# Patient Record
Sex: MALE | Race: WHITE | NOT HISPANIC OR LATINO | Employment: OTHER | URBAN - NONMETROPOLITAN AREA
[De-identification: names, ages, dates, MRNs, and addresses within clinical notes are randomized per-mention and may not be internally consistent; named-entity substitution may affect disease eponyms.]

---

## 2017-08-22 ENCOUNTER — HISTORY (OUTPATIENT)
Dept: INTERNAL MEDICINE | Facility: OTHER | Age: 77
End: 2017-08-22

## 2017-08-22 ENCOUNTER — OFFICE VISIT - GICH (OUTPATIENT)
Dept: INTERNAL MEDICINE | Facility: OTHER | Age: 77
End: 2017-08-22

## 2017-08-22 DIAGNOSIS — L57.8 OTHER SKIN CHANGES DUE TO CHRONIC EXPOSURE TO NONIONIZING RADIATION: ICD-10-CM

## 2017-08-22 DIAGNOSIS — C44.90 MALIGNANT NEOPLASM OF SKIN: ICD-10-CM

## 2017-08-22 ASSESSMENT — PATIENT HEALTH QUESTIONNAIRE - PHQ9: SUM OF ALL RESPONSES TO PHQ QUESTIONS 1-9: 0

## 2017-08-25 ENCOUNTER — AMBULATORY - GICH (OUTPATIENT)
Dept: SURGERY | Facility: OTHER | Age: 77
End: 2017-08-25

## 2017-08-25 ENCOUNTER — HISTORY (OUTPATIENT)
Dept: SURGERY | Facility: OTHER | Age: 77
End: 2017-08-25

## 2017-08-25 DIAGNOSIS — C44.90 MALIGNANT NEOPLASM OF SKIN: ICD-10-CM

## 2017-08-30 ENCOUNTER — AMBULATORY - GICH (OUTPATIENT)
Dept: SURGERY | Facility: OTHER | Age: 77
End: 2017-08-30

## 2017-08-30 DIAGNOSIS — C44.90 MALIGNANT NEOPLASM OF SKIN: ICD-10-CM

## 2017-09-01 ENCOUNTER — HISTORY (OUTPATIENT)
Dept: SURGERY | Facility: OTHER | Age: 77
End: 2017-09-01

## 2017-09-01 ENCOUNTER — OFFICE VISIT - GICH (OUTPATIENT)
Dept: SURGERY | Facility: OTHER | Age: 77
End: 2017-09-01

## 2017-09-01 DIAGNOSIS — C44.92 SQUAMOUS CELL CARCINOMA OF SKIN: ICD-10-CM

## 2017-09-08 ENCOUNTER — OFFICE VISIT - GICH (OUTPATIENT)
Dept: SURGERY | Facility: OTHER | Age: 77
End: 2017-09-08

## 2017-09-08 ENCOUNTER — HISTORY (OUTPATIENT)
Dept: SURGERY | Facility: OTHER | Age: 77
End: 2017-09-08

## 2017-09-08 DIAGNOSIS — C44.92 SQUAMOUS CELL CARCINOMA OF SKIN: ICD-10-CM

## 2017-09-08 DIAGNOSIS — L57.0 ACTINIC KERATOSIS: ICD-10-CM

## 2017-09-08 DIAGNOSIS — C44.519 BASAL CELL CARCINOMA OF SKIN OF OTHER PART OF TRUNK: ICD-10-CM

## 2017-10-03 ENCOUNTER — OFFICE VISIT - GICH (OUTPATIENT)
Dept: SURGERY | Facility: OTHER | Age: 77
End: 2017-10-03

## 2017-10-03 ENCOUNTER — HISTORY (OUTPATIENT)
Dept: SURGERY | Facility: OTHER | Age: 77
End: 2017-10-03

## 2017-10-03 DIAGNOSIS — L57.0 ACTINIC KERATOSIS: ICD-10-CM

## 2017-12-01 ENCOUNTER — HISTORY (OUTPATIENT)
Dept: INTERNAL MEDICINE | Facility: OTHER | Age: 77
End: 2017-12-01

## 2017-12-01 ENCOUNTER — OFFICE VISIT - GICH (OUTPATIENT)
Dept: INTERNAL MEDICINE | Facility: OTHER | Age: 77
End: 2017-12-01

## 2017-12-01 ENCOUNTER — AMBULATORY - GICH (OUTPATIENT)
Dept: ORTHOPEDICS | Facility: OTHER | Age: 77
End: 2017-12-01

## 2017-12-01 DIAGNOSIS — G89.29 OTHER CHRONIC PAIN: ICD-10-CM

## 2017-12-01 DIAGNOSIS — I10 ESSENTIAL (PRIMARY) HYPERTENSION: ICD-10-CM

## 2017-12-01 DIAGNOSIS — Z23 ENCOUNTER FOR IMMUNIZATION: ICD-10-CM

## 2017-12-01 DIAGNOSIS — R47.1 DYSARTHRIA AND ANARTHRIA: ICD-10-CM

## 2017-12-01 DIAGNOSIS — M25.512 PAIN IN LEFT SHOULDER: ICD-10-CM

## 2017-12-01 DIAGNOSIS — I67.9 CEREBROVASCULAR DISEASE: ICD-10-CM

## 2017-12-01 DIAGNOSIS — N13.8 OTHER OBSTRUCTIVE AND REFLUX UROPATHY: ICD-10-CM

## 2017-12-01 DIAGNOSIS — E78.5 HYPERLIPIDEMIA: ICD-10-CM

## 2017-12-01 DIAGNOSIS — I25.10 ATHEROSCLEROTIC HEART DISEASE OF NATIVE CORONARY ARTERY WITHOUT ANGINA PECTORIS: ICD-10-CM

## 2017-12-01 DIAGNOSIS — R73.09 OTHER ABNORMAL GLUCOSE: ICD-10-CM

## 2017-12-01 DIAGNOSIS — N40.1 ENLARGED PROSTATE WITH LOWER URINARY TRACT SYMPTOMS (LUTS): ICD-10-CM

## 2017-12-01 DIAGNOSIS — I25.84 CORONARY ATHEROSCLEROSIS DUE TO CALCIFIED CORONARY LESION (CODE): ICD-10-CM

## 2017-12-01 LAB
A/G RATIO - HISTORICAL: 1.8 (ref 1–2)
ALBUMIN SERPL-MCNC: 4.7 G/DL (ref 3.5–5.7)
ALP SERPL-CCNC: 65 IU/L (ref 34–104)
ALT (SGPT) - HISTORICAL: 17 IU/L (ref 7–52)
ANION GAP - HISTORICAL: 10 (ref 5–18)
AST SERPL-CCNC: 20 IU/L (ref 13–39)
BILIRUB SERPL-MCNC: 0.8 MG/DL (ref 0.3–1)
BUN SERPL-MCNC: 20 MG/DL (ref 7–25)
BUN/CREAT RATIO - HISTORICAL: 20
CALCIUM SERPL-MCNC: 9.2 MG/DL (ref 8.6–10.3)
CHLORIDE SERPLBLD-SCNC: 100 MMOL/L (ref 98–107)
CHOL/HDL RATIO - HISTORICAL: 3.03
CHOLESTEROL TOTAL: 209 MG/DL
CO2 SERPL-SCNC: 25 MMOL/L (ref 21–31)
CREAT SERPL-MCNC: 0.98 MG/DL (ref 0.7–1.3)
ESTIMATED AVERAGE GLUCOSE: 105 MG/DL
GFR IF NOT AFRICAN AMERICAN - HISTORICAL: >60 ML/MIN/1.73M2
GLOBULIN - HISTORICAL: 2.6 G/DL (ref 2–3.7)
GLUCOSE SERPL-MCNC: 106 MG/DL (ref 70–105)
HDLC SERPL-MCNC: 69 MG/DL (ref 23–92)
HEMOGLOBIN A1C MONITORING (POCT) - HISTORICAL: 5.3 % (ref 4–6.2)
LDLC SERPL CALC-MCNC: 119 MG/DL
NON-HDL CHOLESTEROL - HISTORICAL: 140 MG/DL
POTASSIUM SERPL-SCNC: 4.2 MMOL/L (ref 3.5–5.1)
PROT SERPL-MCNC: 7.3 G/DL (ref 6.4–8.9)
PROVIDER ORDERDED STATUS - HISTORICAL: ABNORMAL
PSA TOTAL (DIAGNOSTIC) - HISTORICAL: 2.96 NG/ML
SODIUM SERPL-SCNC: 135 MMOL/L (ref 133–143)
TRIGL SERPL-MCNC: 103 MG/DL

## 2017-12-04 ENCOUNTER — HISTORY (OUTPATIENT)
Dept: ORTHOPEDICS | Facility: OTHER | Age: 77
End: 2017-12-04

## 2017-12-04 ENCOUNTER — AMBULATORY - GICH (OUTPATIENT)
Dept: ORTHOPEDICS | Facility: OTHER | Age: 77
End: 2017-12-04

## 2017-12-04 ENCOUNTER — OFFICE VISIT - GICH (OUTPATIENT)
Dept: ORTHOPEDICS | Facility: OTHER | Age: 77
End: 2017-12-04

## 2017-12-04 ENCOUNTER — HOSPITAL ENCOUNTER (OUTPATIENT)
Dept: RADIOLOGY | Facility: OTHER | Age: 77
End: 2017-12-04
Attending: ORTHOPAEDIC SURGERY

## 2017-12-04 DIAGNOSIS — G89.29 OTHER CHRONIC PAIN: ICD-10-CM

## 2017-12-04 DIAGNOSIS — M75.42 IMPINGEMENT SYNDROME OF LEFT SHOULDER: ICD-10-CM

## 2017-12-04 DIAGNOSIS — M75.82 OTHER SHOULDER LESIONS, LEFT SHOULDER: ICD-10-CM

## 2017-12-04 DIAGNOSIS — M25.512 PAIN IN LEFT SHOULDER: ICD-10-CM

## 2017-12-04 DIAGNOSIS — M19.012 PRIMARY OSTEOARTHRITIS OF LEFT SHOULDER: ICD-10-CM

## 2017-12-12 ENCOUNTER — COMMUNICATION - GICH (OUTPATIENT)
Dept: ORTHOPEDICS | Facility: OTHER | Age: 77
End: 2017-12-12

## 2017-12-27 NOTE — PROGRESS NOTES
Patient Information     Patient Name MRN Sex Wilfrid Stewart [id number] Male 1940      Progress Notes by Nanette Marcelo DO at 2017  1:00 PM     Author:  Nanette Marcelo DO Service:  (none) Author Type:  PHYS- Osteopathic     Filed:  9/3/2017  8:29 PM Encounter Date:  2017 Status:  Signed     :  Nanette Marcelo DO (PHYS- Osteopathic)            Patient is doing well post-op from there recent skin lesion removal..   He has been having no nausea or vomiting; no chest pain, shortness of breath or coughing up anything. Patient has been urinating without any difficulties and moving bowel w/ no straining or bleeding. Patient has no calf pain swelling, tenderness, or redness. Patient has been sleeping at night with no problems. Patient has been ambulating without difficulty. Patient has been able to tolerate a regular diet. Patient has had good relief with previously prescribed pain meds.  The deep margin was +.  If reoccurs- than re-excise.    Patient has an additional lesion by the right lower corner of the lip- he is nonsmoker/nonchewer.  We treated this w/ Liquid N today.  Follow up in 1 weeks time.    /70  Pulse 68  Wt 72.5 kg (159 lb 12.8 oz)  BMI 27.43 kg/m2    HEENT: all negative  No jaundice.  No eye redness or pain.  No throat pain.  L: CTA b/l  Abdom: + BS. no distensions.  LOWER EXTREMITY: no swelling or calf pain  The incision(s) is clean dry and intact without redness, drainage or swelling or bleeding.   ?  Pathology: see Epic  ?  Pt doing well; with no complaints. Reviewed path report. Incision(s): Clean/dry/intact and healing nicely. Removed sutures and placed steri's.    Pt should keep the area clean and dry: wash with plain soap and water. Keep covered. Does not need to put anything on the lesions. May use abx ointment if desires. Avoid lifting Over 5 #'s x1 week. No strenuous activity or hot tubs/sauna's/pool/lake x1 week. Ice and NSAID's for pain. Monitor for  redness/drainage/swelling/increase in pain/temp > 101. May have serous drainage/should not be purulent. Call Clinic if these occur.  As far as heeling: may be red and firm for about 1-3 weeks. This is the normal heeling process and will smooth out to a silvery/white line. Avoid sun exposure X1 year. May take months for redness to dissipate. If is sun burnt, will stay red. Can use vit E oil.  Today we discussed wound care, activities, return to work, warnings signs. Pt is doing well.    If also have a few AK/SK that we have treated w/ liquid N.  These are healing nicely    The patient and I reviewed safe sun practices today: the importance of staying out of the sun; especially between 10-PM, wearing sun screen SPF > 50, and protective clothing. We also discussed the ABC's of skin cancers including: changes in size, uniformity, borders, coloration, bleeding, or any irregularity or changes. If these occur, seek medical attention. The patient should have a complete skin exam by a medical professional every 6 months, and any questionable/suspicious, or changing lesions should be removed.     .  -if lip lesion doesn't improve- refer to ENT      Patient Active Problem List     Diagnosis  Code     HYPERTENSION I10     BPH (benign prostatic hypertrophy) with urinary obstruction N40.1, N13.8     HYPERLIPIDEMIA E78.5     COLONIC POLYPS D12.6     ALCOHOL ABUSE F10.10     HYPERGLYCEMIA R73.09     Basal cell carcinoma of back C44.519     Coronary artery calcification I25.10, I25.84     Lumbar spinal stenosis M48.06     Cerebral microvascular disease I67.9     Dysarthria R47.1         Call the office if have any questions or concern's.  F/u with PCP for routine medical care.  Cobos not require further pain med's.  Will F/U :ashly Marcelo, DO

## 2017-12-27 NOTE — PROGRESS NOTES
Patient Information     Patient Name MRN Sex Wilfrid Stewart [id number] Male 1940      Progress Notes by Jimenez Colon MD at 2017  9:40 AM     Author:  Jimenez Colon MD Service:  (none) Author Type:  Physician     Filed:  2017  9:51 AM Encounter Date:  2017 Status:  Signed     :  Jimenez Colon MD (Physician)            SUBJECTIVE:    Wilfrid Meneses is a 77 y.o. male who presents for spot on his ear.    HPI Comments: He is in today for a few skin lesions. He has a couple on his right ear that he would like checked. He also has some scaly areas on his right forehead. Other than that he feels fine and has no complaints. It was noted today that his blood pressure looks excellent. He does have a previous history of basal cell carcinomas. He has no other complaints or concerns.      Allergies     Allergen  Reactions     Ace Inhibitors Cough     Septra [Sulfamethoxazole-Trimethoprim] Rash   ,   Current Outpatient Prescriptions     Medication  Sig     atenolol (TENORMIN) 50 mg tablet Take 1 tablet by mouth once daily.     multivitamin (MVI) tablet Take 1 tablet by mouth once daily.     simvastatin (ZOCOR) 40 mg tablet Take 1 tablet by mouth at bedtime.     valsartan-hydrochlorothiazide (DIOVAN HCT) 320-12.5 mg tablet Take 1 tablet by mouth once daily.     No current facility-administered medications for this visit.      Medications have been reviewed by me and are current to the best of my knowledge and ability. ,   Past Medical History:     Diagnosis  Date     Alcohol abuse, unspecified      BPH (benign prostatic hyperplasia)     Post prostatectomy      Coronary artery calcification     score 297 2010      Hx of adenomatous colonic polyps      Hyperglycemia      Hyperlipidemia      Hypertension      Lumbar spinal stenosis      Macrocytosis      presumed secondary to alcohol      Pulmonary HTN     on ECHO 2010      Stress ECHO 2010     negative for ischemia     and  "  Patient Active Problem List       Diagnosis  Date Noted     Cerebral microvascular disease  05/18/2016     Dysarthria  05/18/2016     Coronary artery calcification  12/01/2015     Lumbar spinal stenosis       Basal cell carcinoma of back  12/01/2014     HYPERGLYCEMIA  12/14/2011     ALCOHOL ABUSE  12/05/2011     HYPERTENSION       BPH (benign prostatic hypertrophy) with urinary obstruction       HYPERLIPIDEMIA       COLONIC POLYPS       History of colonic polyps, colonoscopy 1/01, follow up done 2006, normal            REVIEW OF SYSTEMS:  Review of Systems   All other systems reviewed and are negative.      OBJECTIVE:  /76  Pulse 68  Ht 1.626 m (5' 4\")  Wt 72.1 kg (159 lb)  BMI 27.29 kg/m2    EXAM:   Physical Exam   Constitutional: He is well-developed, well-nourished, and in no distress. No distress.   HENT:   Head:       Ears:    Skin: He is not diaphoretic.   Nursing note and vitals reviewed.      ASSESSMENT/PLAN:    ICD-10-CM    1. Skin cancer C44.90 AMB CONSULT TO GENERAL SURGEON   2. Actinic skin damage L57.8 PA DESTROY PREMALIGNANT 1ST LESION        Plan:  I treated basal cell carcinoma and needs to be removed. He is sent to surgery to have this done.the actinic lesions pect of the right           "

## 2017-12-28 ENCOUNTER — AMBULATORY - GICH (OUTPATIENT)
Dept: INTERNAL MEDICINE | Facility: OTHER | Age: 77
End: 2017-12-28

## 2017-12-28 NOTE — PROGRESS NOTES
Patient Information     Patient Name MRN Sex Wilfrid Stewart [id number] Male 1940      Progress Notes by Nanette Marcelo DO at 2017 12:00 PM     Author:  Nanette Marcelo DO Service:  (none) Author Type:  PHYS- Osteopathic     Filed:  2017  8:30 PM Encounter Date:  2017 Status:  Signed     :  Nanette Marcelo DO (PHYS- Osteopathic)            Patient is doing well post-op from there recent lesion removal.  He had a stitch that was still in place and we removed this.  He had several actinic keratosis/sk we treated.  particularly one at the angle of the right lower lip.   This is not healed over yet.  We need to re-evaluate this to make sure this is not a squamous lesion of the lip.     The other lesions are treated are healing nicely w/ no infections     /70  Pulse 66  Wt 72.7 kg (160 lb 4 oz)  BMI 27.51 kg/m2    HEENT: all negative  No jaundice.  No eye redness or pain.  No throat pain.  Benign tremor     The incision(s) is clean dry and intact without redness, drainage or swelling or bleeding.   ?  Pathology: see Epic  ?  Pt doing well; with no complaints. Reviewed path report. Incision(s): Clean/dry/intact and healing nicely.    Pt should keep the area clean and dry: wash with plain soap and water. Keep covered. Does not need to put anything on the lesions. May use abx ointment if desires. Avoid lifting Over 5 #'s x1 week. No strenuous activity or hot tubs/sauna's/pool/lake x1 week. Ice and NSAID's for pain. Monitor for redness/drainage/swelling/increase in pain/temp > 101. May have serous drainage/should not be purulent. Call Clinic if these occur.  As far as heeling: may be red and firm for about 1-3 weeks. This is the normal heeling process and will smooth out to a silvery/white line. Avoid sun exposure X1 year. May take months for redness to dissipate. If is sun burnt, will stay red. Can use vit E oil.  Today we discussed wound care, activities, return to work,  warnings signs. Pt is doing well.    The patient and I reviewed safe sun practices today: the importance of staying out of the sun; especially between 10-PM, wearing sun screen SPF > 50, and protective clothing. We also discussed the ABC's of skin cancers including: changes in size, uniformity, borders, coloration, bleeding, or any irregularity or changes. If these occur, seek medical attention. The patient should have a complete skin exam by a medical professional every 6 months, and any questionable/suspicious, or changing lesions should be removed.           Patient Active Problem List     Diagnosis  Code     HYPERTENSION I10     BPH (benign prostatic hypertrophy) with urinary obstruction N40.1, N13.8     HYPERLIPIDEMIA E78.5     COLONIC POLYPS D12.6     ALCOHOL ABUSE F10.10     HYPERGLYCEMIA R73.09     Basal cell carcinoma of back C44.519     Coronary artery calcification I25.10, I25.84     Lumbar spinal stenosis M48.06     Cerebral microvascular disease I67.9     Dysarthria R47.1     Squamous cell skin cancer C44.92         Call the office if have any questions or concern's.  F/u with PCP for routine medical care.  Cobos not require further pain med's.  Will F/U :1st week in October           Nanette Marcelo, DO

## 2017-12-28 NOTE — PROGRESS NOTES
Patient Information     Patient Name MRN Sex Wilfrid Stewart [id number] Male 1940      Progress Notes by Nanette Marcelo DO at 2017  1:50 PM     Author:  Nanette Marcelo DO Service:  (none) Author Type:  PHYS- Osteopathic     Filed:  2017  2:50 PM Encounter Date:  2017 Status:  Signed     :  Nanette Marcelo DO (PHYS- Osteopathic)            Clinic Procedure Note      PMx, PSx, and social Hx are reviewed and updated in [facility]    Current Outpatient Prescriptions on File Prior to Visit       Medication  Sig Dispense Refill     atenolol (TENORMIN) 50 mg tablet Take 1 tablet by mouth once daily. 90 tablet 3     multivitamin (MVI) tablet Take 1 tablet by mouth once daily.       simvastatin (ZOCOR) 40 mg tablet Take 1 tablet by mouth at bedtime. 90 tablet 3     valsartan-hydrochlorothiazide (DIOVAN HCT) 320-12.5 mg tablet Take 1 tablet by mouth once daily. 90 tablet 3     No current facility-administered medications on file prior to visit.        Allergies      Allergen   Reactions     Ace Inhibitors  Cough     Hydrocodone-Acetaminophen  Insomnia     Keeps him awake       Septra [Sulfamethoxazole-Trimethoprim]  Rash     Sulfasalazine  Rash         No visits with results within 90 Day(s) from this visit.  Latest known visit with results is:    Office Visit on 12/15/2016      Component  Date Value     SODIUM 12/15/2016 134      POTASSIUM 12/15/2016 4.6      CHLORIDE 12/15/2016 98      CO2,TOTAL 12/15/2016 26      ANION GAP 12/15/2016 10      GLUCOSE 12/15/2016 116*     CALCIUM 12/15/2016 8.8      BUN 12/15/2016 17      CREATININE 12/15/2016 1.03      BUN/CREAT RATIO           12/15/2016 17      GFR if  12/15/2016 >60      GFR if not  [employer]* 12/15/2016 >60      ALBUMIN 12/15/2016 4.5      PROTEIN,TOTAL 12/15/2016 6.8      GLOBULIN                  12/15/2016 2.3      A/G RATIO 12/15/2016 2.0      BILIRUBIN,TOTAL 12/15/2016 0.8      ALK PHOSPHATASE 12/15/2016 54       ALT (SGPT) 12/15/2016 17      AST (SGOT) 12/15/2016 21      CHOLESTEROL,TOTAL 12/15/2016 195      TRIGLYCERIDES 12/15/2016 75      HDL CHOLESTEROL 12/15/2016 68      NON-HDL CHOLESTEROL 12/15/2016 127      CHOL/HDL RATIO            12/15/2016 2.87      LDL CHOLESTEROL 12/15/2016 112*     PATIENT STATUS            12/15/2016 FASTING      PSA TOTAL (DIAGNOSTIC) 12/15/2016 3.026      HEMOGLOBIN A1C MONITORIN* 12/15/2016 6.0      ESTIMATED AVERAGE GLUCOS* 12/15/2016 126      VITAMIN B12 12/15/2016 735          Any imagining associated with this vist was reviewed.      Indication  Wilfrid Meneses is seen at the request of the Jimenez Colon MD.  Wilfrid Meneses  presents for lesion removal. We have discussed this procedure, including option  of not performing surgery, technique of surgery and potential for  bleeding/infection/scarring/need for removal of more tissue and post-procedural care.  Patient is able to do post procedural dressing changes and understands what is  expected.    OBJECTIVE:    Wilfrid Meneses appears well. Vitals are normal. The patient has no allergies to lidocaine or  suture material. The patient not on any blood thinners.  Informed consent was obtained prior to beginning the procedure. The area was marked  and doubled checked/ID ed with the pt. PAUSE for the CAUSE completed. The risks of  lesion removal include but are not limited to: bleeding, infection, scarring, reoccurrence,  and the need for removal of more tissue, and complications of anesthesia.    Location  ASSESSMENT: The lesion is 0.5Cm in size. Color:reddish   Borders: irreg  Differential diagnosis includes:skin cancer vs sk    Location: right extremity ear     Procedural Sedation  1% lidocaine w/ Epinephrine  2 cc    Technique  Patient appears well. Vitals are normal. The patient has no allergies to lidocaine or  suture material. The patient not on any blood thinners.  Wilfrid Meneses has no history of keloids or problems with healing  in the past.    Skin: see HPI    Informed consent was obtained prior to beginning the procedure. The area was marked  and doubled checked/ID ed with the pt. PAUSE for the CAUSE completed. The risks of  lesion removal include but are not limited to: bleeding, infection, scarring, reoccurrence,  and the need for removal of more tissue, and complications of anesthesia.  After informed consent was obtained, using Chloroprep for cleansing and 1%   Lidocaine w/ epi for anesthetic; using sterile technique, PROCEDURE:excision  was performed.    The lesion is 0.5cm in size. The incision was 0.6Cm long.  Closure:4-0 prolen  of  simple single interrupted stitches.     An Antibiotic salve and steriledressing is applied, and wound care instructions provided. The procedure was well tolerated without complications.    Plan:  The patient will follow up in 7- 10  days for suture removal and review of pathology. If thereis any bleeding/redness/drainage/swelling/pain or tenderness- call the clinic. Patient  was given instructions in wound care, acivity, warning signs, appointment for follow up.  If the patient has any questions or concerns, should call our clinic or go to ER/urgent  care after hours. Patient expressed understanding in directions for care, and was given a  written copy of instructions.    Rx=see EPIC    Pt tolerated the procedure well without complications  Patient was given instruction in activity restrictions, wound care and dressing changes. Medication usage, diet, warning signs to look for (and what to do if they occur), and an appointment for follow-up.      Caring for Your Incision      You ll need to help care for your incision after surgery and certain medical procedures. To close an incision, your healthcare provider used stitches (sutures), special strips of surgical tape called Steri-Strips, surgical staples, or surgical skin glue. Follow the tips on this sheet to help stop bleeding, speed healing, and  prevent infection of your incision.      Types of incision closures    Surgical stitches (sutures) are placed by sewing the edges of an incision together with surgical thread. Sutures are either absorbable or non-absorbable. Absorbable sutures break down in the body over time. Non-absorbable sutures need to be removed.    Home care  Always wash your hands before touching your incision.  Keep the incision clean, dry, and out of water, keep the incision out of water.  Do not to pick at the scabs. Scabs help protect the wound.  You can take a shower in 24 hours and wash the incision with soap and water. Pat dry/don t scrub. It s OK to wash around the incision. But don t spray water directly on it.  Pat stitches dry if they get wet. Don't rub.  Check the incision site daily for pain, redness, drainage, swelling, or separation of the incision edges.  If there is a bandage (dressing) over the incision, leave the dressing in place until you are told to remove it or change it. Using clean hands change the dressing as directed by your healthcare provider. Always wash your hands before changing your dressing.  Make sure any clothing that touches the incision is loose-fitting. This will prevent rubbing.   Try to avoid from rough play, contact sports, or physical activities. This can put you at risk of opening the incision.  Make sure you avoid doing things that could cause dirt or sweat to get in or on the incision.  As your incision heals, the skin may appear pink or red. It may also feel slightly bumpy or raised. This is called a healing ridge. Over time, the color should fade and the raised skin will become less noticeable.    Care for specific closures  Follow these guidelines unless your healthcare provider tells you otherwise:  Sutures or staples. Once you no longer need to keep these dry, clean the incision or wound daily. First remove the bandage using clean hands. Then wash the area gently with soap and warm water.  Use a wet cotton swab to loosen and remove any blood or crust that forms. After cleaning, put a thin layer of antibiotic ointment on. Then put on a new bandage.      Pain Control    Use ice!  Ice keeps the swelling down and swelling is what causes pain.  Never apply ice directly to the skin.  Wrap it in a towel or cloth.  Apply ice 20 minutes on and 20 minutes off for pain control.  Use as needed.    Take tylenol 325 mg by mouth with food every 4 hours as needed for pain. Or ibuprofen 400 mg by mouth with food every 4 hours as needed for pain.  Do not take tylenol if you have a history of heavy drinking , hepatits C or liver problems.  Do not take ibuprofen if you have a history of stomach ulcers/problems, bleeding problem or kidney issues.           Follow-up care      Follow up with your healthcare provider to ask how long sutures or staples should be left in place. Be sure to return for suture or staple removal as directed. If dissolving stitches were used in your mouth, these will not need to be removed. They should fall out or dissolve on their own.  If tape closures were used, remove them yourself when your healthcare provider tells you to if they have not fallen off on their own. If skin glue was used to close your incision, the glue will wear off by itself.    When to seek medical care  Call your healthcare provider right away if you has any of these:  More pain, redness, swelling, bleeding, or foul-smelling discharge around the incision area  Fever of 101 F (38.3 C) or higher, or as directed by your  healthcare provider  Shaking chills  Vomiting or nausea that doesn t go away  Numbness, coldness, or tingling around the incision area, or changes in skin color  Opening of the sutures or wound  Stitches or staples come apart or fall out or surgical tape falls off before 7 days, or as directed by your healthcare provider

## 2017-12-28 NOTE — PATIENT INSTRUCTIONS
Patient Information     Patient Name MRN Sex Wilfrid Stewart [id number] Male 1940      Patient Instructions by Nanette Marcelo DO at 2017  1:00 PM     Author:  Nanette Marcelo DO  Service:  (none) Author Type:  PHYS- Osteopathic     Filed:  9/3/2017  8:29 PM  Encounter Date:  2017 Status:  Addendum     :  Nanette Marcelo DO (PHYS- Osteopathic)        Related Notes: Original Note by Nanette Marcelo DO (PHYS- Osteopathic) filed at 9/3/2017  8:24 PM            Pt should keep the area clean and dry: wash with plain soap and water. Keep covered. Does not need to put anything on the lesions. May use abx ointment if desires. Avoid lifting Over 5 #'s x1 week. No strenuous activity or hot tubs/sauna's/pool/lake x1 week. Ice and NSAID's for pain. Monitor for redness/drainage/swelling/increase in pain/temp > 101. May have serous drainage/should not be purulent. Call Clinic if these occur.  As far as heeling: may be red and firm for about 1-3 weeks. This is the normal heeling process and will smooth out to a silvery/white line. Avoid sun exposure X1 year. May take months for redness to dissipate. If is sun burnt, will stay red. Can use vit E oil.  Today we discussed wound care, activities, return to work, warnings signs. Pt is doing well.    reoccurs, return for re excision  -follow up in 1 weeks time to check lip lesion     -The patient and I reviewed safe sun practices today: the importance of staying out of the sun; especially between 10-PM, wearing sun screen SPF > 50, and protective clothing. We also discussed the ABC's of skin cancers including: changes in size, uniformity, borders, coloration, bleeding, or any irregularity or changes. If these occur, seek medical attention. The patient should have a complete skin exam by a medical professional every 6 months, and any questionable/suspicious, or changing lesions should be removed.

## 2017-12-28 NOTE — PROGRESS NOTES
Patient Information     Patient Name MRN Sex Wilfrid Stewart [id number] Male 1940      Progress Notes by Maria Luisa Dowell MD at 10/3/2017  1:20 PM     Author:  Maria Luisa Dowell MD Service:  (none) Author Type:  Physician     Filed:  10/3/2017  1:46 PM Encounter Date:  10/3/2017 Status:  Signed     :  Maria Luisa Dowell MD (Physician)            Patient presents for follow up of excision of skin lesion from his right ear and cryo of lesion on his lower lip. He notes a spot on the back of his head that is rough and scaly. The previously treated lesions have resolved and no nodules or sores have started again.  /78  Pulse 60  Wt 74.6 kg (164 lb 6.4 oz)  BMI 28.22 kg/m2  General: no acute distress  Skin: right ear with scar and no nodularity or ulceration. Right lower lip with no area of rough or scaly skin. Posterior scalp with area of hyperpigmentation and just superior to that is a crescent shaped area of rough, scaly pink skin consistent with AK.  A: actinic keratosis of scalp  Plan  We discussed cryo therapy of the scalp lesion. We specifically discussed the risks of infection, discoloration and the possible need for further treatments. The patient expressed understanding and the patient wishes to proceed. Informed consent paperwork was completed.     Procedure:  The area of the skin lesion on the posterior scalp was treated with liquid nitrogen for 2 freeze thaw cycles. The patient tolerated the procedure with no immediately apparent complications. We reviewed discharge instructions. The patient will call for any concerns. The patient will follow up in 6 weeks if the lesion is not completely resolved. The patient denies questions at this time.

## 2017-12-29 NOTE — PATIENT INSTRUCTIONS
Patient Information     Patient Name MRN Sex Wilfrid Stewart [id number] Male 1940      Patient Instructions by Nanette Marcelo DO at 2017  1:50 PM     Author:  Nanette Marcelo DO Service:  (none) Author Type:  PHYS- Osteopathic     Filed:  2017  3:06 PM Encounter Date:  2017 Status:  Signed     :  Nanette Marcelo DO (PHYS- Osteopathic)            Caring for Your Incision      You ll need to help care for your incision after surgery and certain medical procedures. To close an incision, your healthcare provider used stitches (sutures), special strips of surgical tape called Steri-Strips, surgical staples, or surgical skin glue. Follow the tips on this sheet to help stop bleeding, speed healing, and prevent infection of your incision.      Pain Control    Use ice!  Ice keeps the swelling down and swelling is what causes pain.  Never apply ice directly to the skin.  Wrap it in a towel or cloth.  Apply ice 20 minutes on and 20 minutes off for pain control.  Use as needed.    Take tylenol 325 mg by mouth with food every 4 hours as needed for pain. Or ibuprofen 400 mg by mouth with food every 4 hours as needed for pain.  Do not take tylenol if you have a history of heavy drinking , hepatits C or liver problems.  Do not take ibuprofen if you have a history of stomach ulcers/problems, bleeding problem or kidney issues.     Types of incision closures    Surgical stitches (sutures) are placed by sewing the edges of an incision together with surgical thread. Sutures are either absorbable or non-absorbable. Absorbable sutures break down in the body over time. Non-absorbable sutures need to be removed.    Home care  Always wash your hands before touching your incision.  Keep the incision clean, dry, and out of water, keep the incision out of water.  Do not to pick at the scabs. Scabs help protect the wound.  You can take a shower in 24 hours and wash the incision with soap and water. Pat  dry/don t scrub. It s OK to wash around the incision. But don t spray water directly on it.  Pat stitches dry if they get wet. Don't rub.  Check the incision site daily for pain, redness, drainage, swelling, or separation of the incision edges.  If there is a bandage (dressing) over the incision, change this every 24 hours as instructed by your provider. Using clean hands change the dressing as directed by your healthcare provider. Always wash your hands before changing your dressing.  Make sure any clothing that touches the incision is loose-fitting. This will prevent rubbing. If the incision is on the head, keep your child from wearing caps or other head coverings. These may rub against the incision.  Try to avoid from rough play, contact sports, or physical activities for two weeks. This can put you at risk of opening the incision.  Make sure you avoid doing things that could cause dirt or sweat to get in or on the incision.  As your incision heals, the skin may appear pink or red. It may also feel slightly bumpy or raised. This is called a healing ridge. Over time, the color should fade and the raised skin will become less noticeable.    Care for specific closures  :  Sutures or staples. Once you no longer need to keep these dry, clean the incision or wound daily, generally after the first 24 hours.  First remove the bandage using clean hands. Then wash the area gently with soap and warm water. Use a wet cotton swab to loosen and remove any blood or crust that forms. After cleaning, put a thin layer of antibiotic ointment on. Then put on a new bandage.      Follow-up care  Staples or sutures generally need to be removed in 7-10 days.  5 days for the face.   Be sure to return for suture or staple removal as directed. If dissolving stitches were used in your mouth, these will not need to be removed. They should fall out or dissolve on their own.  If tape closures were used, remove them yourself when your  healthcare provider tells you to if they have not fallen off on their own.     When to seek medical care  Call your healthcare provider right away if you have any of these:  More pain, redness, swelling, bleeding, or foul-smelling discharge around the incision area  Fever of 101 F (38.3 C) or higher, or as directed by your child's healthcare provider  Shaking chills  Vomiting or nausea that doesn t go away  Numbness, coldness, or tingling around the incision area, or changes in skin color  Opening of the sutures or wound  Stitches or staples come apart or fall out or surgical tape falls off before 7 days, or as directed by your healthcare provider         What to Expect Following Cryosurgery (Liquid Nitrogen)   What is Cryosurgery?   Cryosurgery is a technique for removing skin lesions that primarily involve the surface of the skin, such as warts, seborrheic keratosis, or actinic keratosis. It is a quick method of removing the lesion with minimal scarring.   The liquid nitrogen needs to be applied long enough to freeze the affected skin. By freezing the skin, a blister is created underneath the lesion. Ideally, as the new skin forms underneath the blister, the abnormal skin on the roof of the blister peels off. Occasionally, if the lesion is very thick (such as a large wart), only the surface is blistered off. The base or residual lesion may need to be frozen at another visit.   It takes about one to two weeks for the scab to fall off, which is when the new layer of skin has formed under the blister. Areas of thinner skin, such as the face, may heal a little faster.     What to Expect Over the Next Few Weeks     During Treatment - Area being treated will sting, burn, and then possibly itch.     Immediately After Treatment - Area will be red, sore, and swollen.     Next Day - Blister or blood blister has formed, tenderness starts to subside. Apply a Band-Aid if   necessary.     7 Days - Surface is dark red/brown and  scab-like. Apply Vaseline  or an antibacterial ointment, such as Polysporin , if necessary.     2 to 4 Weeks - The surface starts to peel off. This may be encouraged gently during bathing, when the scab is softened.     No makeup should be applied until area is fully healed.     How to Take Care of the Skin after Cryosurgery     A Band-Aid can be used for larger blisters or blisters in areas that are more likely to be traumatized -such as fingers and toes. If the area becomes dry or crusted, an ointment (Vaseline , Bacitracin , or Polysporin ) can also be applied.     Cleanse area with a mild cleanser  and cool water.     Pat the area dry with a lint-free cloth and apply an ointment (Vaseline , Bacitracin , or Polysporin ).     Avoid glycolic acids, Vitamin C, scrubs, Tretinoin (Retin-A), and Retinol creams for 7 to 10 days.     If approved by your Provider, you may bathe, swim, exercise, and otherwise follow all of your normal activities. How to Take Care of the Skin after Cryosurgery - Continued     The area may get wet while bathing, but swimming or hot tub use should be avoided for one week following a treatment or while the skin is open.     Within 24 hours, you can expect the area to be swollen and/or blistered. The blister may not be visible to the naked eye.     Within one week, the swelling goes down. The top becomes dark red and scab-like. The scab will loosen over the next weeks, and should fall off within one month.     Adverse Effects     The most common adverse effects are pain, swelling/blistering, potential for infection, and pale discoloration of the skin after it heals.         Blisters       Anytime a blister surfaces, whether from ill-fitting shoes, an oven burn, or liquid nitrogen cryosurgery, it will be a bit painful. For most patients, the pain is a temporary sting with some discomfort periodically over the next day as the blister forms.     The goal is to achieve a blister. This means, most  commonly, patients will have a blister form following treatment. Sometimes, the blister is so thin that it can't be seen and may have minimal swelling. Occasionally, a blood blister forms that can be quite dramatic but is harmless.     Rarely, the blister may become infected. When this happens, the blister becomes unusually tender, the fluid becomes cloudy, and the redness around it becomes more extensive (and may even form streaks). If this happens, contact our office.     Some lesions, especially those on the face, may leave a slight pale discoloration.     True scarring, involving deeper layers of the skin is unlikely.     If you have any questions or concerns, please contact our office at [phone]

## 2017-12-29 NOTE — PATIENT INSTRUCTIONS
Patient Information     Patient Name MRN Wilfrid Anderson [id number] Male 1940      Patient Instructions by Maria Luisa Dowell MD at 10/3/2017  1:20 PM     Author:  Maria Luisa Dowell MD Service:  (none) Author Type:  Physician     Filed:  10/3/2017  1:39 PM Encounter Date:  10/3/2017 Status:  Signed     :  Maria Luisa Dowell MD (Physician)            What to Expect Following Cryosurgery (Liquid Nitrogen)   What is Cryosurgery?   Cryosurgery is a technique for removing skin lesions that primarily involve the surface of the skin, such as warts, seborrheic keratosis, or actinic keratosis. It is a quick method of removing the lesion with minimal scarring.   The liquid nitrogen needs to be applied long enough to freeze the affected skin. By freezing the skin, a blister is created underneath the lesion. Ideally, as the new skin forms underneath the blister, the abnormal skin on the roof of the blister peels off. Occasionally, if the lesion is very thick (such as a large wart), only the surface is blistered off. The base or residual lesion may need to be frozen at another visit.   It takes about one to two weeks for the scab to fall off, which is when the new layer of skin has formed under the blister. Areas of thinner skin, such as the face, may heal a little faster.      What to Expect Over the Next Few Weeks     During Treatment - Area being treated will sting, burn, and then possibly itch.     Immediately After Treatment - Area will be red, sore, and swollen.     Next Day - Blister or blood blister has formed, tenderness starts to subside. Apply a Band-Aid if necessary.     7 Days - Surface is dark red/brown and scab-like. You can apply an antibacterial ointment, such as Polysporin , but you don't have to.     2 to 4 Weeks - The surface starts to peel off. This may be encouraged gently during bathing, when the scab is softened.     No makeup should be applied until area is fully healed.      How to Take Care  of the Skin after Cryosurgery   A Band-Aid can be used for larger blisters or blisters in areas that are more likely to be traumatized -such as fingers and toes. If the area becomes dry or crusted, an ointment (Vaseline , Bacitracin , or Polysporin ) can also be applied.   Cleanse area with a mild cleanser and cool water.   Pat the area dry with a lint-free cloth.   Avoid glycolic acids, Vitamin C, scrubs, Tretinoin (Retin-A), and Retinol creams for 7 to 10 days.  The area may get wet while bathing, but swimming or hot tub use should be avoided for one week following a treatment or while the skin is open.   Within 24 hours, you can expect the area to be swollen and/or blistered. The blister may not be visible to the naked eye.   Within one week, the swelling goes down. The top becomes dark red and scab-like. The scab will loosen over the next weeks, and should fall off within one month.      Adverse Effects   The most common adverse effects are pain, swelling/blistering, potential for infection, and discoloration of the skin after it heals.     Blisters  Anytime a blister surfaces, whether from ill-fitting shoes, an oven burn, or liquid nitrogen cryosurgery, it will be a bit painful. For most patients, the pain is a temporary sting with some discomfort periodically over the next day as the blister forms.   The goal is to achieve a blister. This means, most commonly, patients will have a blister form following treatment. Sometimes, the blister is so thin that it can't be seen and may have minimal swelling. Occasionally, a blood blister forms that can be quite dramatic but is harmless.   Rarely, the blister may become infected. When this happens, the blister becomes unusually tender, the fluid becomes cloudy, and the redness around it becomes more extensive (and may even form streaks). If this happens, contact our office.   Some lesions, especially those on the face, may leave a slight pale discoloration.   True  scarring, involving deeper layers of the skin is unlikely.

## 2017-12-30 NOTE — NURSING NOTE
Patient Information     Patient Name MRN Sex Wilfrid Stewart [id number] Male 1940      Nursing Note by Isis Brady at 2017  1:50 PM     Author:  Isis Brady Service:  (none) Author Type:  (none)     Filed:  2017  2:13 PM Encounter Date:  2017 Status:  Signed     :  Isis Brady            Here today with a lesion on his right ear.  Isis Brady LPN..........2017  2:11 PM

## 2017-12-30 NOTE — NURSING NOTE
Patient Information     Patient Name MRN Sex Wilfrid Stewart [id number] Male 1940      Nursing Note by Isis Brady at 2017  1:50 PM     Author:  Isis Brady Service:  (none) Author Type:  (none)     Filed:  2017  2:54 PM Encounter Date:  2017 Status:  Signed     :  Isis Brady            Universal Protocol    A. Pre-procedure verification complete yes  1-relevant information / documentation available, reviewed and properly matched to the patient; 2-consent accurate and complete, 3-equipment and supplies available    B. Site marking complete Yes  Site marked if not in continuous attendance with patient    C. TIME OUT completed yes  Time Out was conducted just prior to starting procedure to verify the eight required elements: 1-patient identity, 2-consent accurate and complete, 3-position, 4-correct side/site marked (if applicable), 5-procedure, 6-relevant images / results properly labeled and displayed (if applicable), 7-antibiotics / irrigation fluids (if applicable), 8-safety precautions.    Isis Brady LPN..........2017  2:54 PM

## 2017-12-30 NOTE — NURSING NOTE
Patient Information     Patient Name MRN Wilfrid Anderson [id number] Male 1940      Nursing Note by Kortney Chapman LPN at 2017  9:40 AM     Author:  Kortney Chapman LPN Service:  (none) Author Type:  NURS- Licensed Practical Nurse     Filed:  2017  9:37 AM Encounter Date:  2017 Status:  Signed     :  Kortney Chapman LPN (NURS- Licensed Practical Nurse)            The patient is here today to have a spot looked at on his right ear.  Kortney Chapman LPN......2017  9:31 AM

## 2017-12-30 NOTE — NURSING NOTE
Patient Information     Patient Name MRN Sex Wilfrid Stewart [id number] Male 1940      Nursing Note by Lexus Barajas at 10/3/2017  1:20 PM     Author:  Lexus Barajas Service:  (none) Author Type:  (none)     Filed:  10/3/2017  1:10 PM Encounter Date:  10/3/2017 Status:  Signed     :  Lexus Barajas            Patient is here today to re-check his skin lesions.  Lexus Barajas LPN.......................... 10/3/2017  1:08 PM

## 2017-12-30 NOTE — NURSING NOTE
Patient Information     Patient Name MRN Sex Wilfrid Stewart [id number] Male 1940      Nursing Note by Lexus Barajas at 2017  1:00 PM     Author:  Lexus Barajas Service:  (none) Author Type:  (none)     Filed:  2017  1:10 PM Encounter Date:  2017 Status:  Signed     :  Lexus Barajas            Patient is here today for a suture removal from his right ear.  Lexus Barajas LPN.......................... 2017  1:07 PM

## 2018-01-09 ENCOUNTER — AMBULATORY - GICH (OUTPATIENT)
Dept: SCHEDULING | Facility: OTHER | Age: 78
End: 2018-01-09

## 2018-01-16 PROBLEM — M48.061 LUMBAR SPINAL STENOSIS: Status: ACTIVE | Noted: 2018-01-16

## 2018-01-16 PROBLEM — M19.012 ARTHRITIS OF LEFT ACROMIOCLAVICULAR JOINT: Status: ACTIVE | Noted: 2017-12-04

## 2018-01-16 PROBLEM — M19.012 OSTEOARTHRITIS OF LEFT GLENOHUMERAL JOINT: Status: ACTIVE | Noted: 2017-12-04

## 2018-01-16 PROBLEM — N13.8 BENIGN PROSTATIC HYPERPLASIA WITH URINARY OBSTRUCTION: Status: ACTIVE | Noted: 2018-01-16

## 2018-01-16 PROBLEM — E78.5 HYPERLIPIDEMIA: Status: ACTIVE | Noted: 2018-01-16

## 2018-01-16 PROBLEM — L57.0 AK (ACTINIC KERATOSIS): Status: ACTIVE | Noted: 2017-09-09

## 2018-01-16 PROBLEM — N40.1 BENIGN PROSTATIC HYPERPLASIA WITH URINARY OBSTRUCTION: Status: ACTIVE | Noted: 2018-01-16

## 2018-01-16 PROBLEM — I10 HYPERTENSION: Status: ACTIVE | Noted: 2018-01-16

## 2018-01-16 PROBLEM — C44.92 SQUAMOUS CELL SKIN CANCER: Status: ACTIVE | Noted: 2017-09-03

## 2018-01-16 PROBLEM — D12.6 BENIGN NEOPLASM OF COLON: Status: ACTIVE | Noted: 2018-01-16

## 2018-01-16 PROBLEM — M75.82 TENDINITIS OF LEFT ROTATOR CUFF: Status: ACTIVE | Noted: 2017-12-04

## 2018-01-16 PROBLEM — M75.42 IMPINGEMENT SYNDROME OF LEFT SHOULDER: Status: ACTIVE | Noted: 2017-12-04

## 2018-01-16 RX ORDER — ATENOLOL 50 MG/1
50 TABLET ORAL
COMMUNITY
Start: 2017-12-01 | End: 2018-09-24

## 2018-01-16 RX ORDER — DIPHENOXYLATE HYDROCHLORIDE AND ATROPINE SULFATE 2.5; .025 MG/1; MG/1
1 TABLET ORAL
COMMUNITY
End: 2018-09-24

## 2018-01-16 RX ORDER — VALSARTAN AND HYDROCHLOROTHIAZIDE 320; 12.5 MG/1; MG/1
1 TABLET, FILM COATED ORAL
COMMUNITY
Start: 2017-12-01 | End: 2018-09-24

## 2018-01-16 RX ORDER — SIMVASTATIN 40 MG
40 TABLET ORAL
COMMUNITY
Start: 2017-12-01 | End: 2018-09-24

## 2018-01-24 ENCOUNTER — HISTORY (OUTPATIENT)
Dept: ONCOLOGY | Facility: OTHER | Age: 78
End: 2018-01-24

## 2018-01-24 ENCOUNTER — OFFICE VISIT - GICH (OUTPATIENT)
Dept: ONCOLOGY | Facility: OTHER | Age: 78
End: 2018-01-24

## 2018-01-24 ENCOUNTER — TRANSFERRED RECORDS (OUTPATIENT)
Dept: HEALTH INFORMATION MANAGEMENT | Facility: HOSPITAL | Age: 78
End: 2018-01-24

## 2018-01-24 DIAGNOSIS — D68.9 COAGULATION DEFECT (H): ICD-10-CM

## 2018-01-24 LAB
A/G RATIO - HISTORICAL: 2.1 (ref 1–2)
ABSOLUTE BASOPHILS - HISTORICAL: 0 THOU/CU MM
ABSOLUTE EOSINOPHILS - HISTORICAL: 0.1 THOU/CU MM
ABSOLUTE IMMATURE GRANULOCYTES(METAS,MYELOS,PROS) - HISTORICAL: 0 THOU/CU MM
ABSOLUTE LYMPHOCYTES - HISTORICAL: 1.4 THOU/CU MM (ref 0.9–2.9)
ABSOLUTE MONOCYTES - HISTORICAL: 0.4 THOU/CU MM
ABSOLUTE NEUTROPHILS - HISTORICAL: 2.9 THOU/CU MM (ref 1.7–7)
ALBUMIN SERPL-MCNC: 4.8 G/DL (ref 3.5–5.7)
ALP SERPL-CCNC: 70 IU/L (ref 34–104)
ALT (SGPT) - HISTORICAL: 15 IU/L (ref 7–52)
ANION GAP - HISTORICAL: 11 (ref 5–18)
APTT PPP: 31 SEC (ref 26–39)
AST SERPL-CCNC: 21 IU/L (ref 13–39)
BASOPHILS # BLD AUTO: 0.4 %
BILIRUB SERPL-MCNC: 0.7 MG/DL (ref 0.3–1)
BUN SERPL-MCNC: 17 MG/DL (ref 7–25)
BUN/CREAT RATIO - HISTORICAL: 18
CALCIUM SERPL-MCNC: 9.6 MG/DL (ref 8.6–10.3)
CHLORIDE SERPLBLD-SCNC: 96 MMOL/L (ref 98–107)
CHROMOGENIC FACTOR 10: 111 % (ref 60–120)
CO2 SERPL-SCNC: 26 MMOL/L (ref 21–31)
CREAT SERPL-MCNC: 0.93 MG/DL (ref 0.7–1.3)
D-DIMER, QUANTITATIVE NG/ML - HISTORICAL: <200 NG/ML
EOSINOPHIL NFR BLD AUTO: 1.4 %
ERYTHROCYTE [DISTWIDTH] IN BLOOD BY AUTOMATED COUNT: 11.9 % (ref 11.5–15.5)
GFR IF NOT AFRICAN AMERICAN - HISTORICAL: >60 ML/MIN/1.73M2
GLOBULIN - HISTORICAL: 2.3 G/DL (ref 2–3.7)
GLUCOSE SERPL-MCNC: 101 MG/DL (ref 70–105)
HCT VFR BLD AUTO: 41.4 % (ref 37–53)
HEMOGLOBIN: 14.5 G/DL (ref 13.5–17.5)
IMMATURE GRANULOCYTES(METAS,MYELOS,PROS) - HISTORICAL: 0.4 %
INR - HISTORICAL: 0.9
LDH SERPL-CCNC: 170 IU/L (ref 140–271)
LYMPHOCYTES NFR BLD AUTO: 28.9 % (ref 20–44)
Lab: 15 SEC
MCH RBC QN AUTO: 36.2 PG (ref 26–34)
MCHC RBC AUTO-ENTMCNC: 35 G/DL (ref 32–36)
MCV RBC AUTO: 103 FL (ref 80–100)
MISCELLANEOUS SEND OUT - HISTORICAL: NORMAL
MISCELLANEOUS SEND OUT - HISTORICAL: NORMAL
MONOCYTES NFR BLD AUTO: 8.5 %
NEUTROPHILS NFR BLD AUTO: 60.4 % (ref 42–72)
PERFORMING LAB - HISTORICAL: NORMAL
PERFORMING LAB - HISTORICAL: NORMAL
PLATELET # BLD AUTO: 231 THOU/CU MM (ref 140–440)
PMV BLD: 9.2 FL (ref 6.5–11)
POTASSIUM SERPL-SCNC: 4.3 MMOL/L (ref 3.5–5.1)
PROT SERPL-MCNC: 7.1 G/DL (ref 6.4–8.9)
PROTIME - HISTORICAL: 10.8 SEC (ref 11.9–15.2)
RED BLOOD COUNT - HISTORICAL: 4.01 MIL/CU MM (ref 4.3–5.9)
REFERRAL LAB TEST # - HISTORICAL: NORMAL
REFERRAL LAB TEST # - HISTORICAL: NORMAL
RHEUMATOID FACTOR - HISTORICAL: <14 IU/ML
SODIUM SERPL-SCNC: 133 MMOL/L (ref 133–143)
SOURCE - HISTORICAL: NORMAL
SOURCE - HISTORICAL: NORMAL
TEST NAME - HISTORICAL: NORMAL
TEST NAME - HISTORICAL: NORMAL
WHITE BLOOD COUNT - HISTORICAL: 4.9 THOU/CU MM (ref 4.5–11)

## 2018-01-25 LAB
ANA INTERPRETATION: POSITIVE
ANA PATTERN 1 - HISTORICAL: ABNORMAL
ANA TITER 1: ABNORMAL
Lab: 114 %
Lab: 117 %
Lab: 120 %
Lab: 93 %
SPECIMEN STATUS - HISTORICAL: ABNORMAL

## 2018-01-27 VITALS
WEIGHT: 164.4 LBS | DIASTOLIC BLOOD PRESSURE: 78 MMHG | HEART RATE: 60 BPM | BODY MASS INDEX: 28.22 KG/M2 | SYSTOLIC BLOOD PRESSURE: 140 MMHG

## 2018-01-27 VITALS
DIASTOLIC BLOOD PRESSURE: 70 MMHG | BODY MASS INDEX: 27.51 KG/M2 | WEIGHT: 160.25 LBS | SYSTOLIC BLOOD PRESSURE: 122 MMHG | WEIGHT: 159.8 LBS | DIASTOLIC BLOOD PRESSURE: 70 MMHG | BODY MASS INDEX: 27.43 KG/M2 | HEART RATE: 68 BPM | SYSTOLIC BLOOD PRESSURE: 128 MMHG | HEART RATE: 66 BPM

## 2018-01-27 VITALS
BODY MASS INDEX: 27.14 KG/M2 | HEIGHT: 64 IN | DIASTOLIC BLOOD PRESSURE: 76 MMHG | WEIGHT: 159 LBS | SYSTOLIC BLOOD PRESSURE: 128 MMHG | HEART RATE: 68 BPM

## 2018-01-27 VITALS
WEIGHT: 159.13 LBS | HEIGHT: 64 IN | SYSTOLIC BLOOD PRESSURE: 124 MMHG | BODY MASS INDEX: 27.17 KG/M2 | DIASTOLIC BLOOD PRESSURE: 84 MMHG

## 2018-02-04 ASSESSMENT — PATIENT HEALTH QUESTIONNAIRE - PHQ9: SUM OF ALL RESPONSES TO PHQ QUESTIONS 1-9: 0

## 2018-02-08 ENCOUNTER — HISTORY (OUTPATIENT)
Dept: ONCOLOGY | Facility: OTHER | Age: 78
End: 2018-02-08

## 2018-02-08 ENCOUNTER — OFFICE VISIT - GICH (OUTPATIENT)
Dept: ONCOLOGY | Facility: OTHER | Age: 78
End: 2018-02-08

## 2018-02-08 ENCOUNTER — TRANSFERRED RECORDS (OUTPATIENT)
Dept: HEALTH INFORMATION MANAGEMENT | Facility: HOSPITAL | Age: 78
End: 2018-02-08

## 2018-02-08 DIAGNOSIS — M19.012 PRIMARY OSTEOARTHRITIS OF LEFT SHOULDER: ICD-10-CM

## 2018-02-09 VITALS
SYSTOLIC BLOOD PRESSURE: 140 MMHG | DIASTOLIC BLOOD PRESSURE: 72 MMHG | HEART RATE: 78 BPM | WEIGHT: 160.6 LBS | BODY MASS INDEX: 27.42 KG/M2 | TEMPERATURE: 97.6 F | HEIGHT: 64 IN

## 2018-02-09 VITALS
BODY MASS INDEX: 26.67 KG/M2 | SYSTOLIC BLOOD PRESSURE: 120 MMHG | WEIGHT: 156.2 LBS | HEIGHT: 64 IN | DIASTOLIC BLOOD PRESSURE: 84 MMHG | RESPIRATION RATE: 16 BRPM | HEART RATE: 48 BPM

## 2018-02-09 VITALS
HEIGHT: 64 IN | BODY MASS INDEX: 27.31 KG/M2 | SYSTOLIC BLOOD PRESSURE: 128 MMHG | HEART RATE: 68 BPM | DIASTOLIC BLOOD PRESSURE: 60 MMHG | WEIGHT: 160 LBS | TEMPERATURE: 97.1 F

## 2018-02-09 VITALS
SYSTOLIC BLOOD PRESSURE: 138 MMHG | BODY MASS INDEX: 26.63 KG/M2 | HEART RATE: 60 BPM | HEIGHT: 64 IN | DIASTOLIC BLOOD PRESSURE: 82 MMHG | WEIGHT: 156 LBS

## 2018-02-11 ENCOUNTER — HEALTH MAINTENANCE LETTER (OUTPATIENT)
Age: 78
End: 2018-02-11

## 2018-02-12 NOTE — PROGRESS NOTES
Patient Information     Patient Name MRN Wilfrid Anderson [id number] Male 1940      Progress Notes by Jimenez Colon MD at 2017 11:00 AM     Author:  Jimenez Colon MD Service:  (none) Author Type:  Physician     Filed:  2017 11:32 AM Encounter Date:  2017 Status:  Signed     :  Jimenez Colon MD (Physician)            SUBJECTIVE:    Wilfrid Meneses is a 77 y.o. male who presents for comprehensive review of their multiple medical problems and review of medications, renewal of medications and update on necessary health maintenance issues.      HPI Comments: He is here today for complete evaluation and a review of his chronic medical problems. He has a history of hypertension. He is on 2 drug therapy for control of his hypertension. He tolerates his medications well and his blood pressure is controlled. He also has a history of coronary artery calcifications. He takes moderate intensity statin therapy. He tolerates this without difficulty. He is due for recheck on this. He also has a history of some hyperglycemia. A1c levels have always been normal.    The patient has a history of significant BPH with symptoms. He's had previous partial prostatectomy. Overall, his urination has been stable. He has history of colonic polyps but is up-to-date with colonoscopy. He continues to use excessive amount of alcohol. This was reviewed with him today and he knows he needs to limit that. The only real problem that he is having lately is that he has pain in his left shoulder with reduced mobility. There was no injury. He is right-handed. He would like to have this checked.    He has a history of dysarthria, likely secondary to alcohol abuse but also has some cerebral microvascular disease. He is due for a flu shot. We talked about a Zostavax. Other immunizations up-to-date. Medications are reconciled. Past medical history, past surgical history, family history and social histories are  reviewed and updated.      Allergies      Allergen   Reactions     Ace Inhibitors  Cough     Hydrocodone-Acetaminophen  Insomnia     Keeps him awake       Septra [Sulfamethoxazole-Trimethoprim]  Rash     Sulfasalazine  Rash   ,   Current Outpatient Prescriptions     Medication  Sig     atenolol (TENORMIN) 50 mg tablet Take 1 tablet by mouth once daily.     multivitamin (MVI) tablet Take 1 tablet by mouth once daily.     simvastatin (ZOCOR) 40 mg tablet Take 1 tablet by mouth at bedtime.     valsartan-hydrochlorothiazide (DIOVAN HCT) 320-12.5 mg tablet Take 1 tablet by mouth once daily.     No current facility-administered medications for this visit.      Medications have been reviewed by me and are current to the best of my knowledge and ability. ,   Past Medical History:     Diagnosis  Date     Alcohol abuse, unspecified      BPH (benign prostatic hyperplasia)     Post prostatectomy      Coronary artery calcification     score 297 12/2010      Hx of adenomatous colonic polyps      Hyperglycemia      Hyperlipidemia      Hypertension      Lumbar spinal stenosis      Macrocytosis      presumed secondary to alcohol      Pulmonary HTN     on ECHO 12/2010      Skin cancer      Stress ECHO 12/30/2010     negative for ischemia    ,   Patient Active Problem List       Diagnosis  Date Noted     AK (actinic keratosis)  09/09/2017     Squamous cell skin cancer  09/03/2017     Cerebral microvascular disease  05/18/2016     Dysarthria  05/18/2016     Coronary artery calcification  12/01/2015     Lumbar spinal stenosis       Basal cell carcinoma of back  12/01/2014     HYPERGLYCEMIA  12/14/2011     ALCOHOL ABUSE  12/05/2011     HYPERTENSION       Benign prostatic hyperplasia with urinary obstruction       HYPERLIPIDEMIA       COLONIC POLYPS       History of colonic polyps, colonoscopy 1/01, follow up done 2006, normal        ,   Past Surgical History:      Procedure  Laterality Date     COLONOSCOPY  11/21/2014    Adenoma        COLONOSCOPY SCREENING  , ,     Adenomatous 2001, subsequent WNL       ENDOSCOPY GI       within normal limits       INGUINAL HERNIA REPAIR BILATERAL       KNEE ARTHROSCOPY Left      KNEE REPLACEMENT Left      PROSTATECTOMY  2007    Open and median lobe removal for symptoms of prostatism.       and   Social History       Substance Use Topics         Smoking status:   Never Smoker     Smokeless tobacco:   Never Used     Alcohol use   16.8 oz/week      28 Cans of beer per week         Comment: 4 daily      Family Status     Relation  Status     Mother  at age 82     Father  at age 82     Son     Acute MI on GXT, severe ASCAD      Sister Alive     Social History     Social History        Marital status:       Spouse name: N/A     Number of children:  N/A     Years of education:  N/A     Social History Main Topics         Smoking status:   Never Smoker     Smokeless tobacco:   Never Used     Alcohol use   16.8 oz/week     28 Cans of beer per week        Comment: 4 daily      Drug use:   No     Sexual activity:   Not Asked     Other Topics  Concern     None      Social History Narrative     Pt is ; lives in [place]; retired as a pharmacist at the hospital.                                   REVIEW OF SYSTEMS:  Review of Systems   Constitutional: Negative for chills, diaphoresis, fever, malaise/fatigue and weight loss.   HENT: Negative for congestion, ear pain, nosebleeds, sore throat and tinnitus.    Eyes: Negative for blurred vision, double vision, photophobia, pain, discharge and redness.   Respiratory: Negative for cough, hemoptysis, sputum production, shortness of breath and wheezing.    Cardiovascular: Negative for chest pain, palpitations, orthopnea, claudication, leg swelling and PND.   Gastrointestinal: Negative for abdominal pain, blood in stool, constipation, diarrhea, heartburn, nausea and vomiting.   Genitourinary: Negative for dysuria,  "flank pain and hematuria.   Musculoskeletal: Positive for joint pain. Negative for back pain, myalgias and neck pain.   Skin: Negative for itching and rash.   Neurological: Negative for dizziness, tingling, tremors, speech change, loss of consciousness, weakness and headaches.   Psychiatric/Behavioral: Negative for depression, hallucinations, memory loss, substance abuse and suicidal ideas. The patient is not nervous/anxious.        OBJECTIVE:  /84  Pulse (!) 48  Resp 16  Ht 1.626 m (5' 4\")  Wt 70.9 kg (156 lb 3.2 oz)  BMI 26.81 kg/m2    EXAM:   Physical Exam   Constitutional: He is oriented to person, place, and time and well-developed, well-nourished, and in no distress. No distress.   HENT:   Head: Normocephalic and atraumatic.   Right Ear: Tympanic membrane and external ear normal.   Left Ear: Tympanic membrane and external ear normal.   Nose: Nose normal.   Mouth/Throat: Oropharynx is clear and moist and mucous membranes are normal. No oropharyngeal exudate.   Eyes: Conjunctivae are normal. Pupils are equal, round, and reactive to light. No scleral icterus.   Neck: Normal range of motion. Neck supple. Normal carotid pulses, no hepatojugular reflux and no JVD present. Carotid bruit is not present. No tracheal deviation and no edema present. No thyroid mass and no thyromegaly present.   Cardiovascular: Normal rate, regular rhythm, normal heart sounds and intact distal pulses.  Exam reveals no gallop and no friction rub.    No murmur heard.  Pulmonary/Chest: Effort normal and breath sounds normal. No respiratory distress. He has no decreased breath sounds. He has no wheezes. He has no rhonchi. He has no rales. He exhibits no tenderness.   Abdominal: Soft. Bowel sounds are normal. He exhibits no distension and no mass. There is no hepatosplenomegaly. There is no tenderness. There is no rebound and no guarding. Hernia confirmed negative in the right inguinal area and confirmed negative in the left " inguinal area.   Genitourinary: Rectum normal, testes/scrotum normal and penis normal. Prostate is enlarged.   Genitourinary Comments: Right lobe larger than the left. No nodularity.   Musculoskeletal: He exhibits no edema or tenderness.   Left shoulder appears normal. There was pain with attempts to abduct past 40 . No specific areas of tenderness.   Lymphadenopathy:     He has no cervical adenopathy.   Neurological: He is alert and oriented to person, place, and time. He has normal motor skills. He displays normal reflexes. No cranial nerve deficit. He exhibits normal muscle tone. Gait normal. Coordination normal.   He has dysarthria with abnormal tremulous movement with talking   Skin: Skin is warm and dry. No rash noted. He is not diaphoretic. No cyanosis or erythema. No pallor. Nails show no clubbing.   Psychiatric: Mood, memory, affect and judgment normal.   Nursing note and vitals reviewed.      ASSESSMENT/PLAN:    ICD-10-CM    1. HYPERTENSION I10 FLU VACCINE => 3 YRS PF QUADRIVALENT IIV4 IM      COMPLETE METABOLIC PANEL      valsartan-hydrochlorothiazide (DIOVAN HCT) 320-12.5 mg tablet      atenolol (TENORMIN) 50 mg tablet      COMPLETE METABOLIC PANEL   2. Benign prostatic hyperplasia with urinary obstruction N40.1 PSA, TOTAL     N13.8 PSA, TOTAL   3. Cerebral microvascular disease I67.9    4. Dysarthria R47.1    5. Coronary artery calcification I25.10      I25.84    6. HYPERGLYCEMIA R73.09 HEMOGLOBIN A1C MONITORING (POCT)      HEMOGLOBIN A1C MONITORING (POCT)   7. Hyperlipidemia, unspecified hyperlipidemia type E78.5 LIPID PANEL      simvastatin (ZOCOR) 40 mg tablet      LIPID PANEL   8. Encounter for immunization  Z23 FLU VACCINE => 3 YRS PF QUADRIVALENT IIV4 IM      ME ADMIN VACC INITIAL   9. Chronic left shoulder pain M25.512 AMB CONSULT TO ORTHOPEDICS - AFFILIATE ONLY     G89.29         Plan:  He appears to be doing well in most respects. Medications will continue without change. Again encouraged him  to limit his alcohol. Flu shot given today, he will consider getting a shingles vaccine at some point in time. Complete lab is drawn and pending and I will send him a letter with the results and any recommendations. We discussed his shoulder situation, he would like to see orthopedics which I think is a good idea. Consultation request is placed. Meds are refilled for him with 1 year of refills. If all goes well, follow up annually.

## 2018-02-12 NOTE — TELEPHONE ENCOUNTER
Patient Information     Patient Name MRN Sex Wilfrid Stewart [id number] Male 1940      Telephone Encounter by Andreina Thompson at 2017  9:15 AM     Author:  Andreina Thompson Service:  (none) Author Type:  (none)     Filed:  2017  9:16 AM Encounter Date:  2017 Status:  Signed     :  Andreina Thompson             called and let us know that she would call this patient about this.  Andreina Thompson LPN .......2017 9:15 AM

## 2018-02-12 NOTE — NURSING NOTE
Patient Information     Patient Name MRN Wilfrid Anderson [id number] Male 1940      Nursing Note by Hannah Naylor at 2017 11:00 AM     Author:  Hannah Naylor Service:  (none) Author Type:  (none)     Filed:  2017 10:57 AM Encounter Date:  2017 Status:  Signed     :  Hannah Naylor            Patient is here for an annual review-no concerns at this time  Hannah Naylor LPN...................2017   10:56 AM

## 2018-02-12 NOTE — NURSING NOTE
Patient Information     Patient Name MRN Sex Wilfrid Stewart [id number] Male 1940      Nursing Note by Andreina Thompson at 2017  7:45 AM     Author:  Andreina Thompson Service:  (none) Author Type:  (none)     Filed:  2017  7:41 AM Encounter Date:  2017 Status:  Signed     :  Andreina Thompson            Patient was referred by Dr. Colon for his chronic left shoulder pain.  Andreina Thompson LPN .......2017 7:41 AM

## 2018-02-12 NOTE — TELEPHONE ENCOUNTER
Patient Information     Patient Name MRN Sex Wilfrid Stewart [id number] Male 1940      Telephone Encounter by Gosselin, Norma J at 2017  9:02 AM     Author:  Gosselin, Norma J Service:  (none) Author Type:  (none)     Filed:  2017  9:03 AM Encounter Date:  2017 Status:  Signed     :  Gosselin, Norma J            Referral printed and handed to   to call patient.  Norma J Gosselin LPN....................  2017   9:03 AM

## 2018-02-12 NOTE — PROGRESS NOTES
Patient Information     Patient Name MRN Sex Wilfrid Stewart [id number] Male 1940      Progress Notes by Alverto Meeks DO at 2017  7:45 AM     Author:  Alverto Meeks DO Service:  (none) Author Type:  Physician     Filed:  2017  8:11 AM Encounter Date:  2017 Status:  Signed     :  Alverto Meeks DO (Physician)            Wilfrid Meneses was seen in consultation for Jimenez Colon MD for a chief complaint of pain about the left shoulder.    CHIEF COMPLAINT: Wilfrid Meneses is a 77 y.o.  male  Chief Complaint     Patient presents with       Consult      left shoulder pain - chronic       HISTORY OF PRESENTING INJURY   History of presenting injury, patient is a 77-year-old male who comes in today with ongoing complaints of pain and stiffness into his left shoulder. It always had some stiffness but about 2 months ago while taking a cooler off of the shelf he has some increased pain was like a shot of pain into his left shoulder since that time his been getting worse. He comes in today having tried his own therapy at the [facility] he's had no injections and no other treatment.  Description of pain:  moderate aching, discomfort and increased pain with activity   Radiation of pain: yes  Pain course: gradually worsening  Worse with: bending or flexing affected area, extending affected area, overhead reaching, pushing and pulling  Improved by: OTC meds, rest, ice and heat  History of injection: No  Any PT: No    PAST MEDICAL HISTORY:  Past Medical History:     Diagnosis  Date     Alcohol abuse, unspecified      Arthritis of left acromioclavicular joint 2017     BPH (benign prostatic hyperplasia)     Post prostatectomy      Coronary artery calcification     score 297 2010      Hx of adenomatous colonic polyps      Hyperglycemia      Hyperlipidemia      Hypertension      Impingement syndrome of left shoulder 2017     Lumbar spinal stenosis      Macrocytosis       presumed secondary to alcohol      Osteoarthritis of left glenohumeral joint 12/4/2017     Pulmonary HTN     on ECHO 12/2010      Skin cancer      Stress ECHO 12/30/2010     negative for ischemia      Tendinitis of left rotator cuff 12/4/2017       PAST SURGICAL HISTORY:  Past Surgical History:      Procedure  Laterality Date     COLONOSCOPY  11/21/2014    Adenoma       COLONOSCOPY SCREENING  2001, 2006, 2011    Adenomatous 2001, subsequent WNL       ENDOSCOPY GI  04/05     within normal limits       INGUINAL HERNIA REPAIR BILATERAL       KNEE ARTHROSCOPY Left 12/05     KNEE REPLACEMENT Left 01/09     PROSTATECTOMY  02/2007    Open and median lobe removal for symptoms of prostatism.          ORTHOPEDIC FRACTURES AND BROKEN BONES:  As above. He did have a total knee by Dr. Gomez.    ALLERGIES:  Allergies      Allergen   Reactions     Ace Inhibitors  Cough     Hydrocodone-Acetaminophen  Insomnia     Keeps him awake       Septra [Sulfamethoxazole-Trimethoprim]  Rash     Sulfasalazine  Rash       CURRENT MEDICATIONS:  Current Outpatient Prescriptions       Medication  Sig Dispense Refill     atenolol (TENORMIN) 50 mg tablet Take 1 tablet by mouth once daily. 90 tablet 3     multivitamin (MVI) tablet Take 1 tablet by mouth once daily.       simvastatin (ZOCOR) 40 mg tablet Take 1 tablet by mouth at bedtime. 90 tablet 3     valsartan-hydrochlorothiazide (DIOVAN HCT) 320-12.5 mg tablet Take 1 tablet by mouth once daily. 90 tablet 3     No current facility-administered medications for this visit.      Medications have been reviewed by me and are current to the best of my knowledge and ability.      SOCIAL HISTORY:  Marital Status:   Children: Yes  Occupation: Retired.  Alcohol use:  Yes  Tobacco use: Smoker: no  Are you or have you used illicit drugs:  no    FAMILY HISTORY:  Family History      Problem  Relation Age of Onset     Diabetes Mother      COPD Mother      Heart attack Father      Good Health Sister   "      REVIEW OF SYSTEMS:  The review of systems as documented in the HPI and on the intake questionnaire, completed by the patient on 12/4/2017 have been reviewed by myself and the pertinent positives and negatives addressed.  The remainder of the complete review of systems was non-contributory.    PHYSICAL EXAM:   /82  Pulse 60  Ht 1.626 m (5' 4\")  Wt 70.8 kg (156 lb)  BMI 26.78 kg/m2 Body mass index is 26.78 kg/(m^2).    General Appearance: Pleasant male in good appearance, mood and affect.  Alert and orientated times three ( time, date and location).    Skin: Normal    Shoulder:  Motion: Patient has active forward flexion to 130  passively I can stretch him to 160  with crepitations to his shoulder during that motion. His abduction is relatively the same range active and passive. External rotation only 20  internal rotation to his back pocket.  Hawkin's Sign: positive  Neer's Sign: positive  Cross body adduction:  positive  Drop arm test: negative  Weakness at waist level: positive  Bicipital testing: positive  Lift off test:  negative  Durant's test:  positive    Elbow:  Flexion: Normal  Extension: Normal    Hand:  Sensation: Normal  Radial and ulnar blood flow:  Normal    Eyes: Pupils are round.    Ears: Hearing: Impaired    Heart: Good capillary refill into his hands.    Lungs: Distant breath sounds.     Radiographic images from 12/4 where independently reviewed and discussed with the patient.      Xray:     X-rays demonstrate significant glenohumeral arthritis. There are osteophytes off of the glenoid and also the inferior humeral head. Type II acromial hook. Narrowing of the acromioclavicular joint.    IMPRESSION:  Left glenohumeral arthritis.  Probable partial if not complete supraspinatus tear.  Left impingement syndrome.  Left acromioclavicular arthritis.  Significant use of alcohol.    PLAN:  Risks, benefits, conservative, surgical and alternatives to treatment where discussed and the patient " would like to proceed with discussion of surgical intervention.  I refer him to Dr. Rodriguez due to his expertise in total shoulder arthroplasty for consideration of total versus reverse arthroplasty on the left.  Patient does drink 7 times a week or his description.  He was instructed on his wall walking he was older performed this today.  I utilized the handouts, poster board and model to help them understand his shoulder anatomy and pathology.  I did not order an MR arthrogram due to the fact that he has significant arthritis and if the patient chooses to do so a total would be best.  Questions and concerns answered.  Follow-up with me as needed otherwise I will refer him to Dr. Rodriguez.    Alverto Meeks D.O.  Orthopaedic Surgeon    [address] [address]  Phone [phone] (KNEE)  Fax [phone]    This document was created using computer generated templates and voice activated software.    8:06 AM 12/4/2017

## 2018-02-13 NOTE — NURSING NOTE
Patient Information     Patient Name MRN Wilfrid Anderson [id number] Male 1940      Nursing Note by Skye Glover at 2018  9:00 AM     Author:  Skye Glover Service:  (none) Author Type:  (none)     Filed:  2018  9:55 AM Encounter Date:  2018 Status:  Signed     :  Skye Glover            Patient presents for a follow up for results regarding a bleeding disorder.  No current complaints or side effects.   Skye Glover CMA (AAMA).....................2018  9:13 AM

## 2018-02-13 NOTE — ADDENDUM NOTE
Patient Information     Patient Name MRN Sex Wilfrid Stewart [id number] Male 1940      Addendum Note by Erick Robison at 2018 10:42 AM     Author:  Erick Robison Service:  (none) Author Type:  (none)     Filed:  2018 10:42 AM Encounter Date:  2018 Status:  Signed     :  Erick Robison       Addended by: ERICK ROBISON on: 2018 10:42 AM        Modules accepted: Orders

## 2018-02-13 NOTE — PROGRESS NOTES
Patient Information     Patient Name MRN Wilfrid Anderson [id number] Male 1940      Progress Notes signed by Jarret Collier MD at 2018  5:03 PM      Author:  Jarret Collier MD Service:  (none) Author Type:  Physician     Filed:  2018  5:03 PM Encounter Date:  2018 Status:  Signed     :  Jarret Collier MD (Physician)            DATE OF SERVICE:  2018    HEMATOLOGY ONCOLOGY CONSULTATION    REASON FOR CONSULTATION:    Rule out bleeding diathesis.    REFERRING PHYSICIAN:  Dr. Timi Rodriguez, [facility] orthopedics  Dr. Jimenez Colon     HISTORY OF PRESENT ILLNESS:  Mr. Meneses is a 77-year-old white male with a history of hypertension, hyperlipidemia we were asked to evaluate concerning possible bleeding disorder in anticipation of planned left shoulder surgery for stage IV osteoarthritis.  Apparently, the patient stated that he had two isolated episodes of excessive bleeding.  Four years ago, he had an episode of epistaxis, which required an ER visit and it subsequently was resolved with packing.  It was felt to be due to low dose aspirin. His low dose aspirin was stopped.  He also had an episode of rectal bleeding after a colonoscopy 3 years ago, but otherwise since then he has had no isolated bleeding episodes.  He denies any recurrent epistaxis, gingival bleeding after dental work, rectal bleeding, ecchymosis, easy bruisability. Most recently had been referred to Dr. 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  He wanted us to clear him for surgery.  The patient's major complaint at this point is left shoulder pain.  He also suffers from dysarthria, which was felt to be due to cerebral microvascular disease and possible  history of alcohol abuse.  He is a retired pharmacist.  There is no family history of bleeding diathesis.  He does not take any nonsteroidals at this point or any aspirin.    PAST MEDICAL HISTORY:  Significant for BPH with urinary obstruction, cerebral microvascular disease, dysarthria, coronary artery calcification, hyperglycemia, hyperlipidemia, chronic left shoulder osteoarthritis.    ALLERGIES:  He IS ALLERGIC TO ACE INHIBITORS, HYDROCODONE, SEPTRA, SULFASALAZINE.    MEDICATIONS:  Include Tenormin 50 mg daily, Zocor 40 mg daily, valsartan/hydrochlorothiazide, Diovan 1 tablet daily, multivitamin daily.    SOCIAL HISTORY:  Tobacco is negative.  Alcohol is negative.  He is a retired pharmacist.    FAMILY HISTORY:  Noncontributory.    REVIEW OF SYSTEMS:    CNS: Negative for headaches.  RESPIRATORY:  Negative for shortness of breath.  CARDIAC:  Negative for chest pain, palpitations.    GASTROINTESTINAL:  Negative for rectal bleeding or change in bowel habits.  MUSCULOSKELETAL:  Significant for left shoulder pain.  GENITOURINARY:  Significant for nocturia.  CONSTITUTIONAL:  Negative for fevers, night sweats, weight loss.  HEMATOLOGIC:  Negative for easy bruisability, epistaxis, bleeding after dental work.    PHYSICAL EXAMINATION:  GENERAL:  He is an elderly white male in no acute distress.  VITAL SIGNS:  Reveal blood pressure 120/60, pulse 68, temperature 97.1.  HEENT:  Atraumatic, normocephalic.  Oropharynx is nonerythematous.  NECK:  Supple.  LUNGS:  Clear to auscultation and percussion.  HEART:  Regular rate and rhythm.  S1, S2 normal.  ABDOMEN:  Soft.  Normoactive bowel sounds.  No masses or tenderness.   LYMPHATICS:  No cervical or supraclavicular nodes.  EXTREMITIES:  Reveal tenderness over the left deltoid region.  NEUROLOGIC:  Significant for dysarthria.    LABORATORIES:  Pending.    IMPRESSION:  Stage IV osteoarthritis of the left shoulder with 2 isolated episodes of excessive bleeding, likely due to aspirin  use.  No obvious history of bleeding diathesis.  Nonetheless, would like to rule this out by obtaining a PT, INR, PTT.  We will also obtain von Willebrand's profile and factor VII, VIII, IX, X, XI, XII and XIII levels. We will also obtain D-dimer and thrombin time, sed rate, rheumatoid factor, GABBY.      If above workup is negative, the patient is cleared for surgery.  We will see the patient after the above workup and then clear him for surgery.    70 minutes was spent with the patient, greater than half the time spent in counseling and coordinating care.      DIEGO ALTMAN MD      ACP/rn  D:01/24/2018 14:47:03  T:01/24/2018 15:17:13  VJID: [id number]  TJID: [id number]    cc:DR. WELLS, JERRY RILEY MD, SHANTI STOUT MD

## 2018-02-13 NOTE — NURSING NOTE
Patient Information     Patient Name MRN Sex Wilfrid Stewart [id number] Male 1940      Nursing Note by Skye Glover at 2018 10:00 AM     Author:  Skye Glover Service:  (none) Author Type:  (none)     Filed:  2018 11:14 AM Encounter Date:  2018 Status:  Signed     :  Skye Glover            Patient presents for a consult regarding bleeding issues; needs surgical clearance.  Needs shoulder surgery with Dr Rodriguez; scheduling pending approval and preop.  Skye Glover [facility] ([facility]).....................2018  10:05 AM

## 2018-02-13 NOTE — PROGRESS NOTES
Patient Information     Patient Name MRN Wilfrid Anderson [id number] Male 1940      Progress Notes signed by Jarret Collier MD at 2018  3:23 PM      Author:  Jarret Collier MD Service:  (none) Author Type:  Physician     Filed:  2018  3:23 PM Encounter Date:  2018 Status:  Signed     :  Jarret Collier MD (Physician)            DATE OF SERVICE:  2018    HEMATOLOGY/ONCOLOGY CLINIC NOTE     Mr. Meneses returns for followup of possible bleeding disorder.    We had seen the patient in consultation at the request of Dr. Jimenez Colon and Dr. Timi Rodriguez, [facility] orthopedics, on 2018.  At that time he was a 77-year-old white male with a history of hypertension, hyperlipidemia whom we were asked to evaluate concerning possible bleeding disorder in anticipation of planned left shoulder surgery for stage IV osteoarthritis.  Apparently the patient stated that he had 2 isolated episodes of excessive bleeding.  4 years ago he had an episode of epistaxis which required an ER visit and subsequently resolved with packing.  It was felt to be due to low-dose aspirin. His low-dose aspirin was stopped.  He also had an episode of rectal bleeding after a colonoscopy 3 years ago, but otherwise since then he has not had any isolated bleeding episodes.  He denies any recurrent epistaxis, gingival bleeding after dental work, rectal bleeding, ecchymosis, easy bruisability. Most recently he had been referred to Dr. 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  He wanted us to clear him for surgery.  The patient's major complaint was left shoulder pain.  He also suffered from dysarthria, which was felt to be due to cerebral microvascular  disease and possible history of alcohol abuse.  He was a retired pharmacist.  There is no family history of bleeding diathesis, and he was not taking any nonsteroidals or aspirin at this point.    When we saw the patient, we wanted to rule out a bleeding disorder by obtaining PT, PTT.  These came back normal.  INR was normal.  We also obtained von Willebrand's profile; this was normal.  We obtained factor VII, VIII, IX, X, XI, XII and XIII levels; these were all normal.  D-dimer was normal.  Thrombin time was normal.  Sed rate was normal.  The patient did have a positive GABBY and negative rheumatoid factor.  Patient does now admit to drinking alcohol on a regular basis, at least a few beers a day, and he has been told to stop drinking.      Otherwise he does not have any bleeding at this point.  No epistaxis.  No rectal bleeding.  No gingival bleeding.  No hemoptysis, hematemesis.    PHYSICAL EXAMINATION:  GENERAL:  He is an elderly white male in no acute distress.  VITAL SIGNS:  Blood pressure 150/66.  Pulse 78.  Temperature 97.6.  HEENT:  Atraumatic.  Normocephalic.  Oropharynx is nonerythematous.  NECK:  Supple.  LUNGS:  Clear to auscultation and percussion.  HEART:  Regular rhythm.  S1, S2 normal.  ABDOMEN:  Soft.  Nontender.  LYMPHATICS:  No cervical or supraclavicular nodes.  EXTREMITIES:  Without edema.  NEUROLOGIC:  Significant for dysarthria.    LABORATORIES:  As above.    Also CBC with white count 4.9, hemoglobin 14.5, hematocrit 41.4, , platelet count is 231.    PTT is 31.    INR is 0.9.    Rheumatoid factor less than 14.  BUN 17, creatinine 0.93.    LFTs are normal.    IMPRESSION:  Stage IV osteoarthritis of the left shoulder.  No evidence of bleeding diathesis.  At this point the patient has normal PT, PTT, INR.  No evidence of von Willebrand's disease.  No factor deficiencies.      Patient was encouraged to discontinue alcohol, but otherwise he is cleared for surgery from a hematologic point of  view.    40 minutes were spent with the patient. Greater than half the time was spent on counseling and coordination of care.      DIEGO ALTMAN MD      ACP/mn  D:02/08/2018 09:48:05  T:02/08/2018 10:56:32  VJID: [id number]  TJID: [id number]    cc:JERRY RILEY MD, SHANTI STOUT MD

## 2018-02-13 NOTE — NURSING NOTE
Patient Information     Patient Name MRN Sex Wilfrid Stewart [id number] Male 1940      Nursing Note by Diana Doss at 2018 10:00 AM     Author:  Diana Doss Service:  (none) Author Type:  NURS- Registered Nurse     Filed:  2018 10:54 AM Encounter Date:  2018 Status:  Signed     :  Diana Doss (NURS- Registered Nurse)            Labs ordered per provider and sent to be co-signed by provider. Diana Doss RN 2018  10:53 AM

## 2018-02-19 ENCOUNTER — OFFICE VISIT (OUTPATIENT)
Dept: INTERNAL MEDICINE | Facility: OTHER | Age: 78
End: 2018-02-19
Attending: INTERNAL MEDICINE
Payer: MEDICARE

## 2018-02-19 VITALS
HEIGHT: 64 IN | DIASTOLIC BLOOD PRESSURE: 92 MMHG | OXYGEN SATURATION: 96 % | SYSTOLIC BLOOD PRESSURE: 148 MMHG | HEART RATE: 53 BPM | WEIGHT: 156.8 LBS | BODY MASS INDEX: 26.77 KG/M2

## 2018-02-19 DIAGNOSIS — I67.89 CEREBRAL MICROVASCULAR DISEASE: ICD-10-CM

## 2018-02-19 DIAGNOSIS — I25.10 CORONARY ARTERY CALCIFICATION: ICD-10-CM

## 2018-02-19 DIAGNOSIS — E78.5 HYPERLIPIDEMIA, UNSPECIFIED HYPERLIPIDEMIA TYPE: ICD-10-CM

## 2018-02-19 DIAGNOSIS — I10 HYPERTENSION, UNSPECIFIED TYPE: Primary | ICD-10-CM

## 2018-02-19 DIAGNOSIS — Z01.818 PRE-OPERATIVE EXAMINATION: ICD-10-CM

## 2018-02-19 DIAGNOSIS — M19.012 OSTEOARTHRITIS OF LEFT GLENOHUMERAL JOINT: ICD-10-CM

## 2018-02-19 PROCEDURE — 93005 ELECTROCARDIOGRAM TRACING: CPT | Performed by: INTERNAL MEDICINE

## 2018-02-19 PROCEDURE — G0463 HOSPITAL OUTPT CLINIC VISIT: HCPCS | Mod: 25

## 2018-02-19 PROCEDURE — 99215 OFFICE O/P EST HI 40 MIN: CPT | Mod: 25 | Performed by: INTERNAL MEDICINE

## 2018-02-19 PROCEDURE — 93010 ELECTROCARDIOGRAM REPORT: CPT | Performed by: INTERNAL MEDICINE

## 2018-02-19 ASSESSMENT — ENCOUNTER SYMPTOMS
FREQUENCY: 0
SLEEP DISTURBANCE: 0
VOMITING: 0
ABDOMINAL DISTENTION: 0
ABDOMINAL PAIN: 0
WOUND: 0
DIAPHORESIS: 0
EYE PAIN: 0
NECK STIFFNESS: 0
SHORTNESS OF BREATH: 0
SORE THROAT: 0
NERVOUS/ANXIOUS: 0
AGITATION: 0
SINUS PAIN: 0
CONSTIPATION: 0
NAUSEA: 0
DIZZINESS: 0
COUGH: 0
TREMORS: 0
BLOOD IN STOOL: 0
HALLUCINATIONS: 0
NUMBNESS: 0
DYSURIA: 0
FLANK PAIN: 0
UNEXPECTED WEIGHT CHANGE: 0
FEVER: 0
LIGHT-HEADEDNESS: 0
EYE REDNESS: 0
SINUS PRESSURE: 0
NECK PAIN: 0
CHEST TIGHTNESS: 0
DIARRHEA: 0
HEMATURIA: 0
DIFFICULTY URINATING: 0
CHILLS: 0
APPETITE CHANGE: 0
BRUISES/BLEEDS EASILY: 0
WHEEZING: 0
PALPITATIONS: 0
ADENOPATHY: 0
HEADACHES: 0
TROUBLE SWALLOWING: 0
VOICE CHANGE: 0
ARTHRALGIAS: 1
SPEECH DIFFICULTY: 0
RHINORRHEA: 0
SEIZURES: 0
CONFUSION: 0
WEAKNESS: 0
FATIGUE: 0
BACK PAIN: 0
JOINT SWELLING: 0

## 2018-02-19 NOTE — MR AVS SNAPSHOT
"              After Visit Summary   2/19/2018    Wilfrid Meneses    MRN: [id number]           Patient Information     Date Of Birth          1940        Visit Information        Provider Department      2/19/2018 10:20 AM Jimenez Colon MD [facility]        Today's Diagnoses     Hypertension, unspecified type    -  1    Hyperlipidemia, unspecified hyperlipidemia type        Osteoarthritis of left glenohumeral joint        Cerebral microvascular disease        Coronary artery calcification        Pre-operative examination           Follow-ups after your visit        Who to contact     If you have questions or need follow up information about today's clinic visit or your schedule please contact [facility] directly at [phone].  Normal or non-critical lab and imaging results will be communicated to you by Netcordiahart, letter or phone within 4 business days after the clinic has received the results. If you do not hear from us within 7 days, please contact the clinic through Netcordiahart or phone. If you have a critical or abnormal lab result, we will notify you by phone as soon as possible.  Submit refill requests through Earthineer or call your pharmacy and they will forward the refill request to us. Please allow 3 business days for your refill to be completed.          Additional Information About Your Visit        MyChart Information     Earthineer lets you send messages to your doctor, view your test results, renew your prescriptions, schedule appointments and more. To sign up, go to [email or web address] . Click on \"Log in\" on the left side of the screen, which will take you to the Welcome page. Then click on \"Sign up Now\" on the right side of the page.     You will be asked to enter the access code listed below, as well as some personal information. Please follow the directions to create your username and password.     Your access code is: [email or web address]  Expires: 5/20/2018 " "10:42 AM     Your access code will  in 90 days. If you need help or a new code, please call your [facility] or [phone].        Care EveryWhere ID     This is your Care EveryWhere ID. This could be used by other organizations to access your [facility] medical records  [id number]        Your Vitals Were     Pulse Height Pulse Oximetry BMI (Body Mass Index)          53 5' 4\" (1.626 m) 96% 26.91 kg/m2         Blood Pressure from Last 3 Encounters:   18 (!) 148/92   18 140/72   18 128/60    Weight from Last 3 Encounters:   18 156 lb 12.8 oz (71.1 kg)   18 160 lb 9.6 oz (72.8 kg)   18 160 lb (72.6 kg)              We Performed the Following     EKG 12-lead complete w/read - Clinics        Primary Care Provider Office Phone # Fax #    Jimenez Colon [phone]       [address] [facility] [address]        Equal Access to Services     [facility]: Hadii maria d rowe Soneil, waaxda luyuridia, qaybta barbei guerrero, vijay weaver . So [facility] [phone].    ATENCIÓN: Si habla español, tiene a marshall disposición servicios gratuitos de asistencia lingüística. [facility] [phone].    We comply with applicable federal civil rights laws and [place] laws. We do not discriminate on the basis of race, color, national origin, age, disability, sex, sexual orientation, or gender identity.            Thank you!     Thank you for choosing [facility] AND [facility]  for your care. Our goal is always to provide you with excellent care. Hearing back from our patients is one way we can continue to improve our services. Please take a few minutes to complete the written survey that you may receive in the mail after your visit with us. Thank you!             Your Updated Medication List - Protect others around you: Learn how to safely use, store and throw away your medicines at www.disposemymeds.org.          This list is " accurate as of 2/19/18 10:42 AM.  Always use your most recent med list.                   Brand Name Dispense Instructions for use Diagnosis    atenolol 50 MG tablet    TENORMIN     Take 50 mg by mouth        MULTI-VITAMINS Tabs      Take 1 tablet by mouth        simvastatin 40 MG tablet    ZOCOR     Take 40 mg by mouth        valsartan-hydrochlorothiazide 320-12.5 MG per tablet    DIOVAN-HCT     Take 1 tablet by mouth

## 2018-02-19 NOTE — PROGRESS NOTES
Chief Complaint:  This patient is here for a preop consultation and clearance.    HPI: Preoperative consultation and clearance is requested by Dr. Rodriguez.  This patient is scheduled to have left shoulder surgery at [facility] in [place] on February 21.  This is being done because of left shoulder pain and degenerative arthritis in the shoulder joint.    The patient is currently feeling well with no complaints or concerns.  He has not had any acute illnesses including upper respiratory infections, influenza or fever.  He does not have a cough or shortness of breath.  He does not have any chest pain or cardiac symptoms.  He has no history of obstructive coronary artery disease.  He does have hypertension which is treated with 2 drug therapy.  He also has hyperlipidemia and is on moderate intensity statin therapy.  He tolerates both of these treatment regimens without problem.    The patient does have a history of excessive alcohol intake although he has limited his alcohol dramatically over the past few weeks and has not even had any for the past week.  He does have a history of some bleeding problems including significant epistaxis and history of bleeding after a polyp was removed from his colon.  Because of that, he had an extensive evaluation recently by hematology for coagulopathy.  Nothing was found.  It was felt that his previous 2 episodes of bleeding were related to his alcohol intake.  He does not have a history of anesthesia complications.  He does not have obstructive sleep apnea.    Medications are reconciled.  Past medical history, past surgical history, family history and social history are reviewed and updated.    Past Medical History:   Diagnosis Date     Abscess of lung without pneumonia (H)     on ECHO 12/2010     Coronary atherosclerosis due to calcified coronary lesion (CODE)     score 297 12/2010     Enlarged prostate without lower urinary tract symptoms (luts)     Post prostatectomy      Essential (primary) hypertension     No Comments Provided     History of colonic polyps     No Comments Provided     Hyperglycemia     No Comments Provided     Hyperlipidemia     No Comments Provided     Malignant neoplasm of skin     No Comments Provided     Spinal stenosis of lumbar region without neurogenic claudication     No Comments Provided     Uncomplicated alcohol abuse     No Comments Provided       Past Surgical History:   Procedure Laterality Date     ARTHROPLASTY KNEE Left     01/09     ARTHROSCOPY KNEE Left     12/05     COLONOSCOPY      2001, 2006, 2011,Adenomatous 2001, subsequent WNL     COLONOSCOPY      11/21/2014,Adenoma     HERNIORRHAPHY INGUINAL BILATERAL      No Comments Provided     PROSTATECTOMY SUPRAPUBIC  2007     UPPER GI ENDOSCOPY  2005       Current Outpatient Prescriptions   Medication Sig Dispense Refill     atenolol (TENORMIN) 50 MG tablet Take 50 mg by mouth       Multiple Vitamin (MULTI-VITAMINS) TABS Take 1 tablet by mouth       simvastatin (ZOCOR) 40 MG tablet Take 40 mg by mouth       valsartan-hydrochlorothiazide (DIOVAN-HCT) 320-12.5 MG per tablet Take 1 tablet by mouth         Allergies   Allergen Reactions     Ace Inhibitors Cough     Hydrocodone-Acetaminophen      Other reaction(s): Insomnia  Keeps him awake      Sulfamethoxazole-Trimethoprim Rash     Sulfasalazine Rash       Family History   Problem Relation Age of Onset     DIABETES Mother      Chronic Obstructive Pulmonary Disease Mother      Myocardial Infarction Father      Other - See Comments Son      MI during GXT     Other - See Comments Sister      Good Health       Social History     Social History     Marital status:      Spouse name: N/A     Number of children: N/A     Years of education: N/A     Occupational History     Not on file.     Social History Main Topics     Smoking status: Never Smoker     Smokeless tobacco: Never Used     Alcohol use 16.8 oz/week      Comment: 2 daily     Drug use: No       Comment: Drug use: No     Sexual activity: Not on file     Other Topics Concern     Not on file     Social History Narrative    Pt is ; lives in [place]; retired as a pharmacist at the hospital.       Review of Systems   Constitutional: Negative for appetite change, chills, diaphoresis, fatigue, fever and unexpected weight change.   HENT: Negative for ear pain, rhinorrhea, sinus pain, sinus pressure, sore throat, trouble swallowing and voice change.    Eyes: Negative for pain, redness and visual disturbance.   Respiratory: Negative for cough, chest tightness, shortness of breath and wheezing.    Cardiovascular: Negative for chest pain, palpitations and leg swelling.   Gastrointestinal: Negative for abdominal distention, abdominal pain, blood in stool, constipation, diarrhea, nausea and vomiting.   Endocrine: Negative for cold intolerance and heat intolerance.   Genitourinary: Negative for difficulty urinating, dysuria, flank pain, frequency and hematuria.   Musculoskeletal: Positive for arthralgias. Negative for back pain, joint swelling, neck pain and neck stiffness.   Skin: Negative for rash and wound.   Allergic/Immunologic: Negative for immunocompromised state.   Neurological: Negative for dizziness, tremors, seizures, syncope, speech difficulty, weakness, light-headedness, numbness and headaches.   Hematological: Negative for adenopathy. Does not bruise/bleed easily.   Psychiatric/Behavioral: Negative for agitation, behavioral problems, confusion, hallucinations and sleep disturbance. The patient is not nervous/anxious.        Physical Exam   Constitutional: He is oriented to person, place, and time. He appears well-developed and well-nourished. No distress.   HENT:   Head: Normocephalic.   Right Ear: External ear normal.   Left Ear: External ear normal.   Nose: Nose normal.   Mouth/Throat: Oropharynx is clear and moist.   Eyes: Conjunctivae are normal. Pupils are equal, round, and reactive to  light.   Neck: Normal range of motion. Neck supple. Normal carotid pulses and no JVD present. Carotid bruit is not present. No tracheal deviation present. No thyromegaly present.   Cardiovascular: Normal rate and regular rhythm.  Exam reveals no gallop and no friction rub.    No murmur heard.  Pulmonary/Chest: Effort normal and breath sounds normal. No respiratory distress. He has no wheezes. He has no rales.   Abdominal: Soft. Bowel sounds are normal. He exhibits no distension and no mass. There is no tenderness. There is no rebound and no guarding. No hernia.   Musculoskeletal: Normal range of motion. He exhibits no edema.   Lymphadenopathy:     He has no cervical adenopathy.   Neurological: He is alert and oriented to person, place, and time. He has normal reflexes. No cranial nerve deficit. He exhibits normal muscle tone. Coordination normal.   Dysarthria   Skin: Skin is warm and dry. No rash noted. He is not diaphoretic.   Psychiatric: He has a normal mood and affect. His behavior is normal.   Nursing note and vitals reviewed.    Assessment:      ICD-10-CM    1. Hypertension, unspecified type I10 EKG 12-lead complete w/read - Clinics   2. Hyperlipidemia, unspecified hyperlipidemia type E78.5    3. Osteoarthritis of left glenohumeral joint M19.012    4. Cerebral microvascular disease I67.9    5. Coronary artery calcification I25.10     I25.84    6. Pre-operative examination Z01.818         Plan: His exam today is acceptable, his blood pressure is a little bit towards the high side but I am not going to intervene at this time.  His EKG shows a sinus bradycardia.  He has had extensive recent laboratory, this was all reviewed was all unremarkable and was not repeated today.  He is okay for his planned surgical procedure without contraindication.  He is off all over-the-counter medications.  He will take his 2 blood pressure medications the morning of surgery with a small sip of water.  He can be restarted on his  usual medications postoperatively.

## 2018-02-19 NOTE — NURSING NOTE
The patient is here today to be seen for a pre op exam. He is having left shoulder arthroplasty surgery done on 2- by dr daniels.  RASHI VELAZQUEZ LPN 2/19/2018 10:13 AM

## 2018-02-19 NOTE — Clinical Note
Send a copy of his consult from today as well as his EKG and his lab work from January 24 to Dr. Rodriguez at [facility] in [place]

## 2018-02-20 ENCOUNTER — DOCUMENTATION ONLY (OUTPATIENT)
Dept: FAMILY MEDICINE | Facility: OTHER | Age: 78
End: 2018-02-20

## 2018-02-20 ASSESSMENT — PATIENT HEALTH QUESTIONNAIRE - PHQ9: SUM OF ALL RESPONSES TO PHQ QUESTIONS 1-9: 0

## 2018-02-21 ENCOUNTER — TRANSFERRED RECORDS (OUTPATIENT)
Dept: HEALTH INFORMATION MANAGEMENT | Facility: OTHER | Age: 78
End: 2018-02-21

## 2018-03-07 DIAGNOSIS — Z98.890 S/P SHOULDER SURGERY: Primary | ICD-10-CM

## 2018-03-08 ENCOUNTER — HOSPITAL ENCOUNTER (OUTPATIENT)
Dept: PHYSICAL THERAPY | Facility: OTHER | Age: 78
Setting detail: THERAPIES SERIES
End: 2018-03-08
Attending: PHYSICIAN ASSISTANT
Payer: MEDICARE

## 2018-03-08 PROCEDURE — 97161 PT EVAL LOW COMPLEX 20 MIN: CPT | Mod: GP | Performed by: PHYSICAL THERAPIST

## 2018-03-08 PROCEDURE — 40000185 ZZHC STATISTIC PT OUTPT VISIT: Performed by: PHYSICAL THERAPIST

## 2018-03-08 PROCEDURE — 97016 VASOPNEUMATIC DEVICE THERAPY: CPT | Mod: GP | Performed by: PHYSICAL THERAPIST

## 2018-03-08 PROCEDURE — G8984 CARRY CURRENT STATUS: HCPCS | Mod: GP,CN | Performed by: PHYSICAL THERAPIST

## 2018-03-08 PROCEDURE — G8985 CARRY GOAL STATUS: HCPCS | Mod: GP,CI | Performed by: PHYSICAL THERAPIST

## 2018-03-08 PROCEDURE — 97110 THERAPEUTIC EXERCISES: CPT | Mod: GP | Performed by: PHYSICAL THERAPIST

## 2018-03-08 NOTE — PROGRESS NOTES
03/08/18 1004   General Information   Type of Visit Initial OP Ortho PT Evaluation   Start of Care Date 03/08/18   Referring Physician Madelyn   Patient/Family Goals Statement Fishing   Orders Evaluate and Treat   Date of Order 03/07/18   Insurance Type Medicare   Medical Diagnosis S/P shoulder surgery Z98.890   Surgical/Medical history reviewed Yes   Special Instructions phase 2a for 3 weeks, 2b for 3 weeks   General Information Comments see pt's record for additional information       Present No   Presentation and Etiology   Pertinent history of current problem (include personal factors and/or comorbidities that impact the POC) Pt was seen in ortho by Dr Meeks for L shoulder pain and was referred to Dr Rodriguez for further evaluation. Pt had a TSA done by Dr Rodriguez on 2/21.   Impairments A. Pain;C. Swelling;D. Decreased ROM;E. Decreased flexibility;F. Decreased strength and endurance   Functional Limitations perform activities of daily living;perform desired leisure / sports activities   Symptom Location L shoulder   How/Where did it occur (wear and tear over the years)   Onset date of current episode/exacerbation 02/21/18   Chronicity New   Pain rating (0-10 point scale) Worst (/10)   Worst (/10) 4   Pain quality B. Dull;C. Aching   Frequency of pain/symptoms B. Intermittent   Pain/symptoms are: Worse during the night   Prior Level of Function   Prior Level of Function-Mobility Ind   Prior Level of Function-ADLs Ind   Current Level of Function   Current Community Support Family/friend caregiver   Patient role/employment history F. Retired   [facility]   Current equipment-Gait/Locomotion None   Current equipment-ADL None   Fall Risk Screen   Fall screen completed by PT   Have you fallen 2 or more times in the past year? Yes   Have you fallen and had an injury in the past year? No   Is patient a fall risk? No;Department fall risk interventions implemented    Functional Scales   Functional Scales Other   Other Scales  PSFS   Planned Therapy Interventions   Planned Therapy Interventions fine motor coordination training;manual therapy;joint mobilization;motor coordination training;neuromuscular re-education;ROM;strengthening;stretching   Planned Modality Interventions   Planned Modality Interventions Cryotherapy   Clinical Impression   Criteria for Skilled Therapeutic Interventions Met yes, treatment indicated   PT Diagnosis L shoulder ROM and strength deficits   Functional limitations due to impairments ADLs   Clinical Presentation Stable/Uncomplicated   Clinical Decision Making (Complexity) Low complexity   Therapy Frequency 2 times/Week   Predicted Duration of Therapy Intervention (days/wks) 12 weeks   Risk & Benefits of therapy have been explained Yes   Patient, Family & other staff in agreement with plan of care Yes   Education Assessment   Preferred Learning Style Pictures/video   Barriers to Learning No barriers   ORTHO GOALS   PT Ortho Eval Goals 1;2;3   Ortho Goal 1   Goal Identifier Pain   Goal Description Pt report 0/10 pain for improved ADLs   Target Date 05/03/18   Ortho Goal 2   Goal Identifier ROM   Goal Description Pt to demo L shoulder ROM that is functional to complete ADLs without difficulty   Target Date 05/31/18   Ortho Goal 3   Goal Identifier Strength   Goal Description Pt to demo L shoulder strength that is functional to complete ADLs without difficulty   Target Date 05/31/18   Total Evaluation Time   Total Evaluation Time 15   Therapy Certification   Certification date from 03/08/18   Certification date to 05/31/18   Medical Diagnosis S/P shoulder surgery Z98.890

## 2018-03-08 NOTE — PROGRESS NOTES
[facility]          OUTPATIENT PHYSICAL THERAPY ORTHOPEDIC EVALUATION  PLAN OF TREATMENT FOR OUTPATIENT REHABILITATION  (COMPLETE FOR INITIAL CLAIMS ONLY)  Patient's Last Name, First Name, M.I.  YOB: 1940  Wilfrid Meneses    Provider s Name:  [facility]   Medical Record No.  [id number]   Start of Care Date:  03/08/18   Onset Date:  02/21/18   Type:     _X__PT   ___OT   ___SLP Medical Diagnosis:  S/P shoulder surgery Z98.890     PT Diagnosis:  L shoulder ROM and strength deficits   Visits from SOC:  1      _________________________________________________________________________________  Plan of Treatment/Functional Goals:  fine motor coordination training, manual therapy, joint mobilization, motor coordination training, neuromuscular re-education, ROM, strengthening, stretching     Cryotherapy     Goals  Goal Identifier: Pain  Goal Description: Pt report 0/10 pain for improved ADLs  Target Date: 05/03/18    Goal Identifier: ROM  Goal Description: Pt to demo L shoulder ROM that is functional to complete ADLs without difficulty  Target Date: 05/31/18    Goal Identifier: Strength  Goal Description: Pt to demo L shoulder strength that is functional to complete ADLs without difficulty  Target Date: 05/31/18       Therapy Frequency:  2 times/Week  Predicted Duration of Therapy Intervention:  12 weeks    Jesús Naranjo, PT                 I CERTIFY THE NEED FOR THESE SERVICES FURNISHED UNDER        THIS PLAN OF TREATMENT AND WHILE UNDER MY CARE .             Physician Signature               Date    X_____________________________________________________                             Certification Date From:  03/08/18   Certification Date To:  05/31/18    Referring Provider:  Madelyn    Initial Assessment        See Epic Evaluation Start of Care Date: 03/08/18

## 2018-03-12 ENCOUNTER — HOSPITAL ENCOUNTER (OUTPATIENT)
Dept: PHYSICAL THERAPY | Facility: OTHER | Age: 78
Setting detail: THERAPIES SERIES
End: 2018-03-12
Attending: PHYSICIAN ASSISTANT
Payer: MEDICARE

## 2018-03-12 PROCEDURE — 40000185 ZZHC STATISTIC PT OUTPT VISIT: Performed by: PHYSICAL THERAPIST

## 2018-03-12 PROCEDURE — 97016 VASOPNEUMATIC DEVICE THERAPY: CPT | Mod: GP | Performed by: PHYSICAL THERAPIST

## 2018-03-12 PROCEDURE — 97110 THERAPEUTIC EXERCISES: CPT | Mod: GP | Performed by: PHYSICAL THERAPIST

## 2018-03-14 ENCOUNTER — HOSPITAL ENCOUNTER (OUTPATIENT)
Dept: PHYSICAL THERAPY | Facility: OTHER | Age: 78
Setting detail: THERAPIES SERIES
End: 2018-03-14
Attending: PHYSICIAN ASSISTANT
Payer: MEDICARE

## 2018-03-14 PROCEDURE — 97016 VASOPNEUMATIC DEVICE THERAPY: CPT

## 2018-03-14 PROCEDURE — 40000185 ZZHC STATISTIC PT OUTPT VISIT

## 2018-03-14 PROCEDURE — 97110 THERAPEUTIC EXERCISES: CPT | Mod: GP

## 2018-03-19 ENCOUNTER — HOSPITAL ENCOUNTER (OUTPATIENT)
Dept: PHYSICAL THERAPY | Facility: OTHER | Age: 78
Setting detail: THERAPIES SERIES
End: 2018-03-19
Attending: PHYSICIAN ASSISTANT
Payer: MEDICARE

## 2018-03-19 PROCEDURE — 97110 THERAPEUTIC EXERCISES: CPT | Mod: GP

## 2018-03-19 PROCEDURE — 40000185 ZZHC STATISTIC PT OUTPT VISIT

## 2018-03-19 PROCEDURE — 97016 VASOPNEUMATIC DEVICE THERAPY: CPT | Mod: GP

## 2018-03-23 ENCOUNTER — HOSPITAL ENCOUNTER (OUTPATIENT)
Dept: PHYSICAL THERAPY | Facility: OTHER | Age: 78
Setting detail: THERAPIES SERIES
End: 2018-03-23
Attending: PHYSICIAN ASSISTANT
Payer: MEDICARE

## 2018-03-23 PROCEDURE — 97016 VASOPNEUMATIC DEVICE THERAPY: CPT | Mod: GP | Performed by: PHYSICAL THERAPIST

## 2018-03-23 PROCEDURE — 97110 THERAPEUTIC EXERCISES: CPT | Mod: GP | Performed by: PHYSICAL THERAPIST

## 2018-03-23 PROCEDURE — 40000185 ZZHC STATISTIC PT OUTPT VISIT: Performed by: PHYSICAL THERAPIST

## 2018-03-27 ENCOUNTER — HOSPITAL ENCOUNTER (OUTPATIENT)
Dept: PHYSICAL THERAPY | Facility: OTHER | Age: 78
Setting detail: THERAPIES SERIES
End: 2018-03-27
Attending: PHYSICIAN ASSISTANT
Payer: MEDICARE

## 2018-03-27 PROCEDURE — 40000185 ZZHC STATISTIC PT OUTPT VISIT

## 2018-03-27 PROCEDURE — 97110 THERAPEUTIC EXERCISES: CPT | Mod: GP,KX

## 2018-03-30 ENCOUNTER — HOSPITAL ENCOUNTER (OUTPATIENT)
Dept: PHYSICAL THERAPY | Facility: OTHER | Age: 78
Setting detail: THERAPIES SERIES
End: 2018-03-30
Attending: PHYSICIAN ASSISTANT
Payer: MEDICARE

## 2018-03-30 PROCEDURE — 97110 THERAPEUTIC EXERCISES: CPT | Mod: GP,KX

## 2018-03-30 PROCEDURE — 97140 MANUAL THERAPY 1/> REGIONS: CPT | Mod: GP,KX

## 2018-03-30 PROCEDURE — 40000185 ZZHC STATISTIC PT OUTPT VISIT

## 2018-04-03 ENCOUNTER — HOSPITAL ENCOUNTER (OUTPATIENT)
Dept: PHYSICAL THERAPY | Facility: OTHER | Age: 78
Setting detail: THERAPIES SERIES
End: 2018-04-03
Attending: PHYSICIAN ASSISTANT
Payer: MEDICARE

## 2018-04-03 PROCEDURE — 40000185 ZZHC STATISTIC PT OUTPT VISIT

## 2018-04-03 PROCEDURE — 97110 THERAPEUTIC EXERCISES: CPT | Mod: GP,KX

## 2018-04-03 PROCEDURE — 97140 MANUAL THERAPY 1/> REGIONS: CPT | Mod: GP,KX

## 2018-04-05 ENCOUNTER — HOSPITAL ENCOUNTER (OUTPATIENT)
Dept: PHYSICAL THERAPY | Facility: OTHER | Age: 78
Setting detail: THERAPIES SERIES
End: 2018-04-05
Attending: PHYSICIAN ASSISTANT
Payer: MEDICARE

## 2018-04-05 PROCEDURE — 97110 THERAPEUTIC EXERCISES: CPT | Mod: GP | Performed by: PHYSICAL THERAPIST

## 2018-04-05 PROCEDURE — 40000185 ZZHC STATISTIC PT OUTPT VISIT: Performed by: PHYSICAL THERAPIST

## 2018-04-10 ENCOUNTER — HOSPITAL ENCOUNTER (OUTPATIENT)
Dept: PHYSICAL THERAPY | Facility: OTHER | Age: 78
Setting detail: THERAPIES SERIES
End: 2018-04-10
Attending: PHYSICIAN ASSISTANT
Payer: MEDICARE

## 2018-04-10 PROCEDURE — G8985 CARRY GOAL STATUS: HCPCS | Mod: GP,CI | Performed by: PHYSICAL THERAPIST

## 2018-04-10 PROCEDURE — G8984 CARRY CURRENT STATUS: HCPCS | Mod: GP,CI | Performed by: PHYSICAL THERAPIST

## 2018-04-10 PROCEDURE — 97110 THERAPEUTIC EXERCISES: CPT | Mod: GP | Performed by: PHYSICAL THERAPIST

## 2018-04-10 PROCEDURE — 40000185 ZZHC STATISTIC PT OUTPT VISIT: Performed by: PHYSICAL THERAPIST

## 2018-04-12 ENCOUNTER — HOSPITAL ENCOUNTER (OUTPATIENT)
Dept: PHYSICAL THERAPY | Facility: OTHER | Age: 78
Setting detail: THERAPIES SERIES
End: 2018-04-12
Attending: PHYSICIAN ASSISTANT
Payer: MEDICARE

## 2018-04-12 PROCEDURE — 40000185 ZZHC STATISTIC PT OUTPT VISIT: Performed by: PHYSICAL THERAPIST

## 2018-04-12 PROCEDURE — 97110 THERAPEUTIC EXERCISES: CPT | Mod: GP | Performed by: PHYSICAL THERAPIST

## 2018-04-17 ENCOUNTER — HOSPITAL ENCOUNTER (OUTPATIENT)
Dept: PHYSICAL THERAPY | Facility: OTHER | Age: 78
Setting detail: THERAPIES SERIES
End: 2018-04-17
Attending: PHYSICIAN ASSISTANT
Payer: MEDICARE

## 2018-04-17 PROCEDURE — 97110 THERAPEUTIC EXERCISES: CPT | Mod: GP

## 2018-04-17 PROCEDURE — 40000185 ZZHC STATISTIC PT OUTPT VISIT

## 2018-04-20 ENCOUNTER — HOSPITAL ENCOUNTER (OUTPATIENT)
Dept: PHYSICAL THERAPY | Facility: OTHER | Age: 78
Setting detail: THERAPIES SERIES
End: 2018-04-20
Attending: PHYSICIAN ASSISTANT
Payer: MEDICARE

## 2018-04-20 PROCEDURE — 97110 THERAPEUTIC EXERCISES: CPT | Mod: GP

## 2018-04-20 PROCEDURE — 40000185 ZZHC STATISTIC PT OUTPT VISIT

## 2018-04-24 ENCOUNTER — HOSPITAL ENCOUNTER (OUTPATIENT)
Dept: PHYSICAL THERAPY | Facility: OTHER | Age: 78
Setting detail: THERAPIES SERIES
End: 2018-04-24
Attending: PHYSICIAN ASSISTANT
Payer: MEDICARE

## 2018-04-24 PROCEDURE — 97110 THERAPEUTIC EXERCISES: CPT | Mod: GP

## 2018-04-24 PROCEDURE — 40000185 ZZHC STATISTIC PT OUTPT VISIT

## 2018-04-26 ENCOUNTER — HOSPITAL ENCOUNTER (OUTPATIENT)
Dept: PHYSICAL THERAPY | Facility: OTHER | Age: 78
Setting detail: THERAPIES SERIES
End: 2018-04-26
Attending: PHYSICAL THERAPIST
Payer: MEDICARE

## 2018-04-26 PROCEDURE — 97110 THERAPEUTIC EXERCISES: CPT | Mod: GP | Performed by: PHYSICAL THERAPIST

## 2018-04-26 PROCEDURE — 40000185 ZZHC STATISTIC PT OUTPT VISIT: Performed by: PHYSICAL THERAPIST

## 2018-05-01 ENCOUNTER — HOSPITAL ENCOUNTER (OUTPATIENT)
Dept: PHYSICAL THERAPY | Facility: OTHER | Age: 78
Setting detail: THERAPIES SERIES
End: 2018-05-01
Attending: INTERNAL MEDICINE
Payer: MEDICARE

## 2018-05-01 PROCEDURE — 40000185 ZZHC STATISTIC PT OUTPT VISIT

## 2018-05-01 PROCEDURE — 97110 THERAPEUTIC EXERCISES: CPT | Mod: GP

## 2018-05-03 ENCOUNTER — HOSPITAL ENCOUNTER (OUTPATIENT)
Dept: PHYSICAL THERAPY | Facility: OTHER | Age: 78
Setting detail: THERAPIES SERIES
End: 2018-05-03
Attending: PHYSICIAN ASSISTANT
Payer: MEDICARE

## 2018-05-03 PROCEDURE — 97110 THERAPEUTIC EXERCISES: CPT | Mod: GP | Performed by: PHYSICAL THERAPIST

## 2018-05-03 PROCEDURE — 40000185 ZZHC STATISTIC PT OUTPT VISIT: Performed by: PHYSICAL THERAPIST

## 2018-05-04 ENCOUNTER — OFFICE VISIT (OUTPATIENT)
Dept: FAMILY MEDICINE | Facility: OTHER | Age: 78
End: 2018-05-04
Attending: PHYSICIAN ASSISTANT
Payer: MEDICARE

## 2018-05-04 VITALS
HEART RATE: 57 BPM | HEIGHT: 64 IN | TEMPERATURE: 98.7 F | BODY MASS INDEX: 25.81 KG/M2 | SYSTOLIC BLOOD PRESSURE: 138 MMHG | DIASTOLIC BLOOD PRESSURE: 70 MMHG | WEIGHT: 151.2 LBS

## 2018-05-04 DIAGNOSIS — L73.9 FOLLICULITIS: Primary | ICD-10-CM

## 2018-05-04 PROCEDURE — G0463 HOSPITAL OUTPT CLINIC VISIT: HCPCS

## 2018-05-04 PROCEDURE — 99212 OFFICE O/P EST SF 10 MIN: CPT | Performed by: PHYSICIAN ASSISTANT

## 2018-05-04 ASSESSMENT — PAIN SCALES - GENERAL: PAINLEVEL: NO PAIN (0)

## 2018-05-04 NOTE — PATIENT INSTRUCTIONS
Hot pack the area for 20 minutes 3 times a day.  Use the triple antibiotic ointment on it.    If it is getting worse or if you get more bumps like it in the area, then get rechecked.

## 2018-05-04 NOTE — MR AVS SNAPSHOT
After Visit Summary   5/4/2018    Wilfrid Meneses    MRN: [id number]           Patient Information     Date Of Birth          1940        Visit Information        Provider Department      5/4/2018 1:45 PM Laureen Linda PA-C [facility]        Today's Diagnoses     Folliculitis    -  1      Care Instructions    Hot pack the area for 20 minutes 3 times a day.  Use the triple antibiotic ointment on it.    If it is getting worse or if you get more bumps like it in the area, then get rechecked.           Follow-ups after your visit        Your next 10 appointments already scheduled     May 08, 2018  9:00 AM CDT   Treatment with Fatoumata Reaves PTA   [facility]    [address] [address]   [phone]            May 10, 2018  9:00 AM CDT   Treatment with Jesús Naranjo, PT   [facility]    [address] [address]   [phone]            May 17, 2018  9:00 AM CDT   Treatment with Jesús Naranjo, PT   [facility]    [address] [address]   [phone]            May 24, 2018 10:45 AM CDT   Treatment with Jesús Naranjo, PT   [facility] ([facility])    [address] [address]   [phone]              Who to contact     If you have questions or need follow up information about today's clinic visit or your schedule please contact [facility] directly at [phone].  Normal or non-critical lab and imaging results will be communicated to you by MyChart, letter or phone within 4 business days after the clinic has received the results. If you do not hear from us within 7 days, please contact the clinic through MyChart or phone. If you have a critical or  "abnormal lab result, we will notify you by phone as soon as possible.  Submit refill requests through [employer] or call your pharmacy and they will forward the refill request to us. Please allow 3 business days for your refill to be completed.          Additional Information About Your Visit        Care EveryWhere ID     This is your Care EveryWhere ID. This could be used by other organizations to access your [place] medical records  [id number]        Your Vitals Were     Pulse Temperature Height BMI (Body Mass Index)          57 98.7  F (37.1  C) (Tympanic) 5' 4.17\" (1.63 m) 25.81 kg/m2         Blood Pressure from Last 3 Encounters:   05/04/18 138/70   02/19/18 (!) 148/92   02/08/18 140/72    Weight from Last 3 Encounters:   05/04/18 151 lb 3.2 oz (68.6 kg)   02/19/18 156 lb 12.8 oz (71.1 kg)   02/08/18 160 lb 9.6 oz (72.8 kg)              Today, you had the following     No orders found for display       Primary Care Provider Office Phone # Fax #    Jimenez Colon [phone]       [address] [facility] [address]        Equal Access to Services     RAVIN CORONA AH: Hadii maria d smith hadasho Solindyali, waaxda luqadaha, qaybta kaalmada adeegyada, vijay mcnamara. So [facility] [phone].    ATENCIÓN: Si habla español, tiene a marshall disposición servicios gratuitos de asistencia lingüística. Llame al [phone].    We comply with applicable federal civil rights laws and [place] laws. We do not discriminate on the basis of race, color, national origin, age, disability, sex, sexual orientation, or gender identity.            Thank you!     Thank you for choosing [facility]  for your care. Our goal is always to provide you with excellent care. Hearing back from our patients is one way we can continue to improve our services. Please take a few minutes to complete the written survey that you may receive in the mail after your visit with us. Thank you!   "           Your Updated Medication List - Protect others around you: Learn how to safely use, store and throw away your medicines at www.disposemymeds.org.          This list is accurate as of 5/4/18  2:30 PM.  Always use your most recent med list.                   Brand Name Dispense Instructions for use Diagnosis    atenolol 50 MG tablet    TENORMIN     Take 50 mg by mouth        MULTI-VITAMINS Tabs      Take 1 tablet by mouth        simvastatin 40 MG tablet    ZOCOR     Take 40 mg by mouth        valsartan-hydrochlorothiazide 320-12.5 MG per tablet    DIOVAN-HCT     Take 1 tablet by mouth

## 2018-05-04 NOTE — NURSING NOTE
Patient presents with scabbed area on right upper thigh for 5 days. Bel Palacios LPN .............5/4/2018  1:55 PM

## 2018-05-04 NOTE — PROGRESS NOTES
"SUBJECTIVE:   Wilfrid Meneses is a 77 year old male presenting with a chief complaint of   Chief Complaint   Patient presents with     Insect Bites     Nursing Notes:   Bel Palacios LPN  5/4/2018  2:24 PM  Signed  Patient presents with scabbed area on right upper thigh for 5 days. Bel Palacios LPN .............5/4/2018  1:55 PM    HPI: Wilfrid presents to [facility] with concerns of a possible bug bite on the anterior right thigh.  He noticed this about 5 days ago.  It has not changed in size.  It does not itch or hurt.  When he first noticed it, there was a tiny scab in the middle.  He has scratched this off now.  He is concerned about \"minor infection\".  He has been using triple antibiotic ointment just today.  He does not have any other lesions like this on his body.         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  He is just getting over a mild cold with some nasal nasal congestion and runny nose.  No fevers or other systemic symptoms.  Past Medical History:   Diagnosis Date     Abscess of lung without pneumonia (H)     on ECHO 12/2010     Coronary atherosclerosis due to calcified coronary lesion (CODE)     score 297 12/2010     Enlarged prostate without lower urinary tract symptoms (luts)     Post prostatectomy     Essential (primary) hypertension     No Comments Provided     History of colonic polyps     No Comments Provided     Hyperglycemia     No Comments Provided     Hyperlipidemia     No Comments Provided     Malignant neoplasm of skin     No Comments Provided     Spinal stenosis of lumbar region without neurogenic claudication     No Comments Provided     Uncomplicated alcohol abuse     No Comments Provided     Family History   Problem Relation Age of Onset     DIABETES Mother      Chronic Obstructive Pulmonary Disease Mother      Myocardial Infarction Father      Other - See Comments Son      " "MI during GXT     Other - See Comments Sister      Good Health     Current Outpatient Prescriptions   Medication Sig Dispense Refill     atenolol (TENORMIN) 50 MG tablet Take 50 mg by mouth       Multiple Vitamin (MULTI-VITAMINS) TABS Take 1 tablet by mouth       simvastatin (ZOCOR) 40 MG tablet Take 40 mg by mouth       valsartan-hydrochlorothiazide (DIOVAN-HCT) 320-12.5 MG per tablet Take 1 tablet by mouth       Allergies   Allergen Reactions     Ace Inhibitors Cough     Hydrocodone-Acetaminophen      Other reaction(s): Insomnia  Keeps him awake      Sulfamethoxazole-Trimethoprim Rash     Sulfasalazine Rash       Social History   Substance Use Topics     Smoking status: Never Smoker     Smokeless tobacco: Never Used     Alcohol use 16.8 oz/week       OBJECTIVE  /70 (BP Location: Right arm, Patient Position: Sitting, Cuff Size: Adult Regular)  Pulse 57  Temp 98.7  F (37.1  C) (Tympanic)  Ht 5' 4.17\" (1.63 m)  Wt 151 lb 3.2 oz (68.6 kg)  BMI 25.81 kg/m2    Physical Exam   Constitutional: He appears well-developed and well-nourished. No distress.   Cardiovascular: Normal rate and regular rhythm.    No murmur heard.  Pulmonary/Chest: Effort normal and breath sounds normal. No respiratory distress.   Skin:   He has a tiny circular, barely raised, erythematous lesion on the right anterior thigh.  It measures about 3-4 mm and there is a small excoriation, about a mm, in the direct center.       Labs:  No results found for this or any previous visit (from the past 24 hour(s)).      ASSESSMENT:      ICD-10-CM    1. Folliculitis L73.9         PLAN:  This is likely a tiny inflamed hair follicle.  This should self resolve.  If he wants to treat it, he can hot pack the area for 20 minutes 3 times a day.  He can continue use of the triple antibiotic ointment.  I would not be concerned about this lesion unless it grows much larger, becomes painful, or if other similar lesions develop around.  Patient understands and " agrees with this plan.  Laureen Linda PA-C on 5/4/2018 at 8:59 PM            Patient Instructions   Hot pack the area for 20 minutes 3 times a day.  Use the triple antibiotic ointment on it.    If it is getting worse or if you get more bumps like it in the area, then get rechecked.

## 2018-05-08 ENCOUNTER — HOSPITAL ENCOUNTER (OUTPATIENT)
Dept: PHYSICAL THERAPY | Facility: OTHER | Age: 78
Setting detail: THERAPIES SERIES
End: 2018-05-08
Attending: PHYSICIAN ASSISTANT
Payer: MEDICARE

## 2018-05-08 PROCEDURE — 40000185 ZZHC STATISTIC PT OUTPT VISIT

## 2018-05-08 PROCEDURE — 97110 THERAPEUTIC EXERCISES: CPT | Mod: GP

## 2018-05-10 ENCOUNTER — HOSPITAL ENCOUNTER (OUTPATIENT)
Dept: PHYSICAL THERAPY | Facility: OTHER | Age: 78
Setting detail: THERAPIES SERIES
End: 2018-05-10
Attending: PHYSICIAN ASSISTANT
Payer: MEDICARE

## 2018-05-10 PROCEDURE — 40000185 ZZHC STATISTIC PT OUTPT VISIT: Performed by: PHYSICAL THERAPIST

## 2018-05-10 PROCEDURE — 97110 THERAPEUTIC EXERCISES: CPT | Mod: GP | Performed by: PHYSICAL THERAPIST

## 2018-05-10 PROCEDURE — 97140 MANUAL THERAPY 1/> REGIONS: CPT | Mod: GP | Performed by: PHYSICAL THERAPIST

## 2018-05-17 ENCOUNTER — HOSPITAL ENCOUNTER (OUTPATIENT)
Dept: PHYSICAL THERAPY | Facility: OTHER | Age: 78
Setting detail: THERAPIES SERIES
End: 2018-05-17
Attending: PHYSICIAN ASSISTANT
Payer: MEDICARE

## 2018-05-17 PROCEDURE — 97140 MANUAL THERAPY 1/> REGIONS: CPT | Mod: GP | Performed by: PHYSICAL THERAPIST

## 2018-05-17 PROCEDURE — 97110 THERAPEUTIC EXERCISES: CPT | Mod: GP | Performed by: PHYSICAL THERAPIST

## 2018-05-17 PROCEDURE — 40000185 ZZHC STATISTIC PT OUTPT VISIT: Performed by: PHYSICAL THERAPIST

## 2018-05-17 NOTE — ADDENDUM NOTE
Encounter addended by: Jesús Naranjo, PT on: 5/17/2018  9:08 AM<BR>     Actions taken: Flowsheet accepted

## 2018-05-17 NOTE — PROGRESS NOTES
"Outpatient Physical Therapy Progress Note     Patient: Wilfrid Meneses  : 1940    Beginning/End Dates of Reporting Period:  3/8/18 to 2018    Referring Provider: Evingson    Therapy Diagnosis: S/P shoulder surgery Z98.890     Client Self Report: Oveall he thinks his shoulder is gradually and slowly improving. He still has the one spot on his anterior shoulder that bothers throughout the day.     Objective Measurements:  Objective Measure: Subjective Pain - L shoulder  Details: 0/10 currently, but 1-2/10 when moving in a certain direction \"sharp jab\"    Objective Measure: 3/8 90 PROM. 3/12 95 PROM. 3/14 120 PROM. 3/19 125 PROM. 3/23 130 AAROM. 3/27 133 AAROM. 3/30 140 AAROM, 135 PROM. 4/3 145 AAROM, 140 PROM.  155 AAROM. 4/10: 160 AAROM. : 160 AAROM.  155 AAROM.  155 AAROM.  156 AAROM.  155 AAROM, 110 standing AROM.  5/3: 115 AROM, 155 AAROM.  110 AROM.  5/10: 110 AROM.  : 110 AROM  Details: L shoulder flexion          Goals:  Goal Identifier Pain   Goal Description Pt report 0/10 pain for improved ADLs   Target Date 18   Date Met      Progress:     Goal Identifier ROM   Goal Description Pt to demo L shoulder ROM that is functional to complete ADLs without difficulty   Target Date 18   Date Met      Progress:     Goal Identifier Strength   Goal Description Pt to demo L shoulder strength that is functional to complete ADLs without difficulty   Target Date 18   Date Met      Progress:       Progress Toward Goals:   Progress this reporting period: improved strength and reaching ability with L arm    Plan:  Continue therapy per current plan of care.    Discharge:  No  "

## 2018-05-24 ENCOUNTER — HOSPITAL ENCOUNTER (OUTPATIENT)
Dept: PHYSICAL THERAPY | Facility: OTHER | Age: 78
Setting detail: THERAPIES SERIES
End: 2018-05-24
Attending: PHYSICIAN ASSISTANT
Payer: MEDICARE

## 2018-05-24 PROCEDURE — 40000185 ZZHC STATISTIC PT OUTPT VISIT: Performed by: PHYSICAL THERAPIST

## 2018-05-24 PROCEDURE — 97140 MANUAL THERAPY 1/> REGIONS: CPT | Mod: GP | Performed by: PHYSICAL THERAPIST

## 2018-05-24 PROCEDURE — 97110 THERAPEUTIC EXERCISES: CPT | Mod: GP | Performed by: PHYSICAL THERAPIST

## 2018-07-23 NOTE — PROGRESS NOTES
Patient Information     Patient Name  Wilfrid Meneses MRN  [id number] Sex  Male   1940      Letter by Jimenez Colon MD at      Author:  Jimenez Colon MD Service:  (none) Author Type:  (none)    Filed:   Encounter Date:  2017 Status:  (Other)           Wilfrid Meneses  [address] [facility] [address]          2017    Dear Art,    Following are the tests completed during your last clinic visit:    Results for orders placed or performed in visit on 17      COMPLETE METABOLIC PANEL      Result  Value Ref Range    SODIUM 135 133 - 143 mmol/L    POTASSIUM 4.2 3.5 - 5.1 mmol/L    CHLORIDE 100 98 - 107 mmol/L    CO2,TOTAL 25 21 - 31 mmol/L    ANION GAP 10 5 - 18                    GLUCOSE 106 (H) 70 - 105 mg/dL    CALCIUM 9.2 8.6 - 10.3 mg/dL    BUN 20 7 - 25 mg/dL    CREATININE 0.98 0.70 - 1.30 mg/dL    BUN/CREAT RATIO           20                    GFR if African American >60 >60 ml/min/1.73m2    GFR if not African American >60 >60 ml/min/1.73m2    ALBUMIN 4.7 3.5 - 5.7 g/dL    PROTEIN,TOTAL 7.3 6.4 - 8.9 g/dL    GLOBULIN                  2.6 2.0 - 3.7 g/dL    A/G RATIO 1.8 1.0 - 2.0                    BILIRUBIN,TOTAL 0.8 0.3 - 1.0 mg/dL    ALK PHOSPHATASE 65 34 - 104 IU/L    ALT (SGPT) 17 7 - 52 IU/L    AST (SGOT) 20 13 - 39 IU/L   LIPID PANEL      Result  Value Ref Range    CHOLESTEROL,TOTAL 209 (H) <200 mg/dL    TRIGLYCERIDES 103 <150 mg/dL    HDL CHOLESTEROL 69 23 - 92 mg/dL    NON-HDL CHOLESTEROL 140 <145 mg/dl    CHOL/HDL RATIO            3.03 <4.50                    LDL CHOLESTEROL 119 (H) <100 mg/dL    PROVIDER ORDERED STATUS FASTING    PSA, TOTAL      Result  Value Ref Range    PSA TOTAL (DIAGNOSTIC) 2.964 <=3.100 ng/mL   HEMOGLOBIN A1C MONITORING (POCT)      Result  Value Ref Range    HEMOGLOBIN A1C MONITORING (POCT) 5.3 4.0 - 6.2 %    ESTIMATED AVERAGE GLUCOSE  105 mg/dL         Your blood tests look fine. Your sugar and your cholesterol are once again a little bit  elevated but nothing too concerning. Your A1c test is normal and your PSA is unchanged. Congratulations on this report and keep up the good work.    Sincerely,      Jimenez Colon MD  Internal Medicine  [facility]

## 2018-07-24 NOTE — PROGRESS NOTES
Outpatient Physical Therapy Discharge Note     Patient: Wilfrid Meneses  : 1940    Beginning/End Dates:  3/8/18 to 18    Referring Provider: Evingson    Therapy Diagnosis: s/p shoulder surgery, Z98.89     Plan:  Discharge from therapy.    Discharge:   Pt has not returned to physical therapy after a trial of self mgmt techniques indicating that he is doing well with HEP. Please refer to pt's chart for most recent information on objective measurements, goals or any additional information. This is an unplanned DC    Reason for Discharge: No further expectation of progress.    Discharge Plan: Patient to continue home program.

## 2018-07-24 NOTE — ADDENDUM NOTE
Encounter addended by: Jesús Naranjo, PT on: 7/24/2018  5:22 PM<BR>     Actions taken: Sign clinical note, Episode resolved

## 2018-09-18 ENCOUNTER — TELEPHONE (OUTPATIENT)
Dept: INTERNAL MEDICINE | Facility: OTHER | Age: 78
End: 2018-09-18

## 2018-09-18 NOTE — TELEPHONE ENCOUNTER
The patient was calling looking for a work in tomorrow for abdominal pain. He stated he wouldn't be able to come in at all today. He states he will look at setting up a appointment next week with Jimenez Colon MD or with a different provider.  Kortney Chapman LPN on 9/18/2018 at 9:38 AM

## 2018-09-24 ENCOUNTER — OFFICE VISIT (OUTPATIENT)
Dept: INTERNAL MEDICINE | Facility: OTHER | Age: 78
End: 2018-09-24
Attending: INTERNAL MEDICINE
Payer: MEDICARE

## 2018-09-24 ENCOUNTER — TELEPHONE (OUTPATIENT)
Dept: INTERNAL MEDICINE | Facility: OTHER | Age: 78
End: 2018-09-24

## 2018-09-24 VITALS
BODY MASS INDEX: 27.69 KG/M2 | WEIGHT: 162.2 LBS | SYSTOLIC BLOOD PRESSURE: 132 MMHG | HEART RATE: 60 BPM | DIASTOLIC BLOOD PRESSURE: 82 MMHG

## 2018-09-24 DIAGNOSIS — R30.0 DYSURIA: Primary | ICD-10-CM

## 2018-09-24 DIAGNOSIS — N40.1 BENIGN PROSTATIC HYPERPLASIA WITH URINARY OBSTRUCTION: ICD-10-CM

## 2018-09-24 DIAGNOSIS — N13.8 BENIGN PROSTATIC HYPERPLASIA WITH URINARY OBSTRUCTION: ICD-10-CM

## 2018-09-24 LAB
ALBUMIN UR-MCNC: NEGATIVE MG/DL
APPEARANCE UR: CLEAR
BILIRUB UR QL STRIP: NEGATIVE
COLOR UR AUTO: YELLOW
GLUCOSE UR STRIP-MCNC: NEGATIVE MG/DL
HGB UR QL STRIP: NEGATIVE
KETONES UR STRIP-MCNC: NEGATIVE MG/DL
LEUKOCYTE ESTERASE UR QL STRIP: NEGATIVE
NITRATE UR QL: NEGATIVE
PH UR STRIP: 6 PH (ref 5–9)
SOURCE: NORMAL
SP GR UR STRIP: 1.01 (ref 1–1.03)
UROBILINOGEN UR STRIP-ACNC: 0.2 EU/DL (ref 0.2–1)

## 2018-09-24 PROCEDURE — 81003 URINALYSIS AUTO W/O SCOPE: CPT | Performed by: INTERNAL MEDICINE

## 2018-09-24 PROCEDURE — 99213 OFFICE O/P EST LOW 20 MIN: CPT | Performed by: INTERNAL MEDICINE

## 2018-09-24 PROCEDURE — G0463 HOSPITAL OUTPT CLINIC VISIT: HCPCS

## 2018-09-24 RX ORDER — TAMSULOSIN HYDROCHLORIDE 0.4 MG/1
0.4 CAPSULE ORAL DAILY
Qty: 15 CAPSULE | Refills: 1 | Status: SHIPPED | OUTPATIENT
Start: 2018-09-24 | End: 2018-11-30

## 2018-09-24 RX ORDER — ATENOLOL 50 MG/1
50 TABLET ORAL DAILY
Qty: 30 TABLET | COMMUNITY
Start: 2018-09-24 | End: 2018-11-30

## 2018-09-24 RX ORDER — SIMVASTATIN 40 MG
40 TABLET ORAL AT BEDTIME
Qty: 30 TABLET | COMMUNITY
Start: 2018-09-24 | End: 2018-11-30

## 2018-09-24 RX ORDER — DIPHENOXYLATE HYDROCHLORIDE AND ATROPINE SULFATE 2.5; .025 MG/1; MG/1
1 TABLET ORAL DAILY
COMMUNITY
Start: 2018-09-24

## 2018-09-24 RX ORDER — VALSARTAN AND HYDROCHLOROTHIAZIDE 320; 12.5 MG/1; MG/1
1 TABLET, FILM COATED ORAL DAILY
Qty: 30 TABLET | COMMUNITY
Start: 2018-09-24 | End: 2018-11-30

## 2018-09-24 ASSESSMENT — ENCOUNTER SYMPTOMS
ALLERGIC/IMMUNOLOGIC NEGATIVE: 1
EYES NEGATIVE: 1
CONSTITUTIONAL NEGATIVE: 1
ENDOCRINE NEGATIVE: 1

## 2018-09-24 NOTE — TELEPHONE ENCOUNTER
MAF - Patient called and stated that he is having some abdominal distress and is wondering if he could be worked in this week sometime.  Please call patient with any questions.        Leila Valdez

## 2018-09-24 NOTE — NURSING NOTE
"The patient is here today to be seen for urinary frequency and discomfort around his bladder area.  Kortney Chapman LPN on 9/24/2018 at 3:40 PM  Chief Complaint   Patient presents with     Urinary Problem       Initial /82 (BP Location: Right arm, Patient Position: Sitting, Cuff Size: Adult Large)  Pulse 60  Wt 162 lb 3.2 oz (73.6 kg)  BMI 27.69 kg/m2 Estimated body mass index is 27.69 kg/(m^2) as calculated from the following:    Height as of 5/4/18: 5' 4.17\" (1.63 m).    Weight as of this encounter: 162 lb 3.2 oz (73.6 kg).  Medication Reconciliation: incomplete    Kortney Chapman LPN    "

## 2018-09-24 NOTE — TELEPHONE ENCOUNTER
Contacted the patient he states that he is having a increase in urination, frequency and discomfort around his bladder area. He would like to be worked in sometime this week to be seen.  Kortney Chapman LPN on 9/24/2018 at 8:56 AM

## 2018-09-24 NOTE — TELEPHONE ENCOUNTER
Contacted the patient and gave him the appointment time below. The patient was placed in Jimenez Colon MD schedule.  Kortney Chapman LPN on 9/24/2018 at 9:03 AM

## 2018-09-24 NOTE — MR AVS SNAPSHOT
After Visit Summary   9/24/2018    Wilfrid Meneses    MRN: [id number]           Patient Information     Date Of Birth          1940        Visit Information        Provider Department      9/24/2018 3:40 PM Jimenez Colon MD [facility]        Today's Diagnoses     Dysuria    -  1    Benign prostatic hyperplasia with urinary obstruction           Follow-ups after your visit        Who to contact     If you have questions or need follow up information about today's clinic visit or your schedule please contact [facility] directly at [phone].  Normal or non-critical lab and imaging results will be communicated to you by MyChart, letter or phone within 4 business days after the clinic has received the results. If you do not hear from us within 7 days, please contact the clinic through MyChart or phone. If you have a critical or abnormal lab result, we will notify you by phone as soon as possible.  Submit refill requests through [employer]aBla[employer] or call your pharmacy and they will forward the refill request to us. Please allow 3 business days for your refill to be completed.          Additional Information About Your Visit        Care EveryWhere ID     This is your Care EveryWhere ID. This could be used by other organizations to access your [place] medical records  [id number]        Your Vitals Were     Pulse BMI (Body Mass Index)                60 27.69 kg/m2           Blood Pressure from Last 3 Encounters:   09/24/18 132/82   05/04/18 138/70   02/19/18 (!) 148/92    Weight from Last 3 Encounters:   09/24/18 162 lb 3.2 oz (73.6 kg)   05/04/18 151 lb 3.2 oz (68.6 kg)   02/19/18 156 lb 12.8 oz (71.1 kg)              We Performed the Following     UA reflex to Microscopic and Culture          Today's Medication Changes          These changes are accurate as of 9/24/18  4:08 PM.  If you have any questions, ask your nurse or doctor.               Start taking  these medicines.        Dose/Directions    tamsulosin 0.4 MG capsule   Commonly known as:  FLOMAX   Used for:  Benign prostatic hyperplasia with urinary obstruction   Started by:  Jimenez Colon MD        Dose:  0.4 mg   Take 1 capsule (0.4 mg) by mouth daily   Quantity:  15 capsule   Refills:  1         These medicines have changed or have updated prescriptions.        Dose/Directions    atenolol 50 MG tablet   Commonly known as:  TENORMIN   This may have changed:  when to take this   Changed by:  Jimenez Colon MD        Dose:  50 mg   Take 1 tablet (50 mg) by mouth daily   Quantity:  30 tablet   Refills:  0       MULTI-VITAMINS Tabs   This may have changed:  when to take this   Changed by:  Jimenez Colon MD        Dose:  1 tablet   Take 1 tablet by mouth daily   Refills:  0       simvastatin 40 MG tablet   Commonly known as:  ZOCOR   This may have changed:  when to take this   Changed by:  Jimenez Colon MD        Dose:  40 mg   Take 1 tablet (40 mg) by mouth At Bedtime   Quantity:  30 tablet   Refills:  0       valsartan-hydrochlorothiazide 320-12.5 MG per tablet   Commonly known as:  DIOVAN-HCT   This may have changed:  when to take this   Changed by:  Jimenez Colon MD        Dose:  1 tablet   Take 1 tablet by mouth daily   Quantity:  30 tablet   Refills:  0            Where to get your medicines      These medications were sent to [facility], - [place], [place] - [address]  [address], [place] [address]     Phone:  [phone]     tamsulosin 0.4 MG capsule                Primary Care Provider Office Phone # Fax #    Jimenez Colon [phone]       [address]  [facility] [address]        Equal Access to Services     [facility] : Emery Bergman, wabrandonda luyuridia, qaybta kaalvijay lopez. So [facility] [phone].    ATENCIÓN: Si habla español, tiene a marshall disposición  servicios gratuitos de asistencia lingüística. Kasandra buchanan [phone].    We comply with applicable federal civil rights laws and [place] laws. We do not discriminate on the basis of race, color, national origin, age, disability, sex, sexual orientation, or gender identity.            Thank you!     Thank you for choosing [facility] AND [facility]  for your care. Our goal is always to provide you with excellent care. Hearing back from our patients is one way we can continue to improve our services. Please take a few minutes to complete the written survey that you may receive in the mail after your visit with us. Thank you!             Your Updated Medication List - Protect others around you: Learn how to safely use, store and throw away your medicines at www.disposemymeds.org.          This list is accurate as of 9/24/18  4:08 PM.  Always use your most recent med list.                   Brand Name Dispense Instructions for use Diagnosis    atenolol 50 MG tablet    TENORMIN    30 tablet    Take 1 tablet (50 mg) by mouth daily        MULTI-VITAMINS Tabs      Take 1 tablet by mouth daily        simvastatin 40 MG tablet    ZOCOR    30 tablet    Take 1 tablet (40 mg) by mouth At Bedtime        tamsulosin 0.4 MG capsule    FLOMAX    15 capsule    Take 1 capsule (0.4 mg) by mouth daily    Benign prostatic hyperplasia with urinary obstruction       valsartan-hydrochlorothiazide 320-12.5 MG per tablet    DIOVAN-HCT    30 tablet    Take 1 tablet by mouth daily

## 2018-09-24 NOTE — PROGRESS NOTES
Chief Complaint:  Urine frequency.    HPI: He is in today with about a week's worth of urinary frequency as well as some low pelvic discomfort.  These are the same symptoms that he had previously before his prostate surgery.  He has not had a fever.  There is no burning with urination.  He has had success with Flomax when he took that before his surgery.    Past Medical History:   Diagnosis Date     Abscess of lung without pneumonia (H)     on ECHO 12/2010     Coronary atherosclerosis due to calcified coronary lesion (CODE)     score 297 12/2010     Enlarged prostate without lower urinary tract symptoms (luts)     Post prostatectomy     Essential (primary) hypertension     No Comments Provided     History of colonic polyps     No Comments Provided     Hyperglycemia     No Comments Provided     Hyperlipidemia     No Comments Provided     Malignant neoplasm of skin     No Comments Provided     Spinal stenosis of lumbar region without neurogenic claudication     No Comments Provided     Uncomplicated alcohol abuse     No Comments Provided       Past Surgical History:   Procedure Laterality Date     ARTHROPLASTY KNEE Left     01/09     ARTHROSCOPY KNEE Left     12/05     COLONOSCOPY      2001, 2006, 2011,Adenomatous 2001, subsequent WNL     COLONOSCOPY      11/21/2014,Adenoma     HERNIORRHAPHY INGUINAL BILATERAL      No Comments Provided     PROSTATECTOMY SUPRAPUBIC  2007     UPPER GI ENDOSCOPY  2005       Allergies   Allergen Reactions     Ace Inhibitors Cough     Hydrocodone-Acetaminophen      Other reaction(s): Insomnia  Keeps him awake      Sulfamethoxazole-Trimethoprim Rash     Sulfasalazine Rash       Current Outpatient Prescriptions   Medication Sig Dispense Refill     atenolol (TENORMIN) 50 MG tablet Take 1 tablet (50 mg) by mouth daily 30 tablet      Multiple Vitamin (MULTI-VITAMINS) TABS Take 1 tablet by mouth daily       simvastatin (ZOCOR) 40 MG tablet Take 1 tablet (40 mg) by mouth At Bedtime 30 tablet       tamsulosin (FLOMAX) 0.4 MG capsule Take 1 capsule (0.4 mg) by mouth daily 15 capsule 1     valsartan-hydrochlorothiazide (DIOVAN-HCT) 320-12.5 MG per tablet Take 1 tablet by mouth daily 30 tablet      [DISCONTINUED] atenolol (TENORMIN) 50 MG tablet Take 50 mg by mouth       [DISCONTINUED] simvastatin (ZOCOR) 40 MG tablet Take 40 mg by mouth       [DISCONTINUED] valsartan-hydrochlorothiazide (DIOVAN-HCT) 320-12.5 MG per tablet Take 1 tablet by mouth         Review of Systems   Constitutional: Negative.    Eyes: Negative.    Endocrine: Negative.    Allergic/Immunologic: Negative.        Physical Exam   Constitutional: He appears well-developed and well-nourished. No distress.   Genitourinary: Prostate is enlarged.   Genitourinary Comments: Right lobe enlarged greater than left with tiny superficial nodularity superiorly.  Slight firmness mid right lobe.   Skin: He is not diaphoretic.   Nursing note and vitals reviewed.      Assessment:        ICD-10-CM    1. Dysuria R30.0 UA reflex to Microscopic and Culture   2. Benign prostatic hyperplasia with urinary obstruction N40.1 tamsulosin (FLOMAX) 0.4 MG capsule    N13.8        Plan: His urine is normal.  Prostate exam as listed.  I think his symptoms could be related to enlarged prostate symptoms.  We will try him on Flomax again.  If not improved, will send to urology.  Consider PSA with his next yearly review.

## 2018-09-25 ASSESSMENT — PATIENT HEALTH QUESTIONNAIRE - PHQ9: SUM OF ALL RESPONSES TO PHQ QUESTIONS 1-9: 0

## 2018-11-01 ENCOUNTER — ALLIED HEALTH/NURSE VISIT (OUTPATIENT)
Dept: FAMILY MEDICINE | Facility: OTHER | Age: 78
End: 2018-11-01
Attending: INTERNAL MEDICINE
Payer: MEDICARE

## 2018-11-01 DIAGNOSIS — Z23 NEED FOR PROPHYLACTIC VACCINATION AND INOCULATION AGAINST INFLUENZA: Primary | ICD-10-CM

## 2018-11-01 PROCEDURE — G0008 ADMIN INFLUENZA VIRUS VAC: HCPCS

## 2018-11-01 PROCEDURE — 90662 IIV NO PRSV INCREASED AG IM: CPT

## 2018-11-01 NOTE — PROGRESS NOTES
Injectable Influenza Immunization Documentation    1.  Is the person to be vaccinated sick today?   No    2. Does the person to be vaccinated have an allergy to a component   of the vaccine?   No  Egg Allergy Algorithm Link    3. Has the person to be vaccinated ever had a serious reaction   to influenza vaccine in the past?   No    4. Has the person to be vaccinated ever had Guillain-Barré syndrome?   No    Form completed by Leny Lees CMA ([facility])......................11/1/2018  11:07 AM        Prior to injection verified patient identity using patient's name and date of birth.  Due to injection administration, patient instructed to remain in clinic for 15 minutes  afterwards, and to report any adverse reaction to me immediately.

## 2018-11-01 NOTE — MR AVS SNAPSHOT
After Visit Summary   11/1/2018    Wilfrid Meneses    MRN: [id number]           Patient Information     Date Of Birth          1940        Visit Information        Provider Department      11/1/2018 1:45 PM Nurse, [facility]        Today's Diagnoses     Need for prophylactic vaccination and inoculation against influenza    -  1       Follow-ups after your visit        Your next 10 appointments already scheduled     Nov 01, 2018  1:45 PM CDT   Nurse Only with [facility]   [facility]    [address]  [place] [address] [phone]              Who to contact     If you have questions or need follow up information about today's clinic visit or your schedule please contact [facility] directly at [phone].  Normal or non-critical lab and imaging results will be communicated to you by MyChart, letter or phone within 4 business days after the clinic has received the results. If you do not hear from us within 7 days, please contact the clinic through MyChart or phone. If you have a critical or abnormal lab result, we will notify you by phone as soon as possible.  Submit refill requests through [employer] or call your pharmacy and they will forward the refill request to us. Please allow 3 business days for your refill to be completed.          Additional Information About Your Visit        Care EveryWhere ID     This is your Care EveryWhere ID. This could be used by other organizations to access your [place] medical records  [id number]         Blood Pressure from Last 3 Encounters:   09/24/18 132/82   05/04/18 138/70   02/19/18 (!) 148/92    Weight from Last 3 Encounters:   09/24/18 162 lb 3.2 oz (73.6 kg)   05/04/18 151 lb 3.2 oz (68.6 kg)   02/19/18 156 lb 12.8 oz (71.1 kg)              We Performed the Following     HC FLU VACCINE, INCREASED ANTIGEN, PRESV FREE     Vaccine Administration, Initial [[id number]]         Primary Care Provider Office Phone # Fax #    Jimenez Colon [phone] [address]  [facility] [facility] [address]        Equal Access to Services     GUMEDYLAN CHLOE : Hadii aad ku hadrajindereliseo Dawitali, wabrandonda luqtanya, qaybta kaalmada cesar, vijay fernandez nettiesilvia burnsmickey mcnamara. So [facility] [phone].    ATENCIÓN: Si habla español, tiene a marshall disposición servicios gratuitos de asistencia lingüística. Llame al [phone].    We comply with applicable federal civil rights laws and [place] laws. We do not discriminate on the basis of race, color, national origin, age, disability, sex, sexual orientation, or gender identity.            Thank you!     Thank you for choosing [facility]  for your care. Our goal is always to provide you with excellent care. Hearing back from our patients is one way we can continue to improve our services. Please take a few minutes to complete the written survey that you may receive in the mail after your visit with us. Thank you!             Your Updated Medication List - Protect others around you: Learn how to safely use, store and throw away your medicines at www.disposemymeds.org.          This list is accurate as of 11/1/18 11:14 AM.  Always use your most recent med list.                   Brand Name Dispense Instructions for use Diagnosis    atenolol 50 MG tablet    TENORMIN    30 tablet    Take 1 tablet (50 mg) by mouth daily        MULTI-VITAMINS Tabs      Take 1 tablet by mouth daily        simvastatin 40 MG tablet    ZOCOR    30 tablet    Take 1 tablet (40 mg) by mouth At Bedtime        tamsulosin 0.4 MG capsule    FLOMAX    15 capsule    Take 1 capsule (0.4 mg) by mouth daily    Benign prostatic hyperplasia with urinary obstruction       valsartan-hydrochlorothiazide 320-12.5 MG per tablet    DIOVAN-HCT    30 tablet    Take 1 tablet by mouth daily

## 2018-11-30 ENCOUNTER — OFFICE VISIT (OUTPATIENT)
Dept: INTERNAL MEDICINE | Facility: OTHER | Age: 78
End: 2018-11-30
Attending: INTERNAL MEDICINE
Payer: MEDICARE

## 2018-11-30 VITALS
BODY MASS INDEX: 26.33 KG/M2 | HEIGHT: 65 IN | HEART RATE: 60 BPM | SYSTOLIC BLOOD PRESSURE: 124 MMHG | DIASTOLIC BLOOD PRESSURE: 82 MMHG | WEIGHT: 158 LBS

## 2018-11-30 DIAGNOSIS — I25.10 CORONARY ARTERY CALCIFICATION: ICD-10-CM

## 2018-11-30 DIAGNOSIS — E78.5 HYPERLIPIDEMIA, UNSPECIFIED HYPERLIPIDEMIA TYPE: ICD-10-CM

## 2018-11-30 DIAGNOSIS — R73.09 ABNORMAL GLUCOSE: ICD-10-CM

## 2018-11-30 DIAGNOSIS — R47.1 DYSARTHRIA: ICD-10-CM

## 2018-11-30 DIAGNOSIS — N13.8 BENIGN PROSTATIC HYPERPLASIA WITH URINARY OBSTRUCTION: ICD-10-CM

## 2018-11-30 DIAGNOSIS — N40.1 BENIGN PROSTATIC HYPERPLASIA WITH URINARY OBSTRUCTION: ICD-10-CM

## 2018-11-30 DIAGNOSIS — I10 HYPERTENSION, UNSPECIFIED TYPE: Primary | ICD-10-CM

## 2018-11-30 LAB
ALBUMIN SERPL-MCNC: 4.9 G/DL (ref 3.5–5.7)
ALP SERPL-CCNC: 62 U/L (ref 34–104)
ALT SERPL W P-5'-P-CCNC: 19 U/L (ref 7–52)
ANION GAP SERPL CALCULATED.3IONS-SCNC: 8 MMOL/L (ref 3–14)
AST SERPL W P-5'-P-CCNC: 20 U/L (ref 13–39)
BILIRUB SERPL-MCNC: 1 MG/DL (ref 0.3–1)
BUN SERPL-MCNC: 18 MG/DL (ref 7–25)
CALCIUM SERPL-MCNC: 9.9 MG/DL (ref 8.6–10.3)
CHLORIDE SERPL-SCNC: 94 MMOL/L (ref 98–107)
CHOLEST SERPL-MCNC: 218 MG/DL
CO2 SERPL-SCNC: 30 MMOL/L (ref 21–31)
CREAT SERPL-MCNC: 1.02 MG/DL (ref 0.7–1.3)
GFR SERPL CREATININE-BSD FRML MDRD: 71 ML/MIN/1.7M2
GLUCOSE SERPL-MCNC: 119 MG/DL (ref 70–105)
HDLC SERPL-MCNC: 62 MG/DL (ref 23–92)
LDLC SERPL CALC-MCNC: 124 MG/DL
NONHDLC SERPL-MCNC: 156 MG/DL
POTASSIUM SERPL-SCNC: 4.4 MMOL/L (ref 3.5–5.1)
PROT SERPL-MCNC: 7.3 G/DL (ref 6.4–8.9)
SODIUM SERPL-SCNC: 132 MMOL/L (ref 134–144)
TRIGL SERPL-MCNC: 158 MG/DL

## 2018-11-30 PROCEDURE — 80053 COMPREHEN METABOLIC PANEL: CPT | Performed by: INTERNAL MEDICINE

## 2018-11-30 PROCEDURE — 36415 COLL VENOUS BLD VENIPUNCTURE: CPT | Performed by: INTERNAL MEDICINE

## 2018-11-30 PROCEDURE — G0463 HOSPITAL OUTPT CLINIC VISIT: HCPCS

## 2018-11-30 PROCEDURE — 80061 LIPID PANEL: CPT | Performed by: INTERNAL MEDICINE

## 2018-11-30 PROCEDURE — 99215 OFFICE O/P EST HI 40 MIN: CPT | Performed by: INTERNAL MEDICINE

## 2018-11-30 RX ORDER — VALSARTAN AND HYDROCHLOROTHIAZIDE 320; 12.5 MG/1; MG/1
1 TABLET, FILM COATED ORAL DAILY
Qty: 90 TABLET | Refills: 3 | Status: SHIPPED | OUTPATIENT
Start: 2018-11-30 | End: 2019-12-03

## 2018-11-30 RX ORDER — ATENOLOL 50 MG/1
50 TABLET ORAL DAILY
Qty: 90 TABLET | Refills: 3 | Status: SHIPPED | OUTPATIENT
Start: 2018-11-30 | End: 2019-12-03

## 2018-11-30 RX ORDER — SIMVASTATIN 40 MG
40 TABLET ORAL AT BEDTIME
Qty: 90 TABLET | Refills: 3 | Status: SHIPPED | OUTPATIENT
Start: 2018-11-30 | End: 2019-12-03

## 2018-11-30 ASSESSMENT — ENCOUNTER SYMPTOMS
NAUSEA: 0
DYSURIA: 0
BRUISES/BLEEDS EASILY: 0
CONFUSION: 0
CHILLS: 0
FLANK PAIN: 0
RHINORRHEA: 0
DIZZINESS: 0
LIGHT-HEADEDNESS: 0
COUGH: 0
EYE REDNESS: 0
UNEXPECTED WEIGHT CHANGE: 0
JOINT SWELLING: 0
WHEEZING: 0
VOICE CHANGE: 0
AGITATION: 0
ADENOPATHY: 0
SHORTNESS OF BREATH: 0
DIAPHORESIS: 0
ABDOMINAL PAIN: 0
PALPITATIONS: 0
EYE PAIN: 0
HEADACHES: 0
FATIGUE: 0
SORE THROAT: 0
APPETITE CHANGE: 0
VOMITING: 0
SLEEP DISTURBANCE: 0
CHEST TIGHTNESS: 0
SINUS PAIN: 0
CONSTIPATION: 0
SEIZURES: 0
NERVOUS/ANXIOUS: 0
BLOOD IN STOOL: 0
HALLUCINATIONS: 0
TREMORS: 0
FREQUENCY: 0
HEMATURIA: 0
WEAKNESS: 0
BACK PAIN: 0
TROUBLE SWALLOWING: 0
NECK PAIN: 0
SPEECH DIFFICULTY: 0
ABDOMINAL DISTENTION: 0
DIARRHEA: 0
WOUND: 0
NECK STIFFNESS: 0
NUMBNESS: 0
SINUS PRESSURE: 0
FEVER: 0
DIFFICULTY URINATING: 0

## 2018-11-30 ASSESSMENT — PATIENT HEALTH QUESTIONNAIRE - PHQ9: SUM OF ALL RESPONSES TO PHQ QUESTIONS 1-9: 0

## 2018-11-30 NOTE — LETTER
November 30, 2018      Wilfrid Meneses  [address] [facility]  [place] [address]        Dear Wilfrid Meneses,    Below are the results of your recent labs:    Results for orders placed or performed in visit on 11/30/18   Comprehensive Metabolic Panel   Result Value Ref Range    Sodium 132 (L) 134 - 144 mmol/L    Potassium 4.4 3.5 - 5.1 mmol/L    Chloride 94 (L) 98 - 107 mmol/L    Carbon Dioxide 30 21 - 31 mmol/L    Anion Gap 8 3 - 14 mmol/L    Glucose 119 (H) 70 - 105 mg/dL    Urea Nitrogen 18 7 - 25 mg/dL    Creatinine 1.02 0.70 - 1.30 mg/dL    GFR Estimate 71 >60 mL/min/1.7m2    GFR Estimate If Black 85 >60 mL/min/1.7m2    Calcium 9.9 8.6 - 10.3 mg/dL    Bilirubin Total 1.0 0.3 - 1.0 mg/dL    Albumin 4.9 3.5 - 5.7 g/dL    Protein Total 7.3 6.4 - 8.9 g/dL    Alkaline Phosphatase 62 34 - 104 U/L    ALT 19 7 - 52 U/L    AST 20 13 - 39 U/L   Lipid Panel   Result Value Ref Range    Cholesterol 218 (H) <200 mg/dL    Triglycerides 158 (H) <150 mg/dL    HDL Cholesterol 62 23 - 92 mg/dL    LDL Cholesterol Calculated 124 (H) <100 mg/dL    Non HDL Cholesterol 156 (H) <130 mg/dL        Overall, your blood tests look reasonable.  Your sugar is a little bit on the high side but I did do an A1c that was normal.  Your cholesterol level is also a little bit on the high side.  Given your coronary artery calcification that we have seen in the past, it might not be a bad idea to change you to a different statin that has a little bit more potency.  If you are interested in making that change, let me know and I will send a new prescription to your pharmacy.  I would probably switch you to a atorvastatin 40 mg daily.    Sincerely,        Jimenez Colon MD  Internal Medicine  [facility]

## 2018-11-30 NOTE — PROGRESS NOTES
Chief Complaint:  This patient is here for a comprehensive review of their multiple medical problems, renewal of medications and update on necessary health maintenance issues.      HPI: He is here today for complete evaluation and review of his chronic medical problems.  He has a history of hypertension.  He is on multidrug therapy for control of his blood pressure.  He tolerates his medications without difficulty.  He does not have any endorgan disease such as renal dysfunction as a result of his hypertension.  The patient also has coronary artery calcifications and hyperlipidemia.  He is on moderate intensity statin therapy for control of this.  He tolerates the statin without difficulty.    Patient has lower urinary tract symptoms.  We were going to try him on Flomax but he decided not to take that.  He has had a previous subtotal prostatectomy for benign disease.  The patient also has a history of dysarthria, possibly related to alcohol excess versus microvascular CNS disease.  This is relatively stable.  He continues to drink 4 beers daily.  I suggested that he try to limit this to 2.    He has a history of some hyperglycemia.  A1c values in the past have been normal.  He has some lesions on his skin that he would like checked.  He does have a history of skin cancer.  He is also been having some congestion in his lungs with cough and phlegm.  No fever and no significant shortness of breath.  No chest pain at all with this.    Medications are reconciled.  Past medical history, past surgical history, family history and social histories are reviewed and updated.  Immunizations are reviewed, he will get a tetanus the  He has an injury.  We talked about the ShingRx vaccine as well.  Colonoscopy is up-to-date.    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     No Comments Provided     Uncomplicated alcohol abuse     No Comments Provided       Past Surgical History:   Procedure Laterality Date     ARTHROPLASTY KNEE Left     01/09     ARTHROPLASTY SHOULDER Left 2018     ARTHROSCOPY KNEE Left     12/05     COLONOSCOPY      2001, 2006, 2011,Adenomatous 2001, subsequent WNL     COLONOSCOPY      11/21/2014,Adenoma     HERNIORRHAPHY INGUINAL BILATERAL      No Comments Provided     PROSTATECTOMY SUPRAPUBIC  2007     UPPER GI ENDOSCOPY  2005       Current Outpatient Prescriptions   Medication Sig Dispense Refill     atenolol (TENORMIN) 50 MG tablet Take 1 tablet (50 mg) by mouth daily 90 tablet 3     Multiple Vitamin (MULTI-VITAMINS) TABS Take 1 tablet by mouth daily       simvastatin (ZOCOR) 40 MG tablet Take 1 tablet (40 mg) by mouth At Bedtime 90 tablet 3     valsartan-hydrochlorothiazide (DIOVAN HCT) 320-12.5 MG tablet Take 1 tablet by mouth daily 90 tablet 3     [DISCONTINUED] atenolol (TENORMIN) 50 MG tablet Take 1 tablet (50 mg) by mouth daily 30 tablet      [DISCONTINUED] simvastatin (ZOCOR) 40 MG tablet Take 1 tablet (40 mg) by mouth At Bedtime 30 tablet      [DISCONTINUED] valsartan-hydrochlorothiazide (DIOVAN-HCT) 320-12.5 MG per tablet Take 1 tablet by mouth daily 30 tablet        Allergies   Allergen Reactions     Ace Inhibitors Cough     Hydrocodone-Acetaminophen      Other reaction(s): Insomnia  Keeps him awake      Sulfamethoxazole-Trimethoprim Rash     Sulfasalazine Rash       Family History   Problem Relation Age of Onset     Diabetes Mother      Chronic Obstructive Pulmonary Disease Mother      Myocardial Infarction Father      Other - See  Comments Son      MI during GXT     Other - See Comments Sister      Good Health       Social History     Social History     Marital status:      Spouse name: N/A     Number of children: N/A     Years of education: N/A     Occupational History     Not on file.     Social History Main Topics     Smoking status: Never Smoker     Smokeless tobacco: Never Used     Alcohol use 16.8 oz/week      Comment: 4 daily     Drug use: No     Sexual activity: Not on file     Other Topics Concern     Not on file     Social History Narrative    Pt is ; lives in [place]; retired as a pharmacist at the hospital.       Review of Systems   Constitutional: Negative for appetite change, chills, diaphoresis, fatigue, fever and unexpected weight change.   HENT: Negative for ear pain, rhinorrhea, sinus pain, sinus pressure, sore throat, trouble swallowing and voice change.    Eyes: Negative for pain, redness and visual disturbance.   Respiratory: Negative for cough, chest tightness, shortness of breath and wheezing.    Cardiovascular: Negative for chest pain, palpitations and leg swelling.   Gastrointestinal: Negative for abdominal distention, abdominal pain, blood in stool, constipation, diarrhea, nausea and vomiting.   Endocrine: Negative for cold intolerance and heat intolerance.   Genitourinary: Negative for difficulty urinating, dysuria, flank pain, frequency and hematuria.   Musculoskeletal: Negative for back pain, joint swelling, neck pain and neck stiffness.   Skin: Negative for rash and wound.   Allergic/Immunologic: Negative for immunocompromised state.   Neurological: Negative for dizziness, tremors, seizures, syncope, speech difficulty, weakness, light-headedness, numbness and headaches.   Hematological: Negative for adenopathy. Does not bruise/bleed easily.   Psychiatric/Behavioral: Negative for agitation, behavioral problems, confusion, hallucinations and sleep disturbance. The patient is not nervous/anxious.         Physical Exam   Constitutional: He is oriented to person, place, and time. He appears well-developed and well-nourished. No distress.   HENT:   Head: Normocephalic.   Right Ear: External ear normal.   Left Ear: External ear normal.   Nose: Nose normal.   Mouth/Throat: Oropharynx is clear and moist.   Eyes: Conjunctivae are normal. Pupils are equal, round, and reactive to light.   Neck: Normal range of motion. Neck supple. Normal carotid pulses and no JVD present. Carotid bruit is not present. No tracheal deviation present. No thyromegaly present.   Cardiovascular: Normal rate and regular rhythm.  Exam reveals no gallop and no friction rub.    No murmur heard.  Pulmonary/Chest: Effort normal and breath sounds normal. No respiratory distress. He has no wheezes. He has no rales.   Abdominal: Soft. Bowel sounds are normal. He exhibits no distension and no mass. There is no tenderness. There is no rebound and no guarding. No hernia.   Genitourinary: Rectum normal, prostate normal and penis normal.   Musculoskeletal: Normal range of motion. He exhibits no edema.   Lymphadenopathy:     He has no cervical adenopathy.   Neurological: He is alert and oriented to person, place, and time. He has normal reflexes. No cranial nerve deficit. He exhibits normal muscle tone. Coordination normal.   Speech dysarthric.  Somewhat dyskinetic movement   Skin: Skin is warm and dry. No rash noted. He is not diaphoretic.   Psychiatric: He has a normal mood and affect. His behavior is normal.   Nursing note and vitals reviewed.      Assessment:      ICD-10-CM    1. Hypertension, unspecified type I10 atenolol (TENORMIN) 50 MG tablet     valsartan-hydrochlorothiazide (DIOVAN HCT) 320-12.5 MG tablet     Comprehensive Metabolic Panel   2. Hyperlipidemia, unspecified hyperlipidemia type E78.5 simvastatin (ZOCOR) 40 MG tablet     Lipid Panel   3. Dysarthria R47.1    4. Coronary artery calcification I25.10     I25.84    5. Abnormal glucose  R73.09    6. Benign prostatic hyperplasia with urinary obstruction N40.1     N13.8         Plan: He appears to be stable at this point in time.  The skin lesions that he had appeared to be fairly benign, he will follow-up on these as needed.  His lungs were clear so we elected not to do a chest x-ray especially since his cough is been less the last 3-4 days.  Return if that worsens.  All other medications will continue without change and were refilled for him today.  Complete lab drawn and pending, I will send him a letter with the results.  Encouraged him to limit his alcohol.  Assuming all goes well, follow-up on an annual basis.  Next year we will need to decide whether or not to proceed with a follow-up colonoscopy.

## 2018-11-30 NOTE — MR AVS SNAPSHOT
"              After Visit Summary   11/30/2018    Wilfrid Meneses    MRN: [id number]           Patient Information     Date Of Birth          1940        Visit Information        Provider Department      11/30/2018 8:40 AM Jimenez Colon MD [facility]        Today's Diagnoses     Hypertension, unspecified type    -  1    Hyperlipidemia, unspecified hyperlipidemia type        Dysarthria        Coronary artery calcification        Abnormal glucose        Benign prostatic hyperplasia with urinary obstruction           Follow-ups after your visit        Who to contact     If you have questions or need follow up information about today's clinic visit or your schedule please contact [facility] directly at [phone].  Normal or non-critical lab and imaging results will be communicated to you by MyChart, letter or phone within 4 business days after the clinic has received the results. If you do not hear from us within 7 days, please contact the clinic through MyChart or phone. If you have a critical or abnormal lab result, we will notify you by phone as soon as possible.  Submit refill requests through [employer] or call your pharmacy and they will forward the refill request to us. Please allow 3 business days for your refill to be completed.          Additional Information About Your Visit        Care EveryWhere ID     This is your Care EveryWhere ID. This could be used by other organizations to access your [facility] medical records  [id number]        Your Vitals Were     Pulse Height BMI (Body Mass Index)             60 5' 4.75\" (1.645 m) 26.5 kg/m2          Blood Pressure from Last 3 Encounters:   11/30/18 124/82   09/24/18 132/82   05/04/18 138/70    Weight from Last 3 Encounters:   11/30/18 158 lb (71.7 kg)   09/24/18 162 lb 3.2 oz (73.6 kg)   05/04/18 151 lb 3.2 oz (68.6 kg)              We Performed the Following     Comprehensive Metabolic Panel     Lipid Panel     "      Today's Medication Changes          These changes are accurate as of 11/30/18  9:04 AM.  If you have any questions, ask your nurse or doctor.               Stop taking these medicines if you haven't already. Please contact your care team if you have questions.     tamsulosin 0.4 MG capsule   Commonly known as:  FLOMAX   Stopped by:  Jimenez Colon MD                Where to get your medicines      These medications were sent to [facility], - [place], [place] - [address]  [address], [place] [address]     Phone:  [phone]     atenolol 50 MG tablet    simvastatin 40 MG tablet    valsartan-hydrochlorothiazide 320-12.5 MG tablet                Primary Care Provider Office Phone # Fax #    Jimenez Colon [phone]       [address] [facility] [address]        Equal Access to Services     [facility]: Hadii maria d smith hadasho Soomaali, waaxda luqadaha, qaybta kaalmada adeegyada, vijay carbone hayyakelin weaver . So [facility] [phone].    ATENCIÓN: Si habla español, tiene a marshall disposición servicios gratuitos de asistencia lingüística. Llame al [phone].    We comply with applicable federal civil rights laws and [place] laws. We do not discriminate on the basis of race, color, national origin, age, disability, sex, sexual orientation, or gender identity.            Thank you!     Thank you for choosing [facility] AND [facility]  for your care. Our goal is always to provide you with excellent care. Hearing back from our patients is one way we can continue to improve our services. Please take a few minutes to complete the written survey that you may receive in the mail after your visit with us. Thank you!             Your Updated Medication List - Protect others around you: Learn how to safely use, store and throw away your medicines at www.disposemymeds.org.          This list is accurate as of 11/30/18  9:04 AM.   Always use your most recent med list.                   Brand Name Dispense Instructions for use Diagnosis    atenolol 50 MG tablet    TENORMIN    90 tablet    Take 1 tablet (50 mg) by mouth daily    Hypertension, unspecified type       MULTI-VITAMINS Tabs      Take 1 tablet by mouth daily        simvastatin 40 MG tablet    ZOCOR    90 tablet    Take 1 tablet (40 mg) by mouth At Bedtime    Hyperlipidemia, unspecified hyperlipidemia type       valsartan-hydrochlorothiazide 320-12.5 MG tablet    DIOVAN HCT    90 tablet    Take 1 tablet by mouth daily    Hypertension, unspecified type

## 2018-11-30 NOTE — NURSING NOTE
"The patient is here today to be seen for a refill on his medications.  Kortney Chapman LPN on 11/30/2018 at 8:34 AM  Chief Complaint   Patient presents with     Recheck Medication       Initial /82 (BP Location: Right arm, Patient Position: Sitting, Cuff Size: Adult Large)  Pulse 60  Ht 5' 4.75\" (1.645 m)  Wt 158 lb (71.7 kg)  BMI 26.5 kg/m2 Estimated body mass index is 26.5 kg/(m^2) as calculated from the following:    Height as of this encounter: 5' 4.75\" (1.645 m).    Weight as of this encounter: 158 lb (71.7 kg).  Medication Reconciliation: incomplete    Kortney Chapman LPN    "

## 2019-01-08 ENCOUNTER — OFFICE VISIT (OUTPATIENT)
Dept: INTERNAL MEDICINE | Facility: OTHER | Age: 79
End: 2019-01-08
Attending: INTERNAL MEDICINE
Payer: MEDICARE

## 2019-01-08 VITALS
HEART RATE: 60 BPM | DIASTOLIC BLOOD PRESSURE: 84 MMHG | WEIGHT: 161.6 LBS | BODY MASS INDEX: 27.1 KG/M2 | SYSTOLIC BLOOD PRESSURE: 132 MMHG | RESPIRATION RATE: 16 BRPM

## 2019-01-08 DIAGNOSIS — L98.9 SKIN LESION: Primary | ICD-10-CM

## 2019-01-08 PROCEDURE — G0463 HOSPITAL OUTPT CLINIC VISIT: HCPCS

## 2019-01-08 PROCEDURE — 17110 DESTRUCTION B9 LES UP TO 14: CPT | Performed by: INTERNAL MEDICINE

## 2019-01-08 PROCEDURE — G0463 HOSPITAL OUTPT CLINIC VISIT: HCPCS | Mod: 25

## 2019-01-08 PROCEDURE — 99213 OFFICE O/P EST LOW 20 MIN: CPT | Mod: 25 | Performed by: INTERNAL MEDICINE

## 2019-01-08 PROCEDURE — 17000 DESTRUCT PREMALG LESION: CPT | Performed by: INTERNAL MEDICINE

## 2019-01-08 ASSESSMENT — ENCOUNTER SYMPTOMS
CONSTITUTIONAL NEGATIVE: 1
ENDOCRINE NEGATIVE: 1
EYES NEGATIVE: 1
ALLERGIC/IMMUNOLOGIC NEGATIVE: 1

## 2019-01-08 ASSESSMENT — PATIENT HEALTH QUESTIONNAIRE - PHQ9: SUM OF ALL RESPONSES TO PHQ QUESTIONS 1-9: 0

## 2019-01-08 NOTE — PROGRESS NOTES
Chief Complaint:  Spot on chest.    HPI: He comes in today for a skin check.  He has a spot on his chest.  It was not present when he was last in and we treated the lesions on his back.  This 1 seems to be a little bit more irritated.    We also talked about his cholesterol.  I had recommended that we change him to atorvastatin from his simvastatin.  He had questions about this and is worried about myalgias.  He still has a number of simvastatin left to use up so he is not going to make a change anytime soon.    Past Medical History:   Diagnosis Date     Coronary artery calcification     score 297 12/2010     Dysarthria      Enlarged prostate without lower urinary tract symptoms (luts)     Post prostatectomy     Essential (primary) hypertension     No Comments Provided     History of colonic polyps     No Comments Provided     Hyperglycemia     No Comments Provided     Hyperlipidemia     No Comments Provided     Malignant neoplasm of skin     No Comments Provided     Spinal stenosis of lumbar region without neurogenic claudication     No Comments Provided     Uncomplicated alcohol abuse     No Comments Provided       Past Surgical History:   Procedure Laterality Date     ARTHROPLASTY KNEE Left     01/09     ARTHROPLASTY SHOULDER Left 2018     ARTHROSCOPY KNEE Left     12/05     COLONOSCOPY      2001, 2006, 2011,Adenomatous 2001, subsequent WNL     COLONOSCOPY      11/21/2014,Adenoma     HERNIORRHAPHY INGUINAL BILATERAL      No Comments Provided     PROSTATECTOMY SUPRAPUBIC  2007     UPPER GI ENDOSCOPY  2005       Allergies   Allergen Reactions     Ace Inhibitors Cough     Hydrocodone-Acetaminophen      Other reaction(s): Insomnia  Keeps him awake      Sulfamethoxazole-Trimethoprim Rash     Sulfasalazine Rash       Current Outpatient Medications   Medication Sig Dispense Refill     atenolol (TENORMIN) 50 MG tablet Take 1 tablet (50 mg) by mouth daily 90 tablet 3     Multiple Vitamin (MULTI-VITAMINS) TABS Take 1  tablet by mouth daily       simvastatin (ZOCOR) 40 MG tablet Take 1 tablet (40 mg) by mouth At Bedtime 90 tablet 3     valsartan-hydrochlorothiazide (DIOVAN HCT) 320-12.5 MG tablet Take 1 tablet by mouth daily 90 tablet 3       Review of Systems   Constitutional: Negative.    Eyes: Negative.    Endocrine: Negative.    Allergic/Immunologic: Negative.        Physical Exam   Constitutional: He appears well-developed and well-nourished. No distress.   Skin: He is not diaphoretic.        Nursing note and vitals reviewed.      Assessment:        ICD-10-CM    1. Skin lesion L98.9 DESTRUCT PREMALIGNANT LESION, FIRST       Plan: Discussed with the patient.  Elected to treat with cryo today and liquid nitrogen.  If this does not resolve, he will need to have surgery to have this removed.  Of note is that [place] will be going [place] in 2 weeks for at least a month, he would not follow-up before then.  Notify if there are any problems.

## 2019-01-08 NOTE — NURSING NOTE
"The patient is here today to have a spot looked at on his chest.  Kortney Chapman on 1/8/2019 at 3:17 PM  Chief Complaint   Patient presents with     Mass       Initial /84 (BP Location: Right arm, Patient Position: Sitting, Cuff Size: Adult Large)   Pulse 60   Resp 16   Wt 73.3 kg (161 lb 9.6 oz)   BMI 27.10 kg/m   Estimated body mass index is 27.1 kg/m  as calculated from the following:    Height as of 11/30/18: 1.645 m (5' 4.75\").    Weight as of this encounter: 73.3 kg (161 lb 9.6 oz).  Medication Reconciliation: complete    Kortney Chapman    "

## 2019-01-17 ENCOUNTER — OFFICE VISIT (OUTPATIENT)
Dept: FAMILY MEDICINE | Facility: OTHER | Age: 79
End: 2019-01-17
Attending: NURSE PRACTITIONER
Payer: MEDICARE

## 2019-01-17 VITALS
BODY MASS INDEX: 26.73 KG/M2 | WEIGHT: 159.4 LBS | SYSTOLIC BLOOD PRESSURE: 138 MMHG | HEART RATE: 55 BPM | OXYGEN SATURATION: 98 % | DIASTOLIC BLOOD PRESSURE: 76 MMHG | TEMPERATURE: 98.5 F

## 2019-01-17 DIAGNOSIS — J06.9 URI WITH COUGH AND CONGESTION: Primary | ICD-10-CM

## 2019-01-17 PROCEDURE — 99214 OFFICE O/P EST MOD 30 MIN: CPT | Performed by: NURSE PRACTITIONER

## 2019-01-17 PROCEDURE — G0463 HOSPITAL OUTPT CLINIC VISIT: HCPCS

## 2019-01-17 RX ORDER — CODEINE PHOSPHATE AND GUAIFENESIN 10; 100 MG/5ML; MG/5ML
2 SOLUTION ORAL EVERY 6 HOURS PRN
Qty: 120 ML | Refills: 0 | Status: SHIPPED | OUTPATIENT
Start: 2019-01-17 | End: 2019-12-03

## 2019-01-17 RX ORDER — BENZONATATE 200 MG/1
200 CAPSULE ORAL 3 TIMES DAILY PRN
Qty: 21 CAPSULE | Refills: 0 | Status: SHIPPED | OUTPATIENT
Start: 2019-01-17 | End: 2019-01-24

## 2019-01-17 NOTE — PROGRESS NOTES
Nursing Notes:   Shabana Yañez CMA  1/17/2019 12:02 PM  Sign at exiting of workspace  Chief Complaint   Patient presents with     URI     Medication Reconciliation: complete     Patient is here today for a cough. He is have a lot of production. Is unable to sleep du to this. Post nasal drainage. No chest congestion. Chest hurts from the coughing. Sx x 7-8 days.    Shabana Yañez CMA      SUBJECTIVE:   Wilfrid Meneses is a 78 year old male who presents to clinic today for the following health issues:    RESPIRATORY SYMPTOMS      Duration: 7-8 days    Description  nasal congestion, rhinorrhea, cough and mild sob with cough, no wheezing. No fevers, chills.     Severity: severe    Accompanying signs and symptoms: He is eating ok, and drinking well.     History (predisposing factors):  No hx of lung disease.     Precipitating or alleviating factors: None    Therapies tried and outcome:  rest and fluids    He did get a flu shot this season.     Problem list and histories reviewed & adjusted, as indicated.  Additional history: as documented    Past Medical History:   Diagnosis Date     Coronary artery calcification     score 297 12/2010     Dysarthria      Enlarged prostate without lower urinary tract symptoms (luts)     Post prostatectomy     Essential (primary) hypertension     No Comments Provided     History of colonic polyps     No Comments Provided     Hyperglycemia     No Comments Provided     Hyperlipidemia     No Comments Provided     Malignant neoplasm of skin     No Comments Provided     Spinal stenosis of lumbar region without neurogenic claudication     No Comments Provided     Uncomplicated alcohol abuse     No Comments Provided       Current Outpatient Medications   Medication Sig Dispense Refill     atenolol (TENORMIN) 50 MG tablet Take 1 tablet (50 mg) by mouth daily 90 tablet 3     Multiple Vitamin (MULTI-VITAMINS) TABS Take 1 tablet by mouth daily       simvastatin (ZOCOR) 40 MG tablet Take 1  tablet (40 mg) by mouth At Bedtime 90 tablet 3     valsartan-hydrochlorothiazide (DIOVAN HCT) 320-12.5 MG tablet Take 1 tablet by mouth daily 90 tablet 3     Allergies   Allergen Reactions     Ace Inhibitors Cough     Hydrocodone-Acetaminophen      Other reaction(s): Insomnia  Keeps him awake      Sulfamethoxazole-Trimethoprim Rash     Sulfasalazine Rash         ROS:  Notable findings in the HPI.   Cough without wheezing, sob.     OBJECTIVE:     /76   Pulse 55   Temp 98.5  F (36.9  C) (Tympanic)   Wt 72.3 kg (159 lb 6.4 oz)   SpO2 98%   BMI 26.73 kg/m    Body mass index is 26.73 kg/m .  GENERAL: healthy, alert and no distress  EYES: Eyes grossly normal to inspection  HENT: normal cephalic/atraumatic, right ear: normal: no effusions, no erythema, normal landmarks, left ear: normal: no effusions, no erythema, normal landmarks, nose and mouth without ulcers or lesions, oropharynx clear, oral mucous membranes moist and sinuses: not tender  NECK: no adenopathy  RESP: lungs clear to auscultation - no rales, rhonchi or wheezes and harsh cough is noted  CV: regular rates and rhythm, normal S1 S2, no S3 or S4, no murmur, click or rub and no peripheral edema  SKIN: no suspicious lesions or rashes  PSYCH: mentation appears normal, affect normal/bright    Diagnostic Test Results:  none     ASSESSMENT/PLAN:     1. URI with cough and congestion  - guaiFENesin-codeine (ROBITUSSIN AC) 100-10 MG/5ML solution; Take 10 mLs by mouth every 6 hours as needed for cough  Dispense: 120 mL; Refill: 0  - benzonatate (TESSALON) 200 MG capsule; Take 1 capsule (200 mg) by mouth 3 times daily as needed for cough  Dispense: 21 capsule; Refill: 0      PLAN:    URI Adult:  Tylenol, Ibuprofen, Fluids, Rest, OTC cough suppressant/expectorant, OTC decongestant/antihistamine, Saline gargles, Saline nasal spray and Vaporizer  Symptoms likely due to virus. No antibiotic is needed at this time. Symptoms typically worse on days 2-5 and then  stabilize and you are sick for days 5-12. Days 12-14 there is slow resolution and if there is a cough, studies show it can linger longer, however one is not as ill as in the beginning. If symptoms begin worsening or fail to improve after 14 days, return to clinic for reevaluation. All questions were answered and he is in agreement with plan.       Followup:    If not improving or if condition worsens, follow up with your Primary Care Provider    I explained my diagnostic considerations and recommendations to the patient, who voiced understanding and agreement with the treatment plan. All questions were answered. We discussed potential side effects of any prescribed or recommended therapies, as well as expectations for response to treatments. He was advised to contact our office if there is no improvement or worsening of conditions or symptoms.  If s/s worsen or persist, patient will either come back or follow up with PCP.    Disclaimer:  This note consists of words and symbols derived from keyboarding, dictation, or using voice recognition software. As a result, there may be errors in the script that have gone undetected. Please consider this when interpreting information found in this note.      Farrah Barney NP, 1/17/2019 12:12 PM

## 2019-01-17 NOTE — NURSING NOTE
Chief Complaint   Patient presents with     URI     Medication Reconciliation: complete     Patient is here today for a cough. He is have a lot of production. Is unable to sleep du to this. Post nasal drainage. No chest congestion. Chest hurts from the coughing. Sx x 7-8 days.    Shabana Pharmer, CMA

## 2019-01-17 NOTE — PATIENT INSTRUCTIONS
Cough   What is coughing?   Coughing is a sudden forcing of air from the lungs. It is a natural reflex to clear the air passages. It can also be a symptom of a disease or other medical problem.   Some coughs are dry and hacking. Some coughs are deeper, even painful at times, and the cough brings up mucus or phlegm. Health care providers call this a productive cough because it produces mucus or phlegm. It brings mucus up out of your airways and can make it easier to breathe. For example, if you have pneumonia, coughing is helpful because it clears the airway of mucus. This relieves chest congestion and it is easier to breathe.   How does it occur?   Coughing often occurs when the airways are irritated. It can be caused by:   a cold or flu   sinus infection   bronchitis   allergies   heartburn (reflux)   asthma.   It may also be caused by more serious illnesses such as:   heart failure   pneumonia   tuberculosis   cancer.   Some drugs may cause coughing as a side effect. Examples of such drugs are ACE inhibitors or beta-blockers, which are drugs used to treat high blood pressure.   Sometimes people just have a nervous habit of coughing or throat clearing.   Any cough that lasts 3 weeks or more is chronic. This is true even if it occurs only in the morning, only at night, or only in the winter. One common cause of a chronic cough is exposure to irritants such as smoke or pollen. Some people with a chronic cough get so used to coughing that they consider it normal. This is often true of the smoker's cough that many smokers come to accept as a part of waking up in the morning. The problem may be more serious than they think.   How is it treated?   Many different medicines for coughs are available without a prescription. If you need relief from a dry, hacking cough, choose a type of cough medicine called a cough suppressant. If you need to loosen mucus, choose an expectorant.   Cough suppressants are medicines that lessen  the urge to cough. If you have a dry, hacking cough and do not have mucus in your airways that needs to be coughed up, a cough suppressant can help you cough less and sleep better. Cough medicines with the initials DM in the name contain the suppressant drug called dextromethorphan.   Expectorants help keep the mucus thin and help bring up mucus from the lungs when you cough. This relieves chest congestion and makes it easier to breathe. The drug used most often as an expectorant is guaifenesin.   How can I take care of myself?   Always follow the instructions on the label of cough medicines.   If you have a wet-sounding cough, do not use medicines that contain antihistamines. Antihistamines dry up the mucus.   Drink a lot of water to help loosen mucus and make it easier to cough it up.   Use a cool-mist humidifier, especially in the bedroom, to help relieve coughing. Be sure to follow the 's instructions for cleaning the humidifier.   Do not smoke.   Avoid exposure to secondhand smoke.   Contact your health care provider if you have:   a cough with fever or thick, foul-smelling, starla or greenish mucus   a cough that interferes with your sleep or daily activities   a cough that has not gotten better in 7 days   a violent cough that comes on suddenly   a high-pitched sound when you breathe in   unexpected weight loss as well as a cough.   Call your health care provider or 911 right away if you have a cough that causes shortness of breath or severe pain, or if you begin coughing up blood.   Copyright   2006 [employer] and/or one of its subsidiaries. All Rights Reserved.

## 2019-07-12 ENCOUNTER — OFFICE VISIT (OUTPATIENT)
Dept: INTERNAL MEDICINE | Facility: OTHER | Age: 79
End: 2019-07-12
Attending: INTERNAL MEDICINE
Payer: MEDICARE

## 2019-07-12 VITALS
DIASTOLIC BLOOD PRESSURE: 68 MMHG | TEMPERATURE: 97.5 F | HEART RATE: 60 BPM | BODY MASS INDEX: 25.86 KG/M2 | RESPIRATION RATE: 18 BRPM | WEIGHT: 154.2 LBS | SYSTOLIC BLOOD PRESSURE: 110 MMHG

## 2019-07-12 DIAGNOSIS — L57.0 ACTINIC KERATOSIS: Primary | ICD-10-CM

## 2019-07-12 PROCEDURE — 17003 DESTRUCT PREMALG LES 2-14: CPT | Performed by: INTERNAL MEDICINE

## 2019-07-12 PROCEDURE — G0463 HOSPITAL OUTPT CLINIC VISIT: HCPCS

## 2019-07-12 PROCEDURE — G0463 HOSPITAL OUTPT CLINIC VISIT: HCPCS | Mod: 25

## 2019-07-12 PROCEDURE — 17000 DESTRUCT PREMALG LESION: CPT | Performed by: INTERNAL MEDICINE

## 2019-07-12 PROCEDURE — 99213 OFFICE O/P EST LOW 20 MIN: CPT | Mod: 25 | Performed by: INTERNAL MEDICINE

## 2019-07-12 ASSESSMENT — PATIENT HEALTH QUESTIONNAIRE - PHQ9: SUM OF ALL RESPONSES TO PHQ QUESTIONS 1-9: 0

## 2019-07-12 ASSESSMENT — ENCOUNTER SYMPTOMS
EYES NEGATIVE: 1
CONSTITUTIONAL NEGATIVE: 1
ENDOCRINE NEGATIVE: 1
ALLERGIC/IMMUNOLOGIC NEGATIVE: 1

## 2019-07-12 NOTE — PROGRESS NOTES
Chief Complaint: Skin concerns.    HPI: He is in today with complaints of some skin lesions on his face and on his scalp.  He does try to avoid direct sunlight but he does not wear sun protection.  He just wears a hat.  He has had lesions treated in the past but also does have a history of a squamous cell skin cancer.    Past Medical History:   Diagnosis Date     Coronary artery calcification     score 297 12/2010     Dysarthria      Enlarged prostate without lower urinary tract symptoms (luts)     Post prostatectomy     Essential (primary) hypertension     No Comments Provided     History of colonic polyps     No Comments Provided     Hyperglycemia     No Comments Provided     Hyperlipidemia     No Comments Provided     Malignant neoplasm of skin     No Comments Provided     Spinal stenosis of lumbar region without neurogenic claudication     No Comments Provided     Uncomplicated alcohol abuse     No Comments Provided       Past Surgical History:   Procedure Laterality Date     ARTHROPLASTY KNEE Left     01/09     ARTHROPLASTY SHOULDER Left 2018     ARTHROSCOPY KNEE Left     12/05     COLONOSCOPY      2001, 2006, 2011,Adenomatous 2001, subsequent WNL     COLONOSCOPY      11/21/2014,Adenoma     HERNIORRHAPHY INGUINAL BILATERAL      No Comments Provided     PROSTATECTOMY SUPRAPUBIC  2007     UPPER GI ENDOSCOPY  2005       Allergies   Allergen Reactions     Ace Inhibitors Cough     Hydrocodone-Acetaminophen      Other reaction(s): Insomnia  Keeps him awake      Sulfamethoxazole-Trimethoprim Rash     Sulfasalazine Rash       Current Outpatient Medications   Medication Sig Dispense Refill     atenolol (TENORMIN) 50 MG tablet Take 1 tablet (50 mg) by mouth daily 90 tablet 3     guaiFENesin-codeine (ROBITUSSIN AC) 100-10 MG/5ML solution Take 10 mLs by mouth every 6 hours as needed for cough 120 mL 0     Multiple Vitamin (MULTI-VITAMINS) TABS Take 1 tablet by mouth daily       simvastatin (ZOCOR) 40 MG tablet Take 1  tablet (40 mg) by mouth At Bedtime 90 tablet 3     valsartan-hydrochlorothiazide (DIOVAN HCT) 320-12.5 MG tablet Take 1 tablet by mouth daily 90 tablet 3       Review of Systems   Constitutional: Negative.    Eyes: Negative.    Endocrine: Negative.    Allergic/Immunologic: Negative.        Physical Exam   Constitutional: He appears well-developed and well-nourished. No distress.   Skin: He is not diaphoretic.   1 actinic lesion on his scalp, 3 on his right temple, and one on his left temple.  Treated with liquid nitrogen.   Nursing note and vitals reviewed.      Assessment:        ICD-10-CM    1. Actinic keratosis L57.0        Plan: After reviewing options including dermatology consultation, Efudex or liquid nitrogen, he opted for the latter.  He will follow-up on these lesions as needed.  I encouraged him to wear sun protection in the form of sunscreen.

## 2019-07-12 NOTE — NURSING NOTE
"The patient is here today to have a couple of sun spots looked at on his forehead.  Kortney Chapman LPN on 7/12/2019 at 2:34 PM  Chief Complaint   Patient presents with     Derm Problem       Initial /68 (BP Location: Right arm, Patient Position: Sitting, Cuff Size: Adult Regular)   Pulse 60   Temp 97.5  F (36.4  C) (Tympanic)   Resp 18   Wt 69.9 kg (154 lb 3.2 oz)   BMI 25.86 kg/m   Estimated body mass index is 25.86 kg/m  as calculated from the following:    Height as of 11/30/18: 1.645 m (5' 4.75\").    Weight as of this encounter: 69.9 kg (154 lb 3.2 oz).  Medication Reconciliation: complete    Kortney Chapman LPN    "

## 2019-12-03 ENCOUNTER — OFFICE VISIT (OUTPATIENT)
Dept: INTERNAL MEDICINE | Facility: OTHER | Age: 79
End: 2019-12-03
Attending: INTERNAL MEDICINE
Payer: MEDICARE

## 2019-12-03 VITALS
HEIGHT: 65 IN | BODY MASS INDEX: 25.96 KG/M2 | OXYGEN SATURATION: 99 % | WEIGHT: 155.8 LBS | SYSTOLIC BLOOD PRESSURE: 110 MMHG | HEART RATE: 56 BPM | DIASTOLIC BLOOD PRESSURE: 70 MMHG | RESPIRATION RATE: 16 BRPM | TEMPERATURE: 97 F

## 2019-12-03 DIAGNOSIS — I10 HYPERTENSION, UNSPECIFIED TYPE: ICD-10-CM

## 2019-12-03 DIAGNOSIS — I67.89 CEREBRAL MICROVASCULAR DISEASE: ICD-10-CM

## 2019-12-03 DIAGNOSIS — N40.1 BENIGN PROSTATIC HYPERPLASIA WITH URINARY OBSTRUCTION: ICD-10-CM

## 2019-12-03 DIAGNOSIS — N13.8 BENIGN PROSTATIC HYPERPLASIA WITH URINARY OBSTRUCTION: ICD-10-CM

## 2019-12-03 DIAGNOSIS — R73.09 ABNORMAL GLUCOSE: ICD-10-CM

## 2019-12-03 DIAGNOSIS — Z86.0101 HX OF ADENOMATOUS COLONIC POLYPS: ICD-10-CM

## 2019-12-03 DIAGNOSIS — I25.10 CORONARY ARTERY CALCIFICATION: ICD-10-CM

## 2019-12-03 DIAGNOSIS — E78.5 HYPERLIPIDEMIA, UNSPECIFIED HYPERLIPIDEMIA TYPE: Primary | ICD-10-CM

## 2019-12-03 DIAGNOSIS — L98.9 SKIN LESION: ICD-10-CM

## 2019-12-03 DIAGNOSIS — S61.210A LACERATION OF RIGHT INDEX FINGER WITHOUT FOREIGN BODY WITHOUT DAMAGE TO NAIL, INITIAL ENCOUNTER: ICD-10-CM

## 2019-12-03 LAB
ALBUMIN SERPL-MCNC: 5 G/DL (ref 3.5–5.7)
ALP SERPL-CCNC: 82 U/L (ref 34–104)
ALT SERPL W P-5'-P-CCNC: 19 U/L (ref 7–52)
ANION GAP SERPL CALCULATED.3IONS-SCNC: 9 MMOL/L (ref 3–14)
AST SERPL W P-5'-P-CCNC: 25 U/L (ref 13–39)
BILIRUB SERPL-MCNC: 1.2 MG/DL (ref 0.3–1)
BUN SERPL-MCNC: 16 MG/DL (ref 7–25)
CALCIUM SERPL-MCNC: 9.8 MG/DL (ref 8.6–10.3)
CHLORIDE SERPL-SCNC: 94 MMOL/L (ref 98–107)
CHOLEST SERPL-MCNC: 207 MG/DL
CO2 SERPL-SCNC: 28 MMOL/L (ref 21–31)
CREAT SERPL-MCNC: 1.01 MG/DL (ref 0.7–1.3)
GFR SERPL CREATININE-BSD FRML MDRD: 71 ML/MIN/{1.73_M2}
GLUCOSE SERPL-MCNC: 99 MG/DL (ref 70–105)
HBA1C MFR BLD: 5.4 % (ref 4–6)
HDLC SERPL-MCNC: 75 MG/DL (ref 23–92)
LDLC SERPL CALC-MCNC: 107 MG/DL
NONHDLC SERPL-MCNC: 132 MG/DL
POTASSIUM SERPL-SCNC: 4.1 MMOL/L (ref 3.5–5.1)
PROT SERPL-MCNC: 7.5 G/DL (ref 6.4–8.9)
PSA SERPL-MCNC: 3.28 NG/ML
SODIUM SERPL-SCNC: 131 MMOL/L (ref 134–144)
TRIGL SERPL-MCNC: 125 MG/DL

## 2019-12-03 PROCEDURE — 90686 IIV4 VACC NO PRSV 0.5 ML IM: CPT

## 2019-12-03 PROCEDURE — 90472 IMMUNIZATION ADMIN EACH ADD: CPT

## 2019-12-03 PROCEDURE — G0008 ADMIN INFLUENZA VIRUS VAC: HCPCS

## 2019-12-03 PROCEDURE — 90715 TDAP VACCINE 7 YRS/> IM: CPT | Mod: GY

## 2019-12-03 PROCEDURE — 84153 ASSAY OF PSA TOTAL: CPT | Mod: ZL | Performed by: INTERNAL MEDICINE

## 2019-12-03 PROCEDURE — 83036 HEMOGLOBIN GLYCOSYLATED A1C: CPT | Mod: ZL | Performed by: INTERNAL MEDICINE

## 2019-12-03 PROCEDURE — G0463 HOSPITAL OUTPT CLINIC VISIT: HCPCS

## 2019-12-03 PROCEDURE — 36415 COLL VENOUS BLD VENIPUNCTURE: CPT | Mod: ZL | Performed by: INTERNAL MEDICINE

## 2019-12-03 PROCEDURE — 80061 LIPID PANEL: CPT | Mod: ZL | Performed by: INTERNAL MEDICINE

## 2019-12-03 PROCEDURE — 17110 DESTRUCTION B9 LES UP TO 14: CPT | Performed by: INTERNAL MEDICINE

## 2019-12-03 PROCEDURE — 99215 OFFICE O/P EST HI 40 MIN: CPT | Mod: 25 | Performed by: INTERNAL MEDICINE

## 2019-12-03 PROCEDURE — 80053 COMPREHEN METABOLIC PANEL: CPT | Mod: ZL | Performed by: INTERNAL MEDICINE

## 2019-12-03 PROCEDURE — G0463 HOSPITAL OUTPT CLINIC VISIT: HCPCS | Mod: 25

## 2019-12-03 RX ORDER — SIMVASTATIN 40 MG
40 TABLET ORAL AT BEDTIME
Qty: 90 TABLET | Refills: 3 | Status: SHIPPED | OUTPATIENT
Start: 2019-12-03 | End: 2020-11-24

## 2019-12-03 RX ORDER — ATENOLOL 50 MG/1
50 TABLET ORAL DAILY
Qty: 90 TABLET | Refills: 3 | Status: SHIPPED | OUTPATIENT
Start: 2019-12-03 | End: 2020-11-24

## 2019-12-03 RX ORDER — VALSARTAN AND HYDROCHLOROTHIAZIDE 320; 12.5 MG/1; MG/1
1 TABLET, FILM COATED ORAL DAILY
Qty: 90 TABLET | Refills: 3 | Status: SHIPPED | OUTPATIENT
Start: 2019-12-03 | End: 2020-11-24

## 2019-12-03 SDOH — HEALTH STABILITY: MENTAL HEALTH: HOW OFTEN DO YOU HAVE A DRINK CONTAINING ALCOHOL?: 4 OR MORE TIMES A WEEK

## 2019-12-03 SDOH — HEALTH STABILITY: MENTAL HEALTH: HOW MANY STANDARD DRINKS CONTAINING ALCOHOL DO YOU HAVE ON A TYPICAL DAY?: 3 OR 4

## 2019-12-03 ASSESSMENT — ENCOUNTER SYMPTOMS
AGITATION: 0
BLOOD IN STOOL: 0
EYE REDNESS: 0
ABDOMINAL PAIN: 0
TROUBLE SWALLOWING: 0
SPEECH DIFFICULTY: 0
DYSURIA: 0
JOINT SWELLING: 0
VOICE CHANGE: 0
SINUS PAIN: 0
EYE PAIN: 0
COUGH: 0
DIZZINESS: 0
WEAKNESS: 0
NUMBNESS: 0
HALLUCINATIONS: 0
ARTHRALGIAS: 1
NECK PAIN: 0
CHILLS: 0
HEMATURIA: 0
ADENOPATHY: 0
NECK STIFFNESS: 0
NERVOUS/ANXIOUS: 0
HEADACHES: 0
WHEEZING: 0
BACK PAIN: 0
SEIZURES: 0
APPETITE CHANGE: 0
VOMITING: 0
CHEST TIGHTNESS: 0
SINUS PRESSURE: 0
FLANK PAIN: 0
DIFFICULTY URINATING: 0
NAUSEA: 0
FEVER: 0
WOUND: 0
LIGHT-HEADEDNESS: 0
UNEXPECTED WEIGHT CHANGE: 0
PALPITATIONS: 0
DIAPHORESIS: 0
ABDOMINAL DISTENTION: 0
DIARRHEA: 0
SORE THROAT: 0
FREQUENCY: 0
SHORTNESS OF BREATH: 0
RHINORRHEA: 0
CONFUSION: 0
CONSTIPATION: 0
SLEEP DISTURBANCE: 0
TREMORS: 0
FATIGUE: 0
BRUISES/BLEEDS EASILY: 0

## 2019-12-03 ASSESSMENT — MIFFLIN-ST. JEOR: SCORE: 1348.58

## 2019-12-03 ASSESSMENT — PAIN SCALES - GENERAL: PAINLEVEL: NO PAIN (0)

## 2019-12-03 NOTE — PROGRESS NOTES
Chief Complaint:  This patient is here for a comprehensive review of their multiple medical problems, renewal of medications and update on necessary health maintenance issues.      HPI: He is here today for complete evaluation and a review of his chronic medical problems.  He has a history of hyperlipidemia and coronary artery calcification as well as cerebral microvascular disease.  He is on moderate intensity statin therapy which he tolerates without difficulty.  He also has a history of hypertension and is on 2 drug therapy for control of his blood pressure.  He does not have any renal disease as result of his hypertension.    He has a history of hyperglycemia with normal A1c's.  He is due for recheck on this.  He has a history of enlarged prostate with previous prostatectomy.  He is due for a PSA, his last was 2 years ago.  He has a history of adenomatous colonic polyps, last colonoscopy 5 years ago.  He would be due for colonoscopy at this time.  He was wondering whether he should have it done so we had a discussion about that today and ultimately decided that he was healthy enough that it should be done.    He has a little skin lesion on his ear that he would like checked.  He worries about that.  He would like to have that checked and potentially treated today.    He recently cut his finger, it is nothing significant but he is due for a tetanus shot.  He is having troubles with arthritis in his right knee as well as in his hips and his back.  Other than that he feels well.  He continues to drink 4 alcoholic beverages a day.    Medications are reconciled.  Past medical history, past surgical history, family history and social histories are reviewed and updated.  He is due for a Boostrix as mentioned as well as a flu shot.    Past Medical History:   Diagnosis Date     Coronary artery calcification     score 297 12/2010     Dysarthria      Enlarged prostate without lower urinary tract symptoms (luts)     Post  prostatectomy     Essential (primary) hypertension     No Comments Provided     History of colonic polyps     No Comments Provided     Hyperglycemia     No Comments Provided     Hyperlipidemia     No Comments Provided     Malignant neoplasm of skin     No Comments Provided     Spinal stenosis of lumbar region without neurogenic claudication     No Comments Provided     Uncomplicated alcohol abuse     No Comments Provided       Past Surgical History:   Procedure Laterality Date     ARTHROPLASTY KNEE Left     01/09     ARTHROPLASTY SHOULDER Left 2018     ARTHROSCOPY KNEE Left     12/05     COLONOSCOPY      2001, 2006, 2011,Adenomatous 2001, subsequent WNL     COLONOSCOPY  11/21/2014 11/21/2014,Adenoma     HERNIORRHAPHY INGUINAL BILATERAL       PROSTATECTOMY SUPRAPUBIC  2007     UPPER GI ENDOSCOPY  2005       Current Outpatient Medications   Medication Sig Dispense Refill     atenolol (TENORMIN) 50 MG tablet Take 1 tablet (50 mg) by mouth daily 90 tablet 3     Multiple Vitamin (MULTI-VITAMINS) TABS Take 1 tablet by mouth daily       simvastatin (ZOCOR) 40 MG tablet Take 1 tablet (40 mg) by mouth At Bedtime 90 tablet 3     valsartan-hydrochlorothiazide (DIOVAN HCT) 320-12.5 MG tablet Take 1 tablet by mouth daily 90 tablet 3       Allergies   Allergen Reactions     Ace Inhibitors Cough     Hydrocodone-Acetaminophen      Other reaction(s): Insomnia  Keeps him awake      Sulfamethoxazole-Trimethoprim Rash     Sulfasalazine Rash       Family History   Problem Relation Age of Onset     Diabetes Mother      Chronic Obstructive Pulmonary Disease Mother      Myocardial Infarction Father      Other - See Comments Son         MI during GXT     Other - See Comments Sister         PMR       Social History     Socioeconomic History     Marital status:      Spouse name: Not on file     Number of children: Not on file     Years of education: Not on file     Highest education level: Not on file   Occupational History     Not  on file   Social Needs     Financial resource strain: Not on file     Food insecurity:     Worry: Not on file     Inability: Not on file     Transportation needs:     Medical: Not on file     Non-medical: Not on file   Tobacco Use     Smoking status: Never Smoker     Smokeless tobacco: Never Used   Substance and Sexual Activity     Alcohol use: Yes     Alcohol/week: 28.0 standard drinks     Frequency: 4 or more times a week     Drinks per session: 3 or 4     Comment: 4 daily     Drug use: No     Sexual activity: Not Currently   Lifestyle     Physical activity:     Days per week: Not on file     Minutes per session: Not on file     Stress: Not on file   Relationships     Social connections:     Talks on phone: Not on file     Gets together: Not on file     Attends Cheondoism service: Not on file     Active member of club or organization: Not on file     Attends meetings of clubs or organizations: Not on file     Relationship status: Not on file     Intimate partner violence:     Fear of current or ex partner: Not on file     Emotionally abused: Not on file     Physically abused: Not on file     Forced sexual activity: Not on file   Other Topics Concern     Parent/sibling w/ CABG, MI or angioplasty before 65F 55M? Not Asked   Social History Narrative    Pt is ; lives in [place]; retired as a pharmacist at the hospital.       Review of Systems   Constitutional: Negative for appetite change, chills, diaphoresis, fatigue, fever and unexpected weight change.   HENT: Negative for ear pain, rhinorrhea, sinus pressure, sinus pain, sore throat, trouble swallowing and voice change.    Eyes: Negative for pain, redness and visual disturbance.   Respiratory: Negative for cough, chest tightness, shortness of breath and wheezing.    Cardiovascular: Negative for chest pain, palpitations and leg swelling.   Gastrointestinal: Negative for abdominal distention, abdominal pain, blood in stool, constipation, diarrhea, nausea  and vomiting.   Endocrine: Negative for cold intolerance and heat intolerance.   Genitourinary: Negative for difficulty urinating, dysuria, flank pain, frequency and hematuria.   Musculoskeletal: Positive for arthralgias. Negative for back pain, joint swelling, neck pain and neck stiffness.   Skin: Negative for rash and wound.   Allergic/Immunologic: Negative for immunocompromised state.   Neurological: Negative for dizziness, tremors, seizures, syncope, speech difficulty, weakness, light-headedness, numbness and headaches.   Hematological: Negative for adenopathy. Does not bruise/bleed easily.   Psychiatric/Behavioral: Negative for agitation, behavioral problems, confusion, hallucinations and sleep disturbance. The patient is not nervous/anxious.        Physical Exam  Vitals signs and nursing note reviewed.   Constitutional:       General: He is not in acute distress.     Appearance: He is well-developed. He is not diaphoretic.   HENT:      Head: Normocephalic.      Right Ear: Tympanic membrane, ear canal and external ear normal.      Left Ear: Tympanic membrane, ear canal and external ear normal.      Nose: Nose normal.      Mouth/Throat:      Pharynx: No oropharyngeal exudate.   Eyes:      Conjunctiva/sclera: Conjunctivae normal.      Pupils: Pupils are equal, round, and reactive to light.   Neck:      Musculoskeletal: Normal range of motion and neck supple.      Thyroid: No thyroid mass or thyromegaly.      Vascular: Normal carotid pulses. No carotid bruit or JVD.      Trachea: No tracheal deviation.   Cardiovascular:      Rate and Rhythm: Normal rate and regular rhythm.      Heart sounds: Normal heart sounds. No murmur. No friction rub. No gallop.    Pulmonary:      Effort: Pulmonary effort is normal. No respiratory distress.      Breath sounds: Normal breath sounds. No stridor. No decreased breath sounds, wheezing, rhonchi or rales.   Abdominal:      General: Bowel sounds are normal. There is no distension.       Palpations: Abdomen is soft. There is no mass.      Tenderness: There is no abdominal tenderness. There is no guarding or rebound.      Hernia: No hernia is present.   Genitourinary:     Penis: Normal.       Scrotum/Testes: Normal.      Prostate: Enlarged.      Rectum: Normal.      Comments: 3-4+ prostate  Musculoskeletal: Normal range of motion.      Right lower leg: No edema.      Left lower leg: No edema.   Lymphadenopathy:      Cervical: No cervical adenopathy.   Skin:     General: Skin is warm and dry.      Findings: No rash.      Comments: Keratotic lesion on his right ear treated with liquid nitrogen   Neurological:      Mental Status: He is alert and oriented to person, place, and time.      Cranial Nerves: No cranial nerve deficit.      Sensory: No sensory deficit.      Motor: No abnormal muscle tone.      Coordination: Coordination normal.      Deep Tendon Reflexes: Reflexes normal.      Comments: Dysarthric speech         Assessment:      ICD-10-CM    1. Hyperlipidemia, unspecified hyperlipidemia type E78.5 Comprehensive Metabolic Panel     Lipid Panel     simvastatin (ZOCOR) 40 MG tablet     Lipid Panel     Comprehensive Metabolic Panel   2. Cerebral microvascular disease I67.9    3. Coronary artery calcification I25.10     I25.84    4. Hypertension, unspecified type I10 atenolol (TENORMIN) 50 MG tablet     valsartan-hydrochlorothiazide (DIOVAN HCT) 320-12.5 MG tablet   5. Abnormal glucose R73.09 Hemoglobin A1c     Hemoglobin A1c   6. Benign prostatic hyperplasia with urinary obstruction N40.1 Prostate Specific Antigen GH    N13.8 Prostate Specific Antigen GH   7. Laceration of right index finger without foreign body without damage to nail, initial encounter S61.210A GH IMM-  TDAP VACCINE (BOOSTRIX )   8. Hx of adenomatous colonic polyps Z86.010 GASTROENTEROLOGY ADULT REF PROCEDURE ONLY   9. Skin lesion L98.9 DESTRUCT PREMALIGNANT LESION, FIRST        Plan: His exam today is fairly unremarkable.  He  will continue with all of his current medications.  Immunization updates were provided today.  He will follow-up on the ear lesion if needed.  I talked to him for a bit today about his arthralgias.  He has had an MRI of the hip as long ago as 2013 which showed significant osteoarthritis.  I am sure that he has this in both hips as well as his right knee.  Suggested that he consider orthopedic consultation if and when this becomes bothersome enough to warrant.  He will let me know when that becomes the case.  He is scheduled for colonoscopy, he is okay for this procedure without contraindication.  Complete lab drawn and pending, I will send him a letter with the results.

## 2019-12-03 NOTE — NURSING NOTE
Chief Complaint   Patient presents with     RECHECK     annual visit     Patient presents to the clinic today for an annual visit  Medication Reconciliation: completed   Sneha Villa LPN  12/3/2019 1:55 PM

## 2019-12-03 NOTE — LETTER
December 3, 2019      Wilfrid Meneses  [address]  [place] [address]        Dear Art,    Below are the results of your recent labs:    Results for orders placed or performed in visit on 12/03/19   Prostate Specific Antigen GH     Status: Abnormal   Result Value Ref Range    Prostate Specific Antigen 3.279 (H) <3.100 ng/mL   Hemoglobin A1c     Status: None   Result Value Ref Range    Hemoglobin A1C 5.4 4.0 - 6.0 %   Lipid Panel     Status: Abnormal   Result Value Ref Range    Cholesterol 207 (H) <200 mg/dL    Triglycerides 125 <150 mg/dL    HDL Cholesterol 75 23 - 92 mg/dL    LDL Cholesterol Calculated 107 (H) <100 mg/dL    Non HDL Cholesterol 132 (H) <130 mg/dL   Comprehensive Metabolic Panel     Status: Abnormal   Result Value Ref Range    Sodium 131 (L) 134 - 144 mmol/L    Potassium 4.1 3.5 - 5.1 mmol/L    Chloride 94 (L) 98 - 107 mmol/L    Carbon Dioxide 28 21 - 31 mmol/L    Anion Gap 9 3 - 14 mmol/L    Glucose 99 70 - 105 mg/dL    Urea Nitrogen 16 7 - 25 mg/dL    Creatinine 1.01 0.70 - 1.30 mg/dL    GFR Estimate 71 >60 mL/min/[1.73_m2]    GFR Estimate If Black 86 >60 mL/min/[1.73_m2]    Calcium 9.8 8.6 - 10.3 mg/dL    Bilirubin Total 1.2 (H) 0.3 - 1.0 mg/dL    Albumin 5.0 3.5 - 5.7 g/dL    Protein Total 7.5 6.4 - 8.9 g/dL    Alkaline Phosphatase 82 34 - 104 U/L    ALT 19 7 - 52 U/L    AST 25 13 - 39 U/L        In general, your blood tests look fine.  Your cholesterol is still a little bit on the high side and now your PSA is just a little bit on the high side as well.  Everything else looks acceptable.  I am not too concerned about the above abnormalities.  I would recommend that we recheck lab work again in 1 year.  If you have any questions or concerns in the interim, feel free to contact me.    Sincerely,        Jimenez Colon MD  Internal Medicine  [facility]

## 2019-12-05 DIAGNOSIS — Z12.11 COLON CANCER SCREENING: Primary | ICD-10-CM

## 2019-12-05 NOTE — TELEPHONE ENCOUNTER
Screening Questions for the Scheduling of Screening Colonoscopies   (If Colonoscopy is diagnostic, Provider should review the chart before scheduling.)  Are you younger than 50 or older than 80?  NO   Do you take aspirin or fish oil?  NO  (if yes, tell patient to stop 1 week prior to Colonoscopy)  Do you take warfarin (Coumadin), clopidogrel (Plavix), apixaban (Eliquis), dabigatram (Pradaxa), rivaroxaban (Xarelto) or any blood thinner? NO   Do you use oxygen at home?  NO   Do you have kidney disease? NO   Are you on dialysis? NO   Have you had a stroke or heart attack in the last year? NO   Have you had a stent in your heart or any blood vessel in the last year? NO   Have you had a transplant of any organ? NO   Have you had a colonoscopy or upper endoscopy (EGD) before? YES          When?  5 YRS   Date of scheduled Colonoscopy. 01/07/2020  Provider [facility]   Pharmacy [facility]

## 2019-12-07 RX ORDER — POLYETHYLENE GLYCOL 3350, SODIUM CHLORIDE, SODIUM BICARBONATE, POTASSIUM CHLORIDE 420; 11.2; 5.72; 1.48 G/4L; G/4L; G/4L; G/4L
4000 POWDER, FOR SOLUTION ORAL ONCE
Qty: 4000 ML | Refills: 0 | Status: ON HOLD | OUTPATIENT
Start: 2019-12-07 | End: 2020-01-07

## 2019-12-07 RX ORDER — BISACODYL 5 MG/1
TABLET, DELAYED RELEASE ORAL
Qty: 2 TABLET | Refills: 0 | Status: ON HOLD | OUTPATIENT
Start: 2019-12-07 | End: 2020-01-07

## 2019-12-27 ENCOUNTER — TRANSFERRED RECORDS (OUTPATIENT)
Dept: HEALTH INFORMATION MANAGEMENT | Facility: OTHER | Age: 79
End: 2019-12-27

## 2019-12-27 LAB — RETINOPATHY: NEGATIVE

## 2020-01-06 ENCOUNTER — ANESTHESIA EVENT (OUTPATIENT)
Dept: SURGERY | Facility: OTHER | Age: 80
End: 2020-01-06
Payer: MEDICARE

## 2020-01-07 ENCOUNTER — ANESTHESIA (OUTPATIENT)
Dept: SURGERY | Facility: OTHER | Age: 80
End: 2020-01-07
Payer: MEDICARE

## 2020-01-07 ENCOUNTER — HOSPITAL ENCOUNTER (OUTPATIENT)
Facility: OTHER | Age: 80
Discharge: HOME | End: 2020-01-07
Attending: SURGERY | Admitting: SURGERY
Payer: MEDICARE

## 2020-01-07 VITALS
DIASTOLIC BLOOD PRESSURE: 83 MMHG | OXYGEN SATURATION: 98 % | HEART RATE: 61 BPM | RESPIRATION RATE: 16 BRPM | SYSTOLIC BLOOD PRESSURE: 161 MMHG | TEMPERATURE: 97.4 F

## 2020-01-07 DIAGNOSIS — K63.5 POLYP OF COLON, UNSPECIFIED PART OF COLON, UNSPECIFIED TYPE: Primary | ICD-10-CM

## 2020-01-07 DIAGNOSIS — K57.90 DIVERTICULOSIS: ICD-10-CM

## 2020-01-07 DIAGNOSIS — K64.4 EXTERNAL HEMORRHOIDS: ICD-10-CM

## 2020-01-07 PROCEDURE — 99100 ANES PT EXTEME AGE<1 YR&>70: CPT | Performed by: NURSE ANESTHETIST, CERTIFIED REGISTERED

## 2020-01-07 PROCEDURE — 45385 COLONOSCOPY W/LESION REMOVAL: CPT | Mod: PT | Performed by: SURGERY

## 2020-01-07 PROCEDURE — 88305 TISSUE EXAM BY PATHOLOGIST: CPT

## 2020-01-07 PROCEDURE — 25000125 ZZHC RX 250: Performed by: NURSE ANESTHETIST, CERTIFIED REGISTERED

## 2020-01-07 PROCEDURE — 45384 COLONOSCOPY W/LESION REMOVAL: CPT | Performed by: SURGERY

## 2020-01-07 PROCEDURE — 25000128 H RX IP 250 OP 636: Performed by: NURSE ANESTHETIST, CERTIFIED REGISTERED

## 2020-01-07 PROCEDURE — 25800030 ZZH RX IP 258 OP 636: Performed by: SURGERY

## 2020-01-07 PROCEDURE — 25000125 ZZHC RX 250: Performed by: SURGERY

## 2020-01-07 PROCEDURE — 40000010 ZZH STATISTIC ANES STAT CODE-CRNA PER MINUTE: Performed by: SURGERY

## 2020-01-07 PROCEDURE — 45385 COLONOSCOPY W/LESION REMOVAL: CPT | Performed by: NURSE ANESTHETIST, CERTIFIED REGISTERED

## 2020-01-07 PROCEDURE — 25000132 ZZH RX MED GY IP 250 OP 250 PS 637: Mod: GY | Performed by: SURGERY

## 2020-01-07 RX ORDER — HYDROMORPHONE HYDROCHLORIDE 1 MG/ML
.3-.5 INJECTION, SOLUTION INTRAMUSCULAR; INTRAVENOUS; SUBCUTANEOUS EVERY 10 MIN PRN
Status: DISCONTINUED | OUTPATIENT
Start: 2020-01-07 | End: 2020-01-07 | Stop reason: HOSPADM

## 2020-01-07 RX ORDER — ONDANSETRON 2 MG/ML
4 INJECTION INTRAMUSCULAR; INTRAVENOUS EVERY 30 MIN PRN
Status: DISCONTINUED | OUTPATIENT
Start: 2020-01-07 | End: 2020-01-07 | Stop reason: HOSPADM

## 2020-01-07 RX ORDER — ONDANSETRON 4 MG/1
4 TABLET, ORALLY DISINTEGRATING ORAL EVERY 30 MIN PRN
Status: DISCONTINUED | OUTPATIENT
Start: 2020-01-07 | End: 2020-01-07 | Stop reason: HOSPADM

## 2020-01-07 RX ORDER — SODIUM CHLORIDE, SODIUM LACTATE, POTASSIUM CHLORIDE, CALCIUM CHLORIDE 600; 310; 30; 20 MG/100ML; MG/100ML; MG/100ML; MG/100ML
INJECTION, SOLUTION INTRAVENOUS CONTINUOUS
Status: DISCONTINUED | OUTPATIENT
Start: 2020-01-07 | End: 2020-01-07 | Stop reason: HOSPADM

## 2020-01-07 RX ORDER — PROPOFOL 10 MG/ML
INJECTION, EMULSION INTRAVENOUS PRN
Status: DISCONTINUED | OUTPATIENT
Start: 2020-01-07 | End: 2020-01-07

## 2020-01-07 RX ORDER — PROPOFOL 10 MG/ML
INJECTION, EMULSION INTRAVENOUS CONTINUOUS PRN
Status: DISCONTINUED | OUTPATIENT
Start: 2020-01-07 | End: 2020-01-07

## 2020-01-07 RX ORDER — NALOXONE HYDROCHLORIDE 0.4 MG/ML
.1-.4 INJECTION, SOLUTION INTRAMUSCULAR; INTRAVENOUS; SUBCUTANEOUS
Status: DISCONTINUED | OUTPATIENT
Start: 2020-01-07 | End: 2020-01-07

## 2020-01-07 RX ORDER — MEPERIDINE HYDROCHLORIDE 50 MG/ML
12.5 INJECTION INTRAMUSCULAR; INTRAVENOUS; SUBCUTANEOUS
Status: DISCONTINUED | OUTPATIENT
Start: 2020-01-07 | End: 2020-01-07 | Stop reason: HOSPADM

## 2020-01-07 RX ORDER — NALOXONE HYDROCHLORIDE 0.4 MG/ML
.1-.4 INJECTION, SOLUTION INTRAMUSCULAR; INTRAVENOUS; SUBCUTANEOUS
Status: DISCONTINUED | OUTPATIENT
Start: 2020-01-07 | End: 2020-01-07 | Stop reason: HOSPADM

## 2020-01-07 RX ORDER — LIDOCAINE HYDROCHLORIDE 20 MG/ML
INJECTION, SOLUTION INFILTRATION; PERINEURAL PRN
Status: DISCONTINUED | OUTPATIENT
Start: 2020-01-07 | End: 2020-01-07

## 2020-01-07 RX ORDER — LIDOCAINE 40 MG/G
CREAM TOPICAL
Status: DISCONTINUED | OUTPATIENT
Start: 2020-01-07 | End: 2020-01-07 | Stop reason: HOSPADM

## 2020-01-07 RX ORDER — SIMETHICONE
LIQUID (ML) MISCELLANEOUS PRN
Status: DISCONTINUED | OUTPATIENT
Start: 2020-01-07 | End: 2020-01-07 | Stop reason: HOSPADM

## 2020-01-07 RX ORDER — FLUMAZENIL 0.1 MG/ML
0.2 INJECTION, SOLUTION INTRAVENOUS
Status: DISCONTINUED | OUTPATIENT
Start: 2020-01-07 | End: 2020-01-07 | Stop reason: HOSPADM

## 2020-01-07 RX ORDER — FENTANYL CITRATE 50 UG/ML
25-50 INJECTION, SOLUTION INTRAMUSCULAR; INTRAVENOUS
Status: DISCONTINUED | OUTPATIENT
Start: 2020-01-07 | End: 2020-01-07 | Stop reason: HOSPADM

## 2020-01-07 RX ORDER — GLYCOPYRROLATE 0.2 MG/ML
INJECTION, SOLUTION INTRAMUSCULAR; INTRAVENOUS PRN
Status: DISCONTINUED | OUTPATIENT
Start: 2020-01-07 | End: 2020-01-07

## 2020-01-07 RX ADMIN — GLYCOPYRROLATE 0.2 MG: 0.2 INJECTION, SOLUTION INTRAMUSCULAR; INTRAVENOUS at 08:46

## 2020-01-07 RX ADMIN — PROPOFOL 140 MCG/KG/MIN: 10 INJECTION, EMULSION INTRAVENOUS at 08:49

## 2020-01-07 RX ADMIN — SODIUM CHLORIDE, POTASSIUM CHLORIDE, SODIUM LACTATE AND CALCIUM CHLORIDE: 600; 310; 30; 20 INJECTION, SOLUTION INTRAVENOUS at 08:38

## 2020-01-07 RX ADMIN — LIDOCAINE HYDROCHLORIDE 60 MG: 20 INJECTION, SOLUTION INFILTRATION; PERINEURAL at 08:49

## 2020-01-07 RX ADMIN — PROPOFOL 80 MG: 10 INJECTION, EMULSION INTRAVENOUS at 08:49

## 2020-01-07 NOTE — DISCHARGE INSTRUCTIONS
[facility] Procedure   Adult Discharge Orders & Instructions      For 24 hours after surgery:  1. Get plenty of rest.  A responsible adult must stay with you for at least 24 hours after you leave the hospital.   2. You may feel lightheaded.  IF so, sit for a few minutes before standing.  Have someone help you get up.   3. You may have a slight fever. Call the doctor if your fever is over 101 F (38.3 C) (taken under the tongue) or lasts longer than 24 hours.  4. You may have a dry mouth, a sore throat, muscle aches or trouble sleeping.  These should go away after 24 hours.  5. Do not make important or legal decisions.  6.   Do not drive or use heavy equipment.  If you have weakness or tingling, don't drive or use heavy equipment until this feeling goes away.                                                                                                                                                                         To contact a doctor, call    [phone]______________

## 2020-01-07 NOTE — OP NOTE
PROCEDURE NOTE    DATE OF SERVICE: 1/7/2020    SURGEON: SARAH Davenport MD     PRE-OP DIAGNOSIS:    History of Colon Cancer    POST-OP DIAGNOSIS:    External hemorrhoids  Sigmoid Diverticulosis  Polyps at rectum and transverse colon    PROCEDURE:   Colonoscopy with snare polypectomy    ASSISTANT:  Circulator: Tameka Naik RN  Scrub Person: Ingrid Hood    ANESTHESIA:  MAC                            Monitor Anesthesia CareCRNA Independent: Michael Keith APRN CRNA    INDICATION FOR THE PROCEDURE: The patient is a 79 year old male with history of colon polyps. The patient has no other complaints.  After explaining the risks to include bleeding, perforation, potential inability to reach the cecum the patient wishes to proceed.    PROCEDURE:After adequate sedation, the patient was in the left lateral decubitus position.  Rectal exam was performed.  There was normal tone and no palpable masses, external hemorrhoids noted.  The colonoscope was introduced into the rectum and advanced to the cecum with Moderate difficulty.  The patient's prep was good.  The terminal cecum was reached.  The cecum, ascending, transverse, descending and sigmoid colon were significant for two small polyps in the transverse colon removed with hot snare, sigmoid diverticulosis and one small polyp in the rectum removed with hot snare.  The scope was retroflexed in the rectum.  The anorectal junction was unremarkable.  The scope was straightened and removed.  The patient tolerated the procedure well.     ESTIMATED BLOOD LOSS: none    COMPLICATIONS:  None    TISSUE REMOVED:  Yes    RECOMMEND:    Follow-up pending pathology  Fiber  Given literature on diverticulosis      SARAH Davenport MD

## 2020-01-07 NOTE — ANESTHESIA POSTPROCEDURE EVALUATION
Patient: Wilfrid Meneses    Procedure(s):  COLONOSCOPY, WITH LESION EXCISION USING HOT BIOPSY DEVICE    Diagnosis:History of colon polyps [Z86.010]  Diagnosis Additional Information: No value filed.    Anesthesia Type:  MAC    Note:  Anesthesia Post Evaluation    Patient location during evaluation: Phase 2  Patient participation: Able to fully participate in evaluation  Level of consciousness: awake and alert  Pain management: adequate  Airway patency: patent  Cardiovascular status: acceptable  Respiratory status: acceptable  Hydration status: acceptable  PONV: none             Last vitals:  Vitals:    01/07/20 0943 01/07/20 0944 01/07/20 0945   BP:   (!) 147/75   Pulse:   55   Resp:  16    Temp:      SpO2: 98% 98%          Electronically Signed By: RAFAEL OSBORNE CRNA  January 7, 2020  10:06 AM

## 2020-01-07 NOTE — ANESTHESIA PREPROCEDURE EVALUATION
Anesthesia Pre-Procedure Evaluation    Patient: Wilfrid Meneses   MRN: [id number] : 1940          Preoperative Diagnosis: History of colon polyps [Z86.010]    Procedure(s):  COLONOSCOPY    Past Medical History:   Diagnosis Date     Coronary artery calcification     score 297 2010     Dysarthria      Enlarged prostate without lower urinary tract symptoms (luts)     Post prostatectomy     Essential (primary) hypertension     No Comments Provided     History of colonic polyps     No Comments Provided     Hyperglycemia     No Comments Provided     Hyperlipidemia     No Comments Provided     Malignant neoplasm of skin     No Comments Provided     Spinal stenosis of lumbar region without neurogenic claudication     No Comments Provided     Uncomplicated alcohol abuse     No Comments Provided     Past Surgical History:   Procedure Laterality Date     ARTHROPLASTY KNEE Left          ARTHROPLASTY SHOULDER Left 2018     ARTHROSCOPY KNEE Left          COLONOSCOPY      , , ,Adenomatous , subsequent WNL     COLONOSCOPY  2014,Adenoma     HERNIORRHAPHY INGUINAL BILATERAL       PROSTATECTOMY SUPRAPUBIC  2007     UPPER GI ENDOSCOPY         Anesthesia Evaluation     . Pt has had prior anesthetic. Type: General, MAC and Regional           ROS/MED HX    ENT/Pulmonary:  - neg pulmonary ROS     Neurologic:  - neg neurologic ROS     Cardiovascular:     (+) hypertension--CAD, --. : . . . :. .       METS/Exercise Tolerance:  >4 METS   Hematologic:  - neg hematologic  ROS       Musculoskeletal:   (+) arthritis,  -       GI/Hepatic: Comment: Hx of polyps    (+) bowel prep,       Renal/Genitourinary:     (+) BPH,       Endo:  - neg endo ROS       Psychiatric:  - neg psychiatric ROS       Infectious Disease:  - neg infectious disease ROS       Malignancy:   (+) Malignancy History of Skin          Other:                          Physical Exam  Normal systems: pulmonary and  "dental    Airway   Mallampati: II  TM distance: >3 FB  Neck ROM: limited  Comment: Has some spinal stenosis in his neck with recessed chin    Dental     Cardiovascular   Rhythm and rate: regular and normal      Pulmonary             Lab Results   Component Value Date    HGB 14.5 01/24/2018    HCT 41.4 01/24/2018     01/24/2018     (L) 12/03/2019    POTASSIUM 4.1 12/03/2019    CHLORIDE 94 (L) 12/03/2019    CO2 28 12/03/2019    BUN 16 12/03/2019    CR 1.01 12/03/2019    GLC 99 12/03/2019    ESTIVEN 9.8 12/03/2019    ALBUMIN 5.0 12/03/2019    PROTTOTAL 7.5 12/03/2019    ALT 19 12/03/2019    AST 25 12/03/2019    ALKPHOS 82 12/03/2019    BILITOTAL 1.2 (H) 12/03/2019    PTT 31 01/24/2018    INR 0.9 01/24/2018       Preop Vitals  BP Readings from Last 3 Encounters:   01/07/20 (!) 174/86   12/03/19 110/70   07/12/19 110/68    Pulse Readings from Last 3 Encounters:   12/03/19 56   07/12/19 60   01/17/19 55      Resp Readings from Last 3 Encounters:   01/07/20 16   12/03/19 16   07/12/19 18    SpO2 Readings from Last 3 Encounters:   01/07/20 99%   12/03/19 99%   01/17/19 98%      Temp Readings from Last 1 Encounters:   01/07/20 97.6  F (36.4  C) (Tympanic)    Ht Readings from Last 1 Encounters:   12/03/19 1.651 m (5' 5\")      Wt Readings from Last 1 Encounters:   12/03/19 70.7 kg (155 lb 12.8 oz)    Estimated body mass index is 25.93 kg/m  as calculated from the following:    Height as of 12/3/19: 1.651 m (5' 5\").    Weight as of 12/3/19: 70.7 kg (155 lb 12.8 oz).       Anesthesia Plan      History & Physical Review      ASA Status:  3 .    NPO Status:  > 4 hours    Plan for MAC with Propofol induction.          Postoperative Care      Consents  Anesthetic plan, risks, benefits and alternatives discussed with:  Patient..                 RAFAEL OSBORNE CRNA  "

## 2020-01-07 NOTE — ANESTHESIA CARE TRANSFER NOTE
Patient: Wilfrid Meneses    Procedure(s):  COLONOSCOPY, WITH LESION EXCISION USING HOT BIOPSY DEVICE    Diagnosis: History of colon polyps [Z86.010]  Diagnosis Additional Information: No value filed.    Anesthesia Type:   MAC     Note:  Airway :Room Air  Patient transferred to:Phase II  Handoff Report: Identifed the Patient, Identified the Reponsible Provider, Reviewed the pertinent medical history, Discussed the surgical course, Reviewed Intra-OP anesthesia mangement and issues during anesthesia, Set expectations for post-procedure period and Allowed opportunity for questions and acknowledgement of understanding      Vitals: (Last set prior to Anesthesia Care Transfer)    CRNA VITALS  1/7/2020 0850 - 1/7/2020 0921      1/7/2020             Resp Rate (set):  10                Electronically Signed By: RAFAEL Ring CRNA  January 7, 2020  9:21 AM

## 2020-01-07 NOTE — H&P
GENERAL SURGERY CONSULTATION NOTE    Wilfrid Meneses   [address] [facility] [address]  79 year old  male    Primary Care Provider:  Jimenez Colon      HPI: Wilfrid Meneses presents to day surgery in need of colonoscopy for history of colon polyps.   Wilfrid Meneses denies family history of colon cancer. Patient denies change in bowel habits or blood in stools. Previous colonoscopy was 2014, tubular adenoma.     REVIEW OF SYSTEMS:    GENERAL: No fevers or chills. Denies fatigue, recent weight loss.  HEENT: No sinus drainage. No changes with vision or hearing. No difficulty swallowing.   LYMPHATICS:  Noswollen nodes in axilla, neck or groin.  CARDIOVASCULAR: Denies chest pain, palpitations and dyspnea on exertion.  PULMONARY: No shortness of breath or cough. No increase in sputum production.  GI: Denies melena,bright red blood in stools. No hematemesis. No constipation or diarrhea.  : No dysuria or hematuria.  SKIN: No recent rashes or ulcers.   HEMATOLOGY:  No history of easy bruising or bleeding.  ENDOCRINE:  No history of diabetes or thyroid problems.  NEUROLOGY:  No history of seizures or headaches. No motor or sensory changes.        Patient Active Problem List   Diagnosis     Cerebral microvascular disease     Benign prostatic hyperplasia with urinary obstruction     Basal cell carcinoma of back     Alcohol abuse     Lumbar spinal stenosis     Hypertension     Hyperlipidemia     Abnormal glucose     Dysarthria     Coronary artery calcification     Benign neoplasm of colon     Squamous cell skin cancer     Osteoarthritis of left glenohumeral joint     Actinic keratosis       Past Medical History:   Diagnosis Date     Coronary artery calcification     score 297 12/2010     Dysarthria      Enlarged prostate without lower urinary tract symptoms (luts)     Post prostatectomy     Essential (primary) hypertension     No Comments Provided     History of colonic polyps     No Comments Provided      Hyperglycemia     No Comments Provided     Hyperlipidemia     No Comments Provided     Malignant neoplasm of skin     No Comments Provided     Spinal stenosis of lumbar region without neurogenic claudication     No Comments Provided     Uncomplicated alcohol abuse     No Comments Provided       Past Surgical History:   Procedure Laterality Date     ARTHROPLASTY KNEE Left     01/09     ARTHROPLASTY SHOULDER Left 2018     ARTHROSCOPY KNEE Left     12/05     COLONOSCOPY      2001, 2006, 2011,Adenomatous 2001, subsequent WNL     COLONOSCOPY  11/21/2014 11/21/2014,Adenoma     HERNIORRHAPHY INGUINAL BILATERAL       PROSTATECTOMY SUPRAPUBIC  2007     UPPER GI ENDOSCOPY  2005       Family History   Problem Relation Age of Onset     Diabetes Mother      Chronic Obstructive Pulmonary Disease Mother      Myocardial Infarction Father      Other - See Comments Son         MI during GXT     Other - See Comments Sister         PMR       Social History     Social History Narrative    Pt is ; lives in [place]; retired as a pharmacist at the hospital.       Social History     Socioeconomic History     Marital status:      Spouse name: Not on file     Number of children: Not on file     Years of education: Not on file     Highest education level: Not on file   Occupational History     Not on file   Social Needs     Financial resource strain: Not on file     Food insecurity:     Worry: Not on file     Inability: Not on file     Transportation needs:     Medical: Not on file     Non-medical: Not on file   Tobacco Use     Smoking status: Never Smoker     Smokeless tobacco: Never Used   Substance and Sexual Activity     Alcohol use: Yes     Alcohol/week: 28.0 standard drinks     Frequency: 4 or more times a week     Drinks per session: 3 or 4     Comment: 4 daily     Drug use: No     Sexual activity: Not Currently   Lifestyle     Physical activity:     Days per week: Not on file     Minutes per session: Not on file      Stress: Not on file   Relationships     Social connections:     Talks on phone: Not on file     Gets together: Not on file     Attends Faith service: Not on file     Active member of club or organization: Not on file     Attends meetings of clubs or organizations: Not on file     Relationship status: Not on file     Intimate partner violence:     Fear of current or ex partner: Not on file     Emotionally abused: Not on file     Physically abused: Not on file     Forced sexual activity: Not on file   Other Topics Concern     Parent/sibling w/ CABG, MI or angioplasty before 65F 55M? Not Asked   Social History Narrative    Pt is ; lives in [place]; retired as a pharmacist at the hospital.       No current facility-administered medications on file prior to encounter.   atenolol (TENORMIN) 50 MG tablet, Take 1 tablet (50 mg) by mouth daily  Multiple Vitamin (MULTI-VITAMINS) TABS, Take 1 tablet by mouth daily  simvastatin (ZOCOR) 40 MG tablet, Take 1 tablet (40 mg) by mouth At Bedtime  valsartan-hydrochlorothiazide (DIOVAN HCT) 320-12.5 MG tablet, Take 1 tablet by mouth daily          ALLERGIES/SENSITIVITIES:   Allergies   Allergen Reactions     Ace Inhibitors Cough     Hydrocodone-Acetaminophen      Other reaction(s): Insomnia  Keeps him awake      Sulfamethoxazole-Trimethoprim Rash     Sulfasalazine Rash       PHYSICAL EXAM:     BP (!) 174/86   Temp 97.6  F (36.4  C) (Tympanic)   Resp 16   SpO2 99%     General Appearance:   Sitting up in bed, no apparent distress  HEENT: Pupils are equal and reactive, no scleral icterus   Heart & CV:  RRR, no murmur.  LUNGS: No increased work of breathing. Lungs are CTA B/L, no wheezing or crackles.  Abd:  soft, non-tender, no masses   Ext: no lower extremity edema   Neuro: alert and oriented, normal speech and mentation         CONSULTATION ASSESSMENT AND PLAN:    79 year old male with history of colon polyps in need of screening colonoscopy.      The technical  details of colonoscopy were discussed with the patient along with the risks and benefits to include bleeding, perforation and incomplete study. Wilfrid Meneses demonstrated understanding and is willing to proceed.       Christian Davenport MD on 1/7/2020 at 8:37 AM

## 2020-01-08 ENCOUNTER — OFFICE VISIT (OUTPATIENT)
Dept: INTERNAL MEDICINE | Facility: OTHER | Age: 80
End: 2020-01-08
Attending: INTERNAL MEDICINE
Payer: MEDICARE

## 2020-01-08 VITALS
TEMPERATURE: 97.1 F | HEART RATE: 56 BPM | SYSTOLIC BLOOD PRESSURE: 128 MMHG | BODY MASS INDEX: 25.79 KG/M2 | DIASTOLIC BLOOD PRESSURE: 78 MMHG | RESPIRATION RATE: 18 BRPM | WEIGHT: 155 LBS

## 2020-01-08 DIAGNOSIS — S76.211A GROIN STRAIN, RIGHT, INITIAL ENCOUNTER: Primary | ICD-10-CM

## 2020-01-08 PROCEDURE — 99213 OFFICE O/P EST LOW 20 MIN: CPT | Performed by: INTERNAL MEDICINE

## 2020-01-08 PROCEDURE — G0463 HOSPITAL OUTPT CLINIC VISIT: HCPCS

## 2020-01-08 ASSESSMENT — ENCOUNTER SYMPTOMS
CONSTITUTIONAL NEGATIVE: 1
ALLERGIC/IMMUNOLOGIC NEGATIVE: 1
ENDOCRINE NEGATIVE: 1
EYES NEGATIVE: 1

## 2020-01-08 NOTE — PROGRESS NOTES
Chief Complaint:  Groin pain.    HPI: He is complaining of pain in his right groin.  It is been since about [place] time.  He was doing some heavy lifting of a cooler at that time so it is possible that he could have strained it.  He had his colonoscopy yesterday and did have a few polyps resected.  He had the surgeon check it briefly and the surgeon did not think it was much of a hernia.  He is in today to have it evaluated.  No urinary symptoms.    Past Medical History:   Diagnosis Date     Coronary artery calcification     score 297 12/2010     Dysarthria      Enlarged prostate without lower urinary tract symptoms (luts)     Post prostatectomy     Essential (primary) hypertension     No Comments Provided     History of colonic polyps     No Comments Provided     Hyperglycemia     No Comments Provided     Hyperlipidemia     No Comments Provided     Malignant neoplasm of skin     No Comments Provided     Spinal stenosis of lumbar region without neurogenic claudication     No Comments Provided     Uncomplicated alcohol abuse     No Comments Provided       Past Surgical History:   Procedure Laterality Date     ARTHROPLASTY KNEE Left     01/09     ARTHROPLASTY SHOULDER Left 2018     ARTHROSCOPY KNEE Left     12/05     COLONOSCOPY      2001, 2006, 2011,Adenomatous 2001, subsequent WNL     COLONOSCOPY  11/21/2014 11/21/2014,Adenoma     HERNIORRHAPHY INGUINAL BILATERAL       PROSTATECTOMY SUPRAPUBIC  2007     UPPER GI ENDOSCOPY  2005       Allergies   Allergen Reactions     Ace Inhibitors Cough     Hydrocodone-Acetaminophen      Other reaction(s): Insomnia  Keeps him awake      Sulfamethoxazole-Trimethoprim Rash     Sulfasalazine Rash       Current Outpatient Medications   Medication Sig Dispense Refill     atenolol (TENORMIN) 50 MG tablet Take 1 tablet (50 mg) by mouth daily 90 tablet 3     Multiple Vitamin (MULTI-VITAMINS) TABS Take 1 tablet by mouth daily       simvastatin (ZOCOR) 40 MG tablet Take 1 tablet (40  mg) by mouth At Bedtime 90 tablet 3     valsartan-hydrochlorothiazide (DIOVAN HCT) 320-12.5 MG tablet Take 1 tablet by mouth daily 90 tablet 3       Review of Systems   Constitutional: Negative.    Eyes: Negative.    Endocrine: Negative.    Allergic/Immunologic: Negative.        Physical Exam  Vitals signs and nursing note reviewed.   Constitutional:       General: He is not in acute distress.     Appearance: Normal appearance. He is not ill-appearing, toxic-appearing or diaphoretic.   Abdominal:      Comments: He has a minimal hernia present in the right inguinal canal.  He was tender around the area of the inguinal ligament but no palpable hernia or other abnormalities.   Neurological:      Mental Status: He is alert.         Assessment:        ICD-10-CM    1. Groin strain, right, initial encounter S76.211A        Plan: He has only a minimal hernia on exam.  I think this is more of a strain in the groin rather than anything else.  This was reviewed with him.  Suggested watchful waiting.  Return if there are problems.

## 2020-01-08 NOTE — NURSING NOTE
"The patient is here today to be seen for a possible hernia on the right side of his groin area.  Kortney Chapman LPN on 1/8/2020 at 8:33 AM  Chief Complaint   Patient presents with     Hernia       Initial /78 (BP Location: Right arm, Patient Position: Sitting, Cuff Size: Adult Large)   Pulse 56   Temp 97.1  F (36.2  C) (Tympanic)   Resp 18   Wt 70.3 kg (155 lb)   BMI 25.79 kg/m   Estimated body mass index is 25.79 kg/m  as calculated from the following:    Height as of 12/3/19: 1.651 m (5' 5\").    Weight as of this encounter: 70.3 kg (155 lb).  Medication Reconciliation: complete    Kortney Chapman LPN    "

## 2020-03-26 ENCOUNTER — NURSE TRIAGE (OUTPATIENT)
Dept: INTERNAL MEDICINE | Facility: OTHER | Age: 80
End: 2020-03-26

## 2020-03-26 ENCOUNTER — OFFICE VISIT (OUTPATIENT)
Dept: FAMILY MEDICINE | Facility: OTHER | Age: 80
End: 2020-03-26
Attending: FAMILY MEDICINE
Payer: MEDICARE

## 2020-03-26 VITALS
SYSTOLIC BLOOD PRESSURE: 128 MMHG | HEART RATE: 60 BPM | TEMPERATURE: 98.2 F | RESPIRATION RATE: 20 BRPM | BODY MASS INDEX: 26.16 KG/M2 | WEIGHT: 157 LBS | DIASTOLIC BLOOD PRESSURE: 68 MMHG | HEIGHT: 65 IN

## 2020-03-26 DIAGNOSIS — M19.012 OSTEOARTHRITIS OF LEFT GLENOHUMERAL JOINT: Primary | ICD-10-CM

## 2020-03-26 PROCEDURE — 99213 OFFICE O/P EST LOW 20 MIN: CPT | Performed by: FAMILY MEDICINE

## 2020-03-26 PROCEDURE — G0463 HOSPITAL OUTPT CLINIC VISIT: HCPCS

## 2020-03-26 ASSESSMENT — MIFFLIN-ST. JEOR: SCORE: 1354.03

## 2020-03-26 ASSESSMENT — PAIN SCALES - GENERAL: PAINLEVEL: MILD PAIN (2)

## 2020-03-26 ASSESSMENT — PATIENT HEALTH QUESTIONNAIRE - PHQ9: SUM OF ALL RESPONSES TO PHQ QUESTIONS 1-9: 0

## 2020-03-26 NOTE — PROGRESS NOTES
SUBJECTIVE:   Wilfrid Meneses is a 79 year old male who presents to clinic today for the following health issues: Right groin pain    Patient arrives here for right groin pain.  He is concerned that the right groin pain might be due to his mesh.  He has had hernia surgery on multiple occasions.  He states that typically putting on a pair of socks will make it worse.  Is been going on for the last 2 months.  But he has had periodic problems and prior to that.  He also would like us to look at his right chest.        Patient Active Problem List    Diagnosis Date Noted     Actinic keratosis 07/12/2019     Priority: Medium     Benign prostatic hyperplasia with urinary obstruction 01/16/2018     Priority: Medium     Lumbar spinal stenosis 01/16/2018     Priority: Medium     Hypertension 01/16/2018     Priority: Medium     Hyperlipidemia 01/16/2018     Priority: Medium     Benign neoplasm of colon 01/16/2018     Priority: Medium     Overview:   History of colonic polyps, colonoscopy 1/01, follow up done 2006, normal       Osteoarthritis of left glenohumeral joint 12/04/2017     Priority: Medium     Squamous cell skin cancer 09/03/2017     Priority: Medium     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     Priority: Medium     Dysarthria 05/18/2016     Priority: Medium     Coronary artery calcification 12/01/2015     Priority: Medium     Basal cell carcinoma of back 12/01/2014     Priority: Medium     Abnormal glucose 12/14/2011     Priority: Medium     Alcohol abuse 12/05/2011     Priority: Medium     Past Medical History:   Diagnosis Date     Coronary artery calcification     score 297 12/2010     Dysarthria      Enlarged prostate without lower urinary tract symptoms (luts)     Post prostatectomy     Essential (primary) hypertension     No Comments Provided     History of colonic polyps     No Comments Provided     Hyperglycemia     No Comments Provided     Hyperlipidemia     No Comments Provided     Malignant neoplasm  "of skin     No Comments Provided     Spinal stenosis of lumbar region without neurogenic claudication     No Comments Provided     Uncomplicated alcohol abuse     No Comments Provided      Past Surgical History:   Procedure Laterality Date     ARTHROPLASTY KNEE Left     01/09     ARTHROPLASTY SHOULDER Left 2018     ARTHROSCOPY KNEE Left     12/05     COLONOSCOPY      2001, 2006, 2011,Adenomatous 2001, subsequent WNL     COLONOSCOPY  11/21/2014 11/21/2014,Adenoma     COLONOSCOPY  01/07/2020    tubular adenomas, follow up 5 years     HERNIORRHAPHY INGUINAL BILATERAL       PROSTATECTOMY SUPRAPUBIC  2007     UPPER GI ENDOSCOPY  2005     Allergies   Allergen Reactions     Ace Inhibitors Cough     Hydrocodone-Acetaminophen      Other reaction(s): Insomnia  Keeps him awake      Sulfamethoxazole-Trimethoprim Rash     Sulfasalazine Rash       Review of Systems     OBJECTIVE:     /68   Pulse 60   Temp 98.2  F (36.8  C)   Resp 20   Ht 1.651 m (5' 5\")   Wt 71.2 kg (157 lb)   BMI 26.13 kg/m    Body mass index is 26.13 kg/m .  Physical Exam  Constitutional:       Appearance: Normal appearance.   Genitourinary:     Comments: No hernia  Musculoskeletal:      Comments: Internal rotation with flexion at the knee produces quite a bit of discomfort in the groin.   Skin:     General: Skin is warm.      Comments: Typical seborrheic keratosis in the right upper chest.   Neurological:      Mental Status: He is alert.         Diagnostic Test Results:  none     ASSESSMENT/PLAN:         1. Osteoarthritis of right glenohumeral joint    Advised the patient that I felt this is due to osteoarthritis.  MRI was done about 5 years ago confirming it.  Advised the patient I felt the next that would be to proceed with an orthopedic consult when he is ready.    Seborrheic keratosis.  Reassurance is given.    Bradford Gilman MD  [facility]  "

## 2020-03-26 NOTE — TELEPHONE ENCOUNTER
"Patient calling and states has been having abdominal pain since about December .  States he was evaluated by Dr. Colon once for this but nothing was found.  Patient states having right lower abd pain is starting to radiating across the whole front lower area.  States is constant but intensity is worse at times ,especially with movement.  States does feel he is urinating more.  Denies urgency, fever, blood or pain with urination.    Patient states pain is somewhat like what he had when he had some prostates issues.  Per protocol patient should be seen today    Will route to Dr. Colon for review and consideration    Devi Delong RN on 3/26/2020 at 9:32 AM    Additional Information    Negative: Passed out (i.e., fainted, collapsed and was not responding)    Negative: Shock suspected (e.g., cold/pale/clammy skin, too weak to stand, low BP, rapid pulse)    Negative: Sounds like a life-threatening emergency to the triager    Negative: Chest pain    Negative: Pain is mainly in upper abdomen (if needed ask: 'is it mainly above the belly button?')    Negative: SEVERE abdominal pain (e.g., excruciating)    Negative: Vomiting red blood or black (coffee ground) material    Negative: Bloody, black, or tarry bowel movements    Negative: Unable to urinate (or only a few drops) and bladder feels very full    Negative: Pain in scrotum persists > 1 hour    Constant abdominal pain lasting > 2 hours    Answer Assessment - Initial Assessment Questions  1. LOCATION: \"Where does it hurt?\"       Right lower abd pain  2. RADIATION: \"Does the pain shoot anywhere else?\" (e.g., chest, back)      Across front lower abd  3. ONSET: \"When did the pain begin?\" (Minutes, hours or days ago)       A couple month  4. SUDDEN: \"Gradual or sudden onset?\"      sudden  5. PATTERN \"Does the pain come and go, or is it constant?\"     - If constant: \"Is it getting better, staying the same, or worsening?\"       (Note: Constant means the pain never goes " "away completely; most serious pain is constant and it progresses)      - If intermittent: \"How long does it last?\" \"Do you have pain now?\"      (Note: Intermittent means the pain goes away completely between bouts)      constant  6. SEVERITY: \"How bad is the pain?\"  (e.g., Scale 1-10; mild, moderate, or severe)     - MILD (1-3): doesn't interfere with normal activities, abdomen soft and not tender to touch      - MODERATE (4-7): interferes with normal activities or awakens from sleep, tender to touch      - SEVERE (8-10): excruciating pain, doubled over, unable to do any normal activities        3/10 at rest, 8/10 with movement  7. RECURRENT SYMPTOM: \"Have you ever had this type of abdominal pain before?\" If so, ask: \"When was the last time?\" and \"What happened that time?\"       States he thinks he had this kind of pain when had prostate problems  8. CAUSE: \"What do you think is causing the abdominal pain?\"      unsure  9. RELIEVING/AGGRAVATING FACTORS: \"What makes it better or worse?\" (e.g., movement, antacids, bowel movement)      Movement makes it worse  10. OTHER SYMPTOMS: \"Has there been any vomiting, diarrhea, constipation, or urine problems?\"        Believes may be urinating more    Protocols used: ABDOMINAL PAIN - MALE-A-OH    "

## 2020-03-26 NOTE — TELEPHONE ENCOUNTER
Patient states has Right side abdominal pain.  3 upon rest.  8 upon movement.  History of hernia and prostate surgery.  Shira Wiseman LPN on 3/26/2020 at 9:19 AM

## 2020-03-26 NOTE — TELEPHONE ENCOUNTER
He likely needs to be seen.  As I am in unit 1, I will not be able to see him so he will need to see a different provider.

## 2020-03-26 NOTE — NURSING NOTE
Patient here for possible right hernia with lower abdominal pain especially when trying to put on his socks. His PSA was up with his physical and he does have a history of prostate  Surgery. Also a derm check on his right front shoulder. Medication Reconciliation: complete.    Angela Almanza LPN  3/26/2020 10:50 AM

## 2020-03-26 NOTE — TELEPHONE ENCOUNTER
After the patient's full name and date of birth was verified, the patient was notified of the below information.  Patient will come in to Unit 4 to be seen.  Shira Wiseman LPN on 3/26/2020 at 10:11 AM

## 2020-05-18 ENCOUNTER — OFFICE VISIT (OUTPATIENT)
Dept: INTERNAL MEDICINE | Facility: OTHER | Age: 80
End: 2020-05-18
Attending: INTERNAL MEDICINE
Payer: MEDICARE

## 2020-05-18 VITALS
SYSTOLIC BLOOD PRESSURE: 126 MMHG | WEIGHT: 149.6 LBS | OXYGEN SATURATION: 99 % | HEART RATE: 74 BPM | TEMPERATURE: 98.3 F | RESPIRATION RATE: 16 BRPM | DIASTOLIC BLOOD PRESSURE: 68 MMHG | BODY MASS INDEX: 24.89 KG/M2

## 2020-05-18 DIAGNOSIS — M25.50 ARTHRALGIA, UNSPECIFIED JOINT: Primary | ICD-10-CM

## 2020-05-18 DIAGNOSIS — M35.3 PMR (POLYMYALGIA RHEUMATICA) (H): ICD-10-CM

## 2020-05-18 DIAGNOSIS — R29.898 HAND WEAKNESS: ICD-10-CM

## 2020-05-18 LAB
CRP SERPL-MCNC: 2 MG/L
ERYTHROCYTE [SEDIMENTATION RATE] IN BLOOD BY WESTERGREN METHOD: 79 MM/H (ref 1–10)
URATE SERPL-MCNC: 4.2 MG/DL (ref 4.4–7.6)

## 2020-05-18 PROCEDURE — 84550 ASSAY OF BLOOD/URIC ACID: CPT | Mod: ZL | Performed by: INTERNAL MEDICINE

## 2020-05-18 PROCEDURE — 85652 RBC SED RATE AUTOMATED: CPT | Mod: ZL | Performed by: INTERNAL MEDICINE

## 2020-05-18 PROCEDURE — 86140 C-REACTIVE PROTEIN: CPT | Mod: ZL | Performed by: INTERNAL MEDICINE

## 2020-05-18 PROCEDURE — 86618 LYME DISEASE ANTIBODY: CPT | Mod: ZL | Performed by: INTERNAL MEDICINE

## 2020-05-18 PROCEDURE — G0463 HOSPITAL OUTPT CLINIC VISIT: HCPCS

## 2020-05-18 PROCEDURE — 36415 COLL VENOUS BLD VENIPUNCTURE: CPT | Mod: ZL | Performed by: INTERNAL MEDICINE

## 2020-05-18 PROCEDURE — 86038 ANTINUCLEAR ANTIBODIES: CPT | Mod: ZL | Performed by: INTERNAL MEDICINE

## 2020-05-18 PROCEDURE — 86039 ANTINUCLEAR ANTIBODIES (ANA): CPT | Mod: ZL | Performed by: INTERNAL MEDICINE

## 2020-05-18 PROCEDURE — 99213 OFFICE O/P EST LOW 20 MIN: CPT | Performed by: INTERNAL MEDICINE

## 2020-05-18 RX ORDER — PREDNISONE 20 MG/1
TABLET ORAL
Qty: 100 TABLET | Refills: 1 | Status: SHIPPED | OUTPATIENT
Start: 2020-05-18 | End: 2020-06-01

## 2020-05-18 ASSESSMENT — PAIN SCALES - GENERAL: PAINLEVEL: SEVERE PAIN (6)

## 2020-05-18 ASSESSMENT — ENCOUNTER SYMPTOMS
ENDOCRINE NEGATIVE: 1
ALLERGIC/IMMUNOLOGIC NEGATIVE: 1
CONSTITUTIONAL NEGATIVE: 1
EYES NEGATIVE: 1

## 2020-05-18 NOTE — PROGRESS NOTES
Chief Complaint: Aches and pains.    HPI: He comes in today complaining of a lot of troubles with arthralgias and myalgias.  This has been going on the past couple of weeks.  His shoulders were bothering him for a while but now all of a sudden he really feels stiff and sore.  His ability to function is limited.  He can even touch the floor now unless he is holding onto something for assistance.  He has a lot of pain in his right wrist.  His hands feel weak.  He has aches and pains through his legs.  He is on nothing different or new for medications.  No fevers.  No rashes.  No tick bites that he is aware of.  No other symptoms associated with this.  Of note is that the patient does have a history of a positive GABBY, titer was 1:80 2 years ago.  Previous testing for rheumatoid factor has been negative.    Past Medical History:   Diagnosis Date     Coronary artery calcification     score 297 12/2010     Dysarthria      Enlarged prostate without lower urinary tract symptoms (luts)     Post prostatectomy     Essential (primary) hypertension     No Comments Provided     History of colonic polyps     No Comments Provided     Hyperglycemia     No Comments Provided     Hyperlipidemia     No Comments Provided     Malignant neoplasm of skin     No Comments Provided     Spinal stenosis of lumbar region without neurogenic claudication     No Comments Provided     Uncomplicated alcohol abuse     No Comments Provided       Past Surgical History:   Procedure Laterality Date     ARTHROPLASTY KNEE Left     01/09     ARTHROPLASTY SHOULDER Left 2018     ARTHROSCOPY KNEE Left     12/05     COLONOSCOPY      2001, 2006, 2011,Adenomatous 2001, subsequent WNL     COLONOSCOPY  11/21/2014 11/21/2014,Adenoma     COLONOSCOPY  01/07/2020    tubular adenomas, follow up 5 years     HERNIORRHAPHY INGUINAL BILATERAL       PROSTATECTOMY SUPRAPUBIC  2007     UPPER GI ENDOSCOPY  2005       Allergies   Allergen Reactions     Ace Inhibitors Cough      Hydrocodone-Acetaminophen      Other reaction(s): Insomnia  Keeps him awake      Sulfamethoxazole-Trimethoprim Rash     Sulfasalazine Rash       Current Outpatient Medications   Medication Sig Dispense Refill     atenolol (TENORMIN) 50 MG tablet Take 1 tablet (50 mg) by mouth daily 90 tablet 3     Multiple Vitamin (MULTI-VITAMINS) TABS Take 1 tablet by mouth daily       simvastatin (ZOCOR) 40 MG tablet Take 1 tablet (40 mg) by mouth At Bedtime 90 tablet 3     valsartan-hydrochlorothiazide (DIOVAN HCT) 320-12.5 MG tablet Take 1 tablet by mouth daily 90 tablet 3       Review of Systems   Constitutional: Negative.    Eyes: Negative.    Endocrine: Negative.    Allergic/Immunologic: Negative.        Physical Exam  Vitals signs and nursing note reviewed.   Constitutional:       General: He is not in acute distress.     Appearance: Normal appearance. He is not ill-appearing, toxic-appearing or diaphoretic.   Musculoskeletal:      Comments: No synovitis present in his hands or wrists.   Neurological:      General: No focal deficit present.      Mental Status: He is alert. Mental status is at baseline.         Assessment:        ICD-10-CM    1. Arthralgia, unspecified joint  M25.50 Sedimentation Rate (ESR)     CRP inflammation     Anti Nuclear Peggy IgG by IFA with Reflex     Lyme Disease Ab with reflex to WB Serum     Uric acid   2. Hand weakness  R29.898 NEUROLOGY ADULT REFERRAL       Plan: He has arthralgias and myalgias that have progressed quite a bit recently.  Etiology unclear.  Associated with a previous borderline positive GABBY.  Elected to do repeat lab work today and I will let him know the results.  Consider Tylenol or anti-inflammatories in the interim.  EMG of the upper extremities as some of his symptoms may be consistent with carpal tunnel.  I will let him know all the results and we will proceed as indicated based on the results and his clinical course.    His sedimentation rate has returned very high at 79.   His clinical situation is consistent with polymyalgia rheumatica.  Interestingly, his mother and sister both had this.  He will be placed on prednisone and will follow-up in 2 weeks for a recheck on symptoms and sedimentation rate.

## 2020-05-18 NOTE — NURSING NOTE
Wilfrid Meneses is a 79 year old male presenting today for: pain and stiffness in joints  Medication Reconciliation: complete    Shira Wiseman LPN 5/18/2020 10:17 AM

## 2020-05-19 LAB
ANA PAT SER IF-IMP: ABNORMAL
ANA SER QL IF: ABNORMAL
ANA TITR SER IF: ABNORMAL {TITER}
B BURGDOR IGG+IGM SER QL: 0.01 (ref 0–0.89)

## 2020-06-01 ENCOUNTER — OFFICE VISIT (OUTPATIENT)
Dept: INTERNAL MEDICINE | Facility: OTHER | Age: 80
End: 2020-06-01
Attending: INTERNAL MEDICINE
Payer: MEDICARE

## 2020-06-01 VITALS
RESPIRATION RATE: 16 BRPM | TEMPERATURE: 96.6 F | BODY MASS INDEX: 24.03 KG/M2 | DIASTOLIC BLOOD PRESSURE: 72 MMHG | SYSTOLIC BLOOD PRESSURE: 130 MMHG | HEART RATE: 58 BPM | WEIGHT: 144.4 LBS

## 2020-06-01 DIAGNOSIS — R73.09 ABNORMAL GLUCOSE: ICD-10-CM

## 2020-06-01 DIAGNOSIS — M35.3 PMR (POLYMYALGIA RHEUMATICA) (H): Primary | ICD-10-CM

## 2020-06-01 LAB
ERYTHROCYTE [SEDIMENTATION RATE] IN BLOOD BY WESTERGREN METHOD: 37 MM/H (ref 1–10)
GLUCOSE SERPL-MCNC: 92 MG/DL (ref 70–105)

## 2020-06-01 PROCEDURE — 99213 OFFICE O/P EST LOW 20 MIN: CPT | Performed by: INTERNAL MEDICINE

## 2020-06-01 PROCEDURE — 36415 COLL VENOUS BLD VENIPUNCTURE: CPT | Mod: ZL | Performed by: INTERNAL MEDICINE

## 2020-06-01 PROCEDURE — G0463 HOSPITAL OUTPT CLINIC VISIT: HCPCS

## 2020-06-01 PROCEDURE — 85652 RBC SED RATE AUTOMATED: CPT | Mod: ZL | Performed by: INTERNAL MEDICINE

## 2020-06-01 PROCEDURE — 82947 ASSAY GLUCOSE BLOOD QUANT: CPT | Mod: ZL | Performed by: INTERNAL MEDICINE

## 2020-06-01 RX ORDER — PREDNISONE 20 MG/1
40 TABLET ORAL DAILY
Qty: 100 TABLET | Refills: 1 | COMMUNITY
Start: 2020-06-01 | End: 2020-07-01

## 2020-06-01 ASSESSMENT — ENCOUNTER SYMPTOMS
CONSTITUTIONAL NEGATIVE: 1
ALLERGIC/IMMUNOLOGIC NEGATIVE: 1
ENDOCRINE NEGATIVE: 1
EYES NEGATIVE: 1

## 2020-06-01 ASSESSMENT — PAIN SCALES - GENERAL: PAINLEVEL: NO PAIN (0)

## 2020-06-01 NOTE — LETTER
June 1, 2020      Wilfrid NOEMI Meneses  [address]  [facility] [address]        Dear Art,    Below are the results of your recent labs:    Results for orders placed or performed in visit on 06/01/20   Glucose     Status: None   Result Value Ref Range    Glucose 92 70 - 105 mg/dL   Sedimentation Rate (ESR)     Status: Abnormal   Result Value Ref Range    Sed Rate 37 (H) 1 - 10 mm/h        Your diabetes test is normal.  Your sedimentation rate has improved nicely.  Plan to decrease your prednisone to 30 mg in 2 weeks as discussed and see me again in 4 weeks.    Sincerely,        Jimenez Colon MD  Internal Medicine  [facility]

## 2020-06-01 NOTE — NURSING NOTE
"Chief Complaint   Patient presents with     Clinic Care Coordination - Follow-up     follow up joint pain       Initial /72 (BP Location: Right arm, Patient Position: Sitting, Cuff Size: Adult Large)   Pulse 58   Temp 96.6  F (35.9  C) (Tympanic)   Resp 16   Wt 65.5 kg (144 lb 6.4 oz)   BMI 24.03 kg/m   Estimated body mass index is 24.03 kg/m  as calculated from the following:    Height as of 3/26/20: 1.651 m (5' 5\").    Weight as of this encounter: 65.5 kg (144 lb 6.4 oz).  Medication Reconciliation: complete    Isis Brady LPN     Follow up with joint pain.  This has resolved.  "

## 2020-06-01 NOTE — PROGRESS NOTES
Chief Complaint:  F/U on PMR.    HPI: He is here for follow-up on his polymyalgia rheumatica.  This was diagnosed a couple of weeks ago.  He was put on prednisone and was better by the end of the day.  He is now on 40 mg daily.  His only complaint is that he is urinating more frequently and is not able to sleep very well because of the prednisone.  He is having no further aches or pains.  Polymyalgia rheumatica of course does run in his family.  He is well aware of the disease because of that.    Past Medical History:   Diagnosis Date     Coronary artery calcification     score 297 12/2010     Dysarthria      Enlarged prostate without lower urinary tract symptoms (luts)     Post prostatectomy     Essential (primary) hypertension     No Comments Provided     History of colonic polyps     No Comments Provided     Hyperglycemia     No Comments Provided     Hyperlipidemia     No Comments Provided     Malignant neoplasm of skin     No Comments Provided     PMR (polymyalgia rheumatica) (H)      Spinal stenosis of lumbar region without neurogenic claudication     No Comments Provided     Uncomplicated alcohol abuse     No Comments Provided       Past Surgical History:   Procedure Laterality Date     ARTHROPLASTY KNEE Left     01/09     ARTHROPLASTY SHOULDER Left 2018     ARTHROSCOPY KNEE Left     12/05     COLONOSCOPY      2001, 2006, 2011,Adenomatous 2001, subsequent WNL     COLONOSCOPY  11/21/2014 11/21/2014,Adenoma     COLONOSCOPY  01/07/2020    tubular adenomas, follow up 5 years     HERNIORRHAPHY INGUINAL BILATERAL       PROSTATECTOMY SUPRAPUBIC  2007     UPPER GI ENDOSCOPY  2005       Allergies   Allergen Reactions     Ace Inhibitors Cough     Hydrocodone-Acetaminophen      Other reaction(s): Insomnia  Keeps him awake      Sulfamethoxazole-Trimethoprim Rash     Sulfasalazine Rash       Current Outpatient Medications   Medication Sig Dispense Refill     atenolol (TENORMIN) 50 MG tablet Take 1 tablet (50 mg) by  mouth daily 90 tablet 3     Multiple Vitamin (MULTI-VITAMINS) TABS Take 1 tablet by mouth daily       predniSONE (DELTASONE) 20 MG tablet Take 2 tablets (40 mg) by mouth daily 100 tablet 1     simvastatin (ZOCOR) 40 MG tablet Take 1 tablet (40 mg) by mouth At Bedtime 90 tablet 3     valsartan-hydrochlorothiazide (DIOVAN HCT) 320-12.5 MG tablet Take 1 tablet by mouth daily 90 tablet 3       Review of Systems   Constitutional: Negative.    Eyes: Negative.    Endocrine: Negative.    Allergic/Immunologic: Negative.        Physical Exam  Vitals signs and nursing note reviewed.   Constitutional:       General: He is not in acute distress.     Appearance: Normal appearance. He is not ill-appearing, toxic-appearing or diaphoretic.   Neurological:      Mental Status: He is alert.         Assessment:        ICD-10-CM    1. PMR (polymyalgia rheumatica) (H)  M35.3 predniSONE (DELTASONE) 20 MG tablet       Plan: He appears to be doing well with his treatment other than some minor side effects.  Lab is pending today including glucose because of his urine frequency, I will send a letter with the results.  I will have him decrease to 30 mg of prednisone in 2 weeks assuming that his lab today is fine.  I will have him follow-up with me in 4 weeks.

## 2020-07-01 ENCOUNTER — OFFICE VISIT (OUTPATIENT)
Dept: INTERNAL MEDICINE | Facility: OTHER | Age: 80
End: 2020-07-01
Attending: INTERNAL MEDICINE
Payer: MEDICARE

## 2020-07-01 VITALS
SYSTOLIC BLOOD PRESSURE: 124 MMHG | BODY MASS INDEX: 23.23 KG/M2 | OXYGEN SATURATION: 99 % | WEIGHT: 139.6 LBS | TEMPERATURE: 97.8 F | RESPIRATION RATE: 16 BRPM | DIASTOLIC BLOOD PRESSURE: 62 MMHG | HEART RATE: 60 BPM

## 2020-07-01 DIAGNOSIS — F19.982 DRUG-INDUCED INSOMNIA (H): ICD-10-CM

## 2020-07-01 DIAGNOSIS — M35.3 PMR (POLYMYALGIA RHEUMATICA) (H): Primary | ICD-10-CM

## 2020-07-01 DIAGNOSIS — M35.3 PMR (POLYMYALGIA RHEUMATICA) (H): ICD-10-CM

## 2020-07-01 LAB — ERYTHROCYTE [SEDIMENTATION RATE] IN BLOOD BY WESTERGREN METHOD: 29 MM/H (ref 1–10)

## 2020-07-01 PROCEDURE — 85652 RBC SED RATE AUTOMATED: CPT | Mod: ZL | Performed by: INTERNAL MEDICINE

## 2020-07-01 PROCEDURE — G0463 HOSPITAL OUTPT CLINIC VISIT: HCPCS

## 2020-07-01 PROCEDURE — 36415 COLL VENOUS BLD VENIPUNCTURE: CPT | Mod: ZL | Performed by: INTERNAL MEDICINE

## 2020-07-01 PROCEDURE — 99213 OFFICE O/P EST LOW 20 MIN: CPT | Performed by: INTERNAL MEDICINE

## 2020-07-01 RX ORDER — ZOLPIDEM TARTRATE 10 MG/1
10 TABLET ORAL
Qty: 30 TABLET | Refills: 0 | Status: SHIPPED | OUTPATIENT
Start: 2020-07-01 | End: 2020-07-31

## 2020-07-01 RX ORDER — PREDNISONE 20 MG/1
20 TABLET ORAL DAILY
Qty: 100 TABLET | Refills: 1 | COMMUNITY
Start: 2020-07-01 | End: 2020-09-28

## 2020-07-01 ASSESSMENT — PAIN SCALES - GENERAL: PAINLEVEL: NO PAIN (0)

## 2020-07-01 ASSESSMENT — ENCOUNTER SYMPTOMS
ALLERGIC/IMMUNOLOGIC NEGATIVE: 1
EYES NEGATIVE: 1
ENDOCRINE NEGATIVE: 1
CONSTITUTIONAL NEGATIVE: 1

## 2020-07-01 NOTE — NURSING NOTE
Wilfrid Meneses is a 80 year old male presenting today for: 1 month follow up PMR  Medication Reconciliation: complete    Shira Wiseman LPN 7/1/2020 10:32 AM

## 2020-07-01 NOTE — PROGRESS NOTES
Chief Complaint:  F/U on PMR    HPI: He is here for follow-up on his PMR.  He has been on 30 mg of prednisone for 2 weeks.  He is doing well in regards to PMR symptoms although he is having hard time sleeping at all because of the prednisone.  This is probably wearing on his health more than anything at this time.    Past Medical History:   Diagnosis Date     Coronary artery calcification     score 297 12/2010     Dysarthria      Enlarged prostate without lower urinary tract symptoms (luts)     Post prostatectomy     Essential (primary) hypertension     No Comments Provided     History of colonic polyps     No Comments Provided     Hyperglycemia     No Comments Provided     Hyperlipidemia     No Comments Provided     Malignant neoplasm of skin     No Comments Provided     PMR (polymyalgia rheumatica) (H)      Spinal stenosis of lumbar region without neurogenic claudication     No Comments Provided     Uncomplicated alcohol abuse     No Comments Provided       Past Surgical History:   Procedure Laterality Date     ARTHROPLASTY KNEE Left     01/09     ARTHROPLASTY SHOULDER Left 2018     ARTHROSCOPY KNEE Left     12/05     COLONOSCOPY      2001, 2006, 2011,Adenomatous 2001, subsequent WNL     COLONOSCOPY  11/21/2014 11/21/2014,Adenoma     COLONOSCOPY  01/07/2020    tubular adenomas, follow up 5 years     HERNIORRHAPHY INGUINAL BILATERAL       PROSTATECTOMY SUPRAPUBIC  2007     UPPER GI ENDOSCOPY  2005       Allergies   Allergen Reactions     Ace Inhibitors Cough     Hydrocodone-Acetaminophen      Other reaction(s): Insomnia  Keeps him awake      Sulfamethoxazole-Trimethoprim Rash     Sulfasalazine Rash       Current Outpatient Medications   Medication Sig Dispense Refill     atenolol (TENORMIN) 50 MG tablet Take 1 tablet (50 mg) by mouth daily 90 tablet 3     Multiple Vitamin (MULTI-VITAMINS) TABS Take 1 tablet by mouth daily       predniSONE (DELTASONE) 20 MG tablet Take 1.5 tablets (30 mg) by mouth daily 100  tablet 1     simvastatin (ZOCOR) 40 MG tablet Take 1 tablet (40 mg) by mouth At Bedtime 90 tablet 3     valsartan-hydrochlorothiazide (DIOVAN HCT) 320-12.5 MG tablet Take 1 tablet by mouth daily 90 tablet 3       Review of Systems   Constitutional: Negative.    Eyes: Negative.    Endocrine: Negative.    Allergic/Immunologic: Negative.        Physical Exam  Vitals signs and nursing note reviewed.   Constitutional:       General: He is not in acute distress.     Appearance: Normal appearance. He is not ill-appearing, toxic-appearing or diaphoretic.   Neurological:      Mental Status: He is alert.         Assessment:        ICD-10-CM    1. PMR (polymyalgia rheumatica) (H)  M35.3 predniSONE (DELTASONE) 20 MG tablet     Sedimentation Rate (ESR)       Plan: Sed rate pending and I will send him a letter with the results.  Hopefully it will continue to be low and I will have him decrease the prednisone to 20 mg daily in 2 weeks.  Trial of Ambien for the insomnia.  Warned of possible side effects.

## 2020-07-27 ENCOUNTER — APPOINTMENT (OUTPATIENT)
Dept: GENERAL RADIOLOGY | Facility: OTHER | Age: 80
End: 2020-07-27
Attending: EMERGENCY MEDICINE
Payer: MEDICARE

## 2020-07-27 ENCOUNTER — HOSPITAL ENCOUNTER (EMERGENCY)
Facility: OTHER | Age: 80
Discharge: HOME | End: 2020-07-27
Attending: EMERGENCY MEDICINE | Admitting: EMERGENCY MEDICINE
Payer: MEDICARE

## 2020-07-27 ENCOUNTER — OFFICE VISIT (OUTPATIENT)
Dept: FAMILY MEDICINE | Facility: OTHER | Age: 80
End: 2020-07-27
Attending: PHYSICIAN ASSISTANT
Payer: MEDICARE

## 2020-07-27 VITALS
HEART RATE: 76 BPM | DIASTOLIC BLOOD PRESSURE: 70 MMHG | SYSTOLIC BLOOD PRESSURE: 118 MMHG | TEMPERATURE: 97.6 F | OXYGEN SATURATION: 98 % | HEIGHT: 64 IN | RESPIRATION RATE: 16 BRPM | BODY MASS INDEX: 23.05 KG/M2 | WEIGHT: 135 LBS

## 2020-07-27 VITALS
BODY MASS INDEX: 23.66 KG/M2 | HEIGHT: 65 IN | DIASTOLIC BLOOD PRESSURE: 64 MMHG | TEMPERATURE: 97.7 F | HEART RATE: 60 BPM | WEIGHT: 142 LBS | SYSTOLIC BLOOD PRESSURE: 138 MMHG | RESPIRATION RATE: 12 BRPM

## 2020-07-27 DIAGNOSIS — R07.89 LEFT-SIDED CHEST WALL PAIN: ICD-10-CM

## 2020-07-27 DIAGNOSIS — S29.012A MUSCLE STRAIN OF LEFT UPPER BACK, INITIAL ENCOUNTER: Primary | ICD-10-CM

## 2020-07-27 PROCEDURE — 72072 X-RAY EXAM THORAC SPINE 3VWS: CPT

## 2020-07-27 PROCEDURE — 25000132 ZZH RX MED GY IP 250 OP 250 PS 637: Mod: GY | Performed by: EMERGENCY MEDICINE

## 2020-07-27 PROCEDURE — G0463 HOSPITAL OUTPT CLINIC VISIT: HCPCS

## 2020-07-27 PROCEDURE — 99283 EMERGENCY DEPT VISIT LOW MDM: CPT | Performed by: EMERGENCY MEDICINE

## 2020-07-27 PROCEDURE — 99283 EMERGENCY DEPT VISIT LOW MDM: CPT | Mod: Z6 | Performed by: EMERGENCY MEDICINE

## 2020-07-27 PROCEDURE — 99213 OFFICE O/P EST LOW 20 MIN: CPT | Performed by: PHYSICIAN ASSISTANT

## 2020-07-27 PROCEDURE — G0463 HOSPITAL OUTPT CLINIC VISIT: HCPCS | Mod: 25,27

## 2020-07-27 PROCEDURE — 71101 X-RAY EXAM UNILAT RIBS/CHEST: CPT | Mod: LT

## 2020-07-27 RX ORDER — ACETAMINOPHEN 500 MG
1000 TABLET ORAL ONCE
Status: COMPLETED | OUTPATIENT
Start: 2020-07-27 | End: 2020-07-27

## 2020-07-27 RX ORDER — OXYCODONE HYDROCHLORIDE 5 MG/1
5 TABLET ORAL ONCE
Status: COMPLETED | OUTPATIENT
Start: 2020-07-27 | End: 2020-07-27

## 2020-07-27 RX ORDER — OXYCODONE HYDROCHLORIDE 5 MG/1
5 TABLET ORAL EVERY 6 HOURS PRN
Qty: 12 TABLET | Refills: 0 | Status: SHIPPED | OUTPATIENT
Start: 2020-07-27 | End: 2020-07-31

## 2020-07-27 RX ORDER — LIDOCAINE 50 MG/G
1 PATCH TOPICAL ONCE
Status: DISCONTINUED | OUTPATIENT
Start: 2020-07-27 | End: 2020-07-27 | Stop reason: HOSPADM

## 2020-07-27 RX ADMIN — ACETAMINOPHEN 1000 MG: 500 TABLET, FILM COATED ORAL at 15:52

## 2020-07-27 RX ADMIN — LIDOCAINE 1 PATCH: 50 PATCH TOPICAL at 15:51

## 2020-07-27 RX ADMIN — OXYCODONE HYDROCHLORIDE 5 MG: 5 TABLET ORAL at 15:51

## 2020-07-27 ASSESSMENT — PAIN SCALES - GENERAL: PAINLEVEL: MODERATE PAIN (4)

## 2020-07-27 ASSESSMENT — MIFFLIN-ST. JEOR
SCORE: 1273.05
SCORE: 1233.36

## 2020-07-27 NOTE — ED PROVIDER NOTES
[facility]  Emergency Department Visit Note    Back Pain      History of Present Illness     Wilfrid Meneses is a 80 year old male presenting with back and chest pain. This pain started approximately 2 days prior to arrival and the onset was while at rest. The pain is characterized as sharp and stabbing, is not worsened by deep inspiration, does not radiate, and is not associated with fevers, cough, shortness of breath, nausea, vomiting, diaphoresis. The patient has not had similar symptoms in the past. No falls or other recent injuries.    Medications:  Prior to Admission medications    Medication Sig Last Dose Taking? Auth Provider   atenolol (TENORMIN) 50 MG tablet Take 1 tablet (50 mg) by mouth daily 7/27/2020 at 0730 Yes Jimenez Colon MD   Multiple Vitamin (MULTI-VITAMINS) TABS Take 1 tablet by mouth daily 7/27/2020 at 0730 Yes Jimenez Colon MD   oxyCODONE (ROXICODONE) 5 MG tablet Take 1 tablet (5 mg) by mouth every 6 hours as needed for severe pain  Yes Tameka Pineda MD   predniSONE (DELTASONE) 20 MG tablet Take 20 mg by mouth daily  7/27/2020 at 0730 Yes Jimenez Colon MD   simvastatin (ZOCOR) 40 MG tablet Take 1 tablet (40 mg) by mouth At Bedtime 7/26/2020 at 2000 Yes Jimenez Colon MD   valsartan-hydrochlorothiazide (DIOVAN HCT) 320-12.5 MG tablet Take 1 tablet by mouth daily 7/27/2020 at 0730 Yes Jimenez Colon MD   zolpidem (AMBIEN) 10 MG tablet Take 1 tablet (10 mg) by mouth nightly as needed for sleep 7/26/2020 at 2000 Yes Jimenez Colon MD       Allergies:  Allergies   Allergen Reactions     Ace Inhibitors Cough     Hydrocodone-Acetaminophen      Other reaction(s): Insomnia  Keeps him awake      Sulfamethoxazole-Trimethoprim Rash     Sulfasalazine Rash       Problem List:  Patient Active Problem List   Diagnosis     Cerebral microvascular disease     Benign prostatic hyperplasia with urinary obstruction     Basal cell carcinoma of back     Alcohol abuse     Lumbar  spinal stenosis     Hypertension     Hyperlipidemia     Abnormal glucose     Dysarthria     Coronary artery calcification     Benign neoplasm of colon     Squamous cell skin cancer     Osteoarthritis of left glenohumeral joint     Actinic keratosis     PMR (polymyalgia rheumatica) (H)       Past Medical History:  Past Medical History:   Diagnosis Date     Coronary artery calcification     score 297 12/2010     Dysarthria      Enlarged prostate without lower urinary tract symptoms (luts)     Post prostatectomy     Essential (primary) hypertension     No Comments Provided     History of colonic polyps     No Comments Provided     Hyperglycemia     No Comments Provided     Hyperlipidemia     No Comments Provided     Malignant neoplasm of skin     No Comments Provided     PMR (polymyalgia rheumatica) (H)      Spinal stenosis of lumbar region without neurogenic claudication     No Comments Provided     Uncomplicated alcohol abuse     No Comments Provided       Past Surgical History:  Past Surgical History:   Procedure Laterality Date     ARTHROPLASTY KNEE Left     01/09     ARTHROPLASTY SHOULDER Left 2018     ARTHROSCOPY KNEE Left     12/05     COLONOSCOPY      2001, 2006, 2011,Adenomatous 2001, subsequent WNL     COLONOSCOPY  11/21/2014 11/21/2014,Adenoma     COLONOSCOPY  01/07/2020    tubular adenomas, follow up 5 years     HERNIORRHAPHY INGUINAL BILATERAL       PROSTATECTOMY SUPRAPUBIC  2007     UPPER GI ENDOSCOPY  2005       Social History:  Social History     Tobacco Use     Smoking status: Never Smoker     Smokeless tobacco: Never Used   Substance Use Topics     Alcohol use: Not Currently     Alcohol/week: 28.0 standard drinks     Frequency: 4 or more times a week     Drinks per session: 3 or 4     Comment: 4 daily     Drug use: No       Review of Systems:  10 point review of systems obtained and pertinent positive and negative findings noted in HPI. Review of systems otherwise negative.      Physical Exam  "    Vital signs: BP (!) 153/56   Temp 98.4  F (36.9  C) (Tympanic)   Resp 15   Ht 1.626 m (5' 4\")   Wt 61.2 kg (135 lb)   SpO2 99%   BMI 23.17 kg/m      Physical Exam:    General: awake and alert, comfortable  HEENT: atraumatic, no scleral injection, no nasal discharge, neck supple  Chest wall: palpation of the chest wall along the left alteral 8th rib  reproduces pain symptoms  Chest: clear to auscultation bilaterally without wheezes or crackles, non labored respirations  Cardiovascular: regular rate and rhythm, no murmurs or gallops  Abdomen: soft, nontender, no rebound or guarding, nondistended  Back: lateral thoracic spinal pain, no midline pain  Extremities: no deformities, edema, or tenderness  Skin: warm, dry, no rashes c/w zoster noted  Neuro: alert and oriented x 3, moving extremities x 4, ambulates without difficulty      Medical Decision Making & ED Course     Wilfrid Meneses is a an 8-year old male presenting with chest pain. Differential includes acute coronary syndrome, pulmonary embolism, aortic dissection, pneumonia, esophageal spasm, gastroesophageal reflux, reactive airway disease, chest wall pain, stress or anxiety. CXR, rib  and thoracic spine xrays are negative. Given the patient's exam and history suggestive of chest wall pain and atypical history for acute coronary syndrome, pulmonary embolism, or other morbid etiology of chest pain, the patient is stable for outpatient management with diagnosis of chest wall pain. Will trial therapy prescription for oxycodone only for severe pain. Patient given instructions on follow-up and warning signs for which to return to ED. All questions were answered and the patient is comfortable with plan for discharge. The patient was discharged in stable condition.      Diagnosis & Disposition     Diagnosis:  1. Left-sided chest wall pain        Disposition:  Home    MD Edwin Odom Theresa M, MD  07/28/20 0726    "

## 2020-07-27 NOTE — ED TRIAGE NOTES
"ED Nursing Triage Note (General)   ________________________________    Wilfrid Meneses is a 80 year old Male that presents to triage private car  With history of  Thoracic back pain that had onset on Saturday that is about a 4/10 sitting still and up to 10/10 with movement with 2 tylenol 500mg not helping reported by patient   Significant symptoms had onset 3 day(s) ago.  BP (!) 153/56   Temp 98.4  F (36.9  C) (Tympanic)   Resp 15   Ht 1.626 m (5' 4\")   Wt 61.2 kg (135 lb)   SpO2 95%   BMI 23.17 kg/m  t  Patient appears alert , in mild distress., and cooperative behavior.    GCS Total = 15  Airway: intact  Breathing noted as Normal.  Circulation Normal  Skin normal  Action taken:  To waiting room      PRE HOSPITAL PRIOR LIVING SITUATION Spouse  "

## 2020-07-27 NOTE — ED AVS SNAPSHOT
[facility]  [address]  [place] [address]  Phone:  [phone]  Fax:  [phone]                                    Wilfrid Meneses   MRN: [id number]    Department:  [facility]   Date of Visit:  7/27/2020           After Visit Summary Signature Page    I have received my discharge instructions, and my questions have been answered. I have discussed any challenges I see with this plan with the nurse or doctor.    ..........................................................................................................................................  Patient/Patient Representative Signature      ..........................................................................................................................................  Patient Representative Print Name and Relationship to Patient    ..................................................               ................................................  Date                                   Time    ..........................................................................................................................................  Reviewed by Signature/Title    ...................................................              ..............................................  Date                                               Time          22EPIC Rev 08/18

## 2020-07-27 NOTE — PROGRESS NOTES
Nursing Notes:   Sharmila Ribera LPN  7/27/2020  8:31 AM  Signed  Patient presents to clinic with back pain that started Friday and has became worse over time. No known injury.  Sharmila Ribera LPN ....................  7/27/2020   8:25 AM      SUBJECTIVE:     HPI  Wilfrid Meneses is a 80 year old male who presents to clinic today for evaluation of left sided mid back pain that began last Friday. States he noticed a twinge in the area and then over the weekend it began to progress. Has been doing more stretching to help with his PMR and feels it is likely related to that. No other known injury. Did not bump the area. No excessive coughing. Took Tylenol yesterday and this morning and that does seem to help some. Tried icing last night. No fever/chills, saddle anesthesia, loss of bowel or bladder control, numbness or tingling.       Review of Systems   Per HPI.     PAST MEDICAL HISTORY:   Past Medical History:   Diagnosis Date     Coronary artery calcification     score 297 12/2010     Dysarthria      Enlarged prostate without lower urinary tract symptoms (luts)     Post prostatectomy     Essential (primary) hypertension     No Comments Provided     History of colonic polyps     No Comments Provided     Hyperglycemia     No Comments Provided     Hyperlipidemia     No Comments Provided     Malignant neoplasm of skin     No Comments Provided     PMR (polymyalgia rheumatica) (H)      Spinal stenosis of lumbar region without neurogenic claudication     No Comments Provided     Uncomplicated alcohol abuse     No Comments Provided       PAST SURGICAL HISTORY:   Past Surgical History:   Procedure Laterality Date     ARTHROPLASTY KNEE Left     01/09     ARTHROPLASTY SHOULDER Left 2018     ARTHROSCOPY KNEE Left     12/05     COLONOSCOPY      2001, 2006, 2011,Adenomatous 2001, subsequent WNL     COLONOSCOPY  11/21/2014 11/21/2014,Adenoma     COLONOSCOPY  01/07/2020    tubular adenomas, follow up 5 years      "HERNIORRHAPHY INGUINAL BILATERAL       PROSTATECTOMY SUPRAPUBIC  2007     UPPER GI ENDOSCOPY  2005       FAMILY HISTORY:   Family History   Problem Relation Age of Onset     Diabetes Mother      Chronic Obstructive Pulmonary Disease Mother      Myocardial Infarction Father      Other - See Comments Son         MI during GXT     Other - See Comments Sister         PMR       SOCIAL HISTORY:   Social History     Tobacco Use     Smoking status: Never Smoker     Smokeless tobacco: Never Used   Substance Use Topics     Alcohol use: Yes     Alcohol/week: 28.0 standard drinks     Frequency: 4 or more times a week     Drinks per session: 3 or 4     Comment: 4 daily        Allergies   Allergen Reactions     Ace Inhibitors Cough     Hydrocodone-Acetaminophen      Other reaction(s): Insomnia  Keeps him awake      Sulfamethoxazole-Trimethoprim Rash     Sulfasalazine Rash     Current Outpatient Medications   Medication     atenolol (TENORMIN) 50 MG tablet     Multiple Vitamin (MULTI-VITAMINS) TABS     predniSONE (DELTASONE) 20 MG tablet     simvastatin (ZOCOR) 40 MG tablet     valsartan-hydrochlorothiazide (DIOVAN HCT) 320-12.5 MG tablet     zolpidem (AMBIEN) 10 MG tablet     No current facility-administered medications for this visit.          OBJECTIVE:     /64   Pulse 60   Temp 97.7  F (36.5  C)   Resp 12   Ht 1.638 m (5' 4.5\")   Wt 64.4 kg (142 lb)   BMI 24.00 kg/m    Body mass index is 24 kg/m .  Physical Exam  General: Pleasant, in no apparent distress.  Cardiovascular: Regular rate and rhythm with S1 equal to S2. No murmurs, friction rubs, or gallops.   Respiratory: Lungs are resonant and clear to auscultation bilaterally. No wheezes, crackles, or rhonchi.  Musculoskeletal: Back is straight, tenderness to palpation over left midline mid thoracic region. No other areas of tenderness. Good ROM of back, neck, and extremities.   Neurologic Exam: Non-focal, symmetric DTRs, normal gross motor, tone coordination and " no visible tremor.  Skin: No jaundice, pallor, rashes, or lesions.  Psych: Appropriate mood and affect.        ASSESSMENT/PLAN:     1. Muscle strain of left upper back, initial encounter      Discussed with patient that symptoms and exam are most suggestive of a strained muscle. Encouraged symptomatic management with Tylenol and alternating heating and icing. Gentle stretches and exercises as tolerated. States he has a follow up with PCP regarding PMR on Friday and will discuss with him if current symptoms have not begun to improve or have worsened at that time.       Lianne Koch PA-C  [facility]

## 2020-07-27 NOTE — NURSING NOTE
Patient presents to clinic with back pain that started Friday and has became worse over time. No known injury.  Shamrila Ribera LPN ....................  7/27/2020   8:25 AM

## 2020-07-31 ENCOUNTER — OFFICE VISIT (OUTPATIENT)
Dept: INTERNAL MEDICINE | Facility: OTHER | Age: 80
End: 2020-07-31
Attending: INTERNAL MEDICINE
Payer: MEDICARE

## 2020-07-31 ENCOUNTER — TELEPHONE (OUTPATIENT)
Dept: INTERNAL MEDICINE | Facility: OTHER | Age: 80
End: 2020-07-31

## 2020-07-31 VITALS
DIASTOLIC BLOOD PRESSURE: 70 MMHG | RESPIRATION RATE: 16 BRPM | SYSTOLIC BLOOD PRESSURE: 122 MMHG | HEART RATE: 64 BPM | TEMPERATURE: 96.4 F | BODY MASS INDEX: 24.03 KG/M2 | WEIGHT: 140 LBS

## 2020-07-31 DIAGNOSIS — M35.3 PMR (POLYMYALGIA RHEUMATICA) (H): ICD-10-CM

## 2020-07-31 DIAGNOSIS — R07.9 CHEST PAIN, UNSPECIFIED TYPE: Primary | ICD-10-CM

## 2020-07-31 DIAGNOSIS — R70.0 ELEVATED ERYTHROCYTE SEDIMENTATION RATE: ICD-10-CM

## 2020-07-31 DIAGNOSIS — F19.982 DRUG-INDUCED INSOMNIA (H): ICD-10-CM

## 2020-07-31 DIAGNOSIS — M35.3 PMR (POLYMYALGIA RHEUMATICA) (H): Primary | ICD-10-CM

## 2020-07-31 LAB
CREAT SERPL-MCNC: 1.04 MG/DL (ref 0.7–1.3)
ERYTHROCYTE [DISTWIDTH] IN BLOOD BY AUTOMATED COUNT: 14.3 % (ref 10–15)
ERYTHROCYTE [SEDIMENTATION RATE] IN BLOOD BY WESTERGREN METHOD: 83 MM/H (ref 1–10)
GFR SERPL CREATININE-BSD FRML MDRD: 69 ML/MIN/{1.73_M2}
HCT VFR BLD AUTO: 35.9 % (ref 40–53)
HGB BLD-MCNC: 12.1 G/DL (ref 13.3–17.7)
MCH RBC QN AUTO: 33.7 PG (ref 26.5–33)
MCHC RBC AUTO-ENTMCNC: 33.7 G/DL (ref 31.5–36.5)
MCV RBC AUTO: 100 FL (ref 78–100)
PLATELET # BLD AUTO: 237 10E9/L (ref 150–450)
RBC # BLD AUTO: 3.59 10E12/L (ref 4.4–5.9)
WBC # BLD AUTO: 8.8 10E9/L (ref 4–11)

## 2020-07-31 PROCEDURE — 82565 ASSAY OF CREATININE: CPT | Mod: ZL | Performed by: INTERNAL MEDICINE

## 2020-07-31 PROCEDURE — G0463 HOSPITAL OUTPT CLINIC VISIT: HCPCS

## 2020-07-31 PROCEDURE — 85652 RBC SED RATE AUTOMATED: CPT | Mod: ZL | Performed by: INTERNAL MEDICINE

## 2020-07-31 PROCEDURE — 36415 COLL VENOUS BLD VENIPUNCTURE: CPT | Mod: ZL | Performed by: INTERNAL MEDICINE

## 2020-07-31 PROCEDURE — 85027 COMPLETE CBC AUTOMATED: CPT | Mod: ZL | Performed by: INTERNAL MEDICINE

## 2020-07-31 PROCEDURE — 99213 OFFICE O/P EST LOW 20 MIN: CPT | Performed by: INTERNAL MEDICINE

## 2020-07-31 RX ORDER — ZOLPIDEM TARTRATE 10 MG/1
10 TABLET ORAL
Qty: 30 TABLET | Refills: 0 | Status: SHIPPED | OUTPATIENT
Start: 2020-07-31 | End: 2020-08-31

## 2020-07-31 RX ORDER — OXYCODONE HYDROCHLORIDE 5 MG/1
5 TABLET ORAL EVERY 6 HOURS PRN
Qty: 12 TABLET | Refills: 0 | Status: SHIPPED | OUTPATIENT
Start: 2020-07-31 | End: 2020-08-31

## 2020-07-31 ASSESSMENT — ANXIETY QUESTIONNAIRES
IF YOU CHECKED OFF ANY PROBLEMS ON THIS QUESTIONNAIRE, HOW DIFFICULT HAVE THESE PROBLEMS MADE IT FOR YOU TO DO YOUR WORK, TAKE CARE OF THINGS AT HOME, OR GET ALONG WITH OTHER PEOPLE: NOT DIFFICULT AT ALL
3. WORRYING TOO MUCH ABOUT DIFFERENT THINGS: NOT AT ALL
5. BEING SO RESTLESS THAT IT IS HARD TO SIT STILL: NOT AT ALL
7. FEELING AFRAID AS IF SOMETHING AWFUL MIGHT HAPPEN: NOT AT ALL
6. BECOMING EASILY ANNOYED OR IRRITABLE: NOT AT ALL
1. FEELING NERVOUS, ANXIOUS, OR ON EDGE: NOT AT ALL
2. NOT BEING ABLE TO STOP OR CONTROL WORRYING: NOT AT ALL
GAD7 TOTAL SCORE: 0

## 2020-07-31 ASSESSMENT — PAIN SCALES - GENERAL: PAINLEVEL: MILD PAIN (2)

## 2020-07-31 ASSESSMENT — ENCOUNTER SYMPTOMS
ENDOCRINE NEGATIVE: 1
EYES NEGATIVE: 1
CONSTITUTIONAL NEGATIVE: 1
ALLERGIC/IMMUNOLOGIC NEGATIVE: 1

## 2020-07-31 ASSESSMENT — PATIENT HEALTH QUESTIONNAIRE - PHQ9
5. POOR APPETITE OR OVEREATING: NOT AT ALL
SUM OF ALL RESPONSES TO PHQ QUESTIONS 1-9: 0

## 2020-07-31 NOTE — TELEPHONE ENCOUNTER
Patient wanted assurance that a doctor would see the results if Jimenez Colon MD was on vacation. Informed patient that there is a pool of doctors and someone will address it. Bel Palacios LPN .............7/31/2020  12:42 PM

## 2020-07-31 NOTE — PROGRESS NOTES
Chief Complaint:  F/U on PMR.    HPI: He comes in today for follow-up on his polymyalgia rheumatica.  He has been on 20 mg of prednisone for the last 2 weeks.  He is not having any pain with this.  His sleep is better but he still does need to use Ambien on occasion to assist with his sleep.  No other obvious side effects or issues other than his pain.    He has a history of cervical spine stenosis as well as arthritis.  He was in the emergency room recently with left-sided chest wall pain.  X-rays were negative.  This continues to bother him on occasion.    Past Medical History:   Diagnosis Date     Coronary artery calcification     score 297 12/2010     Dysarthria      Enlarged prostate without lower urinary tract symptoms (luts)     Post prostatectomy     Essential (primary) hypertension     No Comments Provided     History of colonic polyps     No Comments Provided     Hyperglycemia     No Comments Provided     Hyperlipidemia     No Comments Provided     Malignant neoplasm of skin     No Comments Provided     PMR (polymyalgia rheumatica) (H)      Spinal stenosis of lumbar region without neurogenic claudication     No Comments Provided     Uncomplicated alcohol abuse     No Comments Provided       Past Surgical History:   Procedure Laterality Date     ARTHROPLASTY KNEE Left     01/09     ARTHROPLASTY SHOULDER Left 2018     ARTHROSCOPY KNEE Left     12/05     COLONOSCOPY      2001, 2006, 2011,Adenomatous 2001, subsequent WNL     COLONOSCOPY  11/21/2014 11/21/2014,Adenoma     COLONOSCOPY  01/07/2020    tubular adenomas, follow up 5 years     HERNIORRHAPHY INGUINAL BILATERAL       PROSTATECTOMY SUPRAPUBIC  2007     UPPER GI ENDOSCOPY  2005       Allergies   Allergen Reactions     Ace Inhibitors Cough     Hydrocodone-Acetaminophen      Other reaction(s): Insomnia  Keeps him awake      Sulfamethoxazole-Trimethoprim Rash     Sulfasalazine Rash       Current Outpatient Medications   Medication Sig Dispense Refill      atenolol (TENORMIN) 50 MG tablet Take 1 tablet (50 mg) by mouth daily 90 tablet 3     Multiple Vitamin (MULTI-VITAMINS) TABS Take 1 tablet by mouth daily       oxyCODONE (ROXICODONE) 5 MG tablet Take 1 tablet (5 mg) by mouth every 6 hours as needed for severe pain 12 tablet 0     predniSONE (DELTASONE) 20 MG tablet Take 20 mg by mouth daily  100 tablet 1     simvastatin (ZOCOR) 40 MG tablet Take 1 tablet (40 mg) by mouth At Bedtime 90 tablet 3     valsartan-hydrochlorothiazide (DIOVAN HCT) 320-12.5 MG tablet Take 1 tablet by mouth daily 90 tablet 3     zolpidem (AMBIEN) 10 MG tablet Take 1 tablet (10 mg) by mouth nightly as needed for sleep 30 tablet 0       Review of Systems   Constitutional: Negative.    Eyes: Negative.    Endocrine: Negative.    Allergic/Immunologic: Negative.        Physical Exam  Vitals signs and nursing note reviewed.   Constitutional:       General: He is not in acute distress.     Appearance: Normal appearance. He is not ill-appearing, toxic-appearing or diaphoretic.   Skin:     General: Skin is warm and dry.   Neurological:      Mental Status: He is alert.         Assessment:        ICD-10-CM    1. PMR (polymyalgia rheumatica) (H)  M35.3 Sedimentation Rate (ESR)       Plan: Lab pending, I will send him a letter with the results.  If his sed rate looks fine we will decrease to 20 mg of prednisone every other day alternating with 10 mg every other day.  He will do that in 2 weeks and follow-up with me in 1 month.  Refill of oxycodone and Ambien provided today.  He does not take the 2 together.  If his pains continue we may have to consider imaging studies.    His sedimentation rate has returned quite high at 83.  Given his history including his recent complaints of chest pain and his current disabilities along with this unexplained elevated sedimentation rate, I am going to get him a CT scan of the chest and abdomen.  He does not want to have IV contrast.  I will let him know the results.   For the time being we will keep his prednisone at 20 mg daily.

## 2020-07-31 NOTE — NURSING NOTE
"Patient presents to the clinic for follow up with  and left sided chest/back pain.      Chief Complaint   Patient presents with     Clinic Care Coordination - Follow-up       Initial /70 (BP Location: Right arm, Patient Position: Sitting, Cuff Size: Adult Regular)   Pulse 64   Temp 96.4  F (35.8  C) (Tympanic)   Resp 16   Wt 63.5 kg (140 lb)   BMI 24.03 kg/m   Estimated body mass index is 24.03 kg/m  as calculated from the following:    Height as of 7/27/20: 1.626 m (5' 4\").    Weight as of this encounter: 63.5 kg (140 lb).  Medication Reconciliation: complete    Andreina Deleon LPN    "

## 2020-08-01 ASSESSMENT — ANXIETY QUESTIONNAIRES: GAD7 TOTAL SCORE: 0

## 2020-08-13 ENCOUNTER — HOSPITAL ENCOUNTER (OUTPATIENT)
Dept: CT IMAGING | Facility: OTHER | Age: 80
End: 2020-08-13
Attending: INTERNAL MEDICINE
Payer: MEDICARE

## 2020-08-13 DIAGNOSIS — R70.0 ELEVATED ERYTHROCYTE SEDIMENTATION RATE: ICD-10-CM

## 2020-08-13 DIAGNOSIS — R07.9 CHEST PAIN, UNSPECIFIED TYPE: ICD-10-CM

## 2020-08-13 PROCEDURE — 71250 CT THORAX DX C-: CPT

## 2020-08-13 PROCEDURE — 74176 CT ABD & PELVIS W/O CONTRAST: CPT

## 2020-08-14 ENCOUNTER — OFFICE VISIT (OUTPATIENT)
Dept: INTERNAL MEDICINE | Facility: OTHER | Age: 80
End: 2020-08-14
Attending: INTERNAL MEDICINE
Payer: MEDICARE

## 2020-08-14 ENCOUNTER — TELEPHONE (OUTPATIENT)
Dept: INTERNAL MEDICINE | Facility: OTHER | Age: 80
End: 2020-08-14

## 2020-08-14 VITALS
BODY MASS INDEX: 23.69 KG/M2 | SYSTOLIC BLOOD PRESSURE: 120 MMHG | DIASTOLIC BLOOD PRESSURE: 68 MMHG | RESPIRATION RATE: 16 BRPM | HEART RATE: 60 BPM | TEMPERATURE: 97.7 F | WEIGHT: 138 LBS

## 2020-08-14 DIAGNOSIS — M35.3 PMR (POLYMYALGIA RHEUMATICA) (H): ICD-10-CM

## 2020-08-14 DIAGNOSIS — G47.00 INSOMNIA, UNSPECIFIED TYPE: ICD-10-CM

## 2020-08-14 DIAGNOSIS — R93.89 ABNORMAL CT OF THE CHEST: Primary | ICD-10-CM

## 2020-08-14 PROCEDURE — 99213 OFFICE O/P EST LOW 20 MIN: CPT | Performed by: INTERNAL MEDICINE

## 2020-08-14 PROCEDURE — G0463 HOSPITAL OUTPT CLINIC VISIT: HCPCS

## 2020-08-14 RX ORDER — TRAZODONE HYDROCHLORIDE 50 MG/1
50 TABLET, FILM COATED ORAL AT BEDTIME
Qty: 50 TABLET | Refills: 1 | Status: SHIPPED | OUTPATIENT
Start: 2020-08-14 | End: 2020-09-28

## 2020-08-14 ASSESSMENT — ENCOUNTER SYMPTOMS
ENDOCRINE NEGATIVE: 1
EYES NEGATIVE: 1
CONSTITUTIONAL NEGATIVE: 1
ALLERGIC/IMMUNOLOGIC NEGATIVE: 1

## 2020-08-14 ASSESSMENT — PAIN SCALES - GENERAL: PAINLEVEL: NO PAIN (0)

## 2020-08-14 NOTE — PROGRESS NOTES
Chief Complaint: Follow-up abnormal CT scan.    HPI: This patient had a working diagnosis of polymyalgia rheumatica based on his symptomatology, family history and sedimentation rate.  He was started on prednisone empirically and improved dramatically and his sedimentation rate fell.  The last time he was in, he was not feeling the greatest and his sedimentation rate was much higher.  I elected to do an evaluation for occult malignancy or other problem including a CT scan of the chest abdomen and pelvis.  His CT scan of the chest revealed pulmonary infiltrates of concern.  He is here today to review this.    Past Medical History:   Diagnosis Date     Coronary artery calcification     score 297 12/2010     Dysarthria      Enlarged prostate without lower urinary tract symptoms (luts)     Post prostatectomy     Essential (primary) hypertension     No Comments Provided     History of colonic polyps     No Comments Provided     Hyperglycemia     No Comments Provided     Hyperlipidemia     No Comments Provided     Malignant neoplasm of skin     No Comments Provided     PMR (polymyalgia rheumatica) (H)      Spinal stenosis of lumbar region without neurogenic claudication     No Comments Provided     Uncomplicated alcohol abuse     No Comments Provided       Past Surgical History:   Procedure Laterality Date     ARTHROPLASTY KNEE Left     01/09     ARTHROPLASTY SHOULDER Left 2018     ARTHROSCOPY KNEE Left     12/05     COLONOSCOPY      2001, 2006, 2011,Adenomatous 2001, subsequent WNL     COLONOSCOPY  11/21/2014 11/21/2014,Adenoma     COLONOSCOPY  01/07/2020    tubular adenomas, follow up 5 years     HERNIORRHAPHY INGUINAL BILATERAL       PROSTATECTOMY SUPRAPUBIC  2007     UPPER GI ENDOSCOPY  2005       Allergies   Allergen Reactions     Ace Inhibitors Cough     Hydrocodone-Acetaminophen      Other reaction(s): Insomnia  Keeps him awake      Sulfamethoxazole-Trimethoprim Rash     Sulfasalazine Rash       Current  Outpatient Medications   Medication Sig Dispense Refill     atenolol (TENORMIN) 50 MG tablet Take 1 tablet (50 mg) by mouth daily 90 tablet 3     Multiple Vitamin (MULTI-VITAMINS) TABS Take 1 tablet by mouth daily       oxyCODONE (ROXICODONE) 5 MG tablet Take 1 tablet (5 mg) by mouth every 6 hours as needed for severe pain 12 tablet 0     predniSONE (DELTASONE) 20 MG tablet Take 20 mg by mouth daily  100 tablet 1     simvastatin (ZOCOR) 40 MG tablet Take 1 tablet (40 mg) by mouth At Bedtime 90 tablet 3     traZODone (DESYREL) 50 MG tablet Take 1 tablet (50 mg) by mouth At Bedtime 50 tablet 1     valsartan-hydrochlorothiazide (DIOVAN HCT) 320-12.5 MG tablet Take 1 tablet by mouth daily 90 tablet 3     zolpidem (AMBIEN) 10 MG tablet Take 1 tablet (10 mg) by mouth nightly as needed for sleep 30 tablet 0       Review of Systems   Constitutional: Negative.    Eyes: Negative.    Endocrine: Negative.    Allergic/Immunologic: Negative.        Physical Exam  Vitals signs and nursing note reviewed.   Constitutional:       General: He is not in acute distress.     Appearance: Normal appearance. He is not ill-appearing, toxic-appearing or diaphoretic.   Neurological:      Mental Status: He is alert.         Assessment:        ICD-10-CM    1. Abnormal CT of the chest  R93.89 PULMONARY MEDICINE REFERRAL   2. Insomnia, unspecified type  G47.00 traZODone (DESYREL) 50 MG tablet   3. PMR (polymyalgia rheumatica) (H)  M35.3        Plan: I reviewed the CT scan with the patient.  He needs to be evaluated further by pulmonary medicine.  Consultation request is placed.  I did not change any of his medications today including the prednisone pending the results of his pulmonary evaluation.  Of note is that he was complaining bitterly of continued insomnia despite the Ambien so we elected to start him on trazodone 1 or 2 nightly.

## 2020-08-14 NOTE — TELEPHONE ENCOUNTER
Called patient and verified name and date of birth. He wants a referral to pulmonology and would like to discuss his results from his CT scan. There was an opening in the schedule this afternoon, put in schedule for 3 pm today.  Asha Coker LPN on 8/14/2020 at 9:36 AM

## 2020-08-14 NOTE — TELEPHONE ENCOUNTER
Patient would like to talk to [facility] about  some other things going on with him.

## 2020-08-14 NOTE — NURSING NOTE
"Chief Complaint   Patient presents with     Results     CT        Initial /68 (BP Location: Right arm, Patient Position: Sitting, Cuff Size: Adult Regular)   Pulse 60   Temp 97.7  F (36.5  C) (Tympanic)   Resp 16   Wt 62.6 kg (138 lb)   BMI 23.69 kg/m   Estimated body mass index is 23.69 kg/m  as calculated from the following:    Height as of 7/27/20: 1.626 m (5' 4\").    Weight as of this encounter: 62.6 kg (138 lb).  Medication Reconciliation: complete    Asha Coker LPN  "

## 2020-08-21 ENCOUNTER — TRANSFERRED RECORDS (OUTPATIENT)
Dept: HEALTH INFORMATION MANAGEMENT | Facility: OTHER | Age: 80
End: 2020-08-21

## 2020-08-31 ENCOUNTER — OFFICE VISIT (OUTPATIENT)
Dept: INTERNAL MEDICINE | Facility: OTHER | Age: 80
End: 2020-08-31
Attending: INTERNAL MEDICINE
Payer: MEDICARE

## 2020-08-31 VITALS
SYSTOLIC BLOOD PRESSURE: 128 MMHG | HEART RATE: 56 BPM | BODY MASS INDEX: 24.55 KG/M2 | TEMPERATURE: 97.2 F | RESPIRATION RATE: 18 BRPM | DIASTOLIC BLOOD PRESSURE: 74 MMHG | WEIGHT: 143 LBS

## 2020-08-31 DIAGNOSIS — F19.982 DRUG-INDUCED INSOMNIA (H): ICD-10-CM

## 2020-08-31 DIAGNOSIS — M35.3 PMR (POLYMYALGIA RHEUMATICA) (H): Primary | ICD-10-CM

## 2020-08-31 DIAGNOSIS — R93.89 ABNORMAL CT OF THE CHEST: ICD-10-CM

## 2020-08-31 LAB — ERYTHROCYTE [SEDIMENTATION RATE] IN BLOOD BY WESTERGREN METHOD: 28 MM/H (ref 1–10)

## 2020-08-31 PROCEDURE — 36415 COLL VENOUS BLD VENIPUNCTURE: CPT | Mod: ZL | Performed by: INTERNAL MEDICINE

## 2020-08-31 PROCEDURE — 99213 OFFICE O/P EST LOW 20 MIN: CPT | Performed by: INTERNAL MEDICINE

## 2020-08-31 PROCEDURE — G0463 HOSPITAL OUTPT CLINIC VISIT: HCPCS

## 2020-08-31 PROCEDURE — 85652 RBC SED RATE AUTOMATED: CPT | Mod: ZL | Performed by: INTERNAL MEDICINE

## 2020-08-31 RX ORDER — ZOLPIDEM TARTRATE 10 MG/1
10 TABLET ORAL
Qty: 30 TABLET | Refills: 0 | Status: SHIPPED | OUTPATIENT
Start: 2020-08-31 | End: 2020-10-23

## 2020-08-31 ASSESSMENT — ENCOUNTER SYMPTOMS
EYES NEGATIVE: 1
ENDOCRINE NEGATIVE: 1
ALLERGIC/IMMUNOLOGIC NEGATIVE: 1
CONSTITUTIONAL NEGATIVE: 1

## 2020-08-31 ASSESSMENT — PAIN SCALES - GENERAL: PAINLEVEL: NO PAIN (0)

## 2020-08-31 NOTE — PROGRESS NOTES
Chief Complaint:  F/U on PMR and other issues.    HPI: He returns today for follow-up visit.  We were treating him for polymyalgia rheumatica when he had a sudden jump in his sedimentation rate.  I was concerned that he might have some sort of infectious or inflammatory or possibly even malignant process occurring so I did a CT scan of his lungs, abdomen and pelvis.  His chest CT was abnormal so he was sent to [place] to see pulmonary medicine.  The plan at this time was for him to complete a 10-day course of doxycycline and then have a follow-up CT of the chest at 3 to 4 weeks.  If no improvement, he would need bronchoscopy.    He continues on 20 mg daily of prednisone.  He seems to be doing adequately with that and his myalgias and arthralgias are generally well controlled.  His sedimentation rate 10 days ago in [place] was down to 58 from the 83 that he had here.    Past Medical History:   Diagnosis Date     Coronary artery calcification     score 297 12/2010     Dysarthria      Enlarged prostate without lower urinary tract symptoms (luts)     Post prostatectomy     Essential (primary) hypertension     No Comments Provided     History of colonic polyps     No Comments Provided     Hyperglycemia     No Comments Provided     Hyperlipidemia     No Comments Provided     Malignant neoplasm of skin     No Comments Provided     PMR (polymyalgia rheumatica) (H)      Spinal stenosis of lumbar region without neurogenic claudication     No Comments Provided     Uncomplicated alcohol abuse     No Comments Provided       Past Surgical History:   Procedure Laterality Date     ARTHROPLASTY KNEE Left     01/09     ARTHROPLASTY SHOULDER Left 2018     ARTHROSCOPY KNEE Left     12/05     COLONOSCOPY      2001, 2006, 2011,Adenomatous 2001, subsequent WNL     COLONOSCOPY  11/21/2014 11/21/2014,Adenoma     COLONOSCOPY  01/07/2020    tubular adenomas, follow up 5 years     HERNIORRHAPHY INGUINAL BILATERAL       PROSTATECTOMY  SUPRAPUBIC  2007     UPPER GI ENDOSCOPY  2005       Allergies   Allergen Reactions     Ace Inhibitors Cough     Hydrocodone-Acetaminophen      Other reaction(s): Insomnia  Keeps him awake      Sulfamethoxazole-Trimethoprim Rash     Sulfasalazine Rash       Current Outpatient Medications   Medication Sig Dispense Refill     atenolol (TENORMIN) 50 MG tablet Take 1 tablet (50 mg) by mouth daily 90 tablet 3     Multiple Vitamin (MULTI-VITAMINS) TABS Take 1 tablet by mouth daily       predniSONE (DELTASONE) 20 MG tablet Take 20 mg by mouth daily  100 tablet 1     simvastatin (ZOCOR) 40 MG tablet Take 1 tablet (40 mg) by mouth At Bedtime 90 tablet 3     traZODone (DESYREL) 50 MG tablet Take 1 tablet (50 mg) by mouth At Bedtime 50 tablet 1     valsartan-hydrochlorothiazide (DIOVAN HCT) 320-12.5 MG tablet Take 1 tablet by mouth daily 90 tablet 3     zolpidem (AMBIEN) 10 MG tablet Take 1 tablet (10 mg) by mouth nightly as needed for sleep 30 tablet 0       Review of Systems   Constitutional: Negative.    Eyes: Negative.    Endocrine: Negative.    Allergic/Immunologic: Negative.        Physical Exam  Vitals signs and nursing note reviewed.   Constitutional:       General: He is not in acute distress.     Appearance: Normal appearance. He is not ill-appearing, toxic-appearing or diaphoretic.   Neurological:      Mental Status: He is alert.         Assessment:        ICD-10-CM    1. PMR (polymyalgia rheumatica) (H)  M35.3 Sedimentation Rate (ESR)   2. Abnormal CT of the chest  R93.89 CT Chest w/o Contrast   3. Drug-induced insomnia (H)  F19.982 zolpidem (AMBIEN) 10 MG tablet       Plan: I did refill his Ambien for him and he will start trazodone which she has not yet started.  Sedimentation rate pending, I will let him know the results.  We will schedule a follow-up CT scan in 2 weeks and will forward the results to Dr. Littlejohn in [place].  Follow-up with me will really be dependent on his CT results

## 2020-09-14 ENCOUNTER — HOSPITAL ENCOUNTER (OUTPATIENT)
Dept: CT IMAGING | Facility: OTHER | Age: 80
Discharge: HOME | End: 2020-09-14
Attending: INTERNAL MEDICINE | Admitting: INTERNAL MEDICINE
Payer: MEDICARE

## 2020-09-14 DIAGNOSIS — R93.89 ABNORMAL CT OF THE CHEST: ICD-10-CM

## 2020-09-14 PROCEDURE — 71250 CT THORAX DX C-: CPT

## 2020-09-25 ENCOUNTER — TRANSFERRED RECORDS (OUTPATIENT)
Dept: HEALTH INFORMATION MANAGEMENT | Facility: OTHER | Age: 80
End: 2020-09-25

## 2020-09-28 ENCOUNTER — OFFICE VISIT (OUTPATIENT)
Dept: INTERNAL MEDICINE | Facility: OTHER | Age: 80
End: 2020-09-28
Attending: INTERNAL MEDICINE
Payer: MEDICARE

## 2020-09-28 VITALS
HEART RATE: 60 BPM | WEIGHT: 147 LBS | DIASTOLIC BLOOD PRESSURE: 72 MMHG | RESPIRATION RATE: 18 BRPM | TEMPERATURE: 97 F | SYSTOLIC BLOOD PRESSURE: 130 MMHG | BODY MASS INDEX: 25.23 KG/M2

## 2020-09-28 DIAGNOSIS — M35.3 PMR (POLYMYALGIA RHEUMATICA) (H): Primary | ICD-10-CM

## 2020-09-28 DIAGNOSIS — R91.8 PULMONARY NODULES: ICD-10-CM

## 2020-09-28 LAB — ERYTHROCYTE [SEDIMENTATION RATE] IN BLOOD BY WESTERGREN METHOD: 14 MM/H (ref 1–10)

## 2020-09-28 PROCEDURE — G0463 HOSPITAL OUTPT CLINIC VISIT: HCPCS | Mod: 25

## 2020-09-28 PROCEDURE — G0463 HOSPITAL OUTPT CLINIC VISIT: HCPCS

## 2020-09-28 PROCEDURE — G0008 ADMIN INFLUENZA VIRUS VAC: HCPCS

## 2020-09-28 PROCEDURE — 85652 RBC SED RATE AUTOMATED: CPT | Mod: ZL | Performed by: INTERNAL MEDICINE

## 2020-09-28 PROCEDURE — 90662 IIV NO PRSV INCREASED AG IM: CPT

## 2020-09-28 PROCEDURE — 99213 OFFICE O/P EST LOW 20 MIN: CPT | Performed by: INTERNAL MEDICINE

## 2020-09-28 PROCEDURE — 36415 COLL VENOUS BLD VENIPUNCTURE: CPT | Mod: ZL | Performed by: INTERNAL MEDICINE

## 2020-09-28 RX ORDER — PREDNISONE 10 MG/1
20 TABLET ORAL DAILY
Qty: 180 TABLET | Refills: 1 | Status: SHIPPED | OUTPATIENT
Start: 2020-09-28 | End: 2020-10-23

## 2020-09-28 ASSESSMENT — ENCOUNTER SYMPTOMS
EYES NEGATIVE: 1
ENDOCRINE NEGATIVE: 1
CONSTITUTIONAL NEGATIVE: 1
ALLERGIC/IMMUNOLOGIC NEGATIVE: 1

## 2020-09-28 ASSESSMENT — PAIN SCALES - GENERAL: PAINLEVEL: NO PAIN (0)

## 2020-09-28 NOTE — PROGRESS NOTES
Chief Complaint:  F/U on PMR.    HPI: He is here for follow-up on his presumed polymyalgia rheumatica.  He is on prednisone 20 mg daily.  He seems to be doing fine with that with no other complaints or concerns.    He quit taking the trazodone.  Even with the trazodone he was only getting 3 hours of sleep every night which is his usual.    He had a bronchoscopy done last week.  He will get his results tomorrow.  This was done because of a spiculated lesion in his lung.    Past Medical History:   Diagnosis Date     Coronary artery calcification     score 297 12/2010     Dysarthria      Enlarged prostate without lower urinary tract symptoms (luts)     Post prostatectomy     Essential (primary) hypertension     No Comments Provided     History of colonic polyps     No Comments Provided     Hyperglycemia     No Comments Provided     Hyperlipidemia     No Comments Provided     Malignant neoplasm of skin     No Comments Provided     PMR (polymyalgia rheumatica) (H)      Spinal stenosis of lumbar region without neurogenic claudication     No Comments Provided     Uncomplicated alcohol abuse     No Comments Provided       Past Surgical History:   Procedure Laterality Date     ARTHROPLASTY KNEE Left     01/09     ARTHROPLASTY SHOULDER Left 2018     ARTHROSCOPY KNEE Left     12/05     COLONOSCOPY      2001, 2006, 2011,Adenomatous 2001, subsequent WNL     COLONOSCOPY  11/21/2014 11/21/2014,Adenoma     COLONOSCOPY  01/07/2020    tubular adenomas, follow up 5 years     HERNIORRHAPHY INGUINAL BILATERAL       PROSTATECTOMY SUPRAPUBIC  2007     UPPER GI ENDOSCOPY  2005       Allergies   Allergen Reactions     Ace Inhibitors Cough     Hydrocodone-Acetaminophen      Other reaction(s): Insomnia  Keeps him awake      Sulfamethoxazole-Trimethoprim Rash     Sulfasalazine Rash       Current Outpatient Medications   Medication Sig Dispense Refill     atenolol (TENORMIN) 50 MG tablet Take 1 tablet (50 mg) by mouth daily 90 tablet 3      Multiple Vitamin (MULTI-VITAMINS) TABS Take 1 tablet by mouth daily       predniSONE (DELTASONE) 10 MG tablet Take 2 tablets (20 mg) by mouth daily 180 tablet 1     simvastatin (ZOCOR) 40 MG tablet Take 1 tablet (40 mg) by mouth At Bedtime 90 tablet 3     valsartan-hydrochlorothiazide (DIOVAN HCT) 320-12.5 MG tablet Take 1 tablet by mouth daily 90 tablet 3     zolpidem (AMBIEN) 10 MG tablet Take 1 tablet (10 mg) by mouth nightly as needed for sleep 30 tablet 0       Review of Systems   Constitutional: Negative.    Eyes: Negative.    Endocrine: Negative.    Allergic/Immunologic: Negative.        Physical Exam  Vitals signs and nursing note reviewed.   Constitutional:       General: He is not in acute distress.     Appearance: Normal appearance. He is not ill-appearing, toxic-appearing or diaphoretic.   Neurological:      Mental Status: He is alert.         Assessment:        ICD-10-CM    1. PMR (polymyalgia rheumatica) (H)  M35.3 Sedimentation Rate (ESR)     predniSONE (DELTASONE) 10 MG tablet   2. Pulmonary nodules  R91.8        Plan: Continue prednisone at 20 mg daily pending the results of his sedimentation rate.  I will let him know the results and we will adjust dose as needed.  We will await the results of his bronchoscopy.  Flu shot given today.  Follow-up with me in 1 month.    His sedimentation rate is down to 14.  We will decrease prednisone to 15 mg daily and recheck in 1 month.

## 2020-09-28 NOTE — NURSING NOTE
"Chief Complaint   Patient presents with     Recheck Medication       Initial /72 (BP Location: Right arm, Patient Position: Sitting, Cuff Size: Adult Regular)   Pulse 60   Temp 97  F (36.1  C) (Tympanic)   Resp 18   Wt 66.7 kg (147 lb)   BMI 25.23 kg/m   Estimated body mass index is 25.23 kg/m  as calculated from the following:    Height as of 7/27/20: 1.626 m (5' 4\").    Weight as of this encounter: 66.7 kg (147 lb).  Medication Reconciliation: complete    Asha Coker LPN  "

## 2020-10-23 ENCOUNTER — OFFICE VISIT (OUTPATIENT)
Dept: INTERNAL MEDICINE | Facility: OTHER | Age: 80
End: 2020-10-23
Attending: INTERNAL MEDICINE
Payer: MEDICARE

## 2020-10-23 VITALS
SYSTOLIC BLOOD PRESSURE: 122 MMHG | OXYGEN SATURATION: 99 % | DIASTOLIC BLOOD PRESSURE: 70 MMHG | BODY MASS INDEX: 25.37 KG/M2 | TEMPERATURE: 95.5 F | WEIGHT: 147.8 LBS | HEART RATE: 55 BPM | RESPIRATION RATE: 16 BRPM

## 2020-10-23 DIAGNOSIS — M35.3 PMR (POLYMYALGIA RHEUMATICA) (H): Primary | ICD-10-CM

## 2020-10-23 LAB — ERYTHROCYTE [SEDIMENTATION RATE] IN BLOOD BY WESTERGREN METHOD: 15 MM/H (ref 1–10)

## 2020-10-23 PROCEDURE — 85652 RBC SED RATE AUTOMATED: CPT | Mod: ZL | Performed by: INTERNAL MEDICINE

## 2020-10-23 PROCEDURE — 36415 COLL VENOUS BLD VENIPUNCTURE: CPT | Mod: ZL | Performed by: INTERNAL MEDICINE

## 2020-10-23 PROCEDURE — 99213 OFFICE O/P EST LOW 20 MIN: CPT | Performed by: INTERNAL MEDICINE

## 2020-10-23 PROCEDURE — G0463 HOSPITAL OUTPT CLINIC VISIT: HCPCS

## 2020-10-23 RX ORDER — PREDNISONE 10 MG/1
10 TABLET ORAL DAILY
Qty: 180 TABLET | Refills: 1 | COMMUNITY
Start: 2020-10-23 | End: 2020-11-24

## 2020-10-23 RX ORDER — PREDNISONE 10 MG/1
15 TABLET ORAL DAILY
Qty: 180 TABLET | Refills: 1 | COMMUNITY
Start: 2020-10-23 | End: 2020-10-23

## 2020-10-23 ASSESSMENT — PAIN SCALES - GENERAL: PAINLEVEL: NO PAIN (0)

## 2020-10-23 ASSESSMENT — ENCOUNTER SYMPTOMS
ALLERGIC/IMMUNOLOGIC NEGATIVE: 1
EYES NEGATIVE: 1
ENDOCRINE NEGATIVE: 1
CONSTITUTIONAL NEGATIVE: 1

## 2020-10-23 NOTE — NURSING NOTE
Chief Complaint   Patient presents with     Medication Therapy Management       Medication Reconciliation complete.   Bel Palacios LPN  ..................10/23/2020   8:00 AM

## 2020-11-04 NOTE — PROGRESS NOTES
Chief Complaint:  F/U on PMR.    HPI: He returns for follow-up on his presumed polymyalgia rheumatica.  His prednisone is down to 15 mg daily, he was on 20 mg a month ago but we decreased it when his sedimentation rate came back low.  He feels fine and is not having any myalgias.  His sleep is improving on the lower dose as well.    His lung mass is still undiagnosed.  He has had 2 separate bronchoscopies and biopsies with no results.  He will be having a PET scan in 2 weeks.    Past Medical History:   Diagnosis Date     Coronary artery calcification     score 297 12/2010     Dysarthria      Enlarged prostate without lower urinary tract symptoms (luts)     Post prostatectomy     Essential (primary) hypertension     No Comments Provided     History of colonic polyps     No Comments Provided     Hyperglycemia     No Comments Provided     Hyperlipidemia     No Comments Provided     Malignant neoplasm of skin     No Comments Provided     PMR (polymyalgia rheumatica) (H)      Spinal stenosis of lumbar region without neurogenic claudication     No Comments Provided     Uncomplicated alcohol abuse     No Comments Provided       Past Surgical History:   Procedure Laterality Date     ARTHROPLASTY KNEE Left     01/09     ARTHROPLASTY SHOULDER Left 2018     ARTHROSCOPY KNEE Left     12/05     COLONOSCOPY      2001, 2006, 2011,Adenomatous 2001, subsequent WNL     COLONOSCOPY  11/21/2014 11/21/2014,Adenoma     COLONOSCOPY  01/07/2020    tubular adenomas, follow up 5 years     HERNIORRHAPHY INGUINAL BILATERAL       PROSTATECTOMY SUPRAPUBIC  2007     UPPER GI ENDOSCOPY  2005       Allergies   Allergen Reactions     Ace Inhibitors Cough     Hydrocodone-Acetaminophen      Other reaction(s): Insomnia  Keeps him awake      Sulfamethoxazole-Trimethoprim Rash     Sulfasalazine Rash       Current Outpatient Medications   Medication Sig Dispense Refill     predniSONE (DELTASONE) 10 MG tablet Take 1.5 tablets (15 mg) by mouth daily 180  Pt ambulated with steady gait, no distress noted.   tablet 1     atenolol (TENORMIN) 50 MG tablet Take 1 tablet (50 mg) by mouth daily 90 tablet 3     Multiple Vitamin (MULTI-VITAMINS) TABS Take 1 tablet by mouth daily       simvastatin (ZOCOR) 40 MG tablet Take 1 tablet (40 mg) by mouth At Bedtime 90 tablet 3     valsartan-hydrochlorothiazide (DIOVAN HCT) 320-12.5 MG tablet Take 1 tablet by mouth daily 90 tablet 3       Review of Systems   Constitutional: Negative.    Eyes: Negative.    Endocrine: Negative.    Allergic/Immunologic: Negative.        Physical Exam  Vitals signs and nursing note reviewed.   Constitutional:       General: He is not in acute distress.     Appearance: Normal appearance. He is not ill-appearing, toxic-appearing or diaphoretic.   Neurological:      Mental Status: He is alert.         Assessment:        ICD-10-CM    1. PMR (polymyalgia rheumatica) (H)  M35.3 predniSONE (DELTASONE) 10 MG tablet       Plan: Sedimentation rate pending, I will let him know the results and we will adjust prednisone as dictated by the results.  Recheck in 1 month.    Sed rate is stable at 15.  Decrease prednisone to 10 mg daily.

## 2020-11-24 ENCOUNTER — OFFICE VISIT (OUTPATIENT)
Dept: INTERNAL MEDICINE | Facility: OTHER | Age: 80
End: 2020-11-24
Attending: INTERNAL MEDICINE
Payer: MEDICARE

## 2020-11-24 VITALS
TEMPERATURE: 96.1 F | BODY MASS INDEX: 25.92 KG/M2 | WEIGHT: 151 LBS | RESPIRATION RATE: 16 BRPM | DIASTOLIC BLOOD PRESSURE: 60 MMHG | HEART RATE: 60 BPM | SYSTOLIC BLOOD PRESSURE: 106 MMHG

## 2020-11-24 DIAGNOSIS — R91.8 PULMONARY NODULES: ICD-10-CM

## 2020-11-24 DIAGNOSIS — I10 HYPERTENSION, UNSPECIFIED TYPE: ICD-10-CM

## 2020-11-24 DIAGNOSIS — M35.3 PMR (POLYMYALGIA RHEUMATICA) (H): Primary | ICD-10-CM

## 2020-11-24 DIAGNOSIS — E78.5 HYPERLIPIDEMIA, UNSPECIFIED HYPERLIPIDEMIA TYPE: ICD-10-CM

## 2020-11-24 LAB — ERYTHROCYTE [SEDIMENTATION RATE] IN BLOOD BY WESTERGREN METHOD: 13 MM/H (ref 1–10)

## 2020-11-24 PROCEDURE — 99213 OFFICE O/P EST LOW 20 MIN: CPT | Performed by: INTERNAL MEDICINE

## 2020-11-24 PROCEDURE — G0463 HOSPITAL OUTPT CLINIC VISIT: HCPCS

## 2020-11-24 PROCEDURE — 85652 RBC SED RATE AUTOMATED: CPT | Mod: ZL | Performed by: INTERNAL MEDICINE

## 2020-11-24 PROCEDURE — 36415 COLL VENOUS BLD VENIPUNCTURE: CPT | Mod: ZL | Performed by: INTERNAL MEDICINE

## 2020-11-24 RX ORDER — SIMVASTATIN 40 MG
40 TABLET ORAL AT BEDTIME
Qty: 90 TABLET | Refills: 3 | Status: SHIPPED | OUTPATIENT
Start: 2020-11-24 | End: 2022-01-06

## 2020-11-24 RX ORDER — VALSARTAN AND HYDROCHLOROTHIAZIDE 320; 12.5 MG/1; MG/1
1 TABLET, FILM COATED ORAL DAILY
Qty: 90 TABLET | Refills: 3 | Status: ON HOLD | OUTPATIENT
Start: 2020-11-24 | End: 2021-02-04

## 2020-11-24 RX ORDER — PREDNISONE 2.5 MG/1
10 TABLET ORAL DAILY
Qty: 100 TABLET | Refills: 4 | Status: SHIPPED | OUTPATIENT
Start: 2020-11-24 | End: 2020-11-24

## 2020-11-24 RX ORDER — PREDNISONE 2.5 MG/1
7.5 TABLET ORAL DAILY
Qty: 100 TABLET | Refills: 4 | COMMUNITY
Start: 2020-11-24 | End: 2021-01-13

## 2020-11-24 RX ORDER — ATENOLOL 50 MG/1
50 TABLET ORAL DAILY
Qty: 90 TABLET | Refills: 3 | Status: ON HOLD | OUTPATIENT
Start: 2020-11-24 | End: 2021-02-04

## 2020-11-24 ASSESSMENT — ENCOUNTER SYMPTOMS
CONSTITUTIONAL NEGATIVE: 1
ALLERGIC/IMMUNOLOGIC NEGATIVE: 1
ENDOCRINE NEGATIVE: 1
EYES NEGATIVE: 1

## 2020-11-24 ASSESSMENT — PAIN SCALES - GENERAL: PAINLEVEL: NO PAIN (0)

## 2020-11-24 NOTE — PROGRESS NOTES
Chief Complaint:  F/U on PMR.    HPI: He is here today for follow-up on his polymyalgia rheumatica.  He is down to 10 mg of prednisone daily.  He feels well with that and has no significant complaints or concerns in regards to his polymyalgia rheumatica.    His pulmonary nodules still being evaluated.  He had a PET scan that did not show any uptake.  He is going to be having a transesophageal biopsy next week.    Past Medical History:   Diagnosis Date     Coronary artery calcification     score 297 12/2010     Dysarthria      Enlarged prostate without lower urinary tract symptoms (luts)     Post prostatectomy     Essential (primary) hypertension     No Comments Provided     History of colonic polyps     No Comments Provided     Hyperglycemia     No Comments Provided     Hyperlipidemia     No Comments Provided     Malignant neoplasm of skin     No Comments Provided     PMR (polymyalgia rheumatica) (H)      Spinal stenosis of lumbar region without neurogenic claudication     No Comments Provided     Uncomplicated alcohol abuse     No Comments Provided       Past Surgical History:   Procedure Laterality Date     ARTHROPLASTY KNEE Left     01/09     ARTHROPLASTY SHOULDER Left 2018     ARTHROSCOPY KNEE Left     12/05     COLONOSCOPY      2001, 2006, 2011,Adenomatous 2001, subsequent WNL     COLONOSCOPY  11/21/2014 11/21/2014,Adenoma     COLONOSCOPY  01/07/2020    tubular adenomas, follow up 5 years     HERNIORRHAPHY INGUINAL BILATERAL       PROSTATECTOMY SUPRAPUBIC  2007     UPPER GI ENDOSCOPY  2005       Allergies   Allergen Reactions     Ace Inhibitors Cough     Hydrocodone-Acetaminophen      Other reaction(s): Insomnia  Keeps him awake      Sulfamethoxazole-Trimethoprim Rash     Sulfasalazine Rash       Current Outpatient Medications   Medication Sig Dispense Refill     atenolol (TENORMIN) 50 MG tablet Take 1 tablet (50 mg) by mouth daily 90 tablet 3     Multiple Vitamin (MULTI-VITAMINS) TABS Take 1 tablet by mouth  daily       predniSONE (DELTASONE) 10 MG tablet Take 1 tablet (10 mg) by mouth daily 180 tablet 1     simvastatin (ZOCOR) 40 MG tablet Take 1 tablet (40 mg) by mouth At Bedtime 90 tablet 3     valsartan-hydrochlorothiazide (DIOVAN HCT) 320-12.5 MG tablet Take 1 tablet by mouth daily 90 tablet 3       Review of Systems   Constitutional: Negative.    Eyes: Negative.    Endocrine: Negative.    Allergic/Immunologic: Negative.        Physical Exam  Vitals signs and nursing note reviewed.   Constitutional:       General: He is not in acute distress.     Appearance: Normal appearance. He is not ill-appearing, toxic-appearing or diaphoretic.   Skin:     General: Skin is warm and dry.   Neurological:      Mental Status: He is alert.         Assessment:        ICD-10-CM    1. PMR (polymyalgia rheumatica) (H)  M35.3 Sedimentation Rate (ESR)   2. Pulmonary nodules  R91.8        Plan: Sedimentation rate is pending, I will let him know the results.  I think if it is still low we will drop down to 7-1/2 mg daily.  He will follow up in [place] next week for his biopsy.  See me again in 1 month for complete evaluation.    His sedimentation rate remains low.  He will decrease from 10 mg down to 7.5 mg of prednisone daily.

## 2020-11-24 NOTE — NURSING NOTE
"Chief Complaint   Patient presents with     Follow Up     1 month follow up       Initial /60 (BP Location: Right arm, Patient Position: Sitting, Cuff Size: Adult Regular)   Pulse 60   Temp 96.1  F (35.6  C) (Tympanic)   Resp 16   Wt 68.5 kg (151 lb)   BMI 25.92 kg/m   Estimated body mass index is 25.92 kg/m  as calculated from the following:    Height as of 7/27/20: 1.626 m (5' 4\").    Weight as of this encounter: 68.5 kg (151 lb).  Medication Reconciliation: complete    Asha Coker LPN  "

## 2020-11-26 ENCOUNTER — ALLIED HEALTH/NURSE VISIT (OUTPATIENT)
Dept: FAMILY MEDICINE | Facility: OTHER | Age: 80
End: 2020-11-26
Attending: INTERNAL MEDICINE
Payer: MEDICARE

## 2020-11-26 DIAGNOSIS — Z20.822 COVID-19 RULED OUT: Primary | ICD-10-CM

## 2020-11-26 LAB
SARS-COV-2 RNA SPEC QL NAA+PROBE: NORMAL
SPECIMEN SOURCE: NORMAL

## 2020-11-26 PROCEDURE — U0003 INFECTIOUS AGENT DETECTION BY NUCLEIC ACID (DNA OR RNA); SEVERE ACUTE RESPIRATORY SYNDROME CORONAVIRUS 2 (SARS-COV-2) (CORONAVIRUS DISEASE [COVID-19]), AMPLIFIED PROBE TECHNIQUE, MAKING USE OF HIGH THROUGHPUT TECHNOLOGIES AS DESCRIBED BY CMS-2020-01-R: HCPCS | Mod: ZL | Performed by: INTERNAL MEDICINE

## 2020-11-26 PROCEDURE — C9803 HOPD COVID-19 SPEC COLLECT: HCPCS

## 2020-11-26 PROCEDURE — 99207 PR NO CHARGE NURSE ONLY: CPT

## 2020-11-27 LAB
LABORATORY COMMENT REPORT: NORMAL
SARS-COV-2 RNA SPEC QL NAA+PROBE: NEGATIVE
SPECIMEN SOURCE: NORMAL

## 2020-12-01 ENCOUNTER — TRANSFERRED RECORDS (OUTPATIENT)
Dept: HEALTH INFORMATION MANAGEMENT | Facility: OTHER | Age: 80
End: 2020-12-01

## 2020-12-09 ENCOUNTER — MEDICAL CORRESPONDENCE (OUTPATIENT)
Dept: HEALTH INFORMATION MANAGEMENT | Facility: CLINIC | Age: 80
End: 2020-12-09

## 2020-12-14 ENCOUNTER — ONCOLOGY VISIT (OUTPATIENT)
Dept: RADIATION ONCOLOGY | Facility: HOSPITAL | Age: 80
End: 2020-12-14
Attending: RADIOLOGY
Payer: MEDICARE

## 2020-12-14 VITALS
HEIGHT: 65 IN | SYSTOLIC BLOOD PRESSURE: 138 MMHG | TEMPERATURE: 97.5 F | RESPIRATION RATE: 16 BRPM | WEIGHT: 153.1 LBS | HEART RATE: 60 BPM | BODY MASS INDEX: 25.51 KG/M2 | DIASTOLIC BLOOD PRESSURE: 72 MMHG

## 2020-12-14 DIAGNOSIS — C34.90 PRIMARY MUCINOUS ADENOCARCINOMA OF LUNG (H): ICD-10-CM

## 2020-12-14 DIAGNOSIS — C34.91 PRIMARY LUNG ADENOCARCINOMA, RIGHT (H): Primary | ICD-10-CM

## 2020-12-14 PROCEDURE — 99205 OFFICE O/P NEW HI 60 MIN: CPT | Performed by: RADIOLOGY

## 2020-12-14 PROCEDURE — G0463 HOSPITAL OUTPT CLINIC VISIT: HCPCS | Performed by: RADIOLOGY

## 2020-12-14 SDOH — HEALTH STABILITY: MENTAL HEALTH: HOW OFTEN DO YOU HAVE 6 OR MORE DRINKS ON ONE OCCASION?: NOT ASKED

## 2020-12-14 SDOH — ECONOMIC STABILITY: FOOD INSECURITY: WITHIN THE PAST 12 MONTHS, THE FOOD YOU BOUGHT JUST DIDN'T LAST AND YOU DIDN'T HAVE MONEY TO GET MORE.: NOT ASKED

## 2020-12-14 SDOH — HEALTH STABILITY: MENTAL HEALTH: HOW OFTEN DO YOU HAVE A DRINK CONTAINING ALCOHOL?: NEVER

## 2020-12-14 SDOH — ECONOMIC STABILITY: INCOME INSECURITY: HOW HARD IS IT FOR YOU TO PAY FOR THE VERY BASICS LIKE FOOD, HOUSING, MEDICAL CARE, AND HEATING?: NOT ASKED

## 2020-12-14 SDOH — ECONOMIC STABILITY: TRANSPORTATION INSECURITY
IN THE PAST 12 MONTHS, HAS LACK OF TRANSPORTATION KEPT YOU FROM MEETINGS, WORK, OR FROM GETTING THINGS NEEDED FOR DAILY LIVING?: NOT ASKED

## 2020-12-14 SDOH — ECONOMIC STABILITY: FOOD INSECURITY: WITHIN THE PAST 12 MONTHS, YOU WORRIED THAT YOUR FOOD WOULD RUN OUT BEFORE YOU GOT MONEY TO BUY MORE.: NOT ASKED

## 2020-12-14 SDOH — HEALTH STABILITY: MENTAL HEALTH: HOW MANY STANDARD DRINKS CONTAINING ALCOHOL DO YOU HAVE ON A TYPICAL DAY?: NOT ASKED

## 2020-12-14 SDOH — ECONOMIC STABILITY: TRANSPORTATION INSECURITY
IN THE PAST 12 MONTHS, HAS THE LACK OF TRANSPORTATION KEPT YOU FROM MEDICAL APPOINTMENTS OR FROM GETTING MEDICATIONS?: NOT ASKED

## 2020-12-14 ASSESSMENT — ENCOUNTER SYMPTOMS
WHEEZING: 0
CONSTIPATION: 0
ARTHRALGIAS: 1
FREQUENCY: 0
RECTAL PAIN: 0
VOMITING: 0
FATIGUE: 0
ENDOCRINE NEGATIVE: 1
COUGH: 1
SPEECH DIFFICULTY: 1
TROUBLE SWALLOWING: 0
SORE THROAT: 0
LEG SWELLING: 0
APPETITE CHANGE: 1
MYALGIAS: 1
ABDOMINAL PAIN: 0
VOICE CHANGE: 1
ABDOMINAL DISTENTION: 0
PALPITATIONS: 0
HEADACHES: 0
BACK PAIN: 0
ADENOPATHY: 0
SHORTNESS OF BREATH: 0
DYSURIA: 0
DIFFICULTY URINATING: 1
FEVER: 0
EXTREMITY WEAKNESS: 1
DIARRHEA: 0
NERVOUS/ANXIOUS: 0
CHILLS: 0
BLOOD IN STOOL: 0
NECK PAIN: 0
UNEXPECTED WEIGHT CHANGE: 0
DIZZINESS: 0
LIGHT-HEADEDNESS: 0
EYES NEGATIVE: 1
HEMATOLOGIC/LYMPHATIC NEGATIVE: 1
SEIZURES: 0
SLEEP DISTURBANCE: 0
CHEST TIGHTNESS: 0
NUMBNESS: 0
CONFUSION: 0
DECREASED CONCENTRATION: 0
DIAPHORESIS: 0
NECK STIFFNESS: 0
FLANK PAIN: 0
HEMOPTYSIS: 0
NAUSEA: 0
DEPRESSION: 0
BRUISES/BLEEDS EASILY: 0

## 2020-12-14 ASSESSMENT — MIFFLIN-ST. JEOR: SCORE: 1331.34

## 2020-12-14 ASSESSMENT — PAIN SCALES - GENERAL: PAINLEVEL: MILD PAIN (3)

## 2020-12-14 NOTE — PROGRESS NOTES
"[facility]  DEPARTMENT OF RADIATION ONCOLOGY  INITIAL PATIENT ASSESSMENT    Name: Wilfrid Meneses MRN: [id number]   : 1940 (80 year old)  Date of Service: 2020   Referring: Roman Littlejohn       Chief Complaint   Patient presents with     Radiation Therapy       Initial /72 (BP Location: Left arm, Patient Position: Chair, Cuff Size: Adult Regular)   Pulse 60   Temp 97.5  F (36.4  C) (Temporal)   Resp 16   Ht 1.651 m (5' 5\")   Wt 69.4 kg (153 lb 1.6 oz)   BMI 25.48 kg/m   Estimated body mass index is 25.48 kg/m  as calculated from the following:    Height as of this encounter: 1.651 m (5' 5\").    Weight as of this encounter: 69.4 kg (153 lb 1.6 oz).  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 Yes  Activity status: independent   referral needs: Not needed    Advanced Directive: Yes - Location:  On file    Review of Systems   Constitutional: Positive for appetite change (increased, d/t prednisone).   HENT:  Negative.    Eyes: Negative.    Respiratory: Positive for cough (r/t post nasal drip). Negative for chest tightness, hemoptysis, shortness of breath and wheezing.    Cardiovascular: Negative for chest pain, leg swelling and palpitations.   Gastrointestinal: Negative for abdominal distention, abdominal pain, blood in stool, constipation, diarrhea, nausea, rectal pain and vomiting.   Endocrine: Negative.    Genitourinary: " "Positive for difficulty urinating (weak stream, hesitancy; urgency) and nocturia (1-2x/night). Negative for bladder incontinence, dysuria and frequency.    Musculoskeletal: Positive for arthralgias (hips; r/t spinal stenosis). Negative for back pain.   Skin: Negative.    Neurological: Positive for extremity weakness (leg weakness r/t prednisone; improving) and speech difficulty (voice tremor).   Hematological: Negative.    Psychiatric/Behavioral: Negative for depression. The patient is not nervous/anxious.        Patient was assessed for the influenza, pneumo-poly, prevnar 13, Tdap, and shingles immunizations. \"Vaccinations and Cancer Treatment\" flyer given.  Instructed patient to discuss vaccination status with his/her PCM.     Patient was assessed using the NCCN psychosocial distress thermometer. Patient rated the score as a 1-2/10. Patient rated current stressors as \"I just put myself in God's hands\". Stressors will be brought to the attention of provider or Oncology RN Care Coordinator for a score of 6 or greater or per nurses discretion.     Pt is here today for a consult for radiation therapy for lung cancer.  Educated patient on the mapping process and the possible side effects of XRT to the chest, to include: fatigue, skin reaction, and esophagitis.  Pt verbalizes an understanding and has no questions at this time. Pt is accompanied by self today.   Colette Castillo RN          "

## 2020-12-14 NOTE — PROGRESS NOTES
[facility] [place]  DEPARTMENT OF RADIATION ONCOLOGY  CONSULTATION NOTE    Name: Wilfrid Meneses MRN: [id number]   : 1940 (80 year old)  Date of Service: 2020 Referring: Dr. 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 Pulmonary medicine referred the patient for consultation about the role of stereotactic body radiotherapy (SBRT) as potentially curative treatment for the asymptomatic disease (cT3/4N0M)). Among his pertinent circumstances are chronic vocal cord tremor, decades of sawdust exposure (hobbyist), and recently diagnosed polymyalgia rheumatica (difficulty tolerating therapeutic steroids). The patient is a retired pharmacist.    History of Present Illness   The patient's oncologic history across multiple institutions is briefly abstracted as follows:    2020 CT of chest, abdomen, and pelvis without contrast: Obtained to evaluate elevation of ESR during a prednisone taper for recently diagnosed polymyalgia rheumatica. Study revealed a 6.9-cm mass in the superior segment of the right lower lobe in the perihilar region. No reported regional adenopathy or distant metastatic disease. Patient does not recall undergoing a prior chest CT.    2020 CT chest without contrast: Interval progression of mass to 7.6-cm. No reported regional adenopathy.    2020 Bronchoscopy and endoscopic ultrasound: The operative note described a right lower lobe mass that might have coalesced into a R node. Right lower lobe biopsy was suspicious for well-differentiated adenocarcinoma. Washings were negative for  malignancy.    9/30/2020 CT chest without contrast: Right lower lobe/perihilar mass (4.36 x 3.8-cm).    10/5/2020 Bronchoscopy: Biopsies of the right lower lobe and left mainstem bronchus were not diagnostic for malignancy.    11/4/2020 PET-CT: Slightly hypermetabolic mass in the central right lung field with interval enlargement since 9/30/2020. No suspicious regional zahra or distant metastatic disease.  12/1/2020 Upper endoscopy with endoscopic ultrasound: The operative note described a 6-cm mass along the right lateral and posterior esophageal wall at 33-cm jovany. Biopsy of the right lung yielded mucinous adenocarcinoma (TTF-1 negative, CDX2 positive; third biopsy). No sonographically suspicious regional nodes number and 2.3-cm subcarinal node was benign appearing N negative on PET-CT). No suspicious findings in the visualized portions of the gastrointestinal tract and hepatobiliary system.    We reviewed other conditions that can influence the use and morbidity of radiation therapy. Aside from the patient s pain syndrome from polymyalgia rheumatica, he feels in relatively good general health. Improvements of his related pain syndrome permitted him to stop using a cane about 1-month ago. He is now walking easily, and bilateral proximal thigh weakness is improving as the dose of prednisone decreases. He has never smoked cigarettes. He does not take airway or pulmonary medications. He is an avid woodwork hobbyist with frequent heavy sawdust exposure (no mask). He reports that he developed mild vocal changes about 15-years ago. The  wobbly  voice has not worsened, and consultation with an otolaryngologist was not pursued. 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 The patient denies cough or hemoptysis. He denies aspiration or intolerance of secretions. He denies weight loss. He denies headache, nausea, vomiting, acute visual changes, acute auditory changes, mental status  changes, seizures, changes in bowel or bladder function, or new bony or back pain. He denies a prior history of another upper aerodigestive tract cancer. He denies a history of radiation exposure, chemotherapy, or collagen vascular diseases Aside from sawdust exposure, he has no other known strong risk factors for lung cancer. We counseled the patient about COVID-19 risks, precautions, and potential impact on his radiation therapy. He denied known exposure to COVID-19, risky behaviors, or related symptoms including febrile, chest, gustatory, gastrointestinal, and systemic complaints.    Chemotherapy History: No.  Radiation History: No.  Implanted Cardiac Devices: No.  Implanted Chest Wall Infusion Port: No.    Next 5 appointments (look out 90 days)    Dec 23, 2020  8:00 AM  PHYSICAL with Jimenez Colon MD  [facility] ([facility]) [address]  [place] [address]  [phone]     Past Medical History:   Diagnosis Date     Coronary artery calcification     score 297 12/2010     Dysarthria      Enlarged prostate without lower urinary tract symptoms (luts)     Post prostatectomy     Essential (primary) hypertension     No Comments Provided     History of colonic polyps     No Comments Provided     Hyperglycemia     No Comments Provided     Hyperlipidemia     No Comments Provided     Malignant neoplasm of skin     basal cell ca. on back and right ear     PMR (polymyalgia rheumatica) (H)      Spinal stenosis of lumbar region without neurogenic claudication     No Comments Provided     Uncomplicated alcohol abuse     No Comments Provided     Past Surgical History:   Procedure Laterality Date     ARTHROPLASTY KNEE Left     01/09     ARTHROPLASTY SHOULDER Left 2018     ARTHROSCOPY KNEE Left     12/05     COLONOSCOPY      2001, 2006, 2011,Adenomatous 2001, subsequent WNL     COLONOSCOPY  11/21/2014 11/21/2014,Adenoma     COLONOSCOPY  01/07/2020    tubular  adenomas, follow up 5 years     HERNIORRHAPHY INGUINAL BILATERAL       PROSTATECTOMY SUPRAPUBIC  2007     UPPER GI ENDOSCOPY  2005     Current Outpatient Medications   Medication Instructions     atenolol (TENORMIN) 50 mg, Oral, DAILY     Multiple Vitamin (MULTI-VITAMINS) TABS 1 tablet, Oral, DAILY     predniSONE (DELTASONE) 7.5 mg, Oral, DAILY     simvastatin (ZOCOR) 40 mg, Oral, AT BEDTIME     valsartan-hydrochlorothiazide (DIOVAN HCT) 320-12.5 MG tablet 1 tablet, Oral, DAILY     Allergies: Ace inhibitors, Hydrocodone-acetaminophen, Sulfamethoxazole-trimethoprim, and Sulfasalazine    Social History     Tobacco Use     Smoking status: Current Every Day Smoker     Types: Pipe     Smokeless tobacco: Never Used     Tobacco comment: smoked a pipe in college for 2-3 years (seldom)   Substance Use Topics     Alcohol use: Not Currently     Alcohol/week: 28.0 standard drinks     Frequency: Never     Comment: Last use was 7/4/20     Drug use: No     Family History   Problem Relation Age of Onset     Diabetes Mother      Chronic Obstructive Pulmonary Disease Mother      Myocardial Infarction Father      Other - See Comments Son         MI during GXT     Other - See Comments Sister         PMR     Cancer No family hx of    No other cancers in first or second degree relatives.    Review of Systems (supplemental to the ROS documented in the EMR and the HPI)   Review of Systems   Constitutional: Positive for appetite change (increased, d/t prednisone). Negative for chills, diaphoresis, fatigue, fever and unexpected weight change.   HENT:   Positive for voice change (chronic altered voice (started about 15 years ago, unchanged)). Negative for hearing loss, lump/mass, mouth sores, nosebleeds, sore throat, tinnitus and trouble swallowing.    Eyes: Negative.    Respiratory: Positive for cough (r/t post nasal drip). Negative for chest tightness, hemoptysis, shortness of breath and wheezing.    Cardiovascular: Negative for chest pain,  "leg swelling and palpitations.   Gastrointestinal: Negative for abdominal distention, abdominal pain, blood in stool, constipation, diarrhea, nausea, rectal pain and vomiting.   Endocrine: Negative.    Genitourinary: Positive for difficulty urinating (weak stream, hesitancy; urgency) and nocturia (1-2x/night). Negative for bladder incontinence, dysuria and frequency.    Musculoskeletal: Positive for arthralgias (hips; r/t spinal stenosis) and myalgias. Negative for back pain, flank pain, gait problem, neck pain and neck stiffness.   Skin: Negative.    Neurological: Positive for extremity weakness (leg weakness r/t prednisone; improving) and speech difficulty (voice tremor). Negative for dizziness, gait problem, headaches, light-headedness, numbness and seizures.   Hematological: Negative.  Negative for adenopathy. Does not bruise/bleed easily.   Psychiatric/Behavioral: Negative for confusion, decreased concentration, depression and sleep disturbance. The patient is not nervous/anxious.    Pain: Mild Pain (3).    Physical Exam   KPS: 90.  ECOG Status: 0 (Fully active, able to carry on all activities without restriction)  Vitals: /72 (BP Location: Left arm, Patient Position: Chair, Cuff Size: Adult Regular)   Pulse 60   Temp 97.5  F (36.4  C) (Temporal)   Resp 16   Ht 1.651 m (5' 5\")   Wt 69.4 kg (153 lb 1.6 oz)   BMI 25.48 kg/m      Clinical Examination:  General: Appears rested. Appears staged age. Appears in good general health, well-developed, well-nourished, and in no acute distress. Does not appear acutely or chronically ill. Appears well groomed.  Head: No treatment-related alopecia. Normocephalic.  Eyes: Anicteric, eyelids symmetric, vision grossly intact.  ENT: Slight hoarseness. Good clarity. Good volume. No coughing or aspiration.  Neck: Soft, supple, symmetric, trachea midline.  Lymphatics: No parotid, peripartoid, cervical, supraclavicular, or axillary adenopathy.  Chest/Breasts: No port. No " implanted cardiac device.   Lungs: Clear to auscultation, easy respirations.  Cardiovascular: Regular rate. No jugulovenous distension.  Abdomen: Bowel sounds positive, soft, nondistended, nontender.  Pelvis: Bowel sounds positive, soft, nondistended, nontender.  MSK: Shoulder range of motion is good. No tenderness on palpation. No arm edema or hand edema. No cords or calf tenderness. Good muscle tone.  Integument: No jaundice or pallor. No cellulitis. No ecchymosis.  Neurologic: Alert, oriented, fluent. Memory grossly intact. Motor grossly intact. Sensory grossly intact. No ataxia.  Psychologic: Well spoken about his cancer and therapies. Appropriately anxious about radiation therapy and sad about diagnosis. Pleasant, cooperative, insightful, concrete, and good judgment.    Imaging/Path/Labs   Imaging: See HPI.  Path: See HPI.  Labs: See HPI.  Recent Labs   Lab Test 07/31/20  0809 06/01/20  0839 12/03/19  1437 11/30/18  0859 01/24/18  1257 01/24/18  1148   HGB 12.1*  --   --   --   --  14.5     --   --   --   --  231   WBC 8.8  --   --   --   --   --    NA  --   --  131* 132* 133  --    POTASSIUM  --   --  4.1 4.4 4.3  --    CHLORIDE  --   --  94* 94* 96*  --    CO2  --   --  28 30 26  --    GLC  --  92 99 119* 101  --    BUN  --   --  16 18 17  --    CR 1.04  --  1.01 1.02 0.93  --    ESTIVEN  --   --  9.8 9.9 9.6  --      Information Review   Face-to-face, I reviewed the case with the patient, evaluated him, and discussed his treatment options and risks of therapy. I reviewed the medical records, radiographic information, and pathologic studies. I reviewed the imaging studies via PACS. I reviewed the nursing and patient intake sheets. I offered to speak with his family members and friends today by speakerphone. The patient declined my offer but granted me permission to speak with them if they contact me or come to clinic. The patient is a good historian, began the consultation with good insights into the  disease process, and acknowledged the potentially poor consequences of his disease. He educated himself through a variety of educational media. His sister has assisted him in depth for education including the Internet. I counseled the patient to educate himself further and independent of his sister's research. including the Internet. He demonstrated good comprehension of our discussion and explored the treatment options with good understanding. The patient asked pertinent questions and insightful follow-up questions. I illustrated the basic anatomy and field of treatment. We discussed the onsite and telemedicine coverage of our clinic. He declined referral for an additional opinion or conservative care. He previously accepted referral to our social work staff for additional resources including potential counseling. The patient granted me permission to exchange medical information and records with other physicians. No therapeutic radiation protocols for the patient's disease are available at our Center.    Assessment    I concur with the recommendation definitive radiation therapy as potentially curative treatment for this patient's mucinous adenocarcinoma of the right lung. Several features of the disease strongly influence the use of radiation therapy. Radiation therapy is favored over surgery because the location disease my require a pneumonectomy as well as postoperative radiation therapy. Radiation therapy would be directed to the gross primary disease and would omit zahra irradiation since the zahra metastases are not highly suspected based on imaging, sonographic, and biologic imaging (direct invasion of a hilar node does not quality as regional metastatic disease). Regarding the status of the disease, we will obtain a repeat chest CT since the size of the enlarging mass had already approached on his prior scan (one month old already) the threshold for safe delivery of stereotactic body radiotherapy (SBRT). If  SBRT is not delivered because of safety issues due to size (large) and location (central), conventional radiation therapy would be used. Clinically, the enlarging mass is likely to reflect the mucinous component of the mass rather than principally representing rapid growth of the malignant disease itself. Nevertheless, the large size of the mass merits consideration of concurrent chemotherapy for the cT3/4N0M0 disease.     During our extended visit, the patient and I discussed his diagnosis of a presumably node-negative but large cancer of the central right lung, definition of the risk groups, the natural history of spread of non-small cell cancer, patterns of failure after various treatments, and fair prognosis. We discussed the limitations of radiographic studies such as CT or radiopharmaceuticals (e.g., PET-CT) in identifying local, regional, and distant disease. I do not feel that a brain MRI is required in the absence of brain symptoms. We discussed additional factors specific to the patient s disease such as his pain syndrome and his desire to proceed at this time with potentially curative treatment over palliative or non-curative care. We reviewed the concept of multimodality care, its evolution in the treatment of non-small cell cancer, and the relative contribution of each modality to the treatment paradigm. We discussed the beneficial role of radiation therapy in the context of the evolving standards of care and national guidelines such as the NCCN, ACR, and ERMA including ones that address management during the COVID-19 pandemic. We reviewed the indications for radiation therapy to the gross intrathoracic disease, elective treatment of regional thoracic nodes, and concurrent chemotherapy (chemoradiation). I do not feel that close surveillance is reasonable for this patient s clinical parameters.    Regarding approaches to radiation therapy, we reviewed the concept of stereotactic body radiotherapy (SBRT;  potentially recommended) vs. conventional non-stereotactic radiation therapy (might be recommended). We reviewed the non-radioactive nature of radiation therapy delivered from a Varian TrueBeam linac and the pathophysiology of the treatment. We reviewed the concept of CT-based simulation on a dedicated GE scanner using indexed torso immobilization to improve the reproducibility of the daily setup and stability during the daily treatment. After advanced computerized treatment planning, we would deliver treatment with multileaf collimation, image guidance, and motion management techniques (e.g., 4D-CT). We likely would use either static-gantry intensity-modulated radiation therapy (IMRT) or volumetric modulated arc therapy (VMAT) RapidArc radiation therapy to provide an appropriate distribution of dose. These techniques permit good coverage of complex target tissues while maintaining adequate sparing of adjacent normal critical structures such as the remaining normal lung, primary airways, esophagus, brachial plexus, heart, spinal cord, lymphovascular structures, chest wall, skin, and other structures. I would not use protons, TomoTherapy, brachytherapy, or radioprotectant agents. I do not feel that these methods offer a clear benefit for the treatment lung cancer and the absence of complicating anatomical geometry or comorbid conditions. The patient appears to have no absolute contraindications to radiation therapy.    We discussed in detail the relative risks, benefits, rationale, potential side effects, alternatives, and adjuncts to radiation therapy for large, central lung cancer. The side effects may be acute or chronic, and may be progressive, painful, and negatively impact the patient s long-term quality of life. They may require medical or surgical intervention. The patient has a relatively moderate risk of symptomatic acute toxicity and a significantly elevated risk of chronic/delay toxicity from SBRT (he has  a high risk of acute toxicity from conventional radiation therapy). The toxicities include but are not limited to compromises of the structures noted above, pneumonitis, oxygen dependence, tracheal fistula, bronchial fistula, catastrophic hemorrhage of major vessels, pericarditis, coronary artery disease, myocardial infarction, esophagitis, esophageal fistula, transverse myelitis, brachial plexopathy, lymphedema, pleural effusion, erythema, infection, pain, soft tissue necrosis, bone necrosis, chest wall breakdown, and toxicities to other nearby organ systems as well as systemic toxicities such as fatigue and long-term risks such as second malignancy. His steroid use and pre-existing co-morbidities are among the factors that can significantly increase the risk and impact of toxicity. The patient wished to proceed as noted below. We answered all questions to his satisfaction.    Plan   1) Chest CT: Obtain a repeat study with contrast immediately before the patient's consultation with medical oncology.   2) Medical oncology: Pending consultation on 12/28/2020.  3) Simulation: Anticipate CT-based simulation of the patient in late 12/2020 or early 1/2021 based on the diagnostic CT findings and recommendation of medical oncology.  4) Radiation therapy: Based on the new CT images and findings at our CT-based simulation, will consider SBRT (high-risk because of size and secondarily because of location) vs. conventional radiation therapy.      Reji Zavaleta M.D., Ph.D.  Department of Radiation Oncology  Tel: [phone]  Fax: [phone]    cc:  Roman Littlejohn M.D. (pulmonary medicine)  Itz Ramos M.D., Ph.D. (medical oncology)  Pablo Hyman M.D. (gastroenterology)  Jimenez Colon M.D. (medicine)

## 2020-12-16 ENCOUNTER — TELEPHONE (OUTPATIENT)
Dept: RADIATION ONCOLOGY | Facility: HOSPITAL | Age: 80
End: 2020-12-16

## 2020-12-16 NOTE — TELEPHONE ENCOUNTER
Pt called regarding the CT order that was sent to [facility].  He has not heard from them yet to schedule.  He is just checking in. Call placed to  Radiology.   Message left.  Order re-faxed.   Colette Castillo RN

## 2020-12-23 ENCOUNTER — OFFICE VISIT (OUTPATIENT)
Dept: INTERNAL MEDICINE | Facility: OTHER | Age: 80
End: 2020-12-23
Attending: INTERNAL MEDICINE
Payer: MEDICARE

## 2020-12-23 VITALS
BODY MASS INDEX: 25.95 KG/M2 | HEIGHT: 64 IN | SYSTOLIC BLOOD PRESSURE: 118 MMHG | WEIGHT: 152 LBS | DIASTOLIC BLOOD PRESSURE: 76 MMHG | HEART RATE: 56 BPM | RESPIRATION RATE: 20 BRPM | TEMPERATURE: 96.6 F

## 2020-12-23 DIAGNOSIS — M35.3 PMR (POLYMYALGIA RHEUMATICA) (H): ICD-10-CM

## 2020-12-23 DIAGNOSIS — R73.09 ABNORMAL GLUCOSE: ICD-10-CM

## 2020-12-23 DIAGNOSIS — R47.1 DYSARTHRIA: ICD-10-CM

## 2020-12-23 DIAGNOSIS — C34.90 PRIMARY MUCINOUS ADENOCARCINOMA OF LUNG (H): ICD-10-CM

## 2020-12-23 DIAGNOSIS — E78.5 HYPERLIPIDEMIA, UNSPECIFIED HYPERLIPIDEMIA TYPE: ICD-10-CM

## 2020-12-23 DIAGNOSIS — I10 HYPERTENSION, UNSPECIFIED TYPE: Primary | ICD-10-CM

## 2020-12-23 DIAGNOSIS — C34.91 PRIMARY LUNG ADENOCARCINOMA, RIGHT (H): ICD-10-CM

## 2020-12-23 PROBLEM — R91.8 PULMONARY NODULES: Status: RESOLVED | Noted: 2020-09-28 | Resolved: 2020-12-23

## 2020-12-23 LAB
CHOLEST SERPL-MCNC: 180 MG/DL
CREAT SERPL-MCNC: 0.96 MG/DL (ref 0.7–1.3)
ERYTHROCYTE [SEDIMENTATION RATE] IN BLOOD BY WESTERGREN METHOD: 10 MM/H (ref 1–10)
GFR SERPL CREATININE-BSD FRML MDRD: 75 ML/MIN/{1.73_M2}
HDLC SERPL-MCNC: 56 MG/DL (ref 23–92)
LDLC SERPL CALC-MCNC: 99 MG/DL
NONHDLC SERPL-MCNC: 124 MG/DL
TRIGL SERPL-MCNC: 125 MG/DL

## 2020-12-23 PROCEDURE — 85652 RBC SED RATE AUTOMATED: CPT | Mod: ZL | Performed by: INTERNAL MEDICINE

## 2020-12-23 PROCEDURE — G0463 HOSPITAL OUTPT CLINIC VISIT: HCPCS

## 2020-12-23 PROCEDURE — 99215 OFFICE O/P EST HI 40 MIN: CPT | Performed by: INTERNAL MEDICINE

## 2020-12-23 PROCEDURE — 36415 COLL VENOUS BLD VENIPUNCTURE: CPT | Mod: ZL | Performed by: INTERNAL MEDICINE

## 2020-12-23 PROCEDURE — 82565 ASSAY OF CREATININE: CPT | Mod: ZL | Performed by: INTERNAL MEDICINE

## 2020-12-23 PROCEDURE — 80061 LIPID PANEL: CPT | Mod: ZL | Performed by: INTERNAL MEDICINE

## 2020-12-23 ASSESSMENT — ENCOUNTER SYMPTOMS
SINUS PRESSURE: 0
ABDOMINAL DISTENTION: 0
ADENOPATHY: 0
JOINT SWELLING: 0
SHORTNESS OF BREATH: 0
LIGHT-HEADEDNESS: 0
COUGH: 0
CONSTIPATION: 0
CONFUSION: 0
BLOOD IN STOOL: 0
NECK PAIN: 0
TREMORS: 0
HEADACHES: 0
NAUSEA: 0
SLEEP DISTURBANCE: 0
CHILLS: 0
FEVER: 0
AGITATION: 0
NUMBNESS: 0
TROUBLE SWALLOWING: 0
NECK STIFFNESS: 0
ABDOMINAL PAIN: 0
FREQUENCY: 1
SORE THROAT: 0
DIZZINESS: 0
HALLUCINATIONS: 0
DIFFICULTY URINATING: 0
SEIZURES: 0
DIAPHORESIS: 0
WHEEZING: 0
BACK PAIN: 0
SINUS PAIN: 0
DIARRHEA: 0
SPEECH DIFFICULTY: 0
EYE PAIN: 0
FLANK PAIN: 0
PALPITATIONS: 0
DYSURIA: 0
UNEXPECTED WEIGHT CHANGE: 0
RHINORRHEA: 0
FATIGUE: 0
VOICE CHANGE: 0
BRUISES/BLEEDS EASILY: 0
MYALGIAS: 1
VOMITING: 0
HEMATURIA: 0
NERVOUS/ANXIOUS: 0
EYE REDNESS: 0
ARTHRALGIAS: 1
APPETITE CHANGE: 0
WOUND: 0
WEAKNESS: 0
CHEST TIGHTNESS: 0

## 2020-12-23 ASSESSMENT — MIFFLIN-ST. JEOR: SCORE: 1302.53

## 2020-12-23 ASSESSMENT — PAIN SCALES - GENERAL: PAINLEVEL: NO PAIN (1)

## 2020-12-23 NOTE — PROGRESS NOTES
Chief Complaint:  This patient is here for a comprehensive review of their multiple medical problems, renewal of medications and update on necessary health maintenance issues.      HPI: He is in today for complete evaluation and review of his chronic medical problems.  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  He will be getting radiation and will be seeing oncology.  Based on the location, this is not resectable.  He presented to me in May or June with symptoms of PMR.  He was convinced that he had PMR because his sister had the same disease.  We put him on prednisone and he had variable results so in July I did a malignant work-up at which time we found the abnormal CT of the chest.  It took some time to get adequate tissue to make the diagnosis, but a recent transesophageal biopsy confirmed the adenocarcinoma.    He is doing well with his PMR symptoms.  He has some occasional arthralgias and myalgias but nothing significant.  He has a history of hypertension and is on 2 drug therapy for control of his blood pressure.  He has excellent control and no side effects.  He has a history of cerebral microvascular disease and coronary artery calcification.  He is on moderate intensity statin therapy that he tolerates without difficulty.    He has a history of elevated PSA with BPH and is status post prostatectomy.  He still has lower urinary tract symptoms but they are acceptable.  He does not want to do a PSA this year.    Medications are reconciled.  Past medical history, past surgical history, family history and social histories are reviewed and up-to-date.  Immunizations are up-to-date.  Colonoscopy is up-to-date.    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     PMR (polymyalgia rheumatica) (H)      Primary mucinous adenocarcinoma of lung (H)      Spinal stenosis of lumbar region without neurogenic claudication     No Comments Provided     Uncomplicated alcohol abuse     No Comments Provided       Past Surgical History:   Procedure Laterality Date     ARTHROPLASTY KNEE Left     01/09     ARTHROPLASTY SHOULDER Left 2018     ARTHROSCOPY KNEE Left     12/05     COLONOSCOPY      2001, 2006, 2011,Adenomatous 2001, subsequent WNL     COLONOSCOPY  11/21/2014 11/21/2014,Adenoma     COLONOSCOPY  01/07/2020    tubular adenomas, follow up 5 years     HERNIORRHAPHY INGUINAL BILATERAL       PROSTATECTOMY SUPRAPUBIC  2007     UPPER GI ENDOSCOPY  2005       Current Outpatient Medications   Medication Sig Dispense Refill     atenolol (TENORMIN) 50 MG tablet Take 1 tablet (50 mg) by mouth daily 90 tablet 3     Multiple Vitamin (MULTI-VITAMINS) TABS Take 1 tablet by mouth daily       predniSONE (DELTASONE) 2.5 MG tablet Take 3 tablets (7.5 mg) by mouth daily 100 tablet 4     simvastatin (ZOCOR) 40 MG tablet Take 1 tablet (40 mg) by mouth At Bedtime 90 tablet 3     valsartan-hydrochlorothiazide (DIOVAN HCT) 320-12.5 MG tablet Take 1 tablet by mouth daily 90 tablet 3       Allergies   Allergen Reactions     Ace Inhibitors Cough     Hydrocodone-Acetaminophen      Other reaction(s): Insomnia  Keeps him awake      Sulfamethoxazole-Trimethoprim Rash     Sulfasalazine Rash       Family History   Problem Relation Age of Onset     Diabetes Mother      Chronic Obstructive Pulmonary Disease Mother      Myocardial Infarction Father      Other - See Comments Son         MI during GXT     Other - See Comments Sister         PMR     Cancer No family hx of        Social History     Socioeconomic History      Marital status:      Spouse name: Yoko     Number of children: 2     Years of education: Not on file     Highest education level: Not on file   Occupational History     Occupation: Pharmacist     Comment: retired   Social Needs     Financial resource strain: Not on file     Food insecurity     Worry: Not on file     Inability: Not on file     Transportation needs     Medical: Not on file     Non-medical: Not on file   Tobacco Use     Smoking status: Never Smoker     Smokeless tobacco: Never Used     Tobacco comment: smoked a pipe in college for 2-3 years (seldom)   Substance and Sexual Activity     Alcohol use: Not Currently     Alcohol/week: 28.0 standard drinks     Frequency: Never     Comment: Last use was 7/4/20     Drug use: No     Sexual activity: Not Currently   Lifestyle     Physical activity     Days per week: Not on file     Minutes per session: Not on file     Stress: Not on file   Relationships     Social connections     Talks on phone: Not on file     Gets together: Not on file     Attends [employer] service: Not on file     Active member of club or organization: Not on file     Attends meetings of clubs or organizations: Not on file     Relationship status: Not on file     Intimate partner violence     Fear of current or ex partner: Not on file     Emotionally abused: Not on file     Physically abused: Not on file     Forced sexual activity: Not on file   Other Topics Concern     Parent/sibling w/ CABG, MI or angioplasty before 65F 55M? Not Asked   Social History Narrative    Pt is ; lives in [place]; retired as a pharmacist at the hospital.       Review of Systems   Constitutional: Negative for appetite change, chills, diaphoresis, fatigue, fever and unexpected weight change.   HENT: Negative for ear pain, rhinorrhea, sinus pressure, sinus pain, sore throat, trouble swallowing and voice change.    Eyes: Negative for pain, redness and visual disturbance.   Respiratory:  Negative for cough, chest tightness, shortness of breath and wheezing.    Cardiovascular: Negative for chest pain, palpitations and leg swelling.   Gastrointestinal: Negative for abdominal distention, abdominal pain, blood in stool, constipation, diarrhea, nausea and vomiting.   Endocrine: Negative for cold intolerance and heat intolerance.   Genitourinary: Positive for frequency. Negative for difficulty urinating, dysuria, flank pain and hematuria.   Musculoskeletal: Positive for arthralgias and myalgias. Negative for back pain, joint swelling, neck pain and neck stiffness.   Skin: Negative for rash and wound.   Allergic/Immunologic: Negative for immunocompromised state.   Neurological: Negative for dizziness, tremors, seizures, syncope, speech difficulty, weakness, light-headedness, numbness and headaches.   Hematological: Negative for adenopathy. Does not bruise/bleed easily.   Psychiatric/Behavioral: Negative for agitation, behavioral problems, confusion, hallucinations and sleep disturbance. The patient is not nervous/anxious.        Physical Exam  Vitals signs and nursing note reviewed.   Constitutional:       General: He is not in acute distress.     Appearance: He is well-developed. He is not diaphoretic.   HENT:      Head: Normocephalic.      Right Ear: Tympanic membrane, ear canal and external ear normal.      Left Ear: Tympanic membrane, ear canal and external ear normal.      Nose: Nose normal.      Mouth/Throat:      Pharynx: No oropharyngeal exudate.   Eyes:      Conjunctiva/sclera: Conjunctivae normal.      Pupils: Pupils are equal, round, and reactive to light.   Neck:      Musculoskeletal: Normal range of motion and neck supple.      Thyroid: No thyroid mass or thyromegaly.      Vascular: Normal carotid pulses. No carotid bruit or JVD.      Trachea: No tracheal deviation.   Cardiovascular:      Rate and Rhythm: Normal rate and regular rhythm.      Heart sounds: Normal heart sounds. No murmur. No  friction rub. No gallop.    Pulmonary:      Effort: Pulmonary effort is normal. No respiratory distress.      Breath sounds: No stridor. Wheezing present. No decreased breath sounds, rhonchi or rales.      Comments: Minimal wheeze heard  Abdominal:      General: Bowel sounds are normal. There is no distension.      Palpations: Abdomen is soft. There is no mass.      Tenderness: There is no abdominal tenderness. There is no guarding or rebound.      Hernia: No hernia is present.   Genitourinary:     Penis: Normal.       Testes: Normal.      Prostate: Normal.      Rectum: Normal.      Comments: Prostate is slightly asymmetrical with more tissue on the right than the left.  No palpable abnormalities.  Musculoskeletal: Normal range of motion.      Right lower leg: No edema.      Left lower leg: No edema.   Lymphadenopathy:      Cervical: No cervical adenopathy.   Skin:     General: Skin is warm and dry.      Findings: No rash.   Neurological:      Mental Status: He is alert and oriented to person, place, and time.      Cranial Nerves: No cranial nerve deficit.      Sensory: No sensory deficit.      Motor: No abnormal muscle tone.      Coordination: Coordination normal.      Deep Tendon Reflexes: Reflexes normal.         Assessment:      ICD-10-CM    1. Hypertension, unspecified type  I10    2. Primary mucinous adenocarcinoma of lung (H)  C34.90    3. Hyperlipidemia, unspecified hyperlipidemia type  E78.5 Lipid Profile     Lipid Profile   4. Abnormal glucose  R73.09    5. Dysarthria  R47.1    6. PMR (polymyalgia rheumatica) (H)  M35.3 Sedimentation Rate (ESR)     Sedimentation Rate (ESR)   7. Primary lung adenocarcinoma, right (H)  C34.91 Creatinine        Plan: Medications will continue without change.  He will start radiation and will also have a visit with oncology forthcoming.  Lab in the form of a sed rate and lipid panel pending, I will send him a letter with any results and recommendations for change in his  prednisone as well as recommendations for follow-up.  Obviously follow-up with me anytime in the interim if there are further concerns or issues.

## 2020-12-23 NOTE — LETTER
December 23, 2020      Wilfrid Meneses  [address]  [place] [address]        Dear Art,    Below are the results of your recent labs:    Results for orders placed or performed in visit on 12/23/20   Sedimentation Rate (ESR)     Status: None   Result Value Ref Range    Sed Rate 10 1 - 10 mm/h   Lipid Profile     Status: None   Result Value Ref Range    Cholesterol 180 <200 mg/dL    Triglycerides 125 <150 mg/dL    HDL Cholesterol 56 23 - 92 mg/dL    LDL Cholesterol Calculated 99 <100 mg/dL    Non HDL Cholesterol 124 <130 mg/dL   Creatinine     Status: None   Result Value Ref Range    Creatinine 0.96 0.70 - 1.30 mg/dL    GFR Estimate 75 >60 mL/min/[1.73_m2]    GFR Estimate If Black >90 >60 mL/min/[1.73_m2]        Your blood tests look great.  Congratulations on this report.  Decrease your prednisone to 5 mg daily.  Let us recheck your sed rate again in a month or so.    Sincerely,        Jimenez Colon MD  Internal Medicine  [facility]

## 2020-12-23 NOTE — NURSING NOTE
"Chief Complaint   Patient presents with     Physical       Initial /76 (BP Location: Right arm, Patient Position: Sitting, Cuff Size: Adult Regular)   Pulse 56   Temp 96.6  F (35.9  C) (Tympanic)   Resp 20   Ht 1.613 m (5' 3.5\")   Wt 68.9 kg (152 lb)   BMI 26.50 kg/m   Estimated body mass index is 26.5 kg/m  as calculated from the following:    Height as of this encounter: 1.613 m (5' 3.5\").    Weight as of this encounter: 68.9 kg (152 lb).  Medication Reconciliation: complete    Asha Coker LPN  "

## 2020-12-26 ENCOUNTER — TELEPHONE (OUTPATIENT)
Dept: RADIATION ONCOLOGY | Facility: HOSPITAL | Age: 80
End: 2020-12-26

## 2020-12-26 NOTE — TELEPHONE ENCOUNTER
Telephone Call      After multiple attempts, I reached the patient by telephone (home). He continues to feel well. We reviewed the results of his recent chest CT. As anticipated, the study demonstrated interval progression of the primary disease. No other suspicious disease was present. The patient will proceed with his schedule consultation with medical oncology on Monday. Afterward, decision will be made about whether the patient receives radiation alone or chemoradiation, and whether the radiation therapy consists of stereotactic body radiotherapy (SBRT) or conventional radiation therapy. The large size of the primary disease carries a significant risk of morbidity from SBRT. The central location further increases that risk. He wished to proceed as recommended. I answered all questions to the patient's satisfaction.      Reji Zavaleta M.D., Ph.D.

## 2020-12-28 ENCOUNTER — TRANSFERRED RECORDS (OUTPATIENT)
Dept: HEALTH INFORMATION MANAGEMENT | Facility: OTHER | Age: 80
End: 2020-12-28

## 2020-12-29 ENCOUNTER — TELEPHONE (OUTPATIENT)
Dept: RADIATION ONCOLOGY | Facility: HOSPITAL | Age: 80
End: 2020-12-29

## 2020-12-29 NOTE — TELEPHONE ENCOUNTER
Telephone Call      After reviewing case with medical oncology, attempted multiple times to call patient. Message left on cell (unable to leave message at home). Plan is to proceed with concurrent chemoradiation for [id number] lung cancer and not consider SBRT for [id number] lung cancer.      Reji Zavaleta M.D., Ph.D.

## 2020-12-30 ENCOUNTER — ALLIED HEALTH/NURSE VISIT (OUTPATIENT)
Dept: RADIATION ONCOLOGY | Facility: HOSPITAL | Age: 80
End: 2020-12-30
Attending: RADIOLOGY
Payer: MEDICARE

## 2020-12-30 DIAGNOSIS — C34.91 PRIMARY LUNG ADENOCARCINOMA, RIGHT (H): ICD-10-CM

## 2020-12-30 DIAGNOSIS — C34.90 PRIMARY MUCINOUS ADENOCARCINOMA OF LUNG (H): Primary | ICD-10-CM

## 2020-12-30 PROCEDURE — 77334 RADIATION TREATMENT AID(S): CPT | Mod: 26 | Performed by: RADIOLOGY

## 2020-12-30 PROCEDURE — 77334 RADIATION TREATMENT AID(S): CPT | Performed by: RADIOLOGY

## 2020-12-30 PROCEDURE — 77263 THER RADIOLOGY TX PLNG CPLX: CPT | Performed by: RADIOLOGY

## 2020-12-30 NOTE — PROGRESS NOTES
Patient simulation completed today for Wilfrid Meneses 12/30/20.    Nathaniel Heard  December 30, 2020  10:49 AM

## 2020-12-30 NOTE — PROGRESS NOTES
[facility]  DEPARTMENT OF RADIATION ONCOLOGY  SIMULATION NOTE    Name: Wilfrid Meneses MRN: [id number]   : 1940 (80 year old)  Date of Service: 2020 Referring: Dr. Velazquez ref. provider found       Diagnosis and Cancer Staging  Primary mucinous adenocarcinoma of lung (H)  Staging form: Lung, AJCC 8th Edition  - Clinical stage from 2020: Stage IIIA (cT4, cN0, cM0) - Signed by Reji Zavaleta MD on 2020      Summary:    Mr. Meneses is a gentleman who is 80 years old and was referred by a pulmonary medicine for approximately 7-1/2 cm mass which is body in the esophagus rising from the superior segment of the right lower lobe in the perihilar region.  This is a so-called for central lesion and because of the size of the lesion patient is not felt to be a candidate for SBRT.  He will have typical radiation treatment with a prolonged fractionation of at least 4 to 5 weeks and possibly up to 6-1/2 to 7 weeks to be determined by .  His medical oncologist is Dr. Itz Ramos.  The patient was recently diagnosed with polymyalgia rheumatica and initially was started on 60 mg of prednisone daily.  He is tapered now to a 5 mg tab prednisone daily and has significant improvement in his strength ability to ambulate and much less fatigue.    Procedure:  The patient comes to clinic for simulation and radiation therapy for adenocarcinoma of the lung. cancer (curative intent). He felt well and offered no new complaints. We reviewed the relative risk, benefits, rationale, side effects, toxicities, and alternatives to postoperative radiation therapy to the . He wished to proceed with simulation and treatment. We obtained written consent for radiation therapy. We counseled the patient about the potential impact of COVID-19 on his treatment. He denied known exposure to COVID-19, risky behavior, or related symptoms including febrile, chest, gustatory, gastrointestinal, and systemic  complaints.    With the patient, we verified his identification, consent, site, and side of treatment. KPS: 70.     On examination, he appeared stable and in no acute distress. ENT had normal lip color and no hoarseness. Lungs had no cough, easy respirations that were regular, unlabored, and o use of accessory musculature.    We evaluated multiple positions for setup and treatment, and elected to simulate the patient in a modified supine position. We used orthogonal lasers to align him with the CT simulator. We immobilized the patient with a customized vacloc to improve the reproducibility and safety for daily radiation therapy. We placed radiopaque markers to assist in identifying topographical landmarks for simulation. We obtained  and axial CT imaging through the .  4-dimensional CAT  scanning was performed.  Therapy, treatment planning, and I used virtual simulation techniques to verify the adequacy of the CT images and to create a preliminary isocenter for setup of treatment.  For motion management of the mobile malignant disease and adjacent normal orgnas, we obtained a 4D-CT through the anticipated field of radiation therapy. We placed tattoos to jovany that isocenter on the patient's . He tolerated the procedure well and without complications.    We will use available diagnostic (e.g., for image fusion) and radiation therapy imaging studies for CT-based treatment planning. I anticipate utilizing a form of intensity-modulated radiation therapy, perhaps volumetric modulated radiation therapy (VMAT), to develop a computerized treatment plan whose dosimetric analysis (e.g., dose-volume histogram (DVH)) indicates adequate coverage of target tissues and sparing of nearby normal structures. We will coordinate care with medical oncology for chemotherapy delivered concurrently with radiation therapy (chemoradiation). The patient wished to proceed as recommended. We answered all questions to his satisfaction.    I  discussed with the patient potential short-term side effects during treatment which would include redness of the skin, possible sore throat difficulty swallowing due to esophageal effects from combined modality treatment as well as possible fatigue.  Late effects are extremely rare but could be more severe and could include esophageal stricture, radiation pneumonitis consisting of cough, shortness of breath, low grade fever, decreased  breathing capacity due to the radiation treatment.  Informed consent is obtained    Lowell Arechiga MD  Department of Radiation Oncology  Tel: [phone]  Fax: [phone]

## 2021-01-06 PROCEDURE — 77301 RADIOTHERAPY DOSE PLAN IMRT: CPT | Mod: 26 | Performed by: RADIOLOGY

## 2021-01-06 PROCEDURE — 77301 RADIOTHERAPY DOSE PLAN IMRT: CPT | Performed by: RADIOLOGY

## 2021-01-06 PROCEDURE — 77293 RESPIRATOR MOTION MGMT SIMUL: CPT | Mod: 26 | Performed by: RADIOLOGY

## 2021-01-06 PROCEDURE — 77300 RADIATION THERAPY DOSE PLAN: CPT | Performed by: RADIOLOGY

## 2021-01-06 PROCEDURE — 77300 RADIATION THERAPY DOSE PLAN: CPT | Mod: 26 | Performed by: RADIOLOGY

## 2021-01-06 PROCEDURE — 77338 DESIGN MLC DEVICE FOR IMRT: CPT | Mod: 26 | Performed by: RADIOLOGY

## 2021-01-06 PROCEDURE — 77338 DESIGN MLC DEVICE FOR IMRT: CPT | Performed by: RADIOLOGY

## 2021-01-06 PROCEDURE — 77293 RESPIRATOR MOTION MGMT SIMUL: CPT | Performed by: RADIOLOGY

## 2021-01-11 ENCOUNTER — APPOINTMENT (OUTPATIENT)
Dept: RADIATION ONCOLOGY | Facility: HOSPITAL | Age: 81
End: 2021-01-11
Attending: RADIOLOGY
Payer: MEDICARE

## 2021-01-11 ENCOUNTER — RESULTS ONLY (OUTPATIENT)
Dept: RADIATION ONCOLOGY | Facility: HOSPITAL | Age: 81
End: 2021-01-11

## 2021-01-11 LAB
RAD ONC ARIA COURSE ID: NORMAL
RAD ONC ARIA COURSE LAST TREATMENT DATE: NORMAL
RAD ONC ARIA COURSE START DATE: NORMAL
RAD ONC ARIA COURSE TREATMENT ELAPSED DAYS: 0
RAD ONC ARIA FIRST TREATMENT DATE: NORMAL
RAD ONC ARIA PLAN FRACTIONS TREATED TO DATE: 1
RAD ONC ARIA PLAN ID: NORMAL
RAD ONC ARIA PLAN NAME: NORMAL
RAD ONC ARIA PLAN PRESCRIBED DOSE PER FRACTION: 2 GY
RAD ONC ARIA PLAN TOTAL FRACTIONS PRESCRIBED: 33
RAD ONC ARIA PLAN TOTAL PRESCRIBED DOSE: 6600 CGY
RAD ONC ARIA REFERENCE POINT DOSAGE GIVEN TO DATE: NORMAL GY
RAD ONC ARIA REFERENCE POINT DOSAGE GIVEN TO DATE: NORMAL GY
RAD ONC ARIA REFERENCE POINT ID: NORMAL
RAD ONC ARIA REFERENCE POINT ID: NORMAL

## 2021-01-11 PROCEDURE — 77386 HC IMRT TREATMENT DELIVERY, COMPLEX: CPT | Performed by: RADIOLOGY

## 2021-01-11 PROCEDURE — 77014 PR CT GUIDE FOR PLACEMENT RADIATION THERAPY FIELDS: CPT | Mod: 26 | Performed by: RADIOLOGY

## 2021-01-12 ENCOUNTER — RESULTS ONLY (OUTPATIENT)
Dept: RADIATION ONCOLOGY | Facility: HOSPITAL | Age: 81
End: 2021-01-12

## 2021-01-12 ENCOUNTER — APPOINTMENT (OUTPATIENT)
Dept: RADIATION ONCOLOGY | Facility: HOSPITAL | Age: 81
End: 2021-01-12
Payer: MEDICARE

## 2021-01-12 LAB
RAD ONC ARIA COURSE ID: NORMAL
RAD ONC ARIA COURSE LAST TREATMENT DATE: NORMAL
RAD ONC ARIA COURSE START DATE: NORMAL
RAD ONC ARIA COURSE TREATMENT ELAPSED DAYS: 1
RAD ONC ARIA FIRST TREATMENT DATE: NORMAL
RAD ONC ARIA PLAN FRACTIONS TREATED TO DATE: 2
RAD ONC ARIA PLAN ID: NORMAL
RAD ONC ARIA PLAN NAME: NORMAL
RAD ONC ARIA PLAN PRESCRIBED DOSE PER FRACTION: 2 GY
RAD ONC ARIA PLAN TOTAL FRACTIONS PRESCRIBED: 33
RAD ONC ARIA PLAN TOTAL PRESCRIBED DOSE: 6600 CGY
RAD ONC ARIA REFERENCE POINT DOSAGE GIVEN TO DATE: NORMAL GY
RAD ONC ARIA REFERENCE POINT DOSAGE GIVEN TO DATE: NORMAL GY
RAD ONC ARIA REFERENCE POINT ID: NORMAL
RAD ONC ARIA REFERENCE POINT ID: NORMAL

## 2021-01-12 PROCEDURE — 77014 PR CT GUIDE FOR PLACEMENT RADIATION THERAPY FIELDS: CPT | Mod: 26 | Performed by: RADIOLOGY

## 2021-01-12 PROCEDURE — 77386 HC IMRT TREATMENT DELIVERY, COMPLEX: CPT | Performed by: RADIOLOGY

## 2021-01-13 ENCOUNTER — OFFICE VISIT (OUTPATIENT)
Dept: RADIATION ONCOLOGY | Facility: HOSPITAL | Age: 81
End: 2021-01-13
Payer: MEDICARE

## 2021-01-13 ENCOUNTER — APPOINTMENT (OUTPATIENT)
Dept: RADIATION ONCOLOGY | Facility: HOSPITAL | Age: 81
End: 2021-01-13
Payer: MEDICARE

## 2021-01-13 ENCOUNTER — RESULTS ONLY (OUTPATIENT)
Dept: RADIATION ONCOLOGY | Facility: HOSPITAL | Age: 81
End: 2021-01-13

## 2021-01-13 VITALS — BODY MASS INDEX: 26.87 KG/M2 | WEIGHT: 154.1 LBS | TEMPERATURE: 97.8 F

## 2021-01-13 DIAGNOSIS — C34.90 PRIMARY MUCINOUS ADENOCARCINOMA OF LUNG (H): Primary | ICD-10-CM

## 2021-01-13 LAB
RAD ONC ARIA COURSE ID: NORMAL
RAD ONC ARIA COURSE LAST TREATMENT DATE: NORMAL
RAD ONC ARIA COURSE START DATE: NORMAL
RAD ONC ARIA COURSE TREATMENT ELAPSED DAYS: 2
RAD ONC ARIA FIRST TREATMENT DATE: NORMAL
RAD ONC ARIA PLAN FRACTIONS TREATED TO DATE: 3
RAD ONC ARIA PLAN ID: NORMAL
RAD ONC ARIA PLAN NAME: NORMAL
RAD ONC ARIA PLAN PRESCRIBED DOSE PER FRACTION: 2 GY
RAD ONC ARIA PLAN TOTAL FRACTIONS PRESCRIBED: 33
RAD ONC ARIA PLAN TOTAL PRESCRIBED DOSE: 6600 CGY
RAD ONC ARIA REFERENCE POINT DOSAGE GIVEN TO DATE: NORMAL GY
RAD ONC ARIA REFERENCE POINT DOSAGE GIVEN TO DATE: NORMAL GY
RAD ONC ARIA REFERENCE POINT ID: NORMAL
RAD ONC ARIA REFERENCE POINT ID: NORMAL

## 2021-01-13 PROCEDURE — 77386 HC IMRT TREATMENT DELIVERY, COMPLEX: CPT | Performed by: RADIOLOGY

## 2021-01-13 PROCEDURE — 77014 PR CT GUIDE FOR PLACEMENT RADIATION THERAPY FIELDS: CPT | Mod: 26 | Performed by: RADIOLOGY

## 2021-01-13 RX ORDER — PROCHLORPERAZINE MALEATE 10 MG
10 TABLET ORAL EVERY 6 HOURS PRN
COMMUNITY
Start: 2021-01-12 | End: 2022-01-06

## 2021-01-13 RX ORDER — PREDNISONE 5 MG/1
5 TABLET ORAL DAILY
COMMUNITY
Start: 2020-12-22 | End: 2021-01-25

## 2021-01-13 ASSESSMENT — PAIN SCALES - GENERAL: PAINLEVEL: NO PAIN (0)

## 2021-01-13 NOTE — PROGRESS NOTES
[facility]  DEPARTMENT OF RADIATION ONCOLOGY   ON TREATMENT VISIT (OTV) NOTE    Name: Wilfrid Meneses MRN: [id number]   : 1940 (80 year old)  Date of Service: 2021 Referring: Dr. Littlejohn, Dr. Ramos, and   Dr. 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 Pulmonary medicine referred the patient for consultation about the role of radiotherapy as potentially curative treatment for the asymptomatic disease (cT3/4N0M)). The disease was ultimately felt to not be amenable to SBRT and treated better with concurrent chemoradiation. 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     Subjective: Receiving concurrent chemoradiation (weekly carboplatin/Taxol). Feels relatively well. Feels that he has no toxicity from radiation therapy. Offers no new complaints. Reports no toxicity from treatment. Tolerating a taper of prednisone for polymyalgia rheumatic (PMR). Reports no increase of symptoms attributed to PMR. Baseline breathing and swallowing. Denies known exposure to COVID-19, risky behaviors, or related symptoms including febrile, chest, gustatory, gastrointestinal, and systemic complaints.    ROS (score of 0 indicates that symptom is at baseline for most sections below): See Flowsheet for additional comments as indicated.  No Pain (0)  Nutrition Alteration  Diet Type: Patient's Preference  Anorexia NCI: 0 - None  Skin  Skin  Reaction: 0 - No changes  ENT and Mouth Exam  Mucositis - Current: 0 - None   Mucositis - Internal Severity: 0 - None   Esophagitis: 0 - None  Cardiovascular  Respiratory effort: 1 - Normal - without distress  Gastrointestinal  Nausea: 0 - None  Vomitin - No vomiting (ons)  Diarrhea: 0 - None  Psychosocial  Mood - Anxiety: 0 - Normal  Mood - Depression: 0 - Normal  Pyschosocial Note: fatigue: 0/10     Objective:  Vitals: Temp 97.8  F (36.6  C) (Temporal)   Wt 69.9 kg (154 lb 1.6 oz)   BMI 26.87 kg/m    Wt Readings from Last 3 Encounters:   21 69.9 kg (154 lb 1.6 oz)   20 68.9 kg (152 lb)   20 69.4 kg (153 lb 1.6 oz)     KPS: 80.  Evaluation (also seen a linac during the week):  - General: Appears rested. Appears staged age. Appears in good general health, well-developed, well-nourished, and in no acute distress. Does not appear acutely or chronically ill. Appears well groomed.  - Head: No treatment-related alopecia. Normocephalic.  = Eyes: Anicteric, eyelids symmetric, vision grossly intact.  - ENT: Slight hoarseness. Tremulous voice. Good clarity. Good volume. No coughing or aspiration.  - Neck: Symmetric, trachea midline.  - Lymphatics: No parotid, peripartoid, cervical, supraclavicular, or axillary adenopathy.  - Chest/Breasts: No port. No implanted cardiac device.   - Lungs: Clear to auscultation, easy respirations.  - Cardiovascular: Regular rate. No jugulovenous distension.  - MSK: Shoulder range of motion is good. No arm edema or hand edema. Good muscle tone.  - Integument: No jaundice or pallor.  - Neurologic: Alert, oriented, fluent. Memory grossly intact. Motor grossly intact. Sensory grossly intact. No ataxia.  - Psychologic: Well spoken about his cancer and therapies. Appropriately anxious about radiation therapy and sad about diagnosis. Pleasant, cooperative, insightful, concrete, and good judgment.    Impression: Routine tolerance to radiation therapy. Grade 0 toxicity  attributable to radiation therapy.    Plan:  1) GI: Grade 0. Swallowing well. No esophagitis. Anticipate possible significant discomfort and pain during the coming weeks, perhaps starting late next week. Will initiate pain care and dietary changes as indicated.  2) Pulmonary: Grade 0 toxicity. Relatively modest risk of severe acute toxicity. Continue to observe.  3) Skin: Grade 0. Reviewed routine skin care. Anticipate possible escalation of skin reaction (erythema, hyperpigmentation, desquamation, and pain) with a peak about 1-2 weeks after the end of treatment.   4) Pain: Grade 0. Anticipate possible use of non-opoid analgesics for pain relief as local toxicities progress.  5) FEN:  Grade 0. Continue to monitor. Modify diet as indicated by pain and acuteness of weight loss.  6) Chemotherapy: For concurrent chemoradiation, continue to coordinate with medical oncology their delivery of every week with supportive care and labs as indicated. C1D1 was 1/12/2021 for carboplatin/Taxo.       7) Radiation therapy: No new interventions. No boost/cone down pending. Reviewed graphical 3D plan with attention to dose to the esophagus, lungs, heart, and spinal cord. Reviewed anticipated time course, nature, and potential interventions for managing toxicity during and after radiation therapy. Reviewed the role of imaging in following his disease. Patient aware of onsite and telemedicine coverage of our clinic. He wished to proceed with treatment. All questions answered to his satisfaction.     Weekly Review: Reviewed available labs and diagnostic studies. Interpreted as adequate to proceed with radiation therapy. Discussed management with Therapy, Treatment Planning, and Nursing. Reviewed weekly routine QA, linac imaging, and clinical set up are adequate to proceed with radiation therapy as prescribed.        Recent Labs   Lab Test 12/23/20  0835 07/31/20  0809 06/01/20  0839 12/03/19  1437 11/30/18  0859 01/24/18  1257  01/24/18  1148   HGB  --  12.1*  --   --   --   --  14.5   PLT  --  237  --   --   --   --  231   WBC  --  8.8  --   --   --   --   --    NA  --   --   --  131* 132* 133  --    POTASSIUM  --   --   --  4.1 4.4 4.3  --    CHLORIDE  --   --   --  94* 94* 96*  --    CO2  --   --   --  28 30 26  --    GLC  --   --  92 99 119* 101  --    BUN  --   --   --  16 18 17  --    CR 0.96 1.04  --  1.01 1.02 0.93  --    ESTIVEN  --   --   --  9.8 9.9 9.6  --      Current Outpatient Medications   Medication     prochlorperazine (COMPAZINE) 10 MG tablet     atenolol (TENORMIN) 50 MG tablet     Multiple Vitamin (MULTI-VITAMINS) TABS     predniSONE (DELTASONE) 5 MG tablet     simvastatin (ZOCOR) 40 MG tablet     valsartan-hydrochlorothiazide (DIOVAN HCT) 320-12.5 MG tablet     No current facility-administered medications for this visit.      Allergies: Ace inhibitors, Hydrocodone-acetaminophen, Sulfamethoxazole-trimethoprim, and Sulfasalazine      Reji Zavaleta M.D., Ph.D.  Department of Radiation Oncology  Tel: [phone]  Fax: [phone]

## 2021-01-14 ENCOUNTER — APPOINTMENT (OUTPATIENT)
Dept: RADIATION ONCOLOGY | Facility: HOSPITAL | Age: 81
End: 2021-01-14
Payer: MEDICARE

## 2021-01-14 ENCOUNTER — RESULTS ONLY (OUTPATIENT)
Dept: RADIATION ONCOLOGY | Facility: HOSPITAL | Age: 81
End: 2021-01-14

## 2021-01-14 LAB
RAD ONC ARIA COURSE ID: NORMAL
RAD ONC ARIA COURSE LAST TREATMENT DATE: NORMAL
RAD ONC ARIA COURSE START DATE: NORMAL
RAD ONC ARIA COURSE TREATMENT ELAPSED DAYS: 3
RAD ONC ARIA FIRST TREATMENT DATE: NORMAL
RAD ONC ARIA PLAN FRACTIONS TREATED TO DATE: 4
RAD ONC ARIA PLAN ID: NORMAL
RAD ONC ARIA PLAN NAME: NORMAL
RAD ONC ARIA PLAN PRESCRIBED DOSE PER FRACTION: 2 GY
RAD ONC ARIA PLAN TOTAL FRACTIONS PRESCRIBED: 33
RAD ONC ARIA PLAN TOTAL PRESCRIBED DOSE: 6600 CGY
RAD ONC ARIA REFERENCE POINT DOSAGE GIVEN TO DATE: NORMAL GY
RAD ONC ARIA REFERENCE POINT DOSAGE GIVEN TO DATE: NORMAL GY
RAD ONC ARIA REFERENCE POINT ID: NORMAL
RAD ONC ARIA REFERENCE POINT ID: NORMAL

## 2021-01-14 PROCEDURE — 77014 PR CT GUIDE FOR PLACEMENT RADIATION THERAPY FIELDS: CPT | Mod: 26 | Performed by: RADIOLOGY

## 2021-01-14 PROCEDURE — 77386 HC IMRT TREATMENT DELIVERY, COMPLEX: CPT | Performed by: RADIOLOGY

## 2021-01-15 ENCOUNTER — APPOINTMENT (OUTPATIENT)
Dept: RADIATION ONCOLOGY | Facility: HOSPITAL | Age: 81
End: 2021-01-15
Payer: MEDICARE

## 2021-01-15 ENCOUNTER — RESULTS ONLY (OUTPATIENT)
Dept: RADIATION ONCOLOGY | Facility: HOSPITAL | Age: 81
End: 2021-01-15

## 2021-01-15 LAB
RAD ONC ARIA COURSE ID: NORMAL
RAD ONC ARIA COURSE LAST TREATMENT DATE: NORMAL
RAD ONC ARIA COURSE START DATE: NORMAL
RAD ONC ARIA COURSE TREATMENT ELAPSED DAYS: 4
RAD ONC ARIA FIRST TREATMENT DATE: NORMAL
RAD ONC ARIA PLAN FRACTIONS TREATED TO DATE: 5
RAD ONC ARIA PLAN ID: NORMAL
RAD ONC ARIA PLAN NAME: NORMAL
RAD ONC ARIA PLAN PRESCRIBED DOSE PER FRACTION: 2 GY
RAD ONC ARIA PLAN TOTAL FRACTIONS PRESCRIBED: 33
RAD ONC ARIA PLAN TOTAL PRESCRIBED DOSE: 6600 CGY
RAD ONC ARIA REFERENCE POINT DOSAGE GIVEN TO DATE: NORMAL GY
RAD ONC ARIA REFERENCE POINT DOSAGE GIVEN TO DATE: NORMAL GY
RAD ONC ARIA REFERENCE POINT ID: NORMAL
RAD ONC ARIA REFERENCE POINT ID: NORMAL

## 2021-01-15 PROCEDURE — 77427 RADIATION TX MANAGEMENT X5: CPT | Performed by: RADIOLOGY

## 2021-01-15 PROCEDURE — 77014 PR CT GUIDE FOR PLACEMENT RADIATION THERAPY FIELDS: CPT | Mod: 26 | Performed by: RADIOLOGY

## 2021-01-15 PROCEDURE — 77336 RADIATION PHYSICS CONSULT: CPT | Performed by: RADIOLOGY

## 2021-01-15 PROCEDURE — 77386 HC IMRT TREATMENT DELIVERY, COMPLEX: CPT | Performed by: RADIOLOGY

## 2021-01-18 ENCOUNTER — APPOINTMENT (OUTPATIENT)
Dept: RADIATION ONCOLOGY | Facility: HOSPITAL | Age: 81
End: 2021-01-18
Payer: MEDICARE

## 2021-01-18 ENCOUNTER — RESULTS ONLY (OUTPATIENT)
Dept: RADIATION ONCOLOGY | Facility: HOSPITAL | Age: 81
End: 2021-01-18

## 2021-01-18 LAB
RAD ONC ARIA COURSE ID: NORMAL
RAD ONC ARIA COURSE LAST TREATMENT DATE: NORMAL
RAD ONC ARIA COURSE START DATE: NORMAL
RAD ONC ARIA COURSE TREATMENT ELAPSED DAYS: 7
RAD ONC ARIA FIRST TREATMENT DATE: NORMAL
RAD ONC ARIA PLAN FRACTIONS TREATED TO DATE: 6
RAD ONC ARIA PLAN ID: NORMAL
RAD ONC ARIA PLAN NAME: NORMAL
RAD ONC ARIA PLAN PRESCRIBED DOSE PER FRACTION: 2 GY
RAD ONC ARIA PLAN TOTAL FRACTIONS PRESCRIBED: 33
RAD ONC ARIA PLAN TOTAL PRESCRIBED DOSE: 6600 CGY
RAD ONC ARIA REFERENCE POINT DOSAGE GIVEN TO DATE: NORMAL GY
RAD ONC ARIA REFERENCE POINT DOSAGE GIVEN TO DATE: NORMAL GY
RAD ONC ARIA REFERENCE POINT ID: NORMAL
RAD ONC ARIA REFERENCE POINT ID: NORMAL

## 2021-01-18 PROCEDURE — 77014 PR CT GUIDE FOR PLACEMENT RADIATION THERAPY FIELDS: CPT | Mod: 26 | Performed by: RADIOLOGY

## 2021-01-18 PROCEDURE — 77386 HC IMRT TREATMENT DELIVERY, COMPLEX: CPT | Performed by: RADIOLOGY

## 2021-01-19 ENCOUNTER — APPOINTMENT (OUTPATIENT)
Dept: RADIATION ONCOLOGY | Facility: HOSPITAL | Age: 81
End: 2021-01-19
Payer: MEDICARE

## 2021-01-19 ENCOUNTER — RESULTS ONLY (OUTPATIENT)
Dept: RADIATION ONCOLOGY | Facility: HOSPITAL | Age: 81
End: 2021-01-19

## 2021-01-19 LAB
RAD ONC ARIA COURSE ID: NORMAL
RAD ONC ARIA COURSE LAST TREATMENT DATE: NORMAL
RAD ONC ARIA COURSE START DATE: NORMAL
RAD ONC ARIA COURSE TREATMENT ELAPSED DAYS: 8
RAD ONC ARIA FIRST TREATMENT DATE: NORMAL
RAD ONC ARIA PLAN FRACTIONS TREATED TO DATE: 7
RAD ONC ARIA PLAN ID: NORMAL
RAD ONC ARIA PLAN NAME: NORMAL
RAD ONC ARIA PLAN PRESCRIBED DOSE PER FRACTION: 2 GY
RAD ONC ARIA PLAN TOTAL FRACTIONS PRESCRIBED: 33
RAD ONC ARIA PLAN TOTAL PRESCRIBED DOSE: 6600 CGY
RAD ONC ARIA REFERENCE POINT DOSAGE GIVEN TO DATE: NORMAL GY
RAD ONC ARIA REFERENCE POINT DOSAGE GIVEN TO DATE: NORMAL GY
RAD ONC ARIA REFERENCE POINT ID: NORMAL
RAD ONC ARIA REFERENCE POINT ID: NORMAL

## 2021-01-19 PROCEDURE — 77386 HC IMRT TREATMENT DELIVERY, COMPLEX: CPT | Performed by: RADIOLOGY

## 2021-01-19 PROCEDURE — 77014 PR CT GUIDE FOR PLACEMENT RADIATION THERAPY FIELDS: CPT | Mod: 26 | Performed by: RADIOLOGY

## 2021-01-20 ENCOUNTER — APPOINTMENT (OUTPATIENT)
Dept: RADIATION ONCOLOGY | Facility: HOSPITAL | Age: 81
End: 2021-01-20
Payer: MEDICARE

## 2021-01-20 ENCOUNTER — OFFICE VISIT (OUTPATIENT)
Dept: RADIATION ONCOLOGY | Facility: HOSPITAL | Age: 81
End: 2021-01-20
Payer: MEDICARE

## 2021-01-20 ENCOUNTER — RESULTS ONLY (OUTPATIENT)
Dept: RADIATION ONCOLOGY | Facility: HOSPITAL | Age: 81
End: 2021-01-20

## 2021-01-20 VITALS — WEIGHT: 153.4 LBS | BODY MASS INDEX: 26.75 KG/M2

## 2021-01-20 DIAGNOSIS — C34.90 PRIMARY MUCINOUS ADENOCARCINOMA OF LUNG (H): Primary | ICD-10-CM

## 2021-01-20 LAB
RAD ONC ARIA COURSE ID: NORMAL
RAD ONC ARIA COURSE LAST TREATMENT DATE: NORMAL
RAD ONC ARIA COURSE START DATE: NORMAL
RAD ONC ARIA COURSE TREATMENT ELAPSED DAYS: 9
RAD ONC ARIA FIRST TREATMENT DATE: NORMAL
RAD ONC ARIA PLAN FRACTIONS TREATED TO DATE: 8
RAD ONC ARIA PLAN ID: NORMAL
RAD ONC ARIA PLAN NAME: NORMAL
RAD ONC ARIA PLAN PRESCRIBED DOSE PER FRACTION: 2 GY
RAD ONC ARIA PLAN TOTAL FRACTIONS PRESCRIBED: 33
RAD ONC ARIA PLAN TOTAL PRESCRIBED DOSE: 6600 CGY
RAD ONC ARIA REFERENCE POINT DOSAGE GIVEN TO DATE: NORMAL GY
RAD ONC ARIA REFERENCE POINT DOSAGE GIVEN TO DATE: NORMAL GY
RAD ONC ARIA REFERENCE POINT ID: NORMAL
RAD ONC ARIA REFERENCE POINT ID: NORMAL

## 2021-01-20 PROCEDURE — 77386 HC IMRT TREATMENT DELIVERY, COMPLEX: CPT | Performed by: RADIOLOGY

## 2021-01-20 PROCEDURE — 77014 PR CT GUIDE FOR PLACEMENT RADIATION THERAPY FIELDS: CPT | Mod: 26 | Performed by: RADIOLOGY

## 2021-01-20 RX ORDER — ASPIRIN 81 MG
100 TABLET, DELAYED RELEASE (ENTERIC COATED) ORAL PRN
COMMUNITY
End: 2022-01-06

## 2021-01-20 ASSESSMENT — PAIN SCALES - GENERAL: PAINLEVEL: NO PAIN (0)

## 2021-01-21 ENCOUNTER — APPOINTMENT (OUTPATIENT)
Dept: RADIATION ONCOLOGY | Facility: HOSPITAL | Age: 81
End: 2021-01-21
Payer: MEDICARE

## 2021-01-21 ENCOUNTER — RESULTS ONLY (OUTPATIENT)
Dept: RADIATION ONCOLOGY | Facility: HOSPITAL | Age: 81
End: 2021-01-21

## 2021-01-21 LAB
RAD ONC ARIA COURSE ID: NORMAL
RAD ONC ARIA COURSE LAST TREATMENT DATE: NORMAL
RAD ONC ARIA COURSE START DATE: NORMAL
RAD ONC ARIA COURSE TREATMENT ELAPSED DAYS: 10
RAD ONC ARIA FIRST TREATMENT DATE: NORMAL
RAD ONC ARIA PLAN FRACTIONS TREATED TO DATE: 9
RAD ONC ARIA PLAN ID: NORMAL
RAD ONC ARIA PLAN NAME: NORMAL
RAD ONC ARIA PLAN PRESCRIBED DOSE PER FRACTION: 2 GY
RAD ONC ARIA PLAN TOTAL FRACTIONS PRESCRIBED: 33
RAD ONC ARIA PLAN TOTAL PRESCRIBED DOSE: 6600 CGY
RAD ONC ARIA REFERENCE POINT DOSAGE GIVEN TO DATE: NORMAL GY
RAD ONC ARIA REFERENCE POINT DOSAGE GIVEN TO DATE: NORMAL GY
RAD ONC ARIA REFERENCE POINT ID: NORMAL
RAD ONC ARIA REFERENCE POINT ID: NORMAL

## 2021-01-21 PROCEDURE — 77386 HC IMRT TREATMENT DELIVERY, COMPLEX: CPT | Performed by: RADIOLOGY

## 2021-01-21 PROCEDURE — 77014 PR CT GUIDE FOR PLACEMENT RADIATION THERAPY FIELDS: CPT | Mod: 26 | Performed by: RADIOLOGY

## 2021-01-21 NOTE — PROGRESS NOTES
[facility]  DEPARTMENT OF RADIATION ONCOLOGY   ON TREATMENT VISIT (OTV) NOTE    Name: Wilfrid Meneses MRN: [id number]   : 1940 (80 year old)  Date of Service: 2021 Referring: Dr. Littlejohn, Dr. Howard,  and Dr. 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 Pulmonary medicine referred the patient for consultation about the role of radiotherapy as potentially curative treatment for the asymptomatic disease (cT3/4N0M)). The disease was ultimately felt to not be amenable to SBRT and treated better with concurrent chemoradiation. 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     Subjective: Receiving concurrent chemoradiation. Had minor adverse Taxol reaction yesterday (excessive flushing). Therefore, now receiving weekly carboplatin/Abraxance. Feels relatively well. Offers no new complaints. Constipation better managed. Tolerating a taper of prednisone for polymyalgia rheumatic (PMR). Sees rheumatologist next month. Reports no increase of symptoms attributed to PMR. Still feels that he has no toxicity from radiation therapy. Baseline breathing and swallowing. Denies dysphagia or odynophagia. Denies known exposure to COVID-19, risky behaviors, or related symptoms including febrile, chest, gustatory, gastrointestinal, and systemic complaints.    ROS (score of 0 indicates that symptom is at baseline for most sections below): See  Flowsheet for additional comments as indicated.  No Pain (0)  Nutrition Alteration  Diet Type: Patient's Preference  Anorexia NCI: 0 - None  Skin  Skin Reaction: 0 - No changes  ENT and Mouth Exam  Mucositis - Current: 0 - None   Mucositis - Internal Severity: 0 - None   Esophagitis: 0 - None  Cardiovascular  Respiratory effort: 1 - Normal - without distress  Gastrointestinal  Nausea: 0 - None  Vomitin - No vomiting (ons)  Diarrhea: 0 - None  Constipation NCI: 1 - Occasional or intermittent s/sx  Psychosocial  Mood - Anxiety: 0 - Normal  Mood - Depression: 0 - Normal  Pyschosocial Note: fatigue: 3-4/10     Objective:  Vitals: Wt 69.6 kg (153 lb 6.4 oz)   BMI 26.75 kg/m    Wt Readings from Last 3 Encounters:   21 69.6 kg (153 lb 6.4 oz)   21 69.9 kg (154 lb 1.6 oz)   20 68.9 kg (152 lb)     KPS: 80.  Evaluation (also seen a linac during the week):  - General: Appears stable. Appears rested. Appears staged age. Appears in good general health, well-developed, well-nourished, and in no acute distress. Does not appear acutely or chronically ill. Appears well groomed.  - Head: No treatment-related alopecia. Normocephalic.  = Eyes: Anicteric, eyelids symmetric, vision grossly intact.  - ENT: Baseline levels of slight hoarseness and tremulous voice. Good clarity. Good volume. No coughing or aspiration.  - Neck: Symmetric, trachea midline.  - Lymphatics: No parotid, peripartoid, cervical, supraclavicular, or axillary adenopathy.  - Chest/Breasts: No port. No implanted cardiac device.   - Lungs: Clear to auscultation, easy respirations.  - Cardiovascular: Regular rate. No jugulovenous distension.  - MSK: Shoulder range of motion is good. No arm edema or hand edema. Good muscle tone.  - Integument: No jaundice or pallor.  - Neurologic: Alert, oriented, fluent. Memory grossly intact. Motor grossly intact. Sensory grossly intact. No ataxia.  - Psychologic: Well spoken about his cancer and therapies.  Appropriately anxious about radiation therapy and sad about diagnosis. Pleasant, cooperative, insightful, concrete, and good judgment.    Impression: Routine tolerance to radiation therapy. Grade 0 toxicity attributable to radiation therapy.    Plan:  1) GI: Grade 0. Swallowing well. No esophagitis. Anticipate possible significant discomfort and pain during the coming weeks, perhaps starting late next week. Will initiate pain care and dietary changes as indicated.  2) Pulmonary: Grade 0 toxicity. Relatively modest risk of severe acute toxicity. Continue to observe.  3) Skin: Grade 0. Reviewed routine skin care. Anticipate possible escalation of skin reaction (erythema, hyperpigmentation, desquamation, and pain) with a peak about 1-2 weeks after the end of treatment.   4) Pain: Grade 0. Anticipate possible use of non-opoid analgesics for pain relief as local toxicities progress.  5) FEN:  Grade 0. Continue to monitor. Modify diet as indicated by pain and acuteness of weight loss.  6) Chemotherapy: For concurrent chemoradiation, continue to coordinate with medical oncology their delivery of every week with supportive care and labs as indicated. C1D1 was 1/12/2021 for carboplatin/Taxol. Now on weekly carboplatin/Abraxae.   7) Radiation therapy: No new interventions. No boost/cone down pending. Reviewed graphical 3D plan with attention to dose to the esophagus, lungs, heart, and spinal cord. Reviewed anticipated time course, nature, and potential interventions for managing toxicity during and after radiation therapy. Reviewed the role of imaging in following his disease. Wished to proceed with treatment. All questions answered to his satisfaction.     Weekly Review: Across [facility] and [facility] EMR's, reviewed available labs and diagnostic studies. Interpreted as adequate to proceed with radiation therapy. Discussed management with Therapy, Treatment Planning, and Nursing. Reviewed weekly routine QA, linac imaging, and  clinical set up are adequate to proceed with radiation therapy as prescribed.    Current Outpatient Medications   Medication     atenolol (TENORMIN) 50 MG tablet     docusate sodium (COLACE) 100 MG tablet     Multiple Vitamin (MULTI-VITAMINS) TABS     predniSONE (DELTASONE) 5 MG tablet     simvastatin (ZOCOR) 40 MG tablet     valsartan-hydrochlorothiazide (DIOVAN HCT) 320-12.5 MG tablet     prochlorperazine (COMPAZINE) 10 MG tablet     No current facility-administered medications for this visit.      Allergies: Paclitaxel, Ace inhibitors, Hydrocodone-acetaminophen, Sulfamethoxazole-trimethoprim, and Sulfasalazine      Reji Zavaleta M.D., Ph.D.  Department of Radiation Oncology  Tel: [phone]  Fax: [phone]

## 2021-01-22 ENCOUNTER — APPOINTMENT (OUTPATIENT)
Dept: RADIATION ONCOLOGY | Facility: HOSPITAL | Age: 81
End: 2021-01-22
Payer: MEDICARE

## 2021-01-22 ENCOUNTER — RESULTS ONLY (OUTPATIENT)
Dept: RADIATION ONCOLOGY | Facility: HOSPITAL | Age: 81
End: 2021-01-22

## 2021-01-22 LAB
RAD ONC ARIA COURSE ID: NORMAL
RAD ONC ARIA COURSE LAST TREATMENT DATE: NORMAL
RAD ONC ARIA COURSE START DATE: NORMAL
RAD ONC ARIA COURSE TREATMENT ELAPSED DAYS: 11
RAD ONC ARIA FIRST TREATMENT DATE: NORMAL
RAD ONC ARIA PLAN FRACTIONS TREATED TO DATE: 10
RAD ONC ARIA PLAN ID: NORMAL
RAD ONC ARIA PLAN NAME: NORMAL
RAD ONC ARIA PLAN PRESCRIBED DOSE PER FRACTION: 2 GY
RAD ONC ARIA PLAN TOTAL FRACTIONS PRESCRIBED: 33
RAD ONC ARIA PLAN TOTAL PRESCRIBED DOSE: 6600 CGY
RAD ONC ARIA REFERENCE POINT DOSAGE GIVEN TO DATE: NORMAL GY
RAD ONC ARIA REFERENCE POINT DOSAGE GIVEN TO DATE: NORMAL GY
RAD ONC ARIA REFERENCE POINT ID: NORMAL
RAD ONC ARIA REFERENCE POINT ID: NORMAL

## 2021-01-22 PROCEDURE — 77386 HC IMRT TREATMENT DELIVERY, COMPLEX: CPT | Performed by: RADIOLOGY

## 2021-01-22 PROCEDURE — 77014 PR CT GUIDE FOR PLACEMENT RADIATION THERAPY FIELDS: CPT | Mod: 26 | Performed by: RADIOLOGY

## 2021-01-22 PROCEDURE — 77427 RADIATION TX MANAGEMENT X5: CPT | Performed by: RADIOLOGY

## 2021-01-22 PROCEDURE — 77336 RADIATION PHYSICS CONSULT: CPT | Performed by: RADIOLOGY

## 2021-01-25 ENCOUNTER — OFFICE VISIT (OUTPATIENT)
Dept: INTERNAL MEDICINE | Facility: OTHER | Age: 81
End: 2021-01-25
Attending: INTERNAL MEDICINE
Payer: MEDICARE

## 2021-01-25 ENCOUNTER — RESULTS ONLY (OUTPATIENT)
Dept: RADIATION ONCOLOGY | Facility: HOSPITAL | Age: 81
End: 2021-01-25

## 2021-01-25 VITALS
SYSTOLIC BLOOD PRESSURE: 132 MMHG | OXYGEN SATURATION: 99 % | BODY MASS INDEX: 26.71 KG/M2 | TEMPERATURE: 97.9 F | RESPIRATION RATE: 16 BRPM | HEART RATE: 66 BPM | WEIGHT: 153.2 LBS | DIASTOLIC BLOOD PRESSURE: 78 MMHG

## 2021-01-25 DIAGNOSIS — M35.3 PMR (POLYMYALGIA RHEUMATICA) (H): Primary | ICD-10-CM

## 2021-01-25 LAB
ERYTHROCYTE [SEDIMENTATION RATE] IN BLOOD BY WESTERGREN METHOD: 11 MM/H (ref 1–10)
RAD ONC ARIA COURSE ID: NORMAL
RAD ONC ARIA COURSE LAST TREATMENT DATE: NORMAL
RAD ONC ARIA COURSE START DATE: NORMAL
RAD ONC ARIA COURSE TREATMENT ELAPSED DAYS: 14
RAD ONC ARIA FIRST TREATMENT DATE: NORMAL
RAD ONC ARIA PLAN FRACTIONS TREATED TO DATE: 11
RAD ONC ARIA PLAN ID: NORMAL
RAD ONC ARIA PLAN NAME: NORMAL
RAD ONC ARIA PLAN PRESCRIBED DOSE PER FRACTION: 2 GY
RAD ONC ARIA PLAN TOTAL FRACTIONS PRESCRIBED: 33
RAD ONC ARIA PLAN TOTAL PRESCRIBED DOSE: 6600 CGY
RAD ONC ARIA REFERENCE POINT DOSAGE GIVEN TO DATE: NORMAL GY
RAD ONC ARIA REFERENCE POINT DOSAGE GIVEN TO DATE: NORMAL GY
RAD ONC ARIA REFERENCE POINT ID: NORMAL
RAD ONC ARIA REFERENCE POINT ID: NORMAL

## 2021-01-25 PROCEDURE — 77386 HC IMRT TREATMENT DELIVERY, COMPLEX: CPT | Performed by: RADIOLOGY

## 2021-01-25 PROCEDURE — 85652 RBC SED RATE AUTOMATED: CPT | Mod: ZL | Performed by: INTERNAL MEDICINE

## 2021-01-25 PROCEDURE — G0463 HOSPITAL OUTPT CLINIC VISIT: HCPCS

## 2021-01-25 PROCEDURE — 99213 OFFICE O/P EST LOW 20 MIN: CPT | Performed by: INTERNAL MEDICINE

## 2021-01-25 PROCEDURE — 36415 COLL VENOUS BLD VENIPUNCTURE: CPT | Mod: ZL | Performed by: INTERNAL MEDICINE

## 2021-01-25 RX ORDER — PREDNISONE 5 MG/1
2.5 TABLET ORAL DAILY
COMMUNITY
Start: 2021-01-25 | End: 2021-04-14

## 2021-01-25 ASSESSMENT — ENCOUNTER SYMPTOMS
ALLERGIC/IMMUNOLOGIC NEGATIVE: 1
CONSTITUTIONAL NEGATIVE: 1
ENDOCRINE NEGATIVE: 1
EYES NEGATIVE: 1

## 2021-01-25 ASSESSMENT — PAIN SCALES - GENERAL: PAINLEVEL: NO PAIN (1)

## 2021-01-25 NOTE — PROGRESS NOTES
Chief Complaint:  F/U on PMR.    HPI: He is here today for follow-up.  He is getting chemotherapy weekly and he is getting radiation daily for 33 treatments.  So far things are going well with that.  He is here for a follow-up on his PMR.  He is currently on 5 mg of prednisone daily.  He has no PMR symptoms.  He is hoping to get off of the prednisone as it does cause him some sleep issues.    Past Medical History:   Diagnosis Date     Coronary artery calcification     score 297 12/2010     Dysarthria      Enlarged prostate without lower urinary tract symptoms (luts)     Post prostatectomy     Essential (primary) hypertension     No Comments Provided     History of colonic polyps     No Comments Provided     Hyperglycemia     No Comments Provided     Hyperlipidemia     No Comments Provided     Malignant neoplasm of skin     basal cell ca. on back and right ear     PMR (polymyalgia rheumatica) (H)      Primary mucinous adenocarcinoma of lung (H)      Spinal stenosis of lumbar region without neurogenic claudication     No Comments Provided     Uncomplicated alcohol abuse     No Comments Provided       Past Surgical History:   Procedure Laterality Date     ARTHROPLASTY KNEE Left     01/09     ARTHROPLASTY SHOULDER Left 2018     ARTHROSCOPY KNEE Left     12/05     COLONOSCOPY      2001, 2006, 2011,Adenomatous 2001, subsequent WNL     COLONOSCOPY  11/21/2014 11/21/2014,Adenoma     COLONOSCOPY  01/07/2020    tubular adenomas, follow up 5 years     HERNIORRHAPHY INGUINAL BILATERAL       PROSTATECTOMY SUPRAPUBIC  2007     UPPER GI ENDOSCOPY  2005       Allergies   Allergen Reactions     Paclitaxel Unknown     Ace Inhibitors Cough     Hydrocodone-Acetaminophen      Other reaction(s): Insomnia  Keeps him awake      Sulfamethoxazole-Trimethoprim Rash     Sulfasalazine Rash       Current Outpatient Medications   Medication Sig Dispense Refill     atenolol (TENORMIN) 50 MG tablet Take 1 tablet (50 mg) by mouth daily 90 tablet  3     docusate sodium (COLACE) 100 MG tablet Take 100 mg by mouth as needed for constipation       Multiple Vitamin (MULTI-VITAMINS) TABS Take 1 tablet by mouth daily       predniSONE (DELTASONE) 5 MG tablet 5 mg daily        prochlorperazine (COMPAZINE) 10 MG tablet Take 10 mg by mouth       simvastatin (ZOCOR) 40 MG tablet Take 1 tablet (40 mg) by mouth At Bedtime 90 tablet 3     valsartan-hydrochlorothiazide (DIOVAN HCT) 320-12.5 MG tablet Take 1 tablet by mouth daily 90 tablet 3       Review of Systems   Constitutional: Negative.    Eyes: Negative.    Endocrine: Negative.    Allergic/Immunologic: Negative.        Physical Exam  Vitals signs and nursing note reviewed.   Constitutional:       General: He is not in acute distress.     Appearance: Normal appearance. He is not ill-appearing, toxic-appearing or diaphoretic.   Neurological:      Mental Status: He is alert.         Assessment:        ICD-10-CM    1. PMR (polymyalgia rheumatica) (H)  M35.3 Sedimentation Rate (ESR)       Plan: He appears to be stable at this time.  I do not know if his current treatment for lung cancer will impact his sedimentation rate.  Sedimentation rate drawn and pending and I will let him know the results.  If it looks acceptable we will decrease down to 2.5 mg daily.    His sedimentation rate is 11. Decrease prednisone to 2.5 mg daily.

## 2021-01-25 NOTE — NURSING NOTE
Chief Complaint   Patient presents with     Recheck Medication   Patient presents to the clinic today for a medication recheck    Medication Reconciliation: completed   Sneha Villa LPN  1/25/2021 7:43 AM

## 2021-01-26 ENCOUNTER — OFFICE VISIT (OUTPATIENT)
Dept: RADIATION ONCOLOGY | Facility: HOSPITAL | Age: 81
End: 2021-01-26
Payer: MEDICARE

## 2021-01-26 ENCOUNTER — APPOINTMENT (OUTPATIENT)
Dept: RADIATION ONCOLOGY | Facility: HOSPITAL | Age: 81
End: 2021-01-26
Payer: MEDICARE

## 2021-01-26 ENCOUNTER — RESULTS ONLY (OUTPATIENT)
Dept: RADIATION ONCOLOGY | Facility: HOSPITAL | Age: 81
End: 2021-01-26

## 2021-01-26 ENCOUNTER — CARE COORDINATION (OUTPATIENT)
Dept: CASE MANAGEMENT | Facility: HOSPITAL | Age: 81
End: 2021-01-26

## 2021-01-26 VITALS — WEIGHT: 153 LBS | BODY MASS INDEX: 26.68 KG/M2

## 2021-01-26 DIAGNOSIS — C34.90 PRIMARY MUCINOUS ADENOCARCINOMA OF LUNG (H): Primary | ICD-10-CM

## 2021-01-26 LAB
RAD ONC ARIA COURSE ID: NORMAL
RAD ONC ARIA COURSE LAST TREATMENT DATE: NORMAL
RAD ONC ARIA COURSE START DATE: NORMAL
RAD ONC ARIA COURSE TREATMENT ELAPSED DAYS: 15
RAD ONC ARIA FIRST TREATMENT DATE: NORMAL
RAD ONC ARIA PLAN FRACTIONS TREATED TO DATE: 12
RAD ONC ARIA PLAN ID: NORMAL
RAD ONC ARIA PLAN NAME: NORMAL
RAD ONC ARIA PLAN PRESCRIBED DOSE PER FRACTION: 2 GY
RAD ONC ARIA PLAN TOTAL FRACTIONS PRESCRIBED: 33
RAD ONC ARIA PLAN TOTAL PRESCRIBED DOSE: 6600 CGY
RAD ONC ARIA REFERENCE POINT DOSAGE GIVEN TO DATE: NORMAL GY
RAD ONC ARIA REFERENCE POINT DOSAGE GIVEN TO DATE: NORMAL GY
RAD ONC ARIA REFERENCE POINT ID: NORMAL
RAD ONC ARIA REFERENCE POINT ID: NORMAL

## 2021-01-26 PROCEDURE — 77014 PR CT GUIDE FOR PLACEMENT RADIATION THERAPY FIELDS: CPT | Mod: 26 | Performed by: RADIOLOGY

## 2021-01-26 PROCEDURE — 77386 HC IMRT TREATMENT DELIVERY, COMPLEX: CPT | Performed by: RADIOLOGY

## 2021-01-26 ASSESSMENT — PAIN SCALES - GENERAL: PAINLEVEL: NO PAIN (0)

## 2021-01-26 NOTE — PROGRESS NOTES
[facility]  DEPARTMENT OF RADIATION ONCOLOGY   ON TREATMENT VISIT (OTV) NOTE    Name: Wilfrid Meneses MRN: [id number]   : 1940 (80 year old)  Date of Service: 2021 Referring: Dr. Littlejohn, Dr. Howard,  and Dr. 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 Pulmonary medicine referred the patient for consultation about the role of radiotherapy as potentially curative treatment for the asymptomatic disease (cT3/4N0M)). The disease was ultimately felt to not be amenable to SBRT and treated better with concurrent chemoradiation. 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     Subjective: Video telemedicine visit with assistance of nurse. Receiving concurrent chemoradiation. Had minor adverse Taxol reaction last week (excessive flushing). Therefore, now receiving weekly carboplatin/Abraxance. Next dose tomorrow. Constipation remains better managed. Feels well. Offers no new complaints. Feels that he has no toxicity from radiation therapy. Baseline breathing and swallowing. Denies dysphagia or odynophagia. Has been tolerating a taper of prednisone for polymyalgia rheumatic (PMR). Sees rheumatologist next month. Had reported no increase of symptoms attributed to PMR. Denies known exposure to COVID-19, risky behaviors, or related symptoms including febrile, chest, gustatory, gastrointestinal, and systemic complaints.    ROS (score  of 0 indicates that symptom is at baseline for most sections below): See Flowsheet for additional comments as indicated.  No Pain (0)  Nutrition Alteration  Diet Type: Patient's Preference  Anorexia NCI: 0 - None  Skin  Skin Reaction: 0 - No changes  ENT and Mouth Exam  Mucositis - Current: 0 - None   Mucositis - Internal Severity: 0 - None   Esophagitis: 0 - None  Cardiovascular  Respiratory effort: 1 - Normal - without distress  Gastrointestinal  Nausea: 0 - None  Vomitin - No vomiting (ons)  Diarrhea: 0 - None  Psychosocial  Mood - Anxiety: 0 - Normal  Mood - Depression: 0 - Normal  Pyschosocial Note: fatigue: 3-4/10     Objective:  Vitals: Wt 69.4 kg (153 lb)   BMI 26.68 kg/m    Wt Readings from Last 3 Encounters:   21 69.4 kg (153 lb)   21 69.5 kg (153 lb 3.2 oz)   21 69.6 kg (153 lb 6.4 oz)     KPS: 80.  Evaluation (also seen a linac during the prior week):  - General: Appears stable. Appears rested. Appears staged age. Appears in good general health, well-developed, well-nourished, and in no acute distress. Does not appear acutely or chronically ill. Appears well groomed.  - Head: No treatment-related alopecia. Normocephalic.  = Eyes: Anicteric, eyelids symmetric, vision grossly intact.  - ENT: Baseline levels of slight hoarseness and tremulous voice. Good clarity. Good volume. No coughing or aspiration.  - Neck: Symmetric, trachea midline.  - Lymphatics: No parotid, peripartoid, cervical, supraclavicular, or axillary adenopathy.  - Chest/Breasts: No erythema or hyperpigmentation. No port. No implanted cardiac device.   - Lungs: Clear to auscultation, easy respirations.  - Cardiovascular: Regular rate. No jugulovenous distension.  - MSK: IV sites (left and right arm) appears clean, dry, and intact. No cellulitis. Shoulder range of motion is good. No arm edema or hand edema. Good muscle tone.  - Integument: No jaundice or pallor.  - Neurologic: Alert, oriented, fluent. Memory grossly  intact. Motor grossly intact. Sensory grossly intact. No ataxia.  - Psychologic: Well spoken about his cancer and therapies. Appropriately anxious about radiation therapy and sad about diagnosis. Pleasant, cooperative, insightful, concrete, and good judgment.    Impression: Routine tolerance to radiation therapy. Grade 0 toxicity attributable to radiation therapy.    Plan:  1) GI: Grade 0. Swallowing well. No esophagitis. Anticipate possible significant discomfort and pain during the coming weeks, perhaps starting late next week. Will initiate pain care and dietary changes as indicated.  2) Pulmonary: Grade 0 toxicity. Relatively modest risk of severe acute toxicity. Continue to observe.  3) Skin: Grade 0. Have reviewed routine skin care. Anticipate possible escalation of skin reaction (erythema, hyperpigmentation, desquamation, and pain) with a peak about 1-2 weeks after the end of treatment.   4) Pain: Grade 0. Anticipate possible use of non-opoid analgesics for pain relief as local toxicities progress.  5) FEN:  Grade 0. Continue to monitor. Modify diet as indicated by pain and acuteness of weight loss.  6) Chemotherapy: For concurrent chemoradiation, continue to coordinate with medical oncology their delivery of every week with supportive care and labs as indicated. C1D1 was 1/12/2021 for carboplatin/Taxol. Now on weekly carboplatin/Abraxae. Next dose tomorrow.  7) Radiation therapy: No new interventions. No boost/cone down pending. Have reviewed graphical 3D plan with attention to dose to the esophagus, lungs, heart, and spinal cord. Reviewed anticipated time course, nature, and potential interventions for managing toxicity during and after radiation therapy. Have reviewed the role of imaging in following his disease. Wished to proceed with treatment. All questions answered to his satisfaction.     Weekly Review: Across [facility] and [facility] EMR's, reviewed available labs and diagnostic studies. Interpreted as  adequate to proceed with radiation therapy. Discussed management with Therapy, Treatment Planning, and Nursing. Reviewed weekly routine QA, linac imaging, and clinical set up are adequate to proceed with radiation therapy as prescribed.    Current Outpatient Medications   Medication     atenolol (TENORMIN) 50 MG tablet     docusate sodium (COLACE) 100 MG tablet     Multiple Vitamin (MULTI-VITAMINS) TABS     predniSONE (DELTASONE) 5 MG tablet     prochlorperazine (COMPAZINE) 10 MG tablet     simvastatin (ZOCOR) 40 MG tablet     valsartan-hydrochlorothiazide (DIOVAN HCT) 320-12.5 MG tablet     No current facility-administered medications for this visit.      Allergies: Paclitaxel, Ace inhibitors, Hydrocodone-acetaminophen, Sulfamethoxazole-trimethoprim, and Sulfasalazine      Reji Zavaleta M.D., Ph.D.  Department of Radiation Oncology  Tel: [phone]  Fax: [phone]

## 2021-01-26 NOTE — PROGRESS NOTES
Assessment completed by Katherine Olea     LOC: alert     Dx: Adenocarcinoma of Lung  Chronic Disease Management:Hyperlipidema, hypertension, spinal stenosis    Lives with: Wife of 56 years  Living at:  Home in [place] in same house for 50 years  Transportation: Patient and wife both drive    Primary PCP: Jimenez Colon  Insurance:  Medicare and BC/BS  Support System:  Patient says he has a great support system of 4 close friends who will do anything for him along with his family  Homecare/PCA: N/A  /[facility] Services:   N/A  Health Care Directive: Unknown  Guardian: No  POA: No    Pharmacy: [facility]    Adequate Resources for needs (housing, utilities, food/med): YES  Household chores: Independent  Work/community/social activity: Yes,but not since Covid 19  Quality of Life:  Enjoying your Life  Control of Life:  Yes  ADLs: Independent  Ambulation:Independent  Falls: No  Nutrition: Fair  Sleep: Good  Mental health: No issues  Substance abuse: No  Exposure to violence/abuse: No  Stressors: None identified    Able to Return to Prior Living Arrangements: Yes    Barriers: No barriers identified      Plan: 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  He has lived in the [place] area for 50+ years.  He lives with his wife and is able to do most everything for himself.  He really enjoys his wood shop he has at home.  He tries to get out there at least for 1 hour a day as this brings him bryan.  He has 2 steps into his house from the garage and that is easy for him.  Patient did not feel he would have any issues getting to and from treatment.  Patient has this SW contact information should he have any concerns, barriers or issues.

## 2021-01-27 ENCOUNTER — RESULTS ONLY (OUTPATIENT)
Dept: RADIATION ONCOLOGY | Facility: HOSPITAL | Age: 81
End: 2021-01-27

## 2021-01-27 ENCOUNTER — APPOINTMENT (OUTPATIENT)
Dept: RADIATION ONCOLOGY | Facility: HOSPITAL | Age: 81
End: 2021-01-27
Payer: MEDICARE

## 2021-01-27 LAB
RAD ONC ARIA COURSE ID: NORMAL
RAD ONC ARIA COURSE LAST TREATMENT DATE: NORMAL
RAD ONC ARIA COURSE START DATE: NORMAL
RAD ONC ARIA COURSE TREATMENT ELAPSED DAYS: 16
RAD ONC ARIA FIRST TREATMENT DATE: NORMAL
RAD ONC ARIA PLAN FRACTIONS TREATED TO DATE: 13
RAD ONC ARIA PLAN ID: NORMAL
RAD ONC ARIA PLAN NAME: NORMAL
RAD ONC ARIA PLAN PRESCRIBED DOSE PER FRACTION: 2 GY
RAD ONC ARIA PLAN TOTAL FRACTIONS PRESCRIBED: 33
RAD ONC ARIA PLAN TOTAL PRESCRIBED DOSE: 6600 CGY
RAD ONC ARIA REFERENCE POINT DOSAGE GIVEN TO DATE: NORMAL GY
RAD ONC ARIA REFERENCE POINT DOSAGE GIVEN TO DATE: NORMAL GY
RAD ONC ARIA REFERENCE POINT ID: NORMAL
RAD ONC ARIA REFERENCE POINT ID: NORMAL

## 2021-01-27 PROCEDURE — 77386 HC IMRT TREATMENT DELIVERY, COMPLEX: CPT | Performed by: RADIOLOGY

## 2021-01-27 PROCEDURE — 77014 PR CT GUIDE FOR PLACEMENT RADIATION THERAPY FIELDS: CPT | Mod: 26 | Performed by: RADIOLOGY

## 2021-01-28 ENCOUNTER — APPOINTMENT (OUTPATIENT)
Dept: RADIATION ONCOLOGY | Facility: HOSPITAL | Age: 81
End: 2021-01-28
Payer: MEDICARE

## 2021-01-28 ENCOUNTER — RESULTS ONLY (OUTPATIENT)
Dept: RADIATION ONCOLOGY | Facility: HOSPITAL | Age: 81
End: 2021-01-28

## 2021-01-28 LAB
RAD ONC ARIA COURSE ID: NORMAL
RAD ONC ARIA COURSE LAST TREATMENT DATE: NORMAL
RAD ONC ARIA COURSE START DATE: NORMAL
RAD ONC ARIA COURSE TREATMENT ELAPSED DAYS: 17
RAD ONC ARIA FIRST TREATMENT DATE: NORMAL
RAD ONC ARIA PLAN FRACTIONS TREATED TO DATE: 14
RAD ONC ARIA PLAN ID: NORMAL
RAD ONC ARIA PLAN NAME: NORMAL
RAD ONC ARIA PLAN PRESCRIBED DOSE PER FRACTION: 2 GY
RAD ONC ARIA PLAN TOTAL FRACTIONS PRESCRIBED: 33
RAD ONC ARIA PLAN TOTAL PRESCRIBED DOSE: 6600 CGY
RAD ONC ARIA REFERENCE POINT DOSAGE GIVEN TO DATE: NORMAL GY
RAD ONC ARIA REFERENCE POINT DOSAGE GIVEN TO DATE: NORMAL GY
RAD ONC ARIA REFERENCE POINT ID: NORMAL
RAD ONC ARIA REFERENCE POINT ID: NORMAL

## 2021-01-28 PROCEDURE — 77386 HC IMRT TREATMENT DELIVERY, COMPLEX: CPT | Performed by: RADIOLOGY

## 2021-01-28 PROCEDURE — 77014 PR CT GUIDE FOR PLACEMENT RADIATION THERAPY FIELDS: CPT | Mod: 26 | Performed by: RADIOLOGY

## 2021-01-29 ENCOUNTER — RESULTS ONLY (OUTPATIENT)
Dept: RADIATION ONCOLOGY | Facility: HOSPITAL | Age: 81
End: 2021-01-29

## 2021-01-29 ENCOUNTER — APPOINTMENT (OUTPATIENT)
Dept: RADIATION ONCOLOGY | Facility: HOSPITAL | Age: 81
End: 2021-01-29
Payer: MEDICARE

## 2021-01-29 LAB
RAD ONC ARIA COURSE ID: NORMAL
RAD ONC ARIA COURSE LAST TREATMENT DATE: NORMAL
RAD ONC ARIA COURSE START DATE: NORMAL
RAD ONC ARIA COURSE TREATMENT ELAPSED DAYS: 18
RAD ONC ARIA FIRST TREATMENT DATE: NORMAL
RAD ONC ARIA PLAN FRACTIONS TREATED TO DATE: 15
RAD ONC ARIA PLAN ID: NORMAL
RAD ONC ARIA PLAN NAME: NORMAL
RAD ONC ARIA PLAN PRESCRIBED DOSE PER FRACTION: 2 GY
RAD ONC ARIA PLAN TOTAL FRACTIONS PRESCRIBED: 33
RAD ONC ARIA PLAN TOTAL PRESCRIBED DOSE: 6600 CGY
RAD ONC ARIA REFERENCE POINT DOSAGE GIVEN TO DATE: NORMAL GY
RAD ONC ARIA REFERENCE POINT DOSAGE GIVEN TO DATE: NORMAL GY
RAD ONC ARIA REFERENCE POINT ID: NORMAL
RAD ONC ARIA REFERENCE POINT ID: NORMAL

## 2021-01-29 PROCEDURE — 77427 RADIATION TX MANAGEMENT X5: CPT | Performed by: RADIOLOGY

## 2021-01-29 PROCEDURE — 77386 HC IMRT TREATMENT DELIVERY, COMPLEX: CPT | Performed by: RADIOLOGY

## 2021-01-29 PROCEDURE — 77336 RADIATION PHYSICS CONSULT: CPT | Performed by: RADIOLOGY

## 2021-01-29 PROCEDURE — 77014 PR CT GUIDE FOR PLACEMENT RADIATION THERAPY FIELDS: CPT | Mod: 26 | Performed by: RADIOLOGY

## 2021-02-01 ENCOUNTER — RESULTS ONLY (OUTPATIENT)
Dept: RADIATION ONCOLOGY | Facility: HOSPITAL | Age: 81
End: 2021-02-01

## 2021-02-01 ENCOUNTER — APPOINTMENT (OUTPATIENT)
Dept: RADIATION ONCOLOGY | Facility: HOSPITAL | Age: 81
End: 2021-02-01
Attending: RADIOLOGY
Payer: MEDICARE

## 2021-02-01 LAB
RAD ONC ARIA COURSE ID: NORMAL
RAD ONC ARIA COURSE LAST TREATMENT DATE: NORMAL
RAD ONC ARIA COURSE START DATE: NORMAL
RAD ONC ARIA COURSE TREATMENT ELAPSED DAYS: 21
RAD ONC ARIA FIRST TREATMENT DATE: NORMAL
RAD ONC ARIA PLAN FRACTIONS TREATED TO DATE: 16
RAD ONC ARIA PLAN ID: NORMAL
RAD ONC ARIA PLAN NAME: NORMAL
RAD ONC ARIA PLAN PRESCRIBED DOSE PER FRACTION: 2 GY
RAD ONC ARIA PLAN TOTAL FRACTIONS PRESCRIBED: 33
RAD ONC ARIA PLAN TOTAL PRESCRIBED DOSE: 6600 CGY
RAD ONC ARIA REFERENCE POINT DOSAGE GIVEN TO DATE: NORMAL GY
RAD ONC ARIA REFERENCE POINT DOSAGE GIVEN TO DATE: NORMAL GY
RAD ONC ARIA REFERENCE POINT ID: NORMAL
RAD ONC ARIA REFERENCE POINT ID: NORMAL

## 2021-02-01 PROCEDURE — 77014 PR CT GUIDE FOR PLACEMENT RADIATION THERAPY FIELDS: CPT | Mod: 26 | Performed by: RADIOLOGY

## 2021-02-01 PROCEDURE — 77386 HC IMRT TREATMENT DELIVERY, COMPLEX: CPT | Performed by: RADIOLOGY

## 2021-02-01 PROCEDURE — 77336 RADIATION PHYSICS CONSULT: CPT | Performed by: RADIOLOGY

## 2021-02-02 ENCOUNTER — APPOINTMENT (OUTPATIENT)
Dept: RADIATION ONCOLOGY | Facility: HOSPITAL | Age: 81
End: 2021-02-02
Payer: MEDICARE

## 2021-02-02 ENCOUNTER — RESULTS ONLY (OUTPATIENT)
Dept: RADIATION ONCOLOGY | Facility: HOSPITAL | Age: 81
End: 2021-02-02

## 2021-02-02 LAB
RAD ONC ARIA COURSE ID: NORMAL
RAD ONC ARIA COURSE LAST TREATMENT DATE: NORMAL
RAD ONC ARIA COURSE START DATE: NORMAL
RAD ONC ARIA COURSE TREATMENT ELAPSED DAYS: 22
RAD ONC ARIA FIRST TREATMENT DATE: NORMAL
RAD ONC ARIA PLAN FRACTIONS TREATED TO DATE: 17
RAD ONC ARIA PLAN ID: NORMAL
RAD ONC ARIA PLAN NAME: NORMAL
RAD ONC ARIA PLAN PRESCRIBED DOSE PER FRACTION: 2 GY
RAD ONC ARIA PLAN TOTAL FRACTIONS PRESCRIBED: 33
RAD ONC ARIA PLAN TOTAL PRESCRIBED DOSE: 6600 CGY
RAD ONC ARIA REFERENCE POINT DOSAGE GIVEN TO DATE: NORMAL GY
RAD ONC ARIA REFERENCE POINT DOSAGE GIVEN TO DATE: NORMAL GY
RAD ONC ARIA REFERENCE POINT ID: NORMAL
RAD ONC ARIA REFERENCE POINT ID: NORMAL

## 2021-02-02 PROCEDURE — 77386 HC IMRT TREATMENT DELIVERY, COMPLEX: CPT | Performed by: RADIOLOGY

## 2021-02-02 PROCEDURE — 77014 PR CT GUIDE FOR PLACEMENT RADIATION THERAPY FIELDS: CPT | Mod: 26 | Performed by: RADIOLOGY

## 2021-02-03 ENCOUNTER — HOSPITAL ENCOUNTER (OUTPATIENT)
Facility: OTHER | Age: 81
Setting detail: OBSERVATION
Discharge: HOME | End: 2021-02-04
Attending: EMERGENCY MEDICINE | Admitting: INTERNAL MEDICINE
Payer: MEDICARE

## 2021-02-03 ENCOUNTER — TELEPHONE (OUTPATIENT)
Dept: RADIATION ONCOLOGY | Facility: HOSPITAL | Age: 81
End: 2021-02-03

## 2021-02-03 ENCOUNTER — APPOINTMENT (OUTPATIENT)
Dept: GENERAL RADIOLOGY | Facility: OTHER | Age: 81
End: 2021-02-03
Attending: EMERGENCY MEDICINE
Payer: MEDICARE

## 2021-02-03 DIAGNOSIS — I10 HYPERTENSION, UNSPECIFIED TYPE: ICD-10-CM

## 2021-02-03 DIAGNOSIS — I10 ESSENTIAL (PRIMARY) HYPERTENSION: ICD-10-CM

## 2021-02-03 DIAGNOSIS — Z11.52 ENCOUNTER FOR SCREENING LABORATORY TESTING FOR COVID-19 VIRUS: ICD-10-CM

## 2021-02-03 DIAGNOSIS — C34.90 MALIGNANT NEOPLASM OF BRONCHUS AND LUNG (H): ICD-10-CM

## 2021-02-03 DIAGNOSIS — R50.9 FEVER, UNSPECIFIED: ICD-10-CM

## 2021-02-03 DIAGNOSIS — T45.1X5A ADVERSE EFFECT OF CHEMOTHERAPY, INITIAL ENCOUNTER: ICD-10-CM

## 2021-02-03 DIAGNOSIS — R65.10 SIRS (SYSTEMIC INFLAMMATORY RESPONSE SYNDROME) (H): ICD-10-CM

## 2021-02-03 DIAGNOSIS — R65.10 SYSTEMIC INFLAMMATORY RESPONSE SYNDROME (SIRS) (H): ICD-10-CM

## 2021-02-03 DIAGNOSIS — M35.3 POLYMYALGIA RHEUMATICA (H): ICD-10-CM

## 2021-02-03 DIAGNOSIS — E78.5 HYPERLIPIDEMIA, UNSPECIFIED HYPERLIPIDEMIA TYPE: ICD-10-CM

## 2021-02-03 DIAGNOSIS — Z79.899 ENCOUNTER FOR LONG-TERM (CURRENT) USE OF MEDICATIONS: ICD-10-CM

## 2021-02-03 LAB
ALBUMIN SERPL-MCNC: 4 G/DL (ref 3.5–5.7)
ALBUMIN UR-MCNC: NEGATIVE MG/DL
ALP SERPL-CCNC: 47 U/L (ref 34–104)
ALT SERPL W P-5'-P-CCNC: 15 U/L (ref 7–52)
ANION GAP SERPL CALCULATED.3IONS-SCNC: 9 MMOL/L (ref 3–14)
ANION GAP SERPL CALCULATED.3IONS-SCNC: 9 MMOL/L (ref 3–14)
APPEARANCE UR: CLEAR
AST SERPL W P-5'-P-CCNC: 17 U/L (ref 13–39)
BASOPHILS # BLD AUTO: 0 10E9/L (ref 0–0.2)
BASOPHILS # BLD AUTO: 0 10E9/L (ref 0–0.2)
BASOPHILS NFR BLD AUTO: 0 %
BASOPHILS NFR BLD AUTO: 0 %
BILIRUB SERPL-MCNC: 0.9 MG/DL (ref 0.3–1)
BILIRUB UR QL STRIP: NEGATIVE
BUN SERPL-MCNC: 23 MG/DL (ref 7–25)
BUN SERPL-MCNC: 29 MG/DL (ref 7–25)
CALCIUM SERPL-MCNC: 7.9 MG/DL (ref 8.6–10.3)
CALCIUM SERPL-MCNC: 8.7 MG/DL (ref 8.6–10.3)
CHLORIDE SERPL-SCNC: 93 MMOL/L (ref 98–107)
CHLORIDE SERPL-SCNC: 99 MMOL/L (ref 98–107)
CO2 SERPL-SCNC: 23 MMOL/L (ref 21–31)
CO2 SERPL-SCNC: 25 MMOL/L (ref 21–31)
COLOR UR AUTO: ABNORMAL
CREAT SERPL-MCNC: 0.85 MG/DL (ref 0.7–1.3)
CREAT SERPL-MCNC: 0.91 MG/DL (ref 0.7–1.3)
DIFFERENTIAL METHOD BLD: ABNORMAL
DIFFERENTIAL METHOD BLD: ABNORMAL
EOSINOPHIL # BLD AUTO: 0 10E9/L (ref 0–0.7)
EOSINOPHIL # BLD AUTO: 0 10E9/L (ref 0–0.7)
EOSINOPHIL NFR BLD AUTO: 0 %
EOSINOPHIL NFR BLD AUTO: 0 %
ERYTHROCYTE [DISTWIDTH] IN BLOOD BY AUTOMATED COUNT: 12.2 % (ref 10–15)
ERYTHROCYTE [DISTWIDTH] IN BLOOD BY AUTOMATED COUNT: 12.4 % (ref 10–15)
FLUAV RNA RESP QL NAA+PROBE: NEGATIVE
FLUBV RNA RESP QL NAA+PROBE: NEGATIVE
GFR SERPL CREATININE-BSD FRML MDRD: 80 ML/MIN/{1.73_M2}
GFR SERPL CREATININE-BSD FRML MDRD: 87 ML/MIN/{1.73_M2}
GLUCOSE SERPL-MCNC: 153 MG/DL (ref 70–105)
GLUCOSE SERPL-MCNC: 183 MG/DL (ref 70–105)
GLUCOSE UR STRIP-MCNC: 30 MG/DL
HCT VFR BLD AUTO: 26.7 % (ref 40–53)
HCT VFR BLD AUTO: 29.8 % (ref 40–53)
HGB BLD-MCNC: 10.6 G/DL (ref 13.3–17.7)
HGB BLD-MCNC: 9.4 G/DL (ref 13.3–17.7)
HGB UR QL STRIP: NEGATIVE
IMM GRANULOCYTES # BLD: 0 10E9/L (ref 0–0.4)
IMM GRANULOCYTES NFR BLD: 0.7 %
KETONES UR STRIP-MCNC: NEGATIVE MG/DL
LABORATORY COMMENT REPORT: NORMAL
LACTATE BLD-SCNC: 2.5 MMOL/L (ref 0.7–2)
LACTATE BLD-SCNC: 2.9 MMOL/L (ref 0.7–2)
LEUKOCYTE ESTERASE UR QL STRIP: NEGATIVE
LYMPHOCYTES # BLD AUTO: 0.1 10E9/L (ref 0.8–5.3)
LYMPHOCYTES # BLD AUTO: 0.1 10E9/L (ref 0.8–5.3)
LYMPHOCYTES NFR BLD AUTO: 1.7 %
LYMPHOCYTES NFR BLD AUTO: 3 %
MCH RBC QN AUTO: 35 PG (ref 26.5–33)
MCH RBC QN AUTO: 35.3 PG (ref 26.5–33)
MCHC RBC AUTO-ENTMCNC: 35.2 G/DL (ref 31.5–36.5)
MCHC RBC AUTO-ENTMCNC: 35.6 G/DL (ref 31.5–36.5)
MCV RBC AUTO: 100 FL (ref 78–100)
MCV RBC AUTO: 98 FL (ref 78–100)
MONOCYTES # BLD AUTO: 0 10E9/L (ref 0–1.3)
MONOCYTES # BLD AUTO: 0.1 10E9/L (ref 0–1.3)
MONOCYTES NFR BLD AUTO: 1 %
MONOCYTES NFR BLD AUTO: 1.7 %
NEUTROPHILS # BLD AUTO: 2.7 10E9/L (ref 1.6–8.3)
NEUTROPHILS # BLD AUTO: 2.7 10E9/L (ref 1.6–8.3)
NEUTROPHILS NFR BLD AUTO: 95.9 %
NEUTROPHILS NFR BLD AUTO: 96 %
NITRATE UR QL: NEGATIVE
PH UR STRIP: 6 PH (ref 5–7)
PLATELET # BLD AUTO: 80 10E9/L (ref 150–450)
PLATELET # BLD AUTO: 90 10E9/L (ref 150–450)
POTASSIUM SERPL-SCNC: 3.8 MMOL/L (ref 3.5–5.1)
POTASSIUM SERPL-SCNC: 4.1 MMOL/L (ref 3.5–5.1)
PROCALCITONIN SERPL-MCNC: 0.63 NG/ML
PROT SERPL-MCNC: 6.2 G/DL (ref 6.4–8.9)
RBC # BLD AUTO: 2.66 10E12/L (ref 4.4–5.9)
RBC # BLD AUTO: 3.03 10E12/L (ref 4.4–5.9)
RSV RNA SPEC QL NAA+PROBE: NEGATIVE
SARS-COV-2 RNA RESP QL NAA+PROBE: NEGATIVE
SODIUM SERPL-SCNC: 127 MMOL/L (ref 134–144)
SODIUM SERPL-SCNC: 131 MMOL/L (ref 134–144)
SOURCE: ABNORMAL
SP GR UR STRIP: 1.01 (ref 1–1.03)
SPECIMEN SOURCE: NORMAL
UROBILINOGEN UR STRIP-MCNC: NORMAL MG/DL (ref 0–2)
WBC # BLD AUTO: 2.8 10E9/L (ref 4–11)
WBC # BLD AUTO: 2.9 10E9/L (ref 4–11)

## 2021-02-03 PROCEDURE — 80053 COMPREHEN METABOLIC PANEL: CPT | Performed by: EMERGENCY MEDICINE

## 2021-02-03 PROCEDURE — 87040 BLOOD CULTURE FOR BACTERIA: CPT | Performed by: EMERGENCY MEDICINE

## 2021-02-03 PROCEDURE — 99285 EMERGENCY DEPT VISIT HI MDM: CPT | Mod: 25 | Performed by: EMERGENCY MEDICINE

## 2021-02-03 PROCEDURE — 99285 EMERGENCY DEPT VISIT HI MDM: CPT | Performed by: EMERGENCY MEDICINE

## 2021-02-03 PROCEDURE — 96365 THER/PROPH/DIAG IV INF INIT: CPT | Performed by: EMERGENCY MEDICINE

## 2021-02-03 PROCEDURE — 36415 COLL VENOUS BLD VENIPUNCTURE: CPT | Performed by: INTERNAL MEDICINE

## 2021-02-03 PROCEDURE — 85025 COMPLETE CBC W/AUTO DIFF WBC: CPT | Performed by: EMERGENCY MEDICINE

## 2021-02-03 PROCEDURE — 250N000011 HC RX IP 250 OP 636: Performed by: EMERGENCY MEDICINE

## 2021-02-03 PROCEDURE — 258N000003 HC RX IP 258 OP 636: Performed by: EMERGENCY MEDICINE

## 2021-02-03 PROCEDURE — 85025 COMPLETE CBC W/AUTO DIFF WBC: CPT | Performed by: INTERNAL MEDICINE

## 2021-02-03 PROCEDURE — 250N000012 HC RX MED GY IP 250 OP 636 PS 637: Performed by: INTERNAL MEDICINE

## 2021-02-03 PROCEDURE — 96366 THER/PROPH/DIAG IV INF ADDON: CPT | Performed by: EMERGENCY MEDICINE

## 2021-02-03 PROCEDURE — 87636 SARSCOV2 & INF A&B AMP PRB: CPT | Performed by: EMERGENCY MEDICINE

## 2021-02-03 PROCEDURE — 83605 ASSAY OF LACTIC ACID: CPT | Performed by: EMERGENCY MEDICINE

## 2021-02-03 PROCEDURE — 83605 ASSAY OF LACTIC ACID: CPT | Performed by: INTERNAL MEDICINE

## 2021-02-03 PROCEDURE — 99220 PR INITIAL OBSERVATION CARE,LEVEL III: CPT | Performed by: INTERNAL MEDICINE

## 2021-02-03 PROCEDURE — 81003 URINALYSIS AUTO W/O SCOPE: CPT | Performed by: EMERGENCY MEDICINE

## 2021-02-03 PROCEDURE — 84145 PROCALCITONIN (PCT): CPT | Performed by: EMERGENCY MEDICINE

## 2021-02-03 PROCEDURE — 71045 X-RAY EXAM CHEST 1 VIEW: CPT | Mod: TC

## 2021-02-03 PROCEDURE — C9803 HOPD COVID-19 SPEC COLLECT: HCPCS | Performed by: EMERGENCY MEDICINE

## 2021-02-03 PROCEDURE — G0378 HOSPITAL OBSERVATION PER HR: HCPCS

## 2021-02-03 PROCEDURE — 87086 URINE CULTURE/COLONY COUNT: CPT | Performed by: EMERGENCY MEDICINE

## 2021-02-03 PROCEDURE — 258N000003 HC RX IP 258 OP 636: Performed by: INTERNAL MEDICINE

## 2021-02-03 PROCEDURE — 80048 BASIC METABOLIC PNL TOTAL CA: CPT | Performed by: INTERNAL MEDICINE

## 2021-02-03 PROCEDURE — 250N000013 HC RX MED GY IP 250 OP 250 PS 637: Mod: GY | Performed by: EMERGENCY MEDICINE

## 2021-02-03 RX ORDER — ACETAMINOPHEN 325 MG/1
650 TABLET ORAL EVERY 4 HOURS PRN
Status: DISCONTINUED | OUTPATIENT
Start: 2021-02-03 | End: 2021-02-04 | Stop reason: HOSPADM

## 2021-02-03 RX ORDER — ONDANSETRON 4 MG/1
4 TABLET, ORALLY DISINTEGRATING ORAL EVERY 6 HOURS PRN
Status: DISCONTINUED | OUTPATIENT
Start: 2021-02-03 | End: 2021-02-03

## 2021-02-03 RX ORDER — SODIUM CHLORIDE 9 MG/ML
INJECTION, SOLUTION INTRAVENOUS CONTINUOUS
Status: DISCONTINUED | OUTPATIENT
Start: 2021-02-03 | End: 2021-02-04 | Stop reason: HOSPADM

## 2021-02-03 RX ORDER — ONDANSETRON 2 MG/ML
4 INJECTION INTRAMUSCULAR; INTRAVENOUS EVERY 6 HOURS PRN
Status: DISCONTINUED | OUTPATIENT
Start: 2021-02-03 | End: 2021-02-03

## 2021-02-03 RX ORDER — PREDNISONE 5 MG/1
5 TABLET ORAL DAILY
Status: DISCONTINUED | OUTPATIENT
Start: 2021-02-03 | End: 2021-02-04 | Stop reason: HOSPADM

## 2021-02-03 RX ORDER — SODIUM CHLORIDE 9 MG/ML
INJECTION, SOLUTION INTRAVENOUS CONTINUOUS
Status: DISCONTINUED | OUTPATIENT
Start: 2021-02-03 | End: 2021-02-03

## 2021-02-03 RX ORDER — AMOXICILLIN 500 MG/1
2000 CAPSULE ORAL
COMMUNITY
End: 2022-01-06

## 2021-02-03 RX ORDER — ACETAMINOPHEN 325 MG/1
650 TABLET ORAL ONCE
Status: COMPLETED | OUTPATIENT
Start: 2021-02-03 | End: 2021-02-03

## 2021-02-03 RX ORDER — CEFTRIAXONE SODIUM 1 G/50ML
1 INJECTION, SOLUTION INTRAVENOUS ONCE
Status: COMPLETED | OUTPATIENT
Start: 2021-02-03 | End: 2021-02-03

## 2021-02-03 RX ADMIN — SODIUM CHLORIDE: 9 INJECTION, SOLUTION INTRAVENOUS at 06:31

## 2021-02-03 RX ADMIN — SODIUM CHLORIDE: 9 INJECTION, SOLUTION INTRAVENOUS at 16:34

## 2021-02-03 RX ADMIN — PREDNISONE 5 MG: 5 TABLET ORAL at 10:25

## 2021-02-03 RX ADMIN — SODIUM CHLORIDE 500 ML: 9 INJECTION, SOLUTION INTRAVENOUS at 01:46

## 2021-02-03 RX ADMIN — ACETAMINOPHEN 650 MG: 325 TABLET, FILM COATED ORAL at 06:30

## 2021-02-03 RX ADMIN — CEFTRIAXONE SODIUM 1 G: 1 INJECTION, SOLUTION INTRAVENOUS at 02:43

## 2021-02-03 RX ADMIN — ACETAMINOPHEN 650 MG: 325 TABLET, FILM COATED ORAL at 01:49

## 2021-02-03 RX ADMIN — SODIUM CHLORIDE 500 ML: 9 INJECTION, SOLUTION INTRAVENOUS at 03:42

## 2021-02-03 RX ADMIN — SODIUM CHLORIDE 1000 ML: 9 INJECTION, SOLUTION INTRAVENOUS at 10:25

## 2021-02-03 RX ADMIN — ACETAMINOPHEN 650 MG: 325 TABLET, FILM COATED ORAL at 19:52

## 2021-02-03 ASSESSMENT — ENCOUNTER SYMPTOMS
DYSURIA: 0
VOMITING: 0
LIGHT-HEADEDNESS: 0
AGITATION: 0
NAUSEA: 0
FEVER: 1
CHILLS: 1
ARTHRALGIAS: 0
SHORTNESS OF BREATH: 0
CHEST TIGHTNESS: 0

## 2021-02-03 ASSESSMENT — ACTIVITIES OF DAILY LIVING (ADL)
HEARING_DIFFICULTY_OR_DEAF: NO
DIFFICULTY_EATING/SWALLOWING: NO
FALL_HISTORY_WITHIN_LAST_SIX_MONTHS: NO
WEAR_GLASSES_OR_BLIND: NO
PATIENT_/_FAMILY_COMMUNICATION_STYLE: SPOKEN LANGUAGE (ENGLISH OR BILINGUAL)
CONCENTRATING,_REMEMBERING_OR_MAKING_DECISIONS_DIFFICULTY: NO
DOING_ERRANDS_INDEPENDENTLY_DIFFICULTY: NO
TOILETING_ISSUES: NO
DRESSING/BATHING_DIFFICULTY: NO
DIFFICULTY_COMMUNICATING: NO

## 2021-02-03 ASSESSMENT — MIFFLIN-ST. JEOR
SCORE: 1328.94
SCORE: 1310.47

## 2021-02-03 NOTE — PLAN OF CARE
Pt has been resting this shift. No c/o of pain or discomfort from existing rash.  VS: T 98.8, HR 68, BP 92/52 (57) R 18, O2 96 on RA.  Problem: Adult Inpatient Plan of Care  Goal: Plan of Care Review  Outcome: No Change  Goal: Patient-Specific Goal (Individualized)  Outcome: No Change  Goal: Absence of Hospital-Acquired Illness or Injury  Outcome: No Change  Intervention: Identify and Manage Fall Risk  Recent Flowsheet Documentation  Taken 2/3/2021 0733 by Anna Lara RN  Safety Promotion/Fall Prevention:   assistive device/personal items within reach   bed alarm on   clutter free environment maintained   nonskid shoes/slippers when out of bed   patient and family education   room near nurse's station   safety round/check completed   treat reversible contributory factors  Intervention: Prevent Skin Injury  Recent Flowsheet Documentation  Taken 2/3/2021 0733 by Anna Lara RN  Body Position: position changed independently  Intervention: Prevent and Manage VTE (Venous Thromboembolism) Risk  Recent Flowsheet Documentation  Taken 2/3/2021 0733 by Anna Lara RN  VTE Prevention/Management: ambulation promoted  Goal: Optimal Comfort and Wellbeing  Outcome: No Change  Goal: Readiness for Transition of Care  Outcome: No Change

## 2021-02-03 NOTE — H&P
[facility]    History and Physical  Hospitalist       Date of Admission:  2/3/2021    Assessment & Plan   Wilfrid Meneses is a 80 year old male who presents with fever after chemo.    Principal Problem:    SIRS (systemic inflammatory response syndrome) (H)    Assessment: Unclear etiology.  The patient had a fever to 101.1.  He has low blood pressure, in the 80s systolic with an elevated lactate.  He is not neutropenic.  No obvious source of infection.  He feels well currently.  He received a dose of ceftriaxone in the emergency department.    Plan: Observation status.  Monitor for recurrent fever or positive cultures.  IV fluid bolus and IV fluids, follow lactate.    Active Problems:    Hypertension    Assessment: chronic    Plan: hold antihypertensives      Primary mucinous adenocarcinoma of lung (H)    Assessment: He received C4D1 Abraxane/Carboplatin with radiation on 2/2/21.    Plan: monitor      Adverse effect of chemotherapy, initial encounter    Assessment: possible drug fever    Plan: monitor    DVT Prophylaxis: Pneumatic Compression Devices  Code Status: No Order    Pj Casey    Primary Care Physician   JERRY RILEY    Chief Complaint   Fever, weakness    History is obtained from the patient and chart review.    History of Present Illness   Wilfrid Meneses is a 80 year old male who presents with fever and weakness.  The patient has a history of primary mucinous adenocarcinoma of the lung and is treated with C4D1 Abraxane/Carboplatin with radiation.  He received his last chemo yesterday February 2.  He has tolerated this well.  Last night he developed a fever at home to 101 and felt weak in his legs and presented to the emergency department.  He denies any nausea or vomiting.  He denies any shortness of breath.  He has a rash from radiation therapy but no open sores.  He has no ear pain, sinus congestion or sore throat.  No diarrhea or abdominal pain.    In the emergency  department his CBC shows nonneutropenia.  He has an elevated lactate.  He was given an empiric dose of IV ceftriaxone.  He was placed on observation for SIRS.     Past Medical History    I have reviewed this patient's medical history and updated it with pertinent information if needed.   Past Medical History:   Diagnosis Date     Coronary artery calcification     score 297 12/2010     Dysarthria      Enlarged prostate without lower urinary tract symptoms (luts)     Post prostatectomy     Essential (primary) hypertension     No Comments Provided     History of colonic polyps     No Comments Provided     Hyperglycemia     No Comments Provided     Hyperlipidemia     No Comments Provided     Malignant neoplasm of skin     basal cell ca. on back and right ear     PMR (polymyalgia rheumatica) (H)      Primary mucinous adenocarcinoma of lung (H)      Spinal stenosis of lumbar region without neurogenic claudication     No Comments Provided     Uncomplicated alcohol abuse     No Comments Provided       Past Surgical History   I have reviewed this patient's surgical history and updated it with pertinent information if needed.  Past Surgical History:   Procedure Laterality Date     ARTHROPLASTY KNEE Left     01/09     ARTHROPLASTY SHOULDER Left 2018     ARTHROSCOPY KNEE Left     12/05     COLONOSCOPY      2001, 2006, 2011,Adenomatous 2001, subsequent WNL     COLONOSCOPY  11/21/2014 11/21/2014,Adenoma     COLONOSCOPY  01/07/2020    tubular adenomas, follow up 5 years     HERNIORRHAPHY INGUINAL BILATERAL       PROSTATECTOMY SUPRAPUBIC  2007     UPPER GI ENDOSCOPY  2005       Prior to Admission Medications   Prior to Admission Medications   Prescriptions Last Dose Informant Patient Reported? Taking?   Multiple Vitamin (MULTI-VITAMINS) TABS 2/2/2021 at AM Self Yes Yes   Sig: Take 1 tablet by mouth daily   amoxicillin (AMOXIL) 500 MG capsule More than a month at N/A Self Yes No   Sig: Take 2,000 mg by mouth once as needed (1  HOUR PRIOR TO DENTAL APPOINTMENT)   atenolol (TENORMIN) 50 MG tablet 2/2/2021 at AM Self No Yes   Sig: Take 1 tablet (50 mg) by mouth daily   docusate sodium (COLACE) 100 MG tablet 2/2/2021 at PM Self Yes Yes   Sig: Take 100 mg by mouth as needed for constipation   predniSONE (DELTASONE) 5 MG tablet 2/2/2021 at AM Self Yes Yes   Sig: Take 0.5 tablets (2.5 mg) by mouth daily   prochlorperazine (COMPAZINE) 10 MG tablet More than a month at HAS NOT NEEDED Self Yes No   Sig: Take 10 mg by mouth every 6 hours as needed for nausea    simvastatin (ZOCOR) 40 MG tablet 2/2/2021 at 2000 Self No Yes   Sig: Take 1 tablet (40 mg) by mouth At Bedtime   valsartan-hydrochlorothiazide (DIOVAN HCT) 320-12.5 MG tablet 2/2/2021 at AM Self No Yes   Sig: Take 1 tablet by mouth daily      Facility-Administered Medications: None     Allergies   Allergies   Allergen Reactions     Paclitaxel Other (See Comments) and Cough     flushing     Ace Inhibitors Cough     Aspirin      Epistaxis requiring hospitalization from 81 mg daily     Hydrocodone-Acetaminophen      Other reaction(s): Insomnia     Sulfamethoxazole-Trimethoprim Itching and Rash     Sulfasalazine Rash       Social History   I have reviewed this patient's social history and updated it with pertinent information if needed. Wilfrid FRANKLIN Meneses  reports that he has never smoked. He has never used smokeless tobacco. He reports previous alcohol use of about 28.0 standard drinks of alcohol per week. He reports that he does not use drugs.    Family History   I have reviewed this patient's family history and updated it with pertinent information if needed.   Family History   Problem Relation Age of Onset     Diabetes Mother      Chronic Obstructive Pulmonary Disease Mother      Myocardial Infarction Father      Other - See Comments Son         MI during GXT     Other - See Comments Sister         PMR     Cancer No family hx of        Review of Systems     REVIEW OF SYSTEMS:    Constitutional:  normal energy and appetite, no recent sick contacts  Eyes: no changes in vision  Ears, nose, mouth, throat, and face: no mouth sores, dysphagia, or odynophagia  Respiratory: no shortness of breath, cough, or wheezing. No aspiration symptoms.   Cardiovascular: no chest pain, palpitations, orthopnea, increased lower extremity edema, or syncope.   Gastrointestinal: no constipation, diarrhea, nausea, vomiting or abdominal pain.  Genitourinary: no dysuria, hematuria, urgency or frequency.   Hematologic/lymphatic: no unintentional weight loss or night sweats.  Musculoskeletal: no pain to extremities or falls.   Neurological: Feels weak in his legs  Psychiatric: no hallucinations or delusions.  Endocrine:  not a known diabetic.     Physical Exam   Temp: 99  F (37.2  C) Temp src: Tympanic BP: 90/49 Pulse: 72   Resp: 18 SpO2: 97 % O2 Device: None (Room air)    Vital Signs with Ranges  Temp:  [99  F (37.2  C)-101.1  F (38.4  C)] 99  F (37.2  C)  Pulse:  [] 72  Resp:  [14-20] 18  BP: ()/(41-76) 90/49  SpO2:  [96 %-99 %] 97 %  154 lbs 5.15 oz    Constitutional: In no apparent distress  Eyes: pupils reactive, extraocular movements intact. Anicteric sclera.   HEENT: TM's normal, oropharynx nonerythematous. Neck supple, no JVD.  Respiratory: no crackles noted, no wheezes.  Cardiovascular: regular, no murmur. No lower extremity edema.  GI: soft, non-tender, bowel sounds present.  Lymph/Hematologic: no cervical or supraclavicular LAD.  Genitourinary: deferred  Skin: no rashes, or sores  Musculoskeletal: no joint erythema or swelling  Neurologic: cranial nerves symmetric. Neuro exam nonfocal  Psychiatric: alert and oriented x3. Interactive.       Data   Data reviewed today:  I personally reviewed the chest xray image(s) showing no infiltrate.  Recent Labs   Lab 02/03/21  0845 02/03/21  0140   WBC 2.8* 2.9*   HGB 9.4* 10.6*    98   PLT 80* 90*   * 127*   POTASSIUM 3.8 4.1   CHLORIDE 99 93*   CO2 23 25   BUN 23  29*   CR 0.85 0.91   ANIONGAP 9 9   ESTIVEN 7.9* 8.7   * 153*   ALBUMIN  --  4.0   PROTTOTAL  --  6.2*   BILITOTAL  --  0.9   ALKPHOS  --  47   ALT  --  15   AST  --  17       Recent Results (from the past 24 hour(s))   XR Chest Port 1 View    Narrative    PROCEDURE INFORMATION:   Exam: XR Chest, 1 View   Exam date and time: 2/3/2021 1:56 AM   Age: 80 years old   Clinical indication: Fever; Patient HX: Patient has HX of cancer. Recieved   chemo and radiation treatment yesterday.     TECHNIQUE:   Imaging protocol: XR of the chest   Views: 1 view.     COMPARISON:   CT CHEST W/O CONTRAST 9/14/2020 7:49 AM     FINDINGS:   Lungs:  Hyperaeration. No consolidation.   Pleural spaces: Unremarkable. No pleural effusion. No pneumothorax.   Heart/Mediastinum:  No cardiomegaly.  Prominent right hilum with clips.   Bones/joints:  No acute pathology.       Impression    IMPRESSION:   No evidence of acute pathology.     THIS DOCUMENT HAS BEEN ELECTRONICALLY SIGNED BY ELIZABETH MATHIS MD

## 2021-02-03 NOTE — TELEPHONE ENCOUNTER
[facility] nurse called canceling Mr. Meneses Radiation appointments for today 2/3/21 and tomorrow 2/4/21. Is is currently at [facility] observation for SIRS. Will resume treatment on Friday 2/5/21.    Remedios Espino

## 2021-02-03 NOTE — PROGRESS NOTES
" [facility] ADMISSION NOTE    Patient admitted to room 306 at approximately 0545 via cart from emergency room. Patient was accompanied by transport tech.     Verbal SBAR report received from Danni URENA prior to patient arrival.     Patient ambulated to bed with stand-by assist. Patient alert and oriented X 3. Pain is controlled without any medications.  . Admission vital signs: Blood pressure 96/54, pulse 78, temperature 101.1  F (38.4  C), temperature source Tympanic, resp. rate 17, height 1.626 m (5' 4\"), weight 68.9 kg (152 lb), SpO2 97 %. Patient was oriented to plan of care, call light, bed controls, tv, telephone, bathroom and visiting hours.     Risk Assessment    The following safety risks were identified during admission: none. Yellow risk band applied: NO.     Skin Initial Assessment    This writer admitted this patient and completed a full skin assessment and Reyes score in the Adult PCS flowsheet. Appropriate interventions initiated as needed.              Education    Patient has a Wallace to Observation order: Yes  Observation education completed and documented: Yes      Debora Prado RN    "

## 2021-02-03 NOTE — ED TRIAGE NOTES
Patient on chemo with hx of cancer on chemo and radiation, comes in with fever, chills, weakness in legs starting at 2030 on 2/2

## 2021-02-03 NOTE — ED PROVIDER NOTES
History     Chief Complaint   Patient presents with     Fever     Extremity Weakness     HPI  Wilfrid Meneses is a 80 year old male who is undergoing both chemotherapy and radiation therapy for some lung cancer.  Yesterday he did both of these things.  Today he is here with chills and fever and weakness.  This began last night and just got worse throughout the night so he came in.  No specific symptoms such as shortness of breath or coughing.  No dysuria.  No sores anywhere.  Has never had a similar reaction with previous chemo and there is nothing new in this around.    Allergies:  Allergies   Allergen Reactions     Paclitaxel Unknown     Ace Inhibitors Cough     Aspirin      bleeding     Hydrocodone-Acetaminophen      Other reaction(s): Insomnia  Keeps him awake      Sulfamethoxazole-Trimethoprim Rash     Sulfasalazine Rash       Problem List:    Patient Active Problem List    Diagnosis Date Noted     Primary mucinous adenocarcinoma of lung (H)      Priority: Medium     PMR (polymyalgia rheumatica) (H) 06/01/2020     Priority: Medium     Actinic keratosis 07/12/2019     Priority: Medium     Benign prostatic hyperplasia with urinary obstruction 01/16/2018     Priority: Medium     Lumbar spinal stenosis 01/16/2018     Priority: Medium     Hypertension 01/16/2018     Priority: Medium     Hyperlipidemia 01/16/2018     Priority: Medium     Benign neoplasm of colon 01/16/2018     Priority: Medium     Overview:   History of colonic polyps, colonoscopy 1/01, follow up done 2006, normal       Osteoarthritis of left glenohumeral joint 12/04/2017     Priority: Medium     Squamous cell skin cancer 09/03/2017     Priority: Medium     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     Priority: Medium     Dysarthria 05/18/2016     Priority: Medium     Coronary artery calcification 12/01/2015     Priority: Medium     Basal cell carcinoma of back 12/01/2014     Priority: Medium     Abnormal glucose 12/14/2011     Priority: Medium      Alcohol abuse 12/05/2011     Priority: Medium        Past Medical History:    Past Medical History:   Diagnosis Date     Coronary artery calcification      Dysarthria      Enlarged prostate without lower urinary tract symptoms (luts)      Essential (primary) hypertension      History of colonic polyps      Hyperglycemia      Hyperlipidemia      Malignant neoplasm of skin      PMR (polymyalgia rheumatica) (H)      Primary mucinous adenocarcinoma of lung (H)      Spinal stenosis of lumbar region without neurogenic claudication      Uncomplicated alcohol abuse        Past Surgical History:    Past Surgical History:   Procedure Laterality Date     ARTHROPLASTY KNEE Left     01/09     ARTHROPLASTY SHOULDER Left 2018     ARTHROSCOPY KNEE Left     12/05     COLONOSCOPY      2001, 2006, 2011,Adenomatous 2001, subsequent WNL     COLONOSCOPY  11/21/2014 11/21/2014,Adenoma     COLONOSCOPY  01/07/2020    tubular adenomas, follow up 5 years     HERNIORRHAPHY INGUINAL BILATERAL       PROSTATECTOMY SUPRAPUBIC  2007     UPPER GI ENDOSCOPY  2005       Family History:    Family History   Problem Relation Age of Onset     Diabetes Mother      Chronic Obstructive Pulmonary Disease Mother      Myocardial Infarction Father      Other - See Comments Son         MI during GXT     Other - See Comments Sister         PMR     Cancer No family hx of        Social History:  Marital Status:   [2]  Social History     Tobacco Use     Smoking status: Never Smoker     Smokeless tobacco: Never Used     Tobacco comment: smoked a pipe in college for 2-3 years (seldom)   Substance Use Topics     Alcohol use: Not Currently     Alcohol/week: 28.0 standard drinks     Frequency: Never     Comment: Last use was 7/4/20     Drug use: No        Medications:         atenolol (TENORMIN) 50 MG tablet       docusate sodium (COLACE) 100 MG tablet       Multiple Vitamin (MULTI-VITAMINS) TABS       predniSONE (DELTASONE) 5 MG tablet       simvastatin  "(ZOCOR) 40 MG tablet       valsartan-hydrochlorothiazide (DIOVAN HCT) 320-12.5 MG tablet       prochlorperazine (COMPAZINE) 10 MG tablet          Review of Systems   Constitutional: Positive for chills and fever.   HENT: Negative for congestion.    Eyes: Negative for visual disturbance.   Respiratory: Negative for chest tightness and shortness of breath.    Cardiovascular: Negative for chest pain.   Gastrointestinal: Negative for nausea and vomiting.   Genitourinary: Negative for dysuria.   Musculoskeletal: Negative for arthralgias.   Skin: Negative for rash.   Neurological: Negative for light-headedness.   Psychiatric/Behavioral: Negative for agitation.       Physical Exam   BP: 119/76  Pulse: 113  Temp: 101.1  F (38.4  C)  Resp: 20  Height: 162.6 cm (5' 4\")  Weight: 68.9 kg (152 lb)  SpO2: 98 %      Physical Exam  Vitals signs and nursing note reviewed.   Constitutional:       Appearance: Normal appearance.   HENT:      Head: Normocephalic and atraumatic.      Mouth/Throat:      Mouth: Mucous membranes are moist.   Eyes:      Conjunctiva/sclera: Conjunctivae normal.   Cardiovascular:      Rate and Rhythm: Normal rate and regular rhythm.      Heart sounds: Normal heart sounds.   Pulmonary:      Effort: Pulmonary effort is normal.      Breath sounds: Normal breath sounds.   Abdominal:      General: Abdomen is flat.   Musculoskeletal: Normal range of motion.   Skin:     General: Skin is dry.   Neurological:      Mental Status: He is alert.   Psychiatric:         Mood and Affect: Mood normal.         Behavior: Behavior normal.         ED Course        Procedures                 Results for orders placed or performed during the hospital encounter of 02/03/21 (from the past 24 hour(s))   Comprehensive metabolic panel   Result Value Ref Range    Sodium 127 (L) 134 - 144 mmol/L    Potassium 4.1 3.5 - 5.1 mmol/L    Chloride 93 (L) 98 - 107 mmol/L    Carbon Dioxide 25 21 - 31 mmol/L    Anion Gap 9 3 - 14 mmol/L    Glucose " 153 (H) 70 - 105 mg/dL    Urea Nitrogen 29 (H) 7 - 25 mg/dL    Creatinine 0.91 0.70 - 1.30 mg/dL    GFR Estimate 80 >60 mL/min/[1.73_m2]    GFR Estimate If Black >90 >60 mL/min/[1.73_m2]    Calcium 8.7 8.6 - 10.3 mg/dL    Bilirubin Total 0.9 0.3 - 1.0 mg/dL    Albumin 4.0 3.5 - 5.7 g/dL    Protein Total 6.2 (L) 6.4 - 8.9 g/dL    Alkaline Phosphatase 47 34 - 104 U/L    ALT 15 7 - 52 U/L    AST 17 13 - 39 U/L   CBC with platelets differential   Result Value Ref Range    WBC 2.9 (L) 4.0 - 11.0 10e9/L    RBC Count 3.03 (L) 4.4 - 5.9 10e12/L    Hemoglobin 10.6 (L) 13.3 - 17.7 g/dL    Hematocrit 29.8 (L) 40.0 - 53.0 %    MCV 98 78 - 100 fl    MCH 35.0 (H) 26.5 - 33.0 pg    MCHC 35.6 31.5 - 36.5 g/dL    RDW 12.2 10.0 - 15.0 %    Platelet Count 90 (L) 150 - 450 10e9/L    Diff Method Automated Method     % Neutrophils 95.9 %    % Lymphocytes 1.7 %    % Monocytes 1.7 %    % Eosinophils 0.0 %    % Basophils 0.0 %    % Immature Granulocytes 0.7 %    Absolute Neutrophil 2.7 1.6 - 8.3 10e9/L    Absolute Lymphocytes 0.1 (L) 0.8 - 5.3 10e9/L    Absolute Monocytes 0.1 0.0 - 1.3 10e9/L    Absolute Eosinophils 0.0 0.0 - 0.7 10e9/L    Absolute Basophils 0.0 0.0 - 0.2 10e9/L    Abs Immature Granulocytes 0.0 0 - 0.4 10e9/L   Lactic acid whole blood STAT   Result Value Ref Range    Lactic Acid 2.9 (H) 0.7 - 2.0 mmol/L   Procalcitonin   Result Value Ref Range    Procalcitonin 0.63 ng/ml   XR Chest Port 1 View    Narrative    PROCEDURE INFORMATION:   Exam: XR Chest, 1 View   Exam date and time: 2/3/2021 1:56 AM   Age: 80 years old   Clinical indication: Fever; Patient HX: Patient has HX of cancer. Recieved   chemo and radiation treatment yesterday.     TECHNIQUE:   Imaging protocol: XR of the chest   Views: 1 view.     COMPARISON:   CT CHEST W/O CONTRAST 9/14/2020 7:49 AM     FINDINGS:   Lungs:  Hyperaeration. No consolidation.   Pleural spaces: Unremarkable. No pleural effusion. No pneumothorax.   Heart/Mediastinum:  No cardiomegaly.   Prominent right hilum with clips.   Bones/joints:  No acute pathology.       Impression    IMPRESSION:   No evidence of acute pathology.     THIS DOCUMENT HAS BEEN ELECTRONICALLY SIGNED BY ELIZABETH MATHIS MD   UA reflex to Microscopic and Culture    Specimen: Urine Midstream; Midstream Urine   Result Value Ref Range    Color Urine Light Yellow     Appearance Urine Clear     Glucose Urine 30 (A) NEG^Negative mg/dL    Bilirubin Urine Negative NEG^Negative    Ketones Urine Negative NEG^Negative mg/dL    Specific Gravity Urine 1.015 1.003 - 1.035    Blood Urine Negative NEG^Negative    pH Urine 6.0 5.0 - 7.0 pH    Protein Albumin Urine Negative NEG^Negative mg/dL    Urobilinogen mg/dL Normal 0.0 - 2.0 mg/dL    Nitrite Urine Negative NEG^Negative    Leukocyte Esterase Urine Negative NEG^Negative    Source Midstream Urine        Medications   sodium chloride (PF) 0.9% PF flush 3 mL (has no administration in time range)   sodium chloride (PF) 0.9% PF flush 3 mL (has no administration in time range)   0.9% sodium chloride BOLUS (500 mLs Intravenous New Bag 2/3/21 0146)     Followed by   sodium chloride 0.9% infusion (has no administration in time range)   0.9% sodium chloride BOLUS (500 mLs Intravenous New Bag 2/3/21 0342)   acetaminophen (TYLENOL) tablet 650 mg (650 mg Oral Given 2/3/21 0149)   cefTRIAXone in d5w (ROCEPHIN) intermittent infusion 1 g (1 g Intravenous New Bag 2/3/21 0243)       Assessments & Plan (with Medical Decision Making)     I have reviewed the nursing notes.    I have reviewed the findings, diagnosis, plan and need for follow up with the patient.  Patient received chemo and radiation yesterday, believe it is likely that his fever is simply a reaction to the chemo.  Given his elevated lactic acid and intermediate pro calcitonin however I have treated him with fluid bolus and IV antibiotics.  He is doing somewhat better but still somewhat febrile.  I am going to admit him to the hospital for observation.   I have spoken with Dr. Jones, who has accepted the patient.  Both blood and urine cultures obtained prior to antibiotics.    New Prescriptions    No medications on file       Final diagnoses:   Adverse effect of chemotherapy, initial encounter   SIRS (systemic inflammatory response syndrome) (H)       2/3/2021   [facility]     Nigel Brown MD  02/03/21 1998

## 2021-02-03 NOTE — PHARMACY-ADMISSION MEDICATION HISTORY
Pharmacy -- Admission Medication Reconciliation    Prior to admission (PTA) medications were reviewed and the patient's PTA medication list was updated.    Sources Consulted: patient, sure scripts, Care Everywhere    The reliability of this Medication Reconciliation is: Reliability: Reliable    The following significant changes were made:  Amoxicillin prior to dental appointments ADDED    Note: last received C4D1 Abraxane/Carboplatin with radiation on 2/2/21 - Patient also received Aloxi at that time    In addition, the patient's allergies were reviewed with the patient and updated as follows:   Allergies: Paclitaxel, Ace inhibitors, Aspirin, Hydrocodone-acetaminophen, Sulfamethoxazole-trimethoprim, and Sulfasalazine    The pharmacist has reviewed with the patient that all personal medications should be removed from the building or locked in the belongings safe.  Patient shall only take medications ordered by the physician and administered by the nursing staff.       Medication barriers identified: none   Medication adherence concerns: none   Understanding of emergency medications: has not needed to use any PRN meds for nausea/vomiting - states has tolerated chemotherapy well    Tameka Kolb [facility], 2/3/2021,  10:46 AM

## 2021-02-04 VITALS
BODY MASS INDEX: 26.12 KG/M2 | SYSTOLIC BLOOD PRESSURE: 119 MMHG | TEMPERATURE: 97.8 F | RESPIRATION RATE: 16 BRPM | WEIGHT: 156.8 LBS | HEART RATE: 77 BPM | DIASTOLIC BLOOD PRESSURE: 66 MMHG | OXYGEN SATURATION: 98 % | HEIGHT: 65 IN

## 2021-02-04 LAB
ANION GAP SERPL CALCULATED.3IONS-SCNC: 5 MMOL/L (ref 3–14)
BACTERIA SPEC CULT: NORMAL
BASOPHILS # BLD AUTO: 0 10E9/L (ref 0–0.2)
BASOPHILS NFR BLD AUTO: 0.7 %
BUN SERPL-MCNC: 16 MG/DL (ref 7–25)
CALCIUM SERPL-MCNC: 7.3 MG/DL (ref 8.6–10.3)
CHLORIDE SERPL-SCNC: 109 MMOL/L (ref 98–107)
CO2 SERPL-SCNC: 23 MMOL/L (ref 21–31)
CREAT SERPL-MCNC: 0.74 MG/DL (ref 0.7–1.3)
DIFFERENTIAL METHOD BLD: ABNORMAL
EOSINOPHIL # BLD AUTO: 0 10E9/L (ref 0–0.7)
EOSINOPHIL NFR BLD AUTO: 1.3 %
ERYTHROCYTE [DISTWIDTH] IN BLOOD BY AUTOMATED COUNT: 12.7 % (ref 10–15)
GFR SERPL CREATININE-BSD FRML MDRD: >90 ML/MIN/{1.73_M2}
GLUCOSE SERPL-MCNC: 92 MG/DL (ref 70–105)
HCT VFR BLD AUTO: 23.5 % (ref 40–53)
HGB BLD-MCNC: 8.2 G/DL (ref 13.3–17.7)
IMM GRANULOCYTES # BLD: 0 10E9/L (ref 0–0.4)
IMM GRANULOCYTES NFR BLD: 1.3 %
LACTATE BLD-SCNC: 0.7 MMOL/L (ref 0.7–2)
LYMPHOCYTES # BLD AUTO: 0.2 10E9/L (ref 0.8–5.3)
LYMPHOCYTES NFR BLD AUTO: 9.8 %
MCH RBC QN AUTO: 35.8 PG (ref 26.5–33)
MCHC RBC AUTO-ENTMCNC: 34.9 G/DL (ref 31.5–36.5)
MCV RBC AUTO: 103 FL (ref 78–100)
MONOCYTES # BLD AUTO: 0.1 10E9/L (ref 0–1.3)
MONOCYTES NFR BLD AUTO: 3.9 %
NEUTROPHILS # BLD AUTO: 1.3 10E9/L (ref 1.6–8.3)
NEUTROPHILS NFR BLD AUTO: 83 %
PLATELET # BLD AUTO: 68 10E9/L (ref 150–450)
POTASSIUM SERPL-SCNC: 4 MMOL/L (ref 3.5–5.1)
RBC # BLD AUTO: 2.29 10E12/L (ref 4.4–5.9)
SODIUM SERPL-SCNC: 137 MMOL/L (ref 134–144)
SPECIMEN SOURCE: NORMAL
WBC # BLD AUTO: 1.5 10E9/L (ref 4–11)

## 2021-02-04 PROCEDURE — 250N000012 HC RX MED GY IP 250 OP 636 PS 637: Performed by: INTERNAL MEDICINE

## 2021-02-04 PROCEDURE — 99217 PR OBSERVATION CARE DISCHARGE: CPT | Performed by: INTERNAL MEDICINE

## 2021-02-04 PROCEDURE — 83605 ASSAY OF LACTIC ACID: CPT | Performed by: EMERGENCY MEDICINE

## 2021-02-04 PROCEDURE — 36415 COLL VENOUS BLD VENIPUNCTURE: CPT | Performed by: EMERGENCY MEDICINE

## 2021-02-04 PROCEDURE — 80048 BASIC METABOLIC PNL TOTAL CA: CPT | Performed by: EMERGENCY MEDICINE

## 2021-02-04 PROCEDURE — G0378 HOSPITAL OBSERVATION PER HR: HCPCS

## 2021-02-04 PROCEDURE — 85025 COMPLETE CBC W/AUTO DIFF WBC: CPT | Performed by: EMERGENCY MEDICINE

## 2021-02-04 PROCEDURE — 258N000003 HC RX IP 258 OP 636: Performed by: EMERGENCY MEDICINE

## 2021-02-04 RX ORDER — VALSARTAN AND HYDROCHLOROTHIAZIDE 320; 12.5 MG/1; MG/1
1 TABLET, FILM COATED ORAL DAILY
Qty: 90 TABLET | Refills: 3 | Status: SHIPPED | OUTPATIENT
Start: 2021-02-04 | End: 2021-02-12

## 2021-02-04 RX ORDER — ATENOLOL 50 MG/1
50 TABLET ORAL DAILY
Qty: 90 TABLET | Refills: 3 | Status: SHIPPED | OUTPATIENT
Start: 2021-02-04 | End: 2022-01-06

## 2021-02-04 RX ADMIN — SODIUM CHLORIDE: 9 INJECTION, SOLUTION INTRAVENOUS at 09:12

## 2021-02-04 RX ADMIN — PREDNISONE 5 MG: 5 TABLET ORAL at 09:14

## 2021-02-04 RX ADMIN — SODIUM CHLORIDE: 9 INJECTION, SOLUTION INTRAVENOUS at 00:39

## 2021-02-04 ASSESSMENT — MIFFLIN-ST. JEOR: SCORE: 1340.18

## 2021-02-04 NOTE — PROGRESS NOTES
Discharge Note    Patient Discharged to home on 2/4/2021 10:50 AM via Private Car accompanied by wife.     Patient informed of discharge instructions in AVS. patient verbalizes understanding and denies having any questions pertaining to AVS. Patient stable at time of discharge. All personal belongings returned to patient. Discharge prescriptions sent to [facility] via electronic communication.     Aure Heaton RN  2/4/2021  10:50 AM

## 2021-02-04 NOTE — PROGRESS NOTES
SAFETY CHECKLIST  ID Bands and Risk clasps correct and in place (DNR, Fall risk, Allergy, Latex, Limb):  Yes  All Lines Reconciled and labeled correctly: Yes  Whiteboard updated:Yes  Environmental interventions (bed/chair alarm on, call light, side rails, restraints, sitter....): Yes  Verify Tele #: 4

## 2021-02-04 NOTE — PHARMACY - DISCHARGE MEDICATION RECONCILIATION AND EDUCATION
Pharmacy:  Discharge Counseling and Medication Reconciliation    Wilfrid Meneses  [address] [facility] [address] [phone] (home)   80 year old male  PCP: Jimenez Colon    Allergies: Paclitaxel, Ace inhibitors, Aspirin, Hydrocodone-acetaminophen, Sulfamethoxazole-trimethoprim, and Sulfasalazine    Discharge Counseling:    Pharmacist met with patient (and/or family) today to review the medication portion of the After Visit Summary (with an emphasis on NEW medications) and to address patient's questions/concerns.    Summary of Education: met with patient to discuss changes in medications.  Patient was told to HOLD atenolol and diovan/hctz until his follow up.  Patient was told to check blood pressure at home to evaluate (restart diovan/hctz if needed).    Materials Provided:  MedCounselor sheets printed from Clinical Pharmacology on: NA    Discharge Medication Reconciliation:    It has been determined that the patient has an adequate supply of medications available or which can be obtained from the patient's preferred pharmacy.    Thank you for the consult.    Elli Pham RPH........February 4, 2021 9:58 AM

## 2021-02-04 NOTE — DISCHARGE SUMMARY
"[facility]    Discharge Summary  Hospitalist    Date of Admission:  2/3/2021  Date of Discharge:  2/4/2021  Discharging Provider: Pj Casey  Date of Service (when I saw the patient): 02/04/21    Discharge Diagnoses   Principal Problem:    SIRS (systemic inflammatory response syndrome) (H) (2/3/2021)    Hypertension (1/16/2018)    Primary mucinous adenocarcinoma of lung (H) ()    Adverse effect of chemotherapy, initial encounter (2/3/2021)      History of Present Illness   Wilfrid Meneses is an 80 year old male who presented with fever and weakness. Per the H&P:  \"Wilfrid Menesse is a 80 year old male who presents with fever and weakness.  The patient has a history of primary mucinous adenocarcinoma of the lung and is treated with C4D1 Abraxane/Carboplatin with radiation.  He received his last chemo yesterday February 2.  He has tolerated this well.  Last night he developed a fever at home to 101 and felt weak in his legs and presented to the emergency department.  He denies any nausea or vomiting.  He denies any shortness of breath.  He has a rash from radiation therapy but no open sores.  He has no ear pain, sinus congestion or sore throat.  No diarrhea or abdominal pain.     In the emergency department his CBC shows nonneutropenia.  He has an elevated lactate.  He was given an empiric dose of IV ceftriaxone.  He was placed on observation for SIRS. \"    Hospital Course   Wilfrid Meneses was admitted on 2/3/2021.  The following problems were addressed during his hospitalization:    Fever  No recurrence during this hospital stay. Ambulatory and eating without difficulty. Unclear cause, but suspect chemo related rather than infection.     Low blood pressure  His atenolol and diovan/hct were held. He will hold until he sees Dr. Jimenez Colon. Unless he checks it at home and has high blood pressure. He will then restart the diovan/hct.    Pj Casey MD      Code Status   Full Code     "   Primary Care Physician   JERRY RILEY    Physical Exam   Temp: 97.8  F (36.6  C) Temp src: Tympanic BP: 119/66 Pulse: 77   Resp: 16 SpO2: 98 % O2 Device: None (Room air)    Vitals:    02/03/21 0116 02/03/21 0555 02/04/21 0600   Weight: 68.9 kg (152 lb) 70 kg (154 lb 5.2 oz) 71.1 kg (156 lb 12.8 oz)     Vital Signs with Ranges  Temp:  [97.5  F (36.4  C)-98.8  F (37.1  C)] 97.8  F (36.6  C)  Pulse:  [56-85] 77  Resp:  [16-18] 16  BP: ()/(45-66) 119/66  SpO2:  [96 %-99 %] 98 %  I/O last 3 completed shifts:  In: 3265 [I.V.:3265]  Out: -     GENERAL: Comfortable, no apparent distress.  CARDIOVASCULAR: regular rate and rhythm, no murmur. No lower extremity edema   RESPIRATORY: Clear to auscultation bilaterally, no wheezes or crackles.  GI: non-tender, non-distended, normal bowel sounds.   SKIN: warm periphery, no rashes      Discharge Disposition   Discharged to home  Condition at discharge: Stable    Consultations This Hospital Stay   None    Time Spent on this Encounter   I, Pj Casey MD, personally saw the patient today and spent greater than 30 minutes discharging this patient.    Discharge Orders      Reason for your hospital stay    Fever after chemo     Follow-up and recommended labs and tests    Follow up with Dr. Riley on 2/12 at 2 PM     Activity    Your activity upon discharge: activity as tolerated     Discharge Instructions    Hold your BP meds. Check it at home. If you notice your pressure is high at home, restart Diovan/HCT but hold atenolol.     Full Code     Diet    Follow this diet upon discharge: Orders Placed This Encounter      Regular Diet Adult       Discharge Medications   Current Discharge Medication List      CONTINUE these medications which have CHANGED    Details   atenolol (TENORMIN) 50 MG tablet Take 1 tablet (50 mg) by mouth daily  Qty: 90 tablet, Refills: 3    Comments: Hold  Associated Diagnoses: Hypertension, unspecified type      valsartan-hydrochlorothiazide (DIOVAN  HCT) 320-12.5 MG tablet Take 1 tablet by mouth daily  Qty: 90 tablet, Refills: 3    Comments: Hold  Associated Diagnoses: Hypertension, unspecified type         CONTINUE these medications which have NOT CHANGED    Details   docusate sodium (COLACE) 100 MG tablet Take 100 mg by mouth as needed for constipation      Multiple Vitamin (MULTI-VITAMINS) TABS Take 1 tablet by mouth daily      predniSONE (DELTASONE) 5 MG tablet Take 0.5 tablets (2.5 mg) by mouth daily      simvastatin (ZOCOR) 40 MG tablet Take 1 tablet (40 mg) by mouth At Bedtime  Qty: 90 tablet, Refills: 3    Associated Diagnoses: Hyperlipidemia, unspecified hyperlipidemia type      amoxicillin (AMOXIL) 500 MG capsule Take 2,000 mg by mouth once as needed (1 HOUR PRIOR TO DENTAL APPOINTMENT)      prochlorperazine (COMPAZINE) 10 MG tablet Take 10 mg by mouth every 6 hours as needed for nausea            Allergies   Allergies   Allergen Reactions     Paclitaxel Other (See Comments) and Cough     flushing     Ace Inhibitors Cough     Aspirin      Epistaxis requiring hospitalization from 81 mg daily     Hydrocodone-Acetaminophen      Other reaction(s): Insomnia     Sulfamethoxazole-Trimethoprim Itching and Rash     Sulfasalazine Rash     Data   Most Recent 3 CBC's:  Recent Labs   Lab Test 02/04/21  0520 02/03/21  0845 02/03/21  0140   WBC 1.5* 2.8* 2.9*   HGB 8.2* 9.4* 10.6*   * 100 98   PLT 68* 80* 90*      Most Recent 3 BMP's:  Recent Labs   Lab Test 02/04/21  0520 02/03/21  0845 02/03/21  0140    131* 127*   POTASSIUM 4.0 3.8 4.1   CHLORIDE 109* 99 93*   CO2 23 23 25   BUN 16 23 29*   CR 0.74 0.85 0.91   ANIONGAP 5 9 9   ESTIVEN 7.3* 7.9* 8.7   GLC 92 183* 153*     Most Recent 2 LFT's:  Recent Labs   Lab Test 02/03/21  0140 12/03/19  1437   AST 17 25   ALT 15 19   ALKPHOS 47 82   BILITOTAL 0.9 1.2*     Most Recent INR's and Anticoagulation Dosing History:  Anticoagulation Dose History     Recent Dosing and Labs Latest Ref Rng & Units 1/24/2018     Chromogenic Factor 10 60 - 120 % 111        Most Recent 3 Troponin's:No lab results found.  Most Recent Cholesterol Panel:  Recent Labs   Lab Test 12/23/20  0835   CHOL 180   LDL 99   HDL 56   TRIG 125     Most Recent 6 Bacteria Isolates From Any Culture (See EPIC Reports for Culture Details):  Recent Labs   Lab Test 02/03/21  0140   CULT No growth after 1 day  No growth after 1 day     Most Recent TSH, T4 and A1c Labs:  Recent Labs   Lab Test 12/03/19  1437   A1C 5.4     Results for orders placed or performed during the hospital encounter of 02/03/21   XR Chest Port 1 View    Narrative    PROCEDURE INFORMATION:   Exam: XR Chest, 1 View   Exam date and time: 2/3/2021 1:56 AM   Age: 80 years old   Clinical indication: Fever; Patient HX: Patient has HX of cancer. Recieved   chemo and radiation treatment yesterday.     TECHNIQUE:   Imaging protocol: XR of the chest   Views: 1 view.     COMPARISON:   CT CHEST W/O CONTRAST 9/14/2020 7:49 AM     FINDINGS:   Lungs:  Hyperaeration. No consolidation.   Pleural spaces: Unremarkable. No pleural effusion. No pneumothorax.   Heart/Mediastinum:  No cardiomegaly.  Prominent right hilum with clips.   Bones/joints:  No acute pathology.       Impression    IMPRESSION:   No evidence of acute pathology.     THIS DOCUMENT HAS BEEN ELECTRONICALLY SIGNED BY ELIZABETH MATHIS MD

## 2021-02-04 NOTE — PLAN OF CARE
"Patient is alert and oriented x 4. Reports left sided back pain of a level 2/10. Tylenol administered per MAR. Follow up pain level was a 0/10. Blood pressure 94//56. Rash to face, neck, chest and abdomen. Ambulates to the bathroom with assist x 1 and a gait belt. Reports dizziness with standing. Bowel sounds are audible and normoactive. Voids frequently. Vital signs:  Temp: 98.1  F (36.7  C) Temp src: Tympanic BP: 108/56 Pulse: 71   Resp: 16 SpO2: 98 % O2 Device: None (Room air)   Height: 163.8 cm (5' 4.5\") Weight: 70 kg (154 lb 5.2 oz)  Estimated body mass index is 26.08 kg/m  as calculated from the following:    Height as of this encounter: 1.638 m (5' 4.5\").    Weight as of this encounter: 70 kg (154 lb 5.2 oz).        Emelyn Jimenez RN on 2/4/2021 at 4:33 AM    "

## 2021-02-04 NOTE — PROGRESS NOTES
Tele box #4 removed from patient and sent back to ICU.   Diamante Milelr RN on 2/4/2021 at 10:37 AM

## 2021-02-04 NOTE — PLAN OF CARE
"Alert and oriented x4. SBA. Gait is steady and balanced. Dyspnea with long walks. Ls clear/dim. Rash on chest pt reports from radiation therapy. Afebrile.   /66 (BP Location: Right arm)   Pulse 77   Temp 97.8  F (36.6  C) (Tympanic)   Resp 16   Ht 1.638 m (5' 4.5\")   Wt 71.1 kg (156 lb 12.8 oz)   SpO2 98%   BMI 26.50 kg/m      "

## 2021-02-04 NOTE — PROGRESS NOTES
SAFETY CHECKLIST  ID Bands and Risk clasps correct and in place (DNR, Fall risk, Allergy, Latex, Limb):  Yes  All Lines Reconciled and labeled correctly: Yes  Whiteboard updated:Yes  Environmental interventions (bed/chair alarm on, call light, side rails, restraints, sitter....): Yes  Verify Tele #: 4    Emelyn Jimenez RN on 2/3/2021 at 7:17 PM

## 2021-02-05 ENCOUNTER — RESULTS ONLY (OUTPATIENT)
Dept: RADIATION ONCOLOGY | Facility: HOSPITAL | Age: 81
End: 2021-02-05

## 2021-02-05 ENCOUNTER — OFFICE VISIT (OUTPATIENT)
Dept: RADIATION ONCOLOGY | Facility: HOSPITAL | Age: 81
End: 2021-02-05
Payer: MEDICARE

## 2021-02-05 ENCOUNTER — PATIENT OUTREACH (OUTPATIENT)
Dept: CARE COORDINATION | Facility: CLINIC | Age: 81
End: 2021-02-05

## 2021-02-05 ENCOUNTER — APPOINTMENT (OUTPATIENT)
Dept: RADIATION ONCOLOGY | Facility: HOSPITAL | Age: 81
End: 2021-02-05
Payer: MEDICARE

## 2021-02-05 VITALS
WEIGHT: 156.5 LBS | BODY MASS INDEX: 26.45 KG/M2 | SYSTOLIC BLOOD PRESSURE: 124 MMHG | DIASTOLIC BLOOD PRESSURE: 76 MMHG | HEART RATE: 104 BPM

## 2021-02-05 DIAGNOSIS — C34.90 PRIMARY MUCINOUS ADENOCARCINOMA OF LUNG (H): Primary | ICD-10-CM

## 2021-02-05 LAB
RAD ONC ARIA COURSE ID: NORMAL
RAD ONC ARIA COURSE LAST TREATMENT DATE: NORMAL
RAD ONC ARIA COURSE START DATE: NORMAL
RAD ONC ARIA COURSE TREATMENT ELAPSED DAYS: 25
RAD ONC ARIA FIRST TREATMENT DATE: NORMAL
RAD ONC ARIA PLAN FRACTIONS TREATED TO DATE: 18
RAD ONC ARIA PLAN ID: NORMAL
RAD ONC ARIA PLAN NAME: NORMAL
RAD ONC ARIA PLAN PRESCRIBED DOSE PER FRACTION: 2 GY
RAD ONC ARIA PLAN TOTAL FRACTIONS PRESCRIBED: 33
RAD ONC ARIA PLAN TOTAL PRESCRIBED DOSE: 6600 CGY
RAD ONC ARIA REFERENCE POINT DOSAGE GIVEN TO DATE: NORMAL GY
RAD ONC ARIA REFERENCE POINT DOSAGE GIVEN TO DATE: NORMAL GY
RAD ONC ARIA REFERENCE POINT ID: NORMAL
RAD ONC ARIA REFERENCE POINT ID: NORMAL

## 2021-02-05 PROCEDURE — 77014 PR CT GUIDE FOR PLACEMENT RADIATION THERAPY FIELDS: CPT | Mod: 26 | Performed by: RADIOLOGY

## 2021-02-05 PROCEDURE — 77386 HC IMRT TREATMENT DELIVERY, COMPLEX: CPT | Performed by: RADIOLOGY

## 2021-02-05 ASSESSMENT — PAIN SCALES - GENERAL: PAINLEVEL: NO PAIN (0)

## 2021-02-05 NOTE — PROGRESS NOTES
Clinic Care Coordination Contact  RUST/Voicemail       Clinical Data: Care Coordinator Outreach  Outreach attempted x 1.  Left message on patient's voicemail with call back information and requested return call.  Plan: Care Coordinator will attempt again.   Dyan Brock RN on 2/5/2021 at 9:43 AM      Transitional Care Management Phone Call    Summary of hospitalization:  [facility] discharge summary reviewed    DISCHARGE DIAGNOSIS: Adverse effect of chemotherapy, fever    DATE OF DISCHARGE: 2-4-2021    Diagnostic Tests/Treatments reviewed.  Follow up needed: blood pressure checks    Post Discharge Medication Reconciliation: discharge medications reconciled, continue medications without change.    Medications reviewed by: by myself    Problems taking medications regularly:  None    Problems adhering to non-medication therapy:  None    Other Healthcare Providers Involved in Patient s Care:         Specialist appointment - oncology     Update since discharge: stable. A little dyspnea with exertion today when he went for radiation. Seems to pass with rest. Blood pressures fine, 124/79, pulse 104.  Off of atenolol and Diovan as instructed and will monitor BP daily.     Plan of care communicated with patient    Just a friendly reminder that you appointment is   Next 5 appointments (look out 90 days)    Feb 12, 2021  8:00 AM  Office Visit with Jimenez Colon MD  [facility] ([facility] ) [address]  [place] [address] [phone]      .  We encourage you to keep this appointment.    Please remember to bring all of your pills in their bottles (including any vitamins or over the counter pills) with you to your appointment.   The patient indicates understanding of these issues and agrees with the plan of care.   Yes, plans to follow plan of care and keep the scheduled appointment.    Was the patient contacted within the 2  business days or other approved timeframe?  Yes     Was the Medication reconciliation and management done since the patient was discharged? Yes    Dyan Brock RN  2/5/2021 11:54 AM

## 2021-02-05 NOTE — PROGRESS NOTES
[facility]  DEPARTMENT OF RADIATION ONCOLOGY   ON TREATMENT VISIT (OTV) NOTE    Name: Wilfrid Meneses MRN: [id number]   : 1940 (80 year old)  Date of Service: 2021 Referring: Dr. Littlejohn, Dr. Howard,  and Dr. Ramos     Diagnosis and Cancer Staging  Primary mucinous adenocarcinoma of lung (H)  Staging form: Lung, AJCC 8th Edition  - Clinical stage from 2020: Stage IIIA (cT4, cN0, cM0) - Signed by Reji Zavaleta MD on 2020      Radiation Therapy:   Has received 18 fractions of 200 cGy for 3,600 cGy (planned dose of 33 fractions of 200 cGy for 6,600 cGy).    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 Pulmonary medicine referred the patient for consultation about the role of radiotherapy as potentially curative treatment for the asymptomatic disease (cT3/4N0M)). The disease was ultimately felt to not be amenable to SBRT and treated better with concurrent chemoradiation. 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     Subjective: Receiving concurrent chemoradiation (weekly carboplatin/Abraxane). Had minor adverse Taxol reaction two weeks ago (excessive flushing). Late Tuesday evening after chemotherapy, the patient felt febrile and unwell, Admitted briefly and then discharged home after improving with basic supportive care. Currently feels that he is back to near his baseline of energy and sense of well being. Prior constipation remains better managed. Acknowledges slight erythema of the anterior chest wall. Baseline breathing and swallowing. Denies dysphagia or odynophagia. Has been tolerating a taper of prednisone for polymyalgia rheumatic (PMR). Sees  rheumatologist this month. Had reported no increase of symptoms attributed to PMR. Denies known exposure to COVID-19, risky behaviors, or related symptoms including febrile, chest, gustatory, gastrointestinal, and systemic complaints.    ROS (score of 0 indicates that symptom is at baseline for most sections below): See Flowsheet for additional comments as indicated.  No Pain (0)  Nutrition Alteration  Diet Type: Patient's Preference  Anorexia NCI: 0 - None  Skin  Skin Reaction: 1 - Faint erythema or dry desquamation  Cardiovascular  Respiratory effort: 2 - Mild dyspnea on exertion  Sputum: (clear)  Cardio/Resp Note: Post nasal drip  Gastrointestinal  Nausea: 0 - None  Vomitin - No vomiting (ons)  Diarrhea: 0 - None  Psychosocial  Mood - Anxiety: 0 - Normal  Mood - Depression: 0 - Normal  Pyschosocial Note: fatigue: 3-4/10     Objective:  Vitals: /76 (BP Location: Left arm, Patient Position: Chair, Cuff Size: Adult Regular)   Pulse 104   Wt 71 kg (156 lb 8 oz)   BMI 26.45 kg/m    Wt Readings from Last 3 Encounters:   21 71 kg (156 lb 8 oz)   21 71.1 kg (156 lb 12.8 oz)   21 69.4 kg (153 lb)     KPS: 80.  Evaluation (also seen a linac during the prior week):  - General: Appears stable. Appears rested. Appears staged age. Appears in good general health, well-developed, well-nourished, and in no acute distress. Does not appear acutely or chronically ill. Appears well groomed.  - Head: No treatment-related alopecia. Normocephalic.  = Eyes: Anicteric, eyelids symmetric, vision grossly intact.  - ENT: Baseline levels of slight hoarseness and tremulous voice. Good clarity. Good volume. No coughing or aspiration.  - Neck: Symmetric, trachea midline.  - Lymphatics: No parotid, peripartoid, cervical, supraclavicular, or axillary adenopathy.  - Chest/Breasts: Scant erythema of the anterior chest wall. No desquamation, rash, or cellulitis. No hyperpigmentation. No port. No implanted cardiac  device.   - Lungs: Clear to auscultation, easy respirations.  - Cardiovascular: Regular rate. No jugulovenous distension.  - MSK: IV sites (left and right arm) appears clean, dry, and intact. No cellulitis. Shoulder range of motion is good. No arm edema or hand edema. Good muscle tone.  - Integument: No jaundice or pallor.  - Neurologic: Alert, oriented, fluent. Memory grossly intact. Motor grossly intact. Sensory grossly intact. No ataxia.  - Psychologic: Well spoken about his cancer and therapies. Appropriately anxious about radiation therapy and sad about diagnosis. Pleasant, cooperative, insightful, concrete, and good judgment.    Impression: Routine tolerance to radiation therapy. Grade 1 toxicity attributable to radiation therapy.    Plan:  1) Fever, malaise:  Led to brief admission but now doing well. Continue to observe but with low threshold for seeking assistance from medical oncology.  3) Skin: Grade 1. Nominal. Easily tolerated. Reviewed routine skin care. Anticipate possible escalation of skin reaction (erythema, hyperpigmentation, desquamation, and pain) with a peak about 1-2 weeks after the end of treatment.   3) GI: Grade 0. Swallowing well. No esophagitis. Anticipate possible significant discomfort and pain during the coming weeks, perhaps starting late next week. Will initiate pain care and dietary changes as indicated.  4) Pulmonary: Grade 0 toxicity. Relatively modest risk of severe acute toxicity. Continue to observe.  5) Pain: Grade 0. Anticipate possible use of non-opoid analgesics for pain relief as local toxicities progress.  6) FEN:  Grade 0. Continue to monitor. Modify diet as indicated by pain and acuteness of weight loss.  7) Chemotherapy: For concurrent chemoradiation, continue to coordinate with medical oncology their delivery of every week with supportive care and labs as indicated. C1D1 was 1/12/2021 for carboplatin/Taxol. Now on weekly carboplatin/Abraxae. Next dose 2/9/2021.        8) Radiation therapy: No new interventions. No boost/cone down pending. Have reviewed graphical 3D plan with attention to dose to the esophagus, lungs, heart, and spinal cord. Reviewed anticipated time course, nature, and potential interventions for managing toxicity during and after radiation therapy. Have reviewed the role of imaging in following his disease. Wished to proceed with treatment. All questions answered to his satisfaction.     Weekly Review: [facility] EMR's, reviewed available labs and diagnostic studies. Interpreted as adequate to proceed with radiation therapy. Discussed management with Therapy, Treatment Planning, and Nursing. Reviewed weekly routine QA, linac imaging, and clinical set up are adequate to proceed with radiation therapy as prescribed.    Current Outpatient Medications   Medication     docusate sodium (COLACE) 100 MG tablet     Multiple Vitamin (MULTI-VITAMINS) TABS     predniSONE (DELTASONE) 5 MG tablet     simvastatin (ZOCOR) 40 MG tablet     amoxicillin (AMOXIL) 500 MG capsule     atenolol (TENORMIN) 50 MG tablet     prochlorperazine (COMPAZINE) 10 MG tablet     valsartan-hydrochlorothiazide (DIOVAN HCT) 320-12.5 MG tablet     No current facility-administered medications for this visit.      Allergies: Paclitaxel, Ace inhibitors, Aspirin, Hydrocodone-acetaminophen, Sulfamethoxazole-trimethoprim, and Sulfasalazine      Reji Zavaleta M.D., Ph.D.  Department of Radiation Oncology  Tel: [phone]  Fax: [phone]

## 2021-02-08 ENCOUNTER — OFFICE VISIT (OUTPATIENT)
Dept: INTERNAL MEDICINE | Facility: OTHER | Age: 81
End: 2021-02-08
Attending: INTERNAL MEDICINE
Payer: MEDICARE

## 2021-02-08 ENCOUNTER — RESULTS ONLY (OUTPATIENT)
Dept: RADIATION ONCOLOGY | Facility: HOSPITAL | Age: 81
End: 2021-02-08

## 2021-02-08 ENCOUNTER — TELEPHONE (OUTPATIENT)
Dept: INTERNAL MEDICINE | Facility: OTHER | Age: 81
End: 2021-02-08

## 2021-02-08 ENCOUNTER — NURSE TRIAGE (OUTPATIENT)
Dept: INTERNAL MEDICINE | Facility: OTHER | Age: 81
End: 2021-02-08

## 2021-02-08 VITALS
WEIGHT: 153 LBS | HEART RATE: 126 BPM | BODY MASS INDEX: 25.86 KG/M2 | OXYGEN SATURATION: 99 % | DIASTOLIC BLOOD PRESSURE: 86 MMHG | RESPIRATION RATE: 20 BRPM | TEMPERATURE: 97.3 F | SYSTOLIC BLOOD PRESSURE: 146 MMHG

## 2021-02-08 DIAGNOSIS — R00.0 TACHYCARDIA: Primary | ICD-10-CM

## 2021-02-08 LAB
ALBUMIN SERPL-MCNC: 4.2 G/DL (ref 3.5–5.7)
ALP SERPL-CCNC: 51 U/L (ref 34–104)
ALT SERPL W P-5'-P-CCNC: 23 U/L (ref 7–52)
ANION GAP SERPL CALCULATED.3IONS-SCNC: 10 MMOL/L (ref 3–14)
AST SERPL W P-5'-P-CCNC: 23 U/L (ref 13–39)
BILIRUB SERPL-MCNC: 0.7 MG/DL (ref 0.3–1)
BUN SERPL-MCNC: 24 MG/DL (ref 7–25)
CALCIUM SERPL-MCNC: 9.5 MG/DL (ref 8.6–10.3)
CHLORIDE SERPL-SCNC: 97 MMOL/L (ref 98–107)
CO2 SERPL-SCNC: 25 MMOL/L (ref 21–31)
CREAT SERPL-MCNC: 0.81 MG/DL (ref 0.7–1.3)
ERYTHROCYTE [DISTWIDTH] IN BLOOD BY AUTOMATED COUNT: 12.4 % (ref 10–15)
GFR SERPL CREATININE-BSD FRML MDRD: >90 ML/MIN/{1.73_M2}
GLUCOSE SERPL-MCNC: 121 MG/DL (ref 70–105)
HCT VFR BLD AUTO: 28.8 % (ref 40–53)
HGB BLD-MCNC: 10.3 G/DL (ref 13.3–17.7)
INTERPRETATION ECG - MUSE: NORMAL
MCH RBC QN AUTO: 35.4 PG (ref 26.5–33)
MCHC RBC AUTO-ENTMCNC: 35.8 G/DL (ref 31.5–36.5)
MCV RBC AUTO: 99 FL (ref 78–100)
PLATELET # BLD AUTO: 61 10E9/L (ref 150–450)
POTASSIUM SERPL-SCNC: 4.2 MMOL/L (ref 3.5–5.1)
PROT SERPL-MCNC: 6.4 G/DL (ref 6.4–8.9)
RAD ONC ARIA COURSE ID: NORMAL
RAD ONC ARIA COURSE LAST TREATMENT DATE: NORMAL
RAD ONC ARIA COURSE START DATE: NORMAL
RAD ONC ARIA COURSE TREATMENT ELAPSED DAYS: 28
RAD ONC ARIA FIRST TREATMENT DATE: NORMAL
RAD ONC ARIA PLAN FRACTIONS TREATED TO DATE: 19
RAD ONC ARIA PLAN ID: NORMAL
RAD ONC ARIA PLAN NAME: NORMAL
RAD ONC ARIA PLAN PRESCRIBED DOSE PER FRACTION: 2 GY
RAD ONC ARIA PLAN TOTAL FRACTIONS PRESCRIBED: 33
RAD ONC ARIA PLAN TOTAL PRESCRIBED DOSE: 6600 CGY
RAD ONC ARIA REFERENCE POINT DOSAGE GIVEN TO DATE: NORMAL GY
RAD ONC ARIA REFERENCE POINT DOSAGE GIVEN TO DATE: NORMAL GY
RAD ONC ARIA REFERENCE POINT ID: NORMAL
RAD ONC ARIA REFERENCE POINT ID: NORMAL
RBC # BLD AUTO: 2.91 10E12/L (ref 4.4–5.9)
SODIUM SERPL-SCNC: 132 MMOL/L (ref 134–144)
TROPONIN I SERPL-MCNC: 6 PG/ML
TSH SERPL DL<=0.05 MIU/L-ACNC: 2.1 IU/ML (ref 0.34–5.6)
WBC # BLD AUTO: 2.4 10E9/L (ref 4–11)

## 2021-02-08 PROCEDURE — 84484 ASSAY OF TROPONIN QUANT: CPT | Mod: ZL | Performed by: INTERNAL MEDICINE

## 2021-02-08 PROCEDURE — 80053 COMPREHEN METABOLIC PANEL: CPT | Mod: ZL | Performed by: INTERNAL MEDICINE

## 2021-02-08 PROCEDURE — 85027 COMPLETE CBC AUTOMATED: CPT | Mod: ZL | Performed by: INTERNAL MEDICINE

## 2021-02-08 PROCEDURE — 84443 ASSAY THYROID STIM HORMONE: CPT | Mod: ZL | Performed by: INTERNAL MEDICINE

## 2021-02-08 PROCEDURE — 36415 COLL VENOUS BLD VENIPUNCTURE: CPT | Mod: ZL | Performed by: INTERNAL MEDICINE

## 2021-02-08 PROCEDURE — 99214 OFFICE O/P EST MOD 30 MIN: CPT | Performed by: INTERNAL MEDICINE

## 2021-02-08 PROCEDURE — G0463 HOSPITAL OUTPT CLINIC VISIT: HCPCS

## 2021-02-08 PROCEDURE — 93010 ELECTROCARDIOGRAM REPORT: CPT | Performed by: INTERNAL MEDICINE

## 2021-02-08 PROCEDURE — 93005 ELECTROCARDIOGRAM TRACING: CPT

## 2021-02-08 PROCEDURE — 77386 HC IMRT TREATMENT DELIVERY, COMPLEX: CPT | Performed by: RADIOLOGY

## 2021-02-08 ASSESSMENT — ENCOUNTER SYMPTOMS
CONSTITUTIONAL NEGATIVE: 1
ENDOCRINE NEGATIVE: 1
HEMATOLOGIC/LYMPHATIC NEGATIVE: 1
ALLERGIC/IMMUNOLOGIC NEGATIVE: 1

## 2021-02-08 ASSESSMENT — PAIN SCALES - GENERAL: PAINLEVEL: NO PAIN (0)

## 2021-02-08 NOTE — TELEPHONE ENCOUNTER
S-(situation): Patient call with concern of SOB and Tachycardia    B-(background): Patient history of adenocarcinoma of lung, recent observation hospital stay 02/03/2021- 02/04/2021. Patient was to have follow-up with PCP 02/12/2021.     A-(assessment): Patient states was taken off all blood pressure medications Tuesday night. BP readings have been good, last reading 125/80, after ambulation around store 136/90. Patient concern of HR, lowest reading since Thursday of 105, currently 130 after activity. SOB began Thursday after hospitalization, observation. Occurs with activity only States does not feel SOB has worsened since initial onset. No history of heart concerns, does have adenocarcinoma of lung. Currently receiving infusion and radiation treatments in [place]. Next appointment for both treatments 02/09/2021. Pain only in relation of polymyalgia. Denies fever, chest pain, dizziness, congestion, cough or increase in weakness.   Patient states has been on Atenolol since age 50 and since stopping suddenly, feels SOB and HR could be in correlation to medication change. Patient able to speak through sentences during phone call, some shortness of breath audible.    R-(recommendations): Per Protocol, recommendation given of ED visit concerning rapid heart rate as well and SOB. Patient wishes to seek office visit over ED visit at this time. Discussion with patient regarding disposition reasoning, advised to limit activity at this time and general advice for breathing difficulty. Advised patient to call back if severe breathing difficulty occurs or symptoms become worse. Patient verbalized understanding. Will have wife transport to clinic visit today. No further questions at this time.     Reason for Disposition    MODERATE difficulty breathing (e.g., speaks in phrases, SOB even at rest, pulse 100-120) of new onset or worse than normal    Additional Information    Negative: Chest pain    MILD difficulty breathing (e.g.,  minimal/no SOB at rest, SOB with walking, pulse < 100) of new onset or worse than normal    Patient wants to be seen    Negative: Fever > 101 F (38.3 C) and over 60 years of age    Protocols used: BREATHING DIFFICULTY-A-OH    Forwarded to scheduling, appointment made 1320 pm with Jimenez Colon MD.   Lily Henry RN ....................  2/8/2021   12:37 PM

## 2021-02-08 NOTE — PROGRESS NOTES
Chief Complaint: Shortness of breath and fast heartbeat.    HPI: He is in today complaining of not feeling well.  His pulse is fast and he feels short of breath.  He was hospitalized last week with a possible infection, nothing was found.  He was treated supportively including fluids and antibiotics and his prednisone dose was doubled due to his chronic use and relative adrenal insufficiency.  He was discharged home and was instructed not to take his Diovan or his a atenolol as his blood pressure was somewhat low.  On discharge, his hemoglobin was 8.2.  He of course has a history of lung cancer and is currently undergoing radiation therapy.  He has also received carboplatin and paclitaxel concurrent with the radiation.    I spent some time today reviewing his hospital notes and findings.  The final thought was that his fever may have been related to chemo rather than infection as there was no recurrence during his hospital stay.  Cultures are still pending on his blood.    Past Medical History:   Diagnosis Date     Coronary artery calcification     score 297 12/2010     Dysarthria      Enlarged prostate without lower urinary tract symptoms (luts)     Post prostatectomy     Essential (primary) hypertension     No Comments Provided     History of colonic polyps     No Comments Provided     Hyperglycemia     No Comments Provided     Hyperlipidemia     No Comments Provided     Malignant neoplasm of skin     basal cell ca. on back and right ear     PMR (polymyalgia rheumatica) (H)      Primary mucinous adenocarcinoma of lung (H)      Spinal stenosis of lumbar region without neurogenic claudication     No Comments Provided     Uncomplicated alcohol abuse     No Comments Provided       Past Surgical History:   Procedure Laterality Date     ARTHROPLASTY KNEE Left     01/09     ARTHROPLASTY SHOULDER Left 2018     ARTHROSCOPY KNEE Left     12/05     COLONOSCOPY      2001, 2006, 2011,Adenomatous 2001, subsequent WNL      COLONOSCOPY  11/21/2014 11/21/2014,Adenoma     COLONOSCOPY  01/07/2020    tubular adenomas, follow up 5 years     HERNIORRHAPHY INGUINAL BILATERAL       PROSTATECTOMY SUPRAPUBIC  2007     UPPER GI ENDOSCOPY  2005       Allergies   Allergen Reactions     Paclitaxel Other (See Comments) and Cough     flushing     Ace Inhibitors Cough     Aspirin      Epistaxis requiring hospitalization from 81 mg daily     Hydrocodone-Acetaminophen      Other reaction(s): Insomnia     Sulfamethoxazole-Trimethoprim Itching and Rash     Sulfasalazine Rash       Current Outpatient Medications   Medication Sig Dispense Refill     amoxicillin (AMOXIL) 500 MG capsule Take 2,000 mg by mouth once as needed (1 HOUR PRIOR TO DENTAL APPOINTMENT)       atenolol (TENORMIN) 50 MG tablet Take 1 tablet (50 mg) by mouth daily 90 tablet 3     docusate sodium (COLACE) 100 MG tablet Take 100 mg by mouth as needed for constipation       Multiple Vitamin (MULTI-VITAMINS) TABS Take 1 tablet by mouth daily       predniSONE (DELTASONE) 5 MG tablet Take 0.5 tablets (2.5 mg) by mouth daily       prochlorperazine (COMPAZINE) 10 MG tablet Take 10 mg by mouth every 6 hours as needed for nausea        simvastatin (ZOCOR) 40 MG tablet Take 1 tablet (40 mg) by mouth At Bedtime 90 tablet 3     valsartan-hydrochlorothiazide (DIOVAN HCT) 320-12.5 MG tablet Take 1 tablet by mouth daily 90 tablet 3       Social History     Socioeconomic History     Marital status:      Spouse name: Yoko     Number of children: 2     Years of education: Not on file     Highest education level: Not on file   Occupational History     Occupation: Pharmacist     Comment: retired   Social Needs     Financial resource strain: Not on file     Food insecurity     Worry: Not on file     Inability: Not on file     Transportation needs     Medical: Not on file     Non-medical: Not on file   Tobacco Use     Smoking status: Never Smoker     Smokeless tobacco: Never Used     Tobacco  comment: smoked a pipe in college for 2-3 years (seldom)   Substance and Sexual Activity     Alcohol use: Not Currently     Alcohol/week: 28.0 standard drinks     Frequency: Never     Comment: Last use was 7/4/20     Drug use: No     Sexual activity: Not Currently   Lifestyle     Physical activity     Days per week: Not on file     Minutes per session: Not on file     Stress: Not on file   Relationships     Social connections     Talks on phone: Not on file     Gets together: Not on file     Attends Buddhism service: Not on file     Active member of club or organization: Not on file     Attends meetings of clubs or organizations: Not on file     Relationship status: Not on file     Intimate partner violence     Fear of current or ex partner: Not on file     Emotionally abused: Not on file     Physically abused: Not on file     Forced sexual activity: Not on file   Other Topics Concern     Parent/sibling w/ CABG, MI or angioplasty before 65F 55M? Not Asked   Social History Narrative    Pt is ; lives in [place]; retired as a pharmacist at the hospital.       Review of Systems   Constitutional: Negative.    Endocrine: Negative.    Skin: Negative.    Allergic/Immunologic: Negative.    Hematological: Negative.        Physical Exam  Vitals signs and nursing note reviewed.   Constitutional:       General: He is not in acute distress.     Appearance: Normal appearance. He is not ill-appearing, toxic-appearing or diaphoretic.   Cardiovascular:      Rate and Rhythm: Tachycardia present.      Heart sounds: Normal heart sounds.   Pulmonary:      Effort: Pulmonary effort is normal.      Breath sounds: Normal breath sounds.   Neurological:      Mental Status: He is alert.         Assessment:      ICD-10-CM    1. Tachycardia  R00.0 EKG 12-lead complete w/read - Clinics     CBC with platelets     Comprehensive metabolic panel     Troponin GH     TSH     TSH     Troponin GH     Comprehensive metabolic panel     CBC with  platelets       Plan: His EKG today shows a sinus tachycardia.  His hemoglobin is at 10.2.  Other lab is pending.  I think his tachycardia simply related to going off of the beta-blocker as well as the anemia as everything else appears stable.  This of course is pending the results of the remainder of his lab.  I will have him resume atenolol starting today but continue to hold the Diovan.  He has an appointment with me on Friday which he will keep.  I will let him know in the meantime if any of the rest of his lab is particularly concerning.

## 2021-02-09 ENCOUNTER — RESULTS ONLY (OUTPATIENT)
Dept: RADIATION ONCOLOGY | Facility: HOSPITAL | Age: 81
End: 2021-02-09

## 2021-02-09 ENCOUNTER — APPOINTMENT (OUTPATIENT)
Dept: RADIATION ONCOLOGY | Facility: HOSPITAL | Age: 81
End: 2021-02-09
Payer: MEDICARE

## 2021-02-09 LAB
BACTERIA SPEC CULT: NORMAL
BACTERIA SPEC CULT: NORMAL
RAD ONC ARIA COURSE ID: NORMAL
RAD ONC ARIA COURSE LAST TREATMENT DATE: NORMAL
RAD ONC ARIA COURSE START DATE: NORMAL
RAD ONC ARIA COURSE TREATMENT ELAPSED DAYS: 29
RAD ONC ARIA FIRST TREATMENT DATE: NORMAL
RAD ONC ARIA PLAN FRACTIONS TREATED TO DATE: 20
RAD ONC ARIA PLAN ID: NORMAL
RAD ONC ARIA PLAN NAME: NORMAL
RAD ONC ARIA PLAN PRESCRIBED DOSE PER FRACTION: 2 GY
RAD ONC ARIA PLAN TOTAL FRACTIONS PRESCRIBED: 33
RAD ONC ARIA PLAN TOTAL PRESCRIBED DOSE: 6600 CGY
RAD ONC ARIA REFERENCE POINT DOSAGE GIVEN TO DATE: NORMAL GY
RAD ONC ARIA REFERENCE POINT DOSAGE GIVEN TO DATE: NORMAL GY
RAD ONC ARIA REFERENCE POINT ID: NORMAL
RAD ONC ARIA REFERENCE POINT ID: NORMAL
SPECIMEN SOURCE: NORMAL
SPECIMEN SOURCE: NORMAL

## 2021-02-09 PROCEDURE — 77014 PR CT GUIDE FOR PLACEMENT RADIATION THERAPY FIELDS: CPT | Mod: 26 | Performed by: RADIOLOGY

## 2021-02-09 PROCEDURE — 77427 RADIATION TX MANAGEMENT X5: CPT | Performed by: RADIOLOGY

## 2021-02-09 PROCEDURE — 77336 RADIATION PHYSICS CONSULT: CPT | Performed by: RADIOLOGY

## 2021-02-09 PROCEDURE — 77386 HC IMRT TREATMENT DELIVERY, COMPLEX: CPT | Performed by: RADIOLOGY

## 2021-02-10 ENCOUNTER — RESULTS ONLY (OUTPATIENT)
Dept: RADIATION ONCOLOGY | Facility: HOSPITAL | Age: 81
End: 2021-02-10

## 2021-02-10 ENCOUNTER — APPOINTMENT (OUTPATIENT)
Dept: RADIATION ONCOLOGY | Facility: HOSPITAL | Age: 81
End: 2021-02-10
Payer: MEDICARE

## 2021-02-10 ENCOUNTER — OFFICE VISIT (OUTPATIENT)
Dept: RADIATION ONCOLOGY | Facility: HOSPITAL | Age: 81
End: 2021-02-10
Payer: MEDICARE

## 2021-02-10 VITALS
WEIGHT: 151.2 LBS | DIASTOLIC BLOOD PRESSURE: 72 MMHG | SYSTOLIC BLOOD PRESSURE: 124 MMHG | BODY MASS INDEX: 25.55 KG/M2 | HEART RATE: 68 BPM

## 2021-02-10 DIAGNOSIS — C34.90 PRIMARY MUCINOUS ADENOCARCINOMA OF LUNG (H): Primary | ICD-10-CM

## 2021-02-10 LAB
RAD ONC ARIA COURSE ID: NORMAL
RAD ONC ARIA COURSE LAST TREATMENT DATE: NORMAL
RAD ONC ARIA COURSE START DATE: NORMAL
RAD ONC ARIA COURSE TREATMENT ELAPSED DAYS: 30
RAD ONC ARIA FIRST TREATMENT DATE: NORMAL
RAD ONC ARIA PLAN FRACTIONS TREATED TO DATE: 21
RAD ONC ARIA PLAN ID: NORMAL
RAD ONC ARIA PLAN NAME: NORMAL
RAD ONC ARIA PLAN PRESCRIBED DOSE PER FRACTION: 2 GY
RAD ONC ARIA PLAN TOTAL FRACTIONS PRESCRIBED: 33
RAD ONC ARIA PLAN TOTAL PRESCRIBED DOSE: 6600 CGY
RAD ONC ARIA REFERENCE POINT DOSAGE GIVEN TO DATE: NORMAL GY
RAD ONC ARIA REFERENCE POINT DOSAGE GIVEN TO DATE: NORMAL GY
RAD ONC ARIA REFERENCE POINT ID: NORMAL
RAD ONC ARIA REFERENCE POINT ID: NORMAL

## 2021-02-10 PROCEDURE — 77386 HC IMRT TREATMENT DELIVERY, COMPLEX: CPT | Performed by: RADIOLOGY

## 2021-02-10 PROCEDURE — 77014 PR CT GUIDE FOR PLACEMENT RADIATION THERAPY FIELDS: CPT | Mod: 26 | Performed by: RADIOLOGY

## 2021-02-10 ASSESSMENT — PAIN SCALES - GENERAL: PAINLEVEL: NO PAIN (0)

## 2021-02-10 NOTE — PROGRESS NOTES
[facility]  DEPARTMENT OF RADIATION ONCOLOGY   ON TREATMENT VISIT (OTV) NOTE    Name: Wilfrid Meneses MRN: [id number]   : 1940 (80 year old)  Date of Service: 02/10/2021 Referring: Dr. Littlejohn, Dr. Howard,  and Dr. 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 Pulmonary medicine referred the patient for consultation about the role of radiotherapy as potentially curative treatment for the asymptomatic disease (cT3/4N0M)). The disease was ultimately felt to not be amenable to SBRT and treated better with concurrent chemoradiation. 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     Subjective: Seen with wife. Receiving concurrent chemoradiation (weekly carboplatin/Abraxane). Had minor adverse Taxol reaction 3 weeks ago (excessive flushing). On 2021, patient felt febrile and unwell, Admitted briefly and then discharged home after improving with basic supportive care.     Chemotherapy held this week. Saw PCP on 2021 for dyspnea. Partly attributed to discontinuation of anti-hypertensive medications upon discharge. Regimen partially restarted. Will see primary care physician again on 2021. Feels that he has no toxicity from radiation therapy/ Currently feels that he is again nearly back to near his baseline of energy and sense of well being. Prior constipation remains better managed. Baseline breathing and swallowing.  Denies dysphagia or odynophagia. Has been tolerating a taper of prednisone for polymyalgia rheumatic (PMR). Sees rheumatologist this month. Had reported no increase of symptoms attributed to PMR. Denies known exposure to COVID-19, risky behaviors, or related symptoms including febrile, chest, gustatory, gastrointestinal, and systemic complaints.    ROS (score of 0 indicates that symptom is at baseline for most sections below): See Flowsheet for additional comments as indicated.  No Pain (0)  Nutrition Alteration  Diet Type: Patient's Preference  Anorexia NCI: 0 - None  Skin  Skin Reaction: 1 - Faint erythema or dry desquamation  Cardiovascular  Respiratory effort: 2 - Mild dyspnea on exertion  Cardio/Resp Note: Dysnpea is improved from last week.   Gastrointestinal  Nausea: 0 - None  Vomitin - No vomiting (ons)  Diarrhea: 0 - None  Constipation NCI: 1 - Occasional or intermittent s/sx  Psychosocial  Mood - Anxiety: 0 - Normal  Mood - Depression: 0 - Normal  Pyschosocial Note: fatigue: 3-4/10     Objective:  Vitals: /72   Pulse 68   Wt 68.6 kg (151 lb 3.2 oz)   BMI 25.55 kg/m    Wt Readings from Last 3 Encounters:   02/10/21 68.6 kg (151 lb 3.2 oz)   21 69.4 kg (153 lb)   21 71 kg (156 lb 8 oz)     KPS: 80.  Evaluation (also seen a linac during the prior week):  - General: Appears stable. Appears rested. Appears staged age. Appears in good general health, well-developed, well-nourished, and in no acute distress. Does not appear acutely or chronically ill. Appears well groomed.  - Head: No treatment-related alopecia. Normocephalic.  = Eyes: Anicteric, eyelids symmetric, vision grossly intact.  - ENT: Baseline levels of slight hoarseness and tremulous voice. Good clarity. Good volume. No coughing or aspiration.  - Neck: Symmetric, trachea midline.  - Lymphatics: No parotid, peripartoid, cervical, supraclavicular, or axillary adenopathy.  - Chest/Breasts: Resolution of scant erythema of the  anterior chest wall. No desquamation, rash, or cellulitis. No hyperpigmentation. No port. No implanted cardiac device.   - Lungs: Clear to auscultation, easy respirations.  - Cardiovascular: Regular rate. No jugulovenous distension.  - MSK: IV sites (left and right arm) appears clean, dry, and intact. No cellulitis. Shoulder range of motion is good. No arm edema or hand edema. Good muscle tone.  - Integument: No jaundice or pallor.  - Neurologic: Alert, oriented, fluent. Memory grossly intact. Motor grossly intact. Sensory grossly intact. No ataxia.  - Psychologic: Well spoken about his cancer and therapies. Appropriately anxious about radiation therapy and sad about diagnosis. Good dynamic with his wife. Pleasant, cooperative, insightful, concrete, and good judgment.    Impression: Routine tolerance to radiation therapy. Grade 0 toxicity attributable to radiation therapy.    Plan:  1) Dyspnea, fever, malaise:  Will again follow up with PCP on 2/12/2021. Manage as per service.   2) Skin: Grade 0. Unclear if prior erythema was due to radiation therapy. Was easily tolerated. Reviewed routine skin care. Anticipate possible escalation of skin reaction (erythema, hyperpigmentation, desquamation, and pain) with a peak about 1-2 weeks after the end of treatment.   3) GI: Grade 0. Swallowing well. No esophagitis. Anticipate possible significant discomfort and pain during the coming weeks, perhaps starting late next week. Will initiate pain care and dietary changes as indicated.  4) Pulmonary: Grade 0 toxicity. Relatively modest risk of severe acute toxicity. Continue to observe.  5) Pain: Grade 0. Anticipate possible use of non-opoid analgesics for pain relief as local toxicities progress.  6) FEN:  Grade 0. Continue to monitor. Modify diet as indicated by pain and acuteness of weight loss.  7) Chemotherapy: For concurrent chemoradiation, continue to coordinate with medical oncology their delivery of every week with  supportive care and labs as indicated. C1D1 was 1/12/2021 for carboplatin/Taxol. Now on weekly carboplatin/Abraxae. Does held this week. Next dose tentatively scheduled for 2/16/2021.       8) Radiation therapy: No new interventions. No boost/cone down pending. Again reviewed graphical 3D plan with attention to dose to the esophagus, lungs, heart, and spinal cord. Reviewed anticipated time course, nature, and potential interventions for managing toxicity during and after radiation therapy. Again reviewed the role of imaging (CT, PET-CT) in following his disease. Wished to proceed with treatment. Aware of onsite and telemedicine coverage of clinic. All questions answered to their satisfaction.     Weekly Review: [facility] and [facility] EMR's, reviewed available labs and diagnostic studies. Interpreted as adequate to proceed with radiation therapy. Discussed management with Therapy, Treatment Planning, and Nursing. Reviewed weekly routine QA, linac imaging, and clinical set up are adequate to proceed with radiation therapy as prescribed.    Current Outpatient Medications   Medication     atenolol (TENORMIN) 50 MG tablet     docusate sodium (COLACE) 100 MG tablet     Multiple Vitamin (MULTI-VITAMINS) TABS     predniSONE (DELTASONE) 5 MG tablet     simvastatin (ZOCOR) 40 MG tablet     amoxicillin (AMOXIL) 500 MG capsule     prochlorperazine (COMPAZINE) 10 MG tablet     valsartan-hydrochlorothiazide (DIOVAN HCT) 320-12.5 MG tablet     No current facility-administered medications for this visit.      Allergies: Paclitaxel, Ace inhibitors, Aspirin, Hydrocodone-acetaminophen, Sulfamethoxazole-trimethoprim, and Sulfasalazine      Reji Zavaleta M.D., Ph.D.  Department of Radiation Oncology  Tel: [phone]  Fax: [phone]

## 2021-02-11 ENCOUNTER — DOCUMENTATION ONLY (OUTPATIENT)
Dept: RADIATION ONCOLOGY | Facility: HOSPITAL | Age: 81
End: 2021-02-11

## 2021-02-11 ENCOUNTER — RESULTS ONLY (OUTPATIENT)
Dept: RADIATION ONCOLOGY | Facility: HOSPITAL | Age: 81
End: 2021-02-11

## 2021-02-11 ENCOUNTER — OFFICE VISIT (OUTPATIENT)
Dept: RADIATION ONCOLOGY | Facility: HOSPITAL | Age: 81
End: 2021-02-11
Payer: MEDICARE

## 2021-02-11 ENCOUNTER — ALLIED HEALTH/NURSE VISIT (OUTPATIENT)
Dept: RADIATION ONCOLOGY | Facility: HOSPITAL | Age: 81
End: 2021-02-11

## 2021-02-11 DIAGNOSIS — C34.90 PRIMARY MUCINOUS ADENOCARCINOMA OF LUNG (H): Primary | ICD-10-CM

## 2021-02-11 LAB
RAD ONC ARIA COURSE ID: NORMAL
RAD ONC ARIA COURSE LAST TREATMENT DATE: NORMAL
RAD ONC ARIA COURSE START DATE: NORMAL
RAD ONC ARIA COURSE TREATMENT ELAPSED DAYS: 31
RAD ONC ARIA FIRST TREATMENT DATE: NORMAL
RAD ONC ARIA PLAN FRACTIONS TREATED TO DATE: 22
RAD ONC ARIA PLAN ID: NORMAL
RAD ONC ARIA PLAN NAME: NORMAL
RAD ONC ARIA PLAN PRESCRIBED DOSE PER FRACTION: 2 GY
RAD ONC ARIA PLAN TOTAL FRACTIONS PRESCRIBED: 33
RAD ONC ARIA PLAN TOTAL PRESCRIBED DOSE: 6600 CGY
RAD ONC ARIA REFERENCE POINT DOSAGE GIVEN TO DATE: NORMAL GY
RAD ONC ARIA REFERENCE POINT DOSAGE GIVEN TO DATE: NORMAL GY
RAD ONC ARIA REFERENCE POINT ID: NORMAL
RAD ONC ARIA REFERENCE POINT ID: NORMAL

## 2021-02-11 PROCEDURE — 77470 SPECIAL RADIATION TREATMENT: CPT | Mod: 26 | Performed by: RADIOLOGY

## 2021-02-11 PROCEDURE — 77014 PR CT GUIDE FOR PLACEMENT RADIATION THERAPY FIELDS: CPT | Mod: 26 | Performed by: RADIOLOGY

## 2021-02-11 PROCEDURE — 77386 HC IMRT TREATMENT DELIVERY, COMPLEX: CPT | Performed by: RADIOLOGY

## 2021-02-11 PROCEDURE — 77470 SPECIAL RADIATION TREATMENT: CPT | Performed by: RADIOLOGY

## 2021-02-11 NOTE — PROGRESS NOTES
"     [facility]  DEPARTMENT OF RADIATION ONCOLOGY   CLINIC NOTE    Name: Wilfrid Meneses MRN: [id number]   : 1940 (80 year old)  Date of Service: 2021 Referring: Dr. Littlejohn, Dr. Howard,  and Dr. 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 Pulmonary medicine referred the patient for consultation about the role of radiotherapy as potentially curative treatment for the asymptomatic disease (cT3/4N0M)). The disease was ultimately felt to not be amenable to SBRT and treated better with concurrent chemoradiation. 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     Special Treatment Procedure based on:  o Delivery of radiation therapy will require additional time, effort, and resources due to concurrent cytotoxic chemoradiation with weekly carboplatin/Abraxane, highly toxic nature of chemoradiation for lung cancer, and coordination of care with medical oncology. Has already required additional effort and coordination of care due to adverse reaction to Taxol and admission on 2021 due to additional toxicities.             Reji Zavaleta M.D., Ph.D.  Department of Radiation Oncology  Tel: [phone]  Fax: [phone]  "

## 2021-02-12 ENCOUNTER — RESULTS ONLY (OUTPATIENT)
Dept: RADIATION ONCOLOGY | Facility: HOSPITAL | Age: 81
End: 2021-02-12

## 2021-02-12 ENCOUNTER — OFFICE VISIT (OUTPATIENT)
Dept: INTERNAL MEDICINE | Facility: OTHER | Age: 81
End: 2021-02-12
Attending: INTERNAL MEDICINE
Payer: MEDICARE

## 2021-02-12 VITALS
WEIGHT: 151.6 LBS | OXYGEN SATURATION: 98 % | HEART RATE: 78 BPM | DIASTOLIC BLOOD PRESSURE: 70 MMHG | SYSTOLIC BLOOD PRESSURE: 124 MMHG | BODY MASS INDEX: 25.62 KG/M2 | TEMPERATURE: 95.6 F | RESPIRATION RATE: 16 BRPM

## 2021-02-12 DIAGNOSIS — R00.0 TACHYCARDIA: Primary | ICD-10-CM

## 2021-02-12 DIAGNOSIS — I10 HYPERTENSION, UNSPECIFIED TYPE: ICD-10-CM

## 2021-02-12 PROBLEM — R65.10 SIRS (SYSTEMIC INFLAMMATORY RESPONSE SYNDROME) (H): Status: RESOLVED | Noted: 2021-02-03 | Resolved: 2021-02-12

## 2021-02-12 LAB
RAD ONC ARIA COURSE ID: NORMAL
RAD ONC ARIA COURSE LAST TREATMENT DATE: NORMAL
RAD ONC ARIA COURSE START DATE: NORMAL
RAD ONC ARIA COURSE TREATMENT ELAPSED DAYS: 32
RAD ONC ARIA FIRST TREATMENT DATE: NORMAL
RAD ONC ARIA PLAN FRACTIONS TREATED TO DATE: 23
RAD ONC ARIA PLAN ID: NORMAL
RAD ONC ARIA PLAN NAME: NORMAL
RAD ONC ARIA PLAN PRESCRIBED DOSE PER FRACTION: 2 GY
RAD ONC ARIA PLAN TOTAL FRACTIONS PRESCRIBED: 33
RAD ONC ARIA PLAN TOTAL PRESCRIBED DOSE: 6600 CGY
RAD ONC ARIA REFERENCE POINT DOSAGE GIVEN TO DATE: NORMAL GY
RAD ONC ARIA REFERENCE POINT DOSAGE GIVEN TO DATE: NORMAL GY
RAD ONC ARIA REFERENCE POINT ID: NORMAL
RAD ONC ARIA REFERENCE POINT ID: NORMAL

## 2021-02-12 PROCEDURE — 99213 OFFICE O/P EST LOW 20 MIN: CPT | Performed by: INTERNAL MEDICINE

## 2021-02-12 PROCEDURE — G0463 HOSPITAL OUTPT CLINIC VISIT: HCPCS

## 2021-02-12 PROCEDURE — 77386 HC IMRT TREATMENT DELIVERY, COMPLEX: CPT | Performed by: RADIOLOGY

## 2021-02-12 RX ORDER — VALSARTAN AND HYDROCHLOROTHIAZIDE 320; 12.5 MG/1; MG/1
1 TABLET, FILM COATED ORAL DAILY
Qty: 90 TABLET | Refills: 3 | COMMUNITY
Start: 2021-02-12 | End: 2021-04-14

## 2021-02-12 ASSESSMENT — ENCOUNTER SYMPTOMS
EYES NEGATIVE: 1
CONSTITUTIONAL NEGATIVE: 1
ENDOCRINE NEGATIVE: 1
ALLERGIC/IMMUNOLOGIC NEGATIVE: 1

## 2021-02-12 ASSESSMENT — PAIN SCALES - GENERAL: PAINLEVEL: NO PAIN (0)

## 2021-02-12 NOTE — NURSING NOTE
"Chief Complaint   Patient presents with     RECHECK       Initial /70 (BP Location: Right arm, Patient Position: Sitting, Cuff Size: Adult Regular)   Pulse 78   Temp 95.6  F (35.3  C) (Tympanic)   Resp 16   Wt 68.8 kg (151 lb 9.6 oz)   SpO2 98%   BMI 25.62 kg/m   Estimated body mass index is 25.62 kg/m  as calculated from the following:    Height as of 2/3/21: 1.638 m (5' 4.5\").    Weight as of this encounter: 68.8 kg (151 lb 9.6 oz).  Medication Reconciliation: complete    Kathleen Fleming LPN    "

## 2021-02-12 NOTE — PROGRESS NOTES
Chief Complaint:  F/U on tachycardia.    HPI: See previous note.  He had significant tachycardia.  He is anemic from his chemotherapy and he was taken off of this atenolol as well as his valsartan.  We put him back on his atenolol but had him hold his valsartan as yet.  He is here today for follow-up.  All of his testing for the tachycardia was negative other than the anemia.  He feels fine and his blood pressure is doing well and his pulse is slower now that he is back on the beta-blocker.    Past Medical History:   Diagnosis Date     Coronary artery calcification     score 297 12/2010     Dysarthria      Enlarged prostate without lower urinary tract symptoms (luts)     Post prostatectomy     Essential (primary) hypertension     No Comments Provided     History of colonic polyps     No Comments Provided     Hyperglycemia     No Comments Provided     Hyperlipidemia     No Comments Provided     Malignant neoplasm of skin     basal cell ca. on back and right ear     PMR (polymyalgia rheumatica) (H)      Primary mucinous adenocarcinoma of lung (H)      Spinal stenosis of lumbar region without neurogenic claudication     No Comments Provided     Uncomplicated alcohol abuse     No Comments Provided       Past Surgical History:   Procedure Laterality Date     ARTHROPLASTY KNEE Left     01/09     ARTHROPLASTY SHOULDER Left 2018     ARTHROSCOPY KNEE Left     12/05     COLONOSCOPY      2001, 2006, 2011,Adenomatous 2001, subsequent WNL     COLONOSCOPY  11/21/2014 11/21/2014,Adenoma     COLONOSCOPY  01/07/2020    tubular adenomas, follow up 5 years     HERNIORRHAPHY INGUINAL BILATERAL       PROSTATECTOMY SUPRAPUBIC  2007     UPPER GI ENDOSCOPY  2005       Allergies   Allergen Reactions     Paclitaxel Other (See Comments) and Cough     flushing     Ace Inhibitors Cough     Aspirin      Epistaxis requiring hospitalization from 81 mg daily     Hydrocodone-Acetaminophen      Other reaction(s): Insomnia      Sulfamethoxazole-Trimethoprim Itching and Rash     Sulfasalazine Rash       Current Outpatient Medications   Medication Sig Dispense Refill     amoxicillin (AMOXIL) 500 MG capsule Take 2,000 mg by mouth once as needed (1 HOUR PRIOR TO DENTAL APPOINTMENT)       atenolol (TENORMIN) 50 MG tablet Take 1 tablet (50 mg) by mouth daily 90 tablet 3     docusate sodium (COLACE) 100 MG tablet Take 100 mg by mouth as needed for constipation       Multiple Vitamin (MULTI-VITAMINS) TABS Take 1 tablet by mouth daily       predniSONE (DELTASONE) 5 MG tablet Take 0.5 tablets (2.5 mg) by mouth daily       prochlorperazine (COMPAZINE) 10 MG tablet Take 10 mg by mouth every 6 hours as needed for nausea        simvastatin (ZOCOR) 40 MG tablet Take 1 tablet (40 mg) by mouth At Bedtime 90 tablet 3     valsartan-hydrochlorothiazide (DIOVAN HCT) 320-12.5 MG tablet Take 1 tablet by mouth daily 90 tablet 3       Review of Systems   Constitutional: Negative.    Eyes: Negative.    Endocrine: Negative.    Allergic/Immunologic: Negative.        Physical Exam  Vitals signs and nursing note reviewed.   Constitutional:       General: He is not in acute distress.     Appearance: Normal appearance. He is not ill-appearing or toxic-appearing.   Cardiovascular:      Rate and Rhythm: Normal rate and regular rhythm.   Neurological:      Mental Status: He is alert.         Assessment:        ICD-10-CM    1. Tachycardia  R00.0    2. Hypertension, unspecified type  I10 valsartan-hydrochlorothiazide (DIOVAN HCT) 320-12.5 MG tablet       Plan: His tachycardia was mainly due to the fact that he was off of the atenolol but also exacerbated by his anemia.  Everything is back to normal now.  He still does not need his valsartan so that will continue to be held.  Follow-up with me for sed rate sometime in March after he is done with his radiation therapy.

## 2021-02-15 ENCOUNTER — APPOINTMENT (OUTPATIENT)
Dept: RADIATION ONCOLOGY | Facility: HOSPITAL | Age: 81
End: 2021-02-15
Payer: MEDICARE

## 2021-02-15 ENCOUNTER — RESULTS ONLY (OUTPATIENT)
Dept: RADIATION ONCOLOGY | Facility: HOSPITAL | Age: 81
End: 2021-02-15

## 2021-02-15 LAB
RAD ONC ARIA COURSE ID: NORMAL
RAD ONC ARIA COURSE LAST TREATMENT DATE: NORMAL
RAD ONC ARIA COURSE START DATE: NORMAL
RAD ONC ARIA COURSE TREATMENT ELAPSED DAYS: 35
RAD ONC ARIA FIRST TREATMENT DATE: NORMAL
RAD ONC ARIA PLAN FRACTIONS TREATED TO DATE: 24
RAD ONC ARIA PLAN ID: NORMAL
RAD ONC ARIA PLAN NAME: NORMAL
RAD ONC ARIA PLAN PRESCRIBED DOSE PER FRACTION: 2 GY
RAD ONC ARIA PLAN TOTAL FRACTIONS PRESCRIBED: 33
RAD ONC ARIA PLAN TOTAL PRESCRIBED DOSE: 6600 CGY
RAD ONC ARIA REFERENCE POINT DOSAGE GIVEN TO DATE: NORMAL GY
RAD ONC ARIA REFERENCE POINT DOSAGE GIVEN TO DATE: NORMAL GY
RAD ONC ARIA REFERENCE POINT ID: NORMAL
RAD ONC ARIA REFERENCE POINT ID: NORMAL

## 2021-02-15 PROCEDURE — 77386 HC IMRT TREATMENT DELIVERY, COMPLEX: CPT | Performed by: RADIOLOGY

## 2021-02-15 PROCEDURE — 77014 PR CT GUIDE FOR PLACEMENT RADIATION THERAPY FIELDS: CPT | Mod: 26 | Performed by: RADIOLOGY

## 2021-02-16 ENCOUNTER — RESULTS ONLY (OUTPATIENT)
Dept: RADIATION ONCOLOGY | Facility: HOSPITAL | Age: 81
End: 2021-02-16

## 2021-02-16 ENCOUNTER — APPOINTMENT (OUTPATIENT)
Dept: RADIATION ONCOLOGY | Facility: HOSPITAL | Age: 81
End: 2021-02-16
Payer: MEDICARE

## 2021-02-16 LAB
RAD ONC ARIA COURSE ID: NORMAL
RAD ONC ARIA COURSE LAST TREATMENT DATE: NORMAL
RAD ONC ARIA COURSE START DATE: NORMAL
RAD ONC ARIA COURSE TREATMENT ELAPSED DAYS: 36
RAD ONC ARIA FIRST TREATMENT DATE: NORMAL
RAD ONC ARIA PLAN FRACTIONS TREATED TO DATE: 25
RAD ONC ARIA PLAN ID: NORMAL
RAD ONC ARIA PLAN NAME: NORMAL
RAD ONC ARIA PLAN PRESCRIBED DOSE PER FRACTION: 2 GY
RAD ONC ARIA PLAN TOTAL FRACTIONS PRESCRIBED: 33
RAD ONC ARIA PLAN TOTAL PRESCRIBED DOSE: 6600 CGY
RAD ONC ARIA REFERENCE POINT DOSAGE GIVEN TO DATE: NORMAL GY
RAD ONC ARIA REFERENCE POINT DOSAGE GIVEN TO DATE: NORMAL GY
RAD ONC ARIA REFERENCE POINT ID: NORMAL
RAD ONC ARIA REFERENCE POINT ID: NORMAL

## 2021-02-16 PROCEDURE — 77336 RADIATION PHYSICS CONSULT: CPT | Performed by: RADIOLOGY

## 2021-02-16 PROCEDURE — 77427 RADIATION TX MANAGEMENT X5: CPT | Performed by: RADIOLOGY

## 2021-02-16 PROCEDURE — 77386 HC IMRT TREATMENT DELIVERY, COMPLEX: CPT | Performed by: RADIOLOGY

## 2021-02-16 PROCEDURE — 77014 PR CT GUIDE FOR PLACEMENT RADIATION THERAPY FIELDS: CPT | Mod: 26 | Performed by: RADIOLOGY

## 2021-02-17 ENCOUNTER — OFFICE VISIT (OUTPATIENT)
Dept: RADIATION ONCOLOGY | Facility: HOSPITAL | Age: 81
End: 2021-02-17
Payer: MEDICARE

## 2021-02-17 ENCOUNTER — APPOINTMENT (OUTPATIENT)
Dept: RADIATION ONCOLOGY | Facility: HOSPITAL | Age: 81
End: 2021-02-17
Payer: MEDICARE

## 2021-02-17 ENCOUNTER — RESULTS ONLY (OUTPATIENT)
Dept: RADIATION ONCOLOGY | Facility: HOSPITAL | Age: 81
End: 2021-02-17

## 2021-02-17 VITALS — WEIGHT: 149.6 LBS | BODY MASS INDEX: 25.28 KG/M2

## 2021-02-17 DIAGNOSIS — C34.90 PRIMARY MUCINOUS ADENOCARCINOMA OF LUNG (H): Primary | ICD-10-CM

## 2021-02-17 LAB
RAD ONC ARIA COURSE ID: NORMAL
RAD ONC ARIA COURSE LAST TREATMENT DATE: NORMAL
RAD ONC ARIA COURSE START DATE: NORMAL
RAD ONC ARIA COURSE TREATMENT ELAPSED DAYS: 37
RAD ONC ARIA FIRST TREATMENT DATE: NORMAL
RAD ONC ARIA PLAN FRACTIONS TREATED TO DATE: 26
RAD ONC ARIA PLAN ID: NORMAL
RAD ONC ARIA PLAN NAME: NORMAL
RAD ONC ARIA PLAN PRESCRIBED DOSE PER FRACTION: 2 GY
RAD ONC ARIA PLAN TOTAL FRACTIONS PRESCRIBED: 33
RAD ONC ARIA PLAN TOTAL PRESCRIBED DOSE: 6600 CGY
RAD ONC ARIA REFERENCE POINT DOSAGE GIVEN TO DATE: NORMAL GY
RAD ONC ARIA REFERENCE POINT DOSAGE GIVEN TO DATE: NORMAL GY
RAD ONC ARIA REFERENCE POINT ID: NORMAL
RAD ONC ARIA REFERENCE POINT ID: NORMAL

## 2021-02-17 PROCEDURE — 77014 PR CT GUIDE FOR PLACEMENT RADIATION THERAPY FIELDS: CPT | Mod: 26 | Performed by: RADIOLOGY

## 2021-02-17 PROCEDURE — 77386 HC IMRT TREATMENT DELIVERY, COMPLEX: CPT | Performed by: RADIOLOGY

## 2021-02-17 ASSESSMENT — PAIN SCALES - GENERAL: PAINLEVEL: NO PAIN (0)

## 2021-02-17 NOTE — PROGRESS NOTES
"     [facility]  DEPARTMENT OF RADIATION ONCOLOGY   ON TREATMENT VISIT (OTV) NOTE    Name: Wilfrid Meneses MRN: [id number]   : 1940 (80 year old)  Date of Service: 2021 Referring: Dr. Littlejohn, Dr. Howard,  and Dr. 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 Pulmonary medicine referred the patient for consultation about the role of radiotherapy as potentially curative treatment for the asymptomatic disease (cT3/4N0M)). The disease was ultimately felt to not be amenable to SBRT and treated better with concurrent chemoradiation. 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     Subjective: Receiving concurrent chemoradiation (weekly carboplatin/Abraxane). This week, chemotherapy held because of neutropenia. Four weeks ago, had minor adverse Taxol reaction (excessive flushing). On 2021, patient felt febrile, dyspneic, and unwell. Admitted briefly and then discharged home after improving with basic supportive care. Saw PCP again on 2021. No major changes in care.    Patient feels that he has nominal toxicity from radiation therapy. Reports very slight, difficult to describe esophageal sensation reported as \"feels tight\" but \"it's not tight\". Not painful. Swallows easily. No pain medication required for comfort. Slight anorexia.  Baseline dyspnea and swallowing. Denies dysphagia or odynophagia. Still feels that he is " nearly back to near his baseline of energy and sense of well being. Prior constipation remains better managed. Has been tolerating a taper of prednisone for polymyalgia rheumatic (PMR). Sees rheumatologist this month. Had reported no increase of symptoms attributed to PMR. Daily COVID-19 screening negative for barrier to proceeding with radiation therapy.    ROS (score of 0 indicates that symptom is at baseline for most sections below): See Flowsheet for additional comments as indicated.  No Pain (0)  Nutrition Alteration  Diet Type: Patient's Preference  Anorexia NCI: 1 - Loss of appetite  Skin  Skin Reaction: 1 - Faint erythema or dry desquamation  Cardiovascular  Respiratory effort: 2 - Mild dyspnea on exertion  Gastrointestinal  Nausea: 0 - None  Vomitin - No vomiting (ons)  Diarrhea: 0 - None  Psychosocial  Mood - Anxiety: 0 - Normal  Mood - Depression: 0 - Normal  Pyschosocial Note: fatigue: 2/10     Objective:  Vitals: Wt 67.9 kg (149 lb 9.6 oz)   BMI 25.28 kg/m    Wt Readings from Last 3 Encounters:   21 67.9 kg (149 lb 9.6 oz)   21 68.8 kg (151 lb 9.6 oz)   02/10/21 68.6 kg (151 lb 3.2 oz)     KPS: 80.  Evaluation (also seen a linac during the prior week):  - General: Appears stable. Appears rested. Appears staged age. Appears in good general health, well-developed, well-nourished, and in no acute distress. Does not appear acutely or chronically ill. Appears well groomed.  - Head: No treatment-related alopecia. Normocephalic.  = Eyes: Anicteric, eyelids symmetric, vision grossly intact.  - ENT: Baseline levels of slight hoarseness and tremulous voice. Good clarity. Good volume. No coughing or aspiration.  - Neck: Symmetric, trachea midline.  - Lymphatics: No parotid, peripartoid, cervical, supraclavicular, or axillary adenopathy.  - Chest/Breasts: Resolved erythema of the anterior chest wall. No desquamation, rash, or cellulitis. No hyperpigmentation. No port. No implanted cardiac device.    - Lungs: Clear to auscultation, easy respirations.  - Cardiovascular: Regular rate. No jugulovenous distension.  - MSK: No cellulitis. Shoulder range of motion is good. No arm edema or hand edema. Good muscle tone.  - Integument: No jaundice or pallor.  - Neurologic: Alert, oriented, fluent. Memory grossly intact. Motor grossly intact. Sensory grossly intact. No ataxia.  - Psychologic: Well spoken about his cancer and therapies. Appropriately anxious about radiation therapy and sad about diagnosis. Good dynamic with his wife. Pleasant, cooperative, insightful, concrete, and good judgment.    Impression: Routine tolerance to radiation therapy. Grade 1 toxicity attributable to radiation therapy.    Plan:  1) GI: Grade 1 (nominal). Minimal comfort. Anticipate possible significant discomfort and pain during the coming weeks, perhaps starting late next week. Will initiate pain care and dietary changes as indicated.  2) Pain: Grade 1 (nominal). Does not desire an analgesic since comfort is adequate. Anticipate possible use of non-opoid analgesics for pain relief as local toxicities progress.  3) FEN:  Grade 1 (nominal). Easily tolerate slight anorexia. Continue to monitor. Modify diet as indicated by pain and acuteness of weight loss.  4) Dyspnea, fever, malaise:  Manage as per primary care physician.  5) Skin: Grade 0. Unclear if prior erythema was due to radiation therapy. Was easily tolerated. Reviewed routine skin care. Anticipate possible escalation of skin reaction (erythema, hyperpigmentation, desquamation, and pain) with a peak about 1-2 weeks after the end of treatment.   6) Pulmonary: Grade 0 toxicity. Relatively modest risk of severe acute toxicity. Continue to observe.  7) Chemotherapy: For concurrent chemoradiation, continue to coordinate with medical oncology their delivery of every week with supportive care and labs as indicated. C1D1 was 1/12/2021 for carboplatin/Taxol. Now on weekly carboplatin/Abraxae.  Does held this week. Next dose was scheduled for 2/16/2021.       8) Radiation therapy: No new interventions. No boost/cone down pending. Have reviewed graphical 3D plan with attention to dose to the esophagus, lungs, heart, and spinal cord. Reviewed anticipated time course, nature, and potential interventions for managing toxicity during and after radiation therapy. Again reviewed the role of imaging (CT, PET-CT) in following his disease. Aware of onsite and telemedicine coverage of clinic. Wished to proceed with treatment. All questions answered to their satisfaction.     Weekly Review: Across [facility] and [facility] EMR's, reviewed available labs and diagnostic studies. Interpreted as adequate to proceed with radiation therapy. Discussed management with Therapy, Treatment Planning, and Nursing. Reviewed weekly routine QA, linac imaging, and clinical set up are adequate to proceed with radiation therapy as prescribed.    Current Outpatient Medications   Medication     atenolol (TENORMIN) 50 MG tablet     docusate sodium (COLACE) 100 MG tablet     Multiple Vitamin (MULTI-VITAMINS) TABS     predniSONE (DELTASONE) 5 MG tablet     simvastatin (ZOCOR) 40 MG tablet     amoxicillin (AMOXIL) 500 MG capsule     prochlorperazine (COMPAZINE) 10 MG tablet     valsartan-hydrochlorothiazide (DIOVAN HCT) 320-12.5 MG tablet     No current facility-administered medications for this visit.      Allergies: Paclitaxel, Ace inhibitors, Aspirin, Hydrocodone-acetaminophen, Sulfamethoxazole-trimethoprim, and Sulfasalazine      Reji Zavaleta M.D., Ph.D.  Department of Radiation Oncology  Tel: [phone]  Fax: [phone]

## 2021-02-18 ENCOUNTER — APPOINTMENT (OUTPATIENT)
Dept: RADIATION ONCOLOGY | Facility: HOSPITAL | Age: 81
End: 2021-02-18
Payer: MEDICARE

## 2021-02-18 ENCOUNTER — RESULTS ONLY (OUTPATIENT)
Dept: RADIATION ONCOLOGY | Facility: HOSPITAL | Age: 81
End: 2021-02-18

## 2021-02-18 LAB
RAD ONC ARIA COURSE ID: NORMAL
RAD ONC ARIA COURSE LAST TREATMENT DATE: NORMAL
RAD ONC ARIA COURSE START DATE: NORMAL
RAD ONC ARIA COURSE TREATMENT ELAPSED DAYS: 38
RAD ONC ARIA FIRST TREATMENT DATE: NORMAL
RAD ONC ARIA PLAN FRACTIONS TREATED TO DATE: 27
RAD ONC ARIA PLAN ID: NORMAL
RAD ONC ARIA PLAN NAME: NORMAL
RAD ONC ARIA PLAN PRESCRIBED DOSE PER FRACTION: 2 GY
RAD ONC ARIA PLAN TOTAL FRACTIONS PRESCRIBED: 33
RAD ONC ARIA PLAN TOTAL PRESCRIBED DOSE: 6600 CGY
RAD ONC ARIA REFERENCE POINT DOSAGE GIVEN TO DATE: NORMAL GY
RAD ONC ARIA REFERENCE POINT DOSAGE GIVEN TO DATE: NORMAL GY
RAD ONC ARIA REFERENCE POINT ID: NORMAL
RAD ONC ARIA REFERENCE POINT ID: NORMAL

## 2021-02-18 PROCEDURE — 77014 PR CT GUIDE FOR PLACEMENT RADIATION THERAPY FIELDS: CPT | Mod: 26 | Performed by: RADIOLOGY

## 2021-02-18 PROCEDURE — 77386 HC IMRT TREATMENT DELIVERY, COMPLEX: CPT | Performed by: RADIOLOGY

## 2021-02-19 ENCOUNTER — APPOINTMENT (OUTPATIENT)
Dept: RADIATION ONCOLOGY | Facility: HOSPITAL | Age: 81
End: 2021-02-19
Payer: MEDICARE

## 2021-02-19 ENCOUNTER — RESULTS ONLY (OUTPATIENT)
Dept: RADIATION ONCOLOGY | Facility: HOSPITAL | Age: 81
End: 2021-02-19

## 2021-02-19 LAB
RAD ONC ARIA COURSE ID: NORMAL
RAD ONC ARIA COURSE LAST TREATMENT DATE: NORMAL
RAD ONC ARIA COURSE START DATE: NORMAL
RAD ONC ARIA COURSE TREATMENT ELAPSED DAYS: 39
RAD ONC ARIA FIRST TREATMENT DATE: NORMAL
RAD ONC ARIA PLAN FRACTIONS TREATED TO DATE: 28
RAD ONC ARIA PLAN ID: NORMAL
RAD ONC ARIA PLAN NAME: NORMAL
RAD ONC ARIA PLAN PRESCRIBED DOSE PER FRACTION: 2 GY
RAD ONC ARIA PLAN TOTAL FRACTIONS PRESCRIBED: 33
RAD ONC ARIA PLAN TOTAL PRESCRIBED DOSE: 6600 CGY
RAD ONC ARIA REFERENCE POINT DOSAGE GIVEN TO DATE: NORMAL GY
RAD ONC ARIA REFERENCE POINT DOSAGE GIVEN TO DATE: NORMAL GY
RAD ONC ARIA REFERENCE POINT ID: NORMAL
RAD ONC ARIA REFERENCE POINT ID: NORMAL

## 2021-02-19 PROCEDURE — 77014 PR CT GUIDE FOR PLACEMENT RADIATION THERAPY FIELDS: CPT | Mod: 26 | Performed by: RADIOLOGY

## 2021-02-19 PROCEDURE — 77386 HC IMRT TREATMENT DELIVERY, COMPLEX: CPT | Performed by: RADIOLOGY

## 2021-02-22 ENCOUNTER — APPOINTMENT (OUTPATIENT)
Dept: RADIATION ONCOLOGY | Facility: HOSPITAL | Age: 81
End: 2021-02-22
Payer: MEDICARE

## 2021-02-22 ENCOUNTER — RESULTS ONLY (OUTPATIENT)
Dept: RADIATION ONCOLOGY | Facility: HOSPITAL | Age: 81
End: 2021-02-22

## 2021-02-22 LAB
RAD ONC ARIA COURSE ID: NORMAL
RAD ONC ARIA COURSE LAST TREATMENT DATE: NORMAL
RAD ONC ARIA COURSE START DATE: NORMAL
RAD ONC ARIA COURSE TREATMENT ELAPSED DAYS: 42
RAD ONC ARIA FIRST TREATMENT DATE: NORMAL
RAD ONC ARIA PLAN FRACTIONS TREATED TO DATE: 29
RAD ONC ARIA PLAN ID: NORMAL
RAD ONC ARIA PLAN NAME: NORMAL
RAD ONC ARIA PLAN PRESCRIBED DOSE PER FRACTION: 2 GY
RAD ONC ARIA PLAN TOTAL FRACTIONS PRESCRIBED: 33
RAD ONC ARIA PLAN TOTAL PRESCRIBED DOSE: 6600 CGY
RAD ONC ARIA REFERENCE POINT DOSAGE GIVEN TO DATE: NORMAL GY
RAD ONC ARIA REFERENCE POINT DOSAGE GIVEN TO DATE: NORMAL GY
RAD ONC ARIA REFERENCE POINT ID: NORMAL
RAD ONC ARIA REFERENCE POINT ID: NORMAL

## 2021-02-22 PROCEDURE — 77014 PR CT GUIDE FOR PLACEMENT RADIATION THERAPY FIELDS: CPT | Mod: 26 | Performed by: RADIOLOGY

## 2021-02-22 PROCEDURE — 77386 HC IMRT TREATMENT DELIVERY, COMPLEX: CPT | Performed by: RADIOLOGY

## 2021-02-23 ENCOUNTER — RESULTS ONLY (OUTPATIENT)
Dept: RADIATION ONCOLOGY | Facility: HOSPITAL | Age: 81
End: 2021-02-23

## 2021-02-23 ENCOUNTER — APPOINTMENT (OUTPATIENT)
Dept: RADIATION ONCOLOGY | Facility: HOSPITAL | Age: 81
End: 2021-02-23
Payer: MEDICARE

## 2021-02-23 LAB
RAD ONC ARIA COURSE ID: NORMAL
RAD ONC ARIA COURSE LAST TREATMENT DATE: NORMAL
RAD ONC ARIA COURSE START DATE: NORMAL
RAD ONC ARIA COURSE TREATMENT ELAPSED DAYS: 43
RAD ONC ARIA FIRST TREATMENT DATE: NORMAL
RAD ONC ARIA PLAN FRACTIONS TREATED TO DATE: 30
RAD ONC ARIA PLAN ID: NORMAL
RAD ONC ARIA PLAN NAME: NORMAL
RAD ONC ARIA PLAN PRESCRIBED DOSE PER FRACTION: 2 GY
RAD ONC ARIA PLAN TOTAL FRACTIONS PRESCRIBED: 33
RAD ONC ARIA PLAN TOTAL PRESCRIBED DOSE: 6600 CGY
RAD ONC ARIA REFERENCE POINT DOSAGE GIVEN TO DATE: NORMAL GY
RAD ONC ARIA REFERENCE POINT DOSAGE GIVEN TO DATE: NORMAL GY
RAD ONC ARIA REFERENCE POINT ID: NORMAL
RAD ONC ARIA REFERENCE POINT ID: NORMAL

## 2021-02-23 PROCEDURE — 77336 RADIATION PHYSICS CONSULT: CPT | Performed by: RADIOLOGY

## 2021-02-23 PROCEDURE — 77014 PR CT GUIDE FOR PLACEMENT RADIATION THERAPY FIELDS: CPT | Mod: 26 | Performed by: RADIOLOGY

## 2021-02-23 PROCEDURE — 77386 HC IMRT TREATMENT DELIVERY, COMPLEX: CPT | Performed by: RADIOLOGY

## 2021-02-23 PROCEDURE — 77427 RADIATION TX MANAGEMENT X5: CPT | Performed by: RADIOLOGY

## 2021-02-24 ENCOUNTER — OFFICE VISIT (OUTPATIENT)
Dept: RADIATION ONCOLOGY | Facility: HOSPITAL | Age: 81
End: 2021-02-24
Payer: MEDICARE

## 2021-02-24 ENCOUNTER — APPOINTMENT (OUTPATIENT)
Dept: RADIATION ONCOLOGY | Facility: HOSPITAL | Age: 81
End: 2021-02-24
Payer: MEDICARE

## 2021-02-24 ENCOUNTER — RESULTS ONLY (OUTPATIENT)
Dept: RADIATION ONCOLOGY | Facility: HOSPITAL | Age: 81
End: 2021-02-24

## 2021-02-24 VITALS — WEIGHT: 147.8 LBS | BODY MASS INDEX: 24.98 KG/M2

## 2021-02-24 DIAGNOSIS — C34.90 PRIMARY MUCINOUS ADENOCARCINOMA OF LUNG (H): Primary | ICD-10-CM

## 2021-02-24 LAB
RAD ONC ARIA COURSE ID: NORMAL
RAD ONC ARIA COURSE LAST TREATMENT DATE: NORMAL
RAD ONC ARIA COURSE START DATE: NORMAL
RAD ONC ARIA COURSE TREATMENT ELAPSED DAYS: 44
RAD ONC ARIA FIRST TREATMENT DATE: NORMAL
RAD ONC ARIA PLAN FRACTIONS TREATED TO DATE: 31
RAD ONC ARIA PLAN ID: NORMAL
RAD ONC ARIA PLAN NAME: NORMAL
RAD ONC ARIA PLAN PRESCRIBED DOSE PER FRACTION: 2 GY
RAD ONC ARIA PLAN TOTAL FRACTIONS PRESCRIBED: 33
RAD ONC ARIA PLAN TOTAL PRESCRIBED DOSE: 6600 CGY
RAD ONC ARIA REFERENCE POINT DOSAGE GIVEN TO DATE: NORMAL GY
RAD ONC ARIA REFERENCE POINT DOSAGE GIVEN TO DATE: NORMAL GY
RAD ONC ARIA REFERENCE POINT ID: NORMAL
RAD ONC ARIA REFERENCE POINT ID: NORMAL

## 2021-02-24 PROCEDURE — 77386 HC IMRT TREATMENT DELIVERY, COMPLEX: CPT | Performed by: RADIOLOGY

## 2021-02-24 PROCEDURE — 77014 PR CT GUIDE FOR PLACEMENT RADIATION THERAPY FIELDS: CPT | Mod: 26 | Performed by: RADIOLOGY

## 2021-02-24 ASSESSMENT — PAIN SCALES - GENERAL: PAINLEVEL: NO PAIN (0)

## 2021-02-24 NOTE — PROGRESS NOTES
"     [facility]  DEPARTMENT OF RADIATION ONCOLOGY   ON TREATMENT VISIT (OTV) NOTE    Name: Wilfrid Meneses MRN: [id number]   : 1940 (80 year old)  Date of Service: 2021 Referring: Dr. Littlejohn, Dr. Howard,  and Dr. 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 Pulmonary medicine referred the patient for consultation about the role of radiotherapy as potentially curative treatment for the asymptomatic disease (cT3/4N0M)). The disease was ultimately felt to not be amenable to SBRT and treated better with concurrent chemoradiation. 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     Subjective   Virtual video visit assisted by patient and nursing. Receiving concurrent chemoradiation (weekly carboplatin/Abraxane). Chemotherapy held this week and last week due to concerns about counts and tolerance. Happy with treatment, sense of well being, and paucity of treatment-related complaints. Reports new \"spam\" of pain in the right jaw that sometimes occurs when swallowing. Easily tolerated. Not painful or limiting. Has no throat pain or odynophagia. Swallows easily, and eats normally. No aspiration. Fatigue had improved but slightly more this week. Still active. Pleased that PMR has improved during chemoradiation despite the prednisone taper that had occurred prior to chemoradiation (remains on prednisone).  Five weeks ago, had minor " adverse Taxol reaction (excessive flushing). On 2021, patient felt febrile, dyspneic, and unwell. Admitted briefly and then discharged home after improving with basic supportive care. Saw PCP again on 2021. No major changes in care.    ROS (score of 0 indicates that symptom is at baseline for most sections below)   See Flowsheet for additional comments as indicated.  No Pain (0)  Nutrition Alteration  Diet Type: Patient's Preference  Anorexia NCI: 1 - Loss of appetite  Skin  Skin Reaction: 1 - Faint erythema or dry desquamation  Cardiovascular  Respiratory effort: 2 - Mild dyspnea on exertion(improving)  Gastrointestinal  Nausea: 0 - None  Vomitin - No vomiting (ons)  Diarrhea: 0 - None  Psychosocial  Mood - Anxiety: 0 - Normal  Mood - Depression: 0 - Normal  Pyschosocial Note: fatigue: 2/10        Objective   Vitals: Wt 67 kg (147 lb 12.8 oz)   BMI 24.98 kg/m    Wt Readings from Last 3 Encounters:   21 67 kg (147 lb 12.8 oz)   21 67.9 kg (149 lb 9.6 oz)   21 68.8 kg (151 lb 9.6 oz)     KPS: 80.  Evaluation (also seen a linac during the prior week):  - General: Appears stable. Appears rested. Appears staged age. Appears in good general health, well-developed, well-nourished, and in no acute distress. Does not appear acutely or chronically ill. Appears well groomed.  - Head: No treatment-related alopecia. Normocephalic.  = Eyes: Anicteric, eyelids symmetric, vision grossly intact.  - ENT: Baseline levels of slight hoarseness and tremulous voice. Good clarity. Good volume. No coughing or aspiration.  - Neck: Symmetric, trachea midline.  - Lymphatics: No parotid, peripartoid, cervical, supraclavicular, or axillary adenopathy.  - Chest/Breasts: Resolved erythema of the anterior chest wall. No desquamation, rash, or cellulitis. No hyperpigmentation. No port. No implanted cardiac device.   - Lungs: Easy respirations.  - Cardiovascular: No jugulovenous distension.  - MSK: Shoulder range of  motion is good. No arm edema or hand edema. Good muscle tone.  - Integument: No jaundice or pallor.  - Neurologic: Alert, oriented, fluent. Memory grossly intact. Motor grossly intact. Sensory grossly intact. No ataxia.  - Psychologic: Well spoken about his cancer and therapies. Appropriately anxious about radiation therapy and sad about diagnosis.Pleasant, cooperative, insightful, concrete, and good judgment.    Impression   Routine tolerance to radiation therapy. Grade 1 toxicity attributable to radiation therapy.    Plan   1) Pain: Grade 1 (nominal). Likely referred sympathetic pain from the pharynx/esophagus to the right jaw. Easily tolerated. Continue to observe. No esophagitis.  2) Skin: Grade 0. Prior erythema (resolved) not due to radiation therapy. Was easily tolerated. Anticipate possible escalation of skin reaction (erythema, hyperpigmentation, desquamation, and pain) with a peak about 1-2 weeks after the end of treatment.   3) GI: Grade 1 (nominal). Minimal comfort. Anticipate possible significant discomfort and pain during the coming weeks, perhaps starting late next week. Will initiate pain care and dietary changes as indicated.  4) FEN:  Grade 1 (nominal). Easily tolerate slight anorexia. Continue to monitor. Modify diet as indicated by pain and acuteness of weight loss.  5) Dyspnea, fever, fatigue, malaise:  Improved overall. Manage as per primary care physician.  6) Pulmonary: Grade 0 toxicity. Relatively modest risk of severe acute toxicity. Continue to observe.  7) Chemotherapy: No additional chemotherapy is planned. For concurrent chemoradiation, continue to coordinate with medical oncology their delivery of every week with supportive care and labs as indicated. C1D1 was 1/12/2021 for carboplatin/Taxol. Now on weekly carboplatin/Abraxae. Does held again this week. Next dose was scheduled for 2/16/2021 but not given.       8) Radiation therapy: No new interventions. No boost/cone down pending.  Have reviewed graphical 3D plan with attention to dose to the esophagus, lungs, heart, and spinal cord. Reviewed anticipated time course, nature, and potential interventions for managing toxicity during and after radiation therapy. Reviewed discharge and follow up plans. Nursing will call patient in 1-2 weeks after. Virtual follow up with me in about 3 months if stable. Has appointment to see medical oncology as principal course of oncologic care. Should (also seen at linac over the week) see pulmonary medicine. Will see rheumatology for ongoing management of PMR. Aware of onsite and telemedicine coverage of clinic. Wished to proceed with treatment. All questions answered to their satisfaction.     Radiation Oncology Weekly Review   [facility] and [facility] EMR's, reviewed available labs and diagnostic studies. Interpreted as adequate to proceed with radiation therapy. Discussed management with Therapy, Treatment Planning, and Nursing. Reviewed weekly routine QA, linac imaging, and clinical set up are adequate to proceed with radiation therapy as prescribed.    Additional Data, Medications, Allergies     Current Outpatient Medications   Medication     amoxicillin (AMOXIL) 500 MG capsule     atenolol (TENORMIN) 50 MG tablet     docusate sodium (COLACE) 100 MG tablet     Multiple Vitamin (MULTI-VITAMINS) TABS     predniSONE (DELTASONE) 5 MG tablet     prochlorperazine (COMPAZINE) 10 MG tablet     simvastatin (ZOCOR) 40 MG tablet     valsartan-hydrochlorothiazide (DIOVAN HCT) 320-12.5 MG tablet     No current facility-administered medications for this visit.      Allergies: Paclitaxel, Ace inhibitors, Aspirin, Hydrocodone-acetaminophen, Sulfamethoxazole-trimethoprim, and Sulfasalazine      Reji Zavaleta M.D., Ph.D.  Department of Radiation Oncology  Tel: [phone]  Fax: [phone]

## 2021-02-25 ENCOUNTER — APPOINTMENT (OUTPATIENT)
Dept: RADIATION ONCOLOGY | Facility: HOSPITAL | Age: 81
End: 2021-02-25
Payer: MEDICARE

## 2021-02-25 ENCOUNTER — RESULTS ONLY (OUTPATIENT)
Dept: RADIATION ONCOLOGY | Facility: HOSPITAL | Age: 81
End: 2021-02-25

## 2021-02-25 LAB
RAD ONC ARIA COURSE ID: NORMAL
RAD ONC ARIA COURSE LAST TREATMENT DATE: NORMAL
RAD ONC ARIA COURSE START DATE: NORMAL
RAD ONC ARIA COURSE TREATMENT ELAPSED DAYS: 45
RAD ONC ARIA FIRST TREATMENT DATE: NORMAL
RAD ONC ARIA PLAN FRACTIONS TREATED TO DATE: 32
RAD ONC ARIA PLAN ID: NORMAL
RAD ONC ARIA PLAN NAME: NORMAL
RAD ONC ARIA PLAN PRESCRIBED DOSE PER FRACTION: 2 GY
RAD ONC ARIA PLAN TOTAL FRACTIONS PRESCRIBED: 33
RAD ONC ARIA PLAN TOTAL PRESCRIBED DOSE: 6600 CGY
RAD ONC ARIA REFERENCE POINT DOSAGE GIVEN TO DATE: NORMAL GY
RAD ONC ARIA REFERENCE POINT DOSAGE GIVEN TO DATE: NORMAL GY
RAD ONC ARIA REFERENCE POINT ID: NORMAL
RAD ONC ARIA REFERENCE POINT ID: NORMAL

## 2021-02-25 PROCEDURE — 77014 PR CT GUIDE FOR PLACEMENT RADIATION THERAPY FIELDS: CPT | Mod: 26 | Performed by: RADIOLOGY

## 2021-02-25 PROCEDURE — 77386 HC IMRT TREATMENT DELIVERY, COMPLEX: CPT | Performed by: RADIOLOGY

## 2021-02-26 ENCOUNTER — DOCUMENTATION ONLY (OUTPATIENT)
Dept: RADIATION ONCOLOGY | Facility: HOSPITAL | Age: 81
End: 2021-02-26

## 2021-02-26 ENCOUNTER — RESULTS ONLY (OUTPATIENT)
Dept: RADIATION ONCOLOGY | Facility: HOSPITAL | Age: 81
End: 2021-02-26

## 2021-02-26 ENCOUNTER — IMMUNIZATION (OUTPATIENT)
Dept: FAMILY MEDICINE | Facility: OTHER | Age: 81
End: 2021-02-26
Attending: FAMILY MEDICINE
Payer: MEDICARE

## 2021-02-26 ENCOUNTER — APPOINTMENT (OUTPATIENT)
Dept: RADIATION ONCOLOGY | Facility: HOSPITAL | Age: 81
End: 2021-02-26
Payer: MEDICARE

## 2021-02-26 LAB
RAD ONC ARIA COURSE ID: NORMAL
RAD ONC ARIA COURSE LAST TREATMENT DATE: NORMAL
RAD ONC ARIA COURSE START DATE: NORMAL
RAD ONC ARIA COURSE TREATMENT ELAPSED DAYS: 46
RAD ONC ARIA FIRST TREATMENT DATE: NORMAL
RAD ONC ARIA PLAN FRACTIONS TREATED TO DATE: 33
RAD ONC ARIA PLAN ID: NORMAL
RAD ONC ARIA PLAN NAME: NORMAL
RAD ONC ARIA PLAN PRESCRIBED DOSE PER FRACTION: 2 GY
RAD ONC ARIA PLAN TOTAL FRACTIONS PRESCRIBED: 33
RAD ONC ARIA PLAN TOTAL PRESCRIBED DOSE: 6600 CGY
RAD ONC ARIA REFERENCE POINT DOSAGE GIVEN TO DATE: NORMAL GY
RAD ONC ARIA REFERENCE POINT DOSAGE GIVEN TO DATE: NORMAL GY
RAD ONC ARIA REFERENCE POINT ID: NORMAL
RAD ONC ARIA REFERENCE POINT ID: NORMAL

## 2021-02-26 PROCEDURE — 77336 RADIATION PHYSICS CONSULT: CPT | Performed by: RADIOLOGY

## 2021-02-26 PROCEDURE — 77386 HC IMRT TREATMENT DELIVERY, COMPLEX: CPT | Performed by: RADIOLOGY

## 2021-02-26 PROCEDURE — 77427 RADIATION TX MANAGEMENT X5: CPT | Performed by: RADIOLOGY

## 2021-02-26 PROCEDURE — 91300 PR COVID VAC PFIZER DIL RECON 30 MCG/0.3 ML IM: CPT

## 2021-02-26 PROCEDURE — 77014 PR CT GUIDE FOR PLACEMENT RADIATION THERAPY FIELDS: CPT | Mod: 26 | Performed by: RADIOLOGY

## 2021-03-03 PROBLEM — C44.92 SQUAMOUS CELL SKIN CANCER: Status: RESOLVED | Noted: 2017-09-03 | Resolved: 2021-03-03

## 2021-03-03 NOTE — PROGRESS NOTES
[facility]  DEPARTMENT OF RADIATION ONCOLOGY  TREATMENT SUMMARY NOTE    Name: Wilfrid Meneses MRN: [id number]   : 1940 (80 year old)  Date of Service: 2021 Referring: Dr. Littlejohn, Dr. Howard,  and Dr. Ramos       Diagnosis and Cancer Staging  Primary mucinous adenocarcinoma of lung (H)  Staging form: Lung, AJCC 8th Edition  - Clinical stage from 2020: Stage IIIA (cT4, cN0, cM0) - Signed by Reji Zavaleta MD on 2020      Radiation Therapy   Treatment intent: Curative.  Treatment site: Right lung.  Delivery method: Volumetric arc radiation therapy (VMAT).  Energy: 6 MV.  Dose: 33 fractions of 200 cGy each for 6,600 cGy.  Elapsed calendar days: 46 elapsed days (2021-2021)  Completion date: 2021.    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 Pulmonary medicine referred the patient for consultation about the role of radiotherapy as potentially curative treatment for the asymptomatic disease (cT3/4N0M)). The disease was ultimately felt to not be amenable to SBRT and treated better with concurrent chemoradiation. 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     Recent History   With the gracious assistance of medical oncology, the patient completed concurrent chemoradiation with weekly carboplatin/Abraxance) for cancer of the right lower lobe/perihilar region. He tolerated treatment much better than he had anticipated. He was very pleased that longstanding complaints attributed to polymyalgia rheumatica (PMR) improved during chemoradiation despite a prednisone taper that occurred before the start of treatment. Aside from slight erythema of the chest  wall and weight loss to 67-kg (grade 1), he exhibited grade 0 toxicity due to radiation therapy. He experienced no esophageal pain. Breathing remained at baseline. We anticipate a mild degree of additional toxicity with the likely peak of acute reactions about 1-2 weeks after the end of radiation therapy. Due to the ongoing COVID-19 pandemic, nursing will contact the patient during this interval for a virtual evaluation and possible escalation of symptomatic care if indicated. We will ask the patient to come to clinic if formal physical evaluation is deemed required.     We gave the patient an appointment on for follow up (virtual) with me on 6/9/2021. The patient has an appointment with medical oncology in 3/2021 for clinical and radiographic evaluation. We counseled the patient to continue to see his primary care service for general and maintenance health issues. He wished to proceed as recommended. We answered all questions answered to his satisfaction. Thank you for allowing us to care for this very pleasant patient.    Recent Labs   Lab Test 02/08/21  1345 02/04/21  0520 02/03/21  0845 02/03/21  0140   HGB 10.3* 8.2* 9.4* 10.6*   PLT 61* 68* 80* 90*   WBC 2.4* 1.5* 2.8* 2.9*   ANEU  --  1.3* 2.7 2.7   * 137 131* 127*   POTASSIUM 4.2 4.0 3.8 4.1   CHLORIDE 97* 109* 99 93*   CO2 25 23 23 25   * 92 183* 153*   BUN 24 16 23 29*   CR 0.81 0.74 0.85 0.91   ESTIVEN 9.5 7.3* 7.9* 8.7     Current Outpatient Medications   Medication Instructions     amoxicillin (AMOXIL) 2,000 mg, Oral, ONCE PRN     atenolol (TENORMIN) 50 mg, Oral, DAILY     docusate sodium (COLACE) 100 mg, Oral, PRN     Multiple Vitamin (MULTI-VITAMINS) TABS 1 tablet, Oral, DAILY     predniSONE (DELTASONE) 2.5 mg, Oral, DAILY     prochlorperazine (COMPAZINE) 10 mg, Oral, EVERY 6 HOURS PRN     simvastatin (ZOCOR) 40 mg, Oral, AT BEDTIME     valsartan-hydrochlorothiazide (DIOVAN HCT) 320-12.5 MG tablet 1 tablet, Oral, DAILY     Allergies:  Paclitaxel, Ace inhibitors, Aspirin, Hydrocodone-acetaminophen, Sulfamethoxazole-trimethoprim, and Sulfasalazine      Reji Zavaleta M.D., Ph.D.  Department of Radiation Oncology  Tel: [phone]  Fax: [phone]    cc:  Roman Littlejohn M.D. (pulmonary medicine)  Itz Ramos M.D., Ph.D. (medical oncology)  Walter Howard III, M.D. (medical oncology)  Pablo Hyman M.D. (gastroenterology)  Jimenez Colon M.D. (medicine)

## 2021-03-05 ENCOUNTER — TELEPHONE (OUTPATIENT)
Dept: RADIATION ONCOLOGY | Facility: HOSPITAL | Age: 81
End: 2021-03-05
Payer: MEDICARE

## 2021-03-05 NOTE — TELEPHONE ENCOUNTER
Notes esophagitis with cold foods/liquids.  Pain is managed at this time with Tylenol.  He will call us if he needs something else for pain.  He has no other complaints or concerns.  He sees Dr. Ramos at the end of the month.   Rosy Riddle RN

## 2021-03-19 ENCOUNTER — IMMUNIZATION (OUTPATIENT)
Dept: FAMILY MEDICINE | Facility: OTHER | Age: 81
End: 2021-03-19
Attending: FAMILY MEDICINE
Payer: MEDICARE

## 2021-03-19 PROCEDURE — 91300 PR COVID VAC PFIZER DIL RECON 30 MCG/0.3 ML IM: CPT

## 2021-04-14 ENCOUNTER — OFFICE VISIT (OUTPATIENT)
Dept: INTERNAL MEDICINE | Facility: OTHER | Age: 81
End: 2021-04-14
Attending: INTERNAL MEDICINE
Payer: MEDICARE

## 2021-04-14 VITALS
BODY MASS INDEX: 24.94 KG/M2 | DIASTOLIC BLOOD PRESSURE: 76 MMHG | OXYGEN SATURATION: 98 % | TEMPERATURE: 96.9 F | HEART RATE: 75 BPM | WEIGHT: 147.6 LBS | SYSTOLIC BLOOD PRESSURE: 126 MMHG | RESPIRATION RATE: 18 BRPM

## 2021-04-14 DIAGNOSIS — M35.3 PMR (POLYMYALGIA RHEUMATICA) (H): Primary | ICD-10-CM

## 2021-04-14 PROCEDURE — G0463 HOSPITAL OUTPT CLINIC VISIT: HCPCS

## 2021-04-14 PROCEDURE — 99213 OFFICE O/P EST LOW 20 MIN: CPT | Performed by: INTERNAL MEDICINE

## 2021-04-14 RX ORDER — PREDNISONE 5 MG/1
2.5 TABLET ORAL EVERY OTHER DAY
COMMUNITY
Start: 2021-04-14 | End: 2021-05-03

## 2021-04-14 ASSESSMENT — ENCOUNTER SYMPTOMS
EYES NEGATIVE: 1
CONSTITUTIONAL NEGATIVE: 1
ALLERGIC/IMMUNOLOGIC NEGATIVE: 1
ENDOCRINE NEGATIVE: 1

## 2021-04-14 ASSESSMENT — PAIN SCALES - GENERAL: PAINLEVEL: NO PAIN (0)

## 2021-04-14 NOTE — NURSING NOTE
"Chief Complaint   Patient presents with     RECHECK       Initial /76 (BP Location: Right arm, Patient Position: Sitting, Cuff Size: Adult Regular)   Pulse 75   Temp 96.9  F (36.1  C) (Tympanic)   Resp 18   Wt 67 kg (147 lb 9.6 oz)   SpO2 98%   BMI 24.94 kg/m   Estimated body mass index is 24.94 kg/m  as calculated from the following:    Height as of 2/3/21: 1.638 m (5' 4.5\").    Weight as of this encounter: 67 kg (147 lb 9.6 oz).  Medication Reconciliation: complete    Kathleen Fleming LPN    "

## 2021-04-14 NOTE — PROGRESS NOTES
Chief Complaint: Follow-up.    HPI: He is in today for follow-up on his polymyalgia rheumatica.  He has been on 2-1/2 mg of prednisone since January.  He feels fine with that and has no complaints or concerns.    He has completed his radiation therapy and chemotherapy for his non-small cell lung cancer.  He is on maintenance biologic therapy at this point in time.    Past Medical History:   Diagnosis Date     Basal cell carcinoma of back 12/01/2014     Coronary artery calcification     score 297 12/2010     Dysarthria      Enlarged prostate without lower urinary tract symptoms (luts)     Post prostatectomy     Essential (primary) hypertension     No Comments Provided     History of colonic polyps     No Comments Provided     Hyperglycemia     No Comments Provided     Hyperlipidemia     No Comments Provided     Malignant neoplasm of skin     basal cell ca. on back and right ear     PMR (polymyalgia rheumatica) (H)      Primary mucinous adenocarcinoma of lung (H)     S/P radiation and chemo, on maintainence biologic     Spinal stenosis of lumbar region without neurogenic claudication     No Comments Provided     Uncomplicated alcohol abuse     No Comments Provided       Past Surgical History:   Procedure Laterality Date     ARTHROPLASTY KNEE Left     01/09     ARTHROPLASTY SHOULDER Left 2018     ARTHROSCOPY KNEE Left     12/05     COLONOSCOPY      2001, 2006, 2011,Adenomatous 2001, subsequent WNL     COLONOSCOPY  11/21/2014 11/21/2014,Adenoma     COLONOSCOPY  01/07/2020    tubular adenomas, follow up 5 years     HERNIORRHAPHY INGUINAL BILATERAL       PROSTATECTOMY SUPRAPUBIC  2007     UPPER GI ENDOSCOPY  2005       Allergies   Allergen Reactions     Paclitaxel Other (See Comments) and Cough     flushing     Ace Inhibitors Cough     Aspirin      Epistaxis requiring hospitalization from 81 mg daily     Hydrocodone-Acetaminophen      Other reaction(s): Insomnia     Sulfamethoxazole-Trimethoprim Itching and Rash      Sulfasalazine Rash       Current Outpatient Medications   Medication Sig Dispense Refill     amoxicillin (AMOXIL) 500 MG capsule Take 2,000 mg by mouth once as needed (1 HOUR PRIOR TO DENTAL APPOINTMENT)       atenolol (TENORMIN) 50 MG tablet Take 1 tablet (50 mg) by mouth daily 90 tablet 3     docusate sodium (COLACE) 100 MG tablet Take 100 mg by mouth as needed for constipation       durvalumab Twice monthly       Multiple Vitamin (MULTI-VITAMINS) TABS Take 1 tablet by mouth daily       predniSONE (DELTASONE) 5 MG tablet Take 0.5 tablets (2.5 mg) by mouth daily       prochlorperazine (COMPAZINE) 10 MG tablet Take 10 mg by mouth every 6 hours as needed for nausea        simvastatin (ZOCOR) 40 MG tablet Take 1 tablet (40 mg) by mouth At Bedtime 90 tablet 3       Review of Systems   Constitutional: Negative.    Eyes: Negative.    Endocrine: Negative.    Allergic/Immunologic: Negative.        Physical Exam  Vitals signs and nursing note reviewed.   Constitutional:       General: He is not in acute distress.     Appearance: Normal appearance. He is not ill-appearing, toxic-appearing or diaphoretic.   Neurological:      Mental Status: He is alert.         Assessment:        ICD-10-CM    1. PMR (polymyalgia rheumatica) (H)  M35.3        Plan: He appears to be asymptomatic from the PMR standpoint.  I going to have him change his prednisone to 2-1/2 mg every other day for 3 weeks and then stop completely.  I did not do a sedimentation rate today.  If he feels as though he is having a recurrence of PMR symptoms, he will let me know and we will do a sed rate at that time.

## 2021-05-03 ENCOUNTER — OFFICE VISIT (OUTPATIENT)
Dept: INTERNAL MEDICINE | Facility: OTHER | Age: 81
End: 2021-05-03
Attending: INTERNAL MEDICINE
Payer: MEDICARE

## 2021-05-03 VITALS
HEART RATE: 79 BPM | OXYGEN SATURATION: 95 % | RESPIRATION RATE: 18 BRPM | BODY MASS INDEX: 25.25 KG/M2 | SYSTOLIC BLOOD PRESSURE: 122 MMHG | DIASTOLIC BLOOD PRESSURE: 78 MMHG | WEIGHT: 149.4 LBS | TEMPERATURE: 97 F

## 2021-05-03 DIAGNOSIS — M35.3 PMR (POLYMYALGIA RHEUMATICA) (H): Primary | ICD-10-CM

## 2021-05-03 PROBLEM — F10.20 ALCOHOL DEPENDENCE (H): Status: ACTIVE | Noted: 2021-05-03

## 2021-05-03 PROCEDURE — G0463 HOSPITAL OUTPT CLINIC VISIT: HCPCS

## 2021-05-03 PROCEDURE — 99213 OFFICE O/P EST LOW 20 MIN: CPT | Performed by: INTERNAL MEDICINE

## 2021-05-03 RX ORDER — PREDNISONE 5 MG/1
2.5 TABLET ORAL DAILY
COMMUNITY
Start: 2021-05-03 | End: 2022-01-06

## 2021-05-03 ASSESSMENT — ENCOUNTER SYMPTOMS
ENDOCRINE NEGATIVE: 1
EYES NEGATIVE: 1
CONSTITUTIONAL NEGATIVE: 1
ALLERGIC/IMMUNOLOGIC NEGATIVE: 1

## 2021-05-03 ASSESSMENT — PAIN SCALES - GENERAL: PAINLEVEL: NO PAIN (0)

## 2021-05-03 NOTE — PROGRESS NOTES
Chief Complaint:  F/U on PMR.    HPI: He is here for follow-up on his polymyalgia rheumatica.  The last time he was in he was on 2-1/2 mg a day and had been on this for quite some time so we talked about weaning him off of this.  He went to 2-1/2 mg every other day but after about 2 weeks he noticed that he really started feeling weaker and more myalgias.  He went back to 2-1/2 mg every day starting the end of last week and is already starting to feel better.  He is looking for direction as to what he should do with his prednisone.  His lung cancer treatment is stable at this time.    Past Medical History:   Diagnosis Date     Basal cell carcinoma of back 12/01/2014     Coronary artery calcification     score 297 12/2010     Dysarthria      Enlarged prostate without lower urinary tract symptoms (luts)     Post prostatectomy     Essential (primary) hypertension     No Comments Provided     History of colonic polyps     No Comments Provided     Hyperglycemia     No Comments Provided     Hyperlipidemia     No Comments Provided     Malignant neoplasm of skin     basal cell ca. on back and right ear     PMR (polymyalgia rheumatica) (H)      Primary mucinous adenocarcinoma of lung (H)     S/P radiation and chemo, on maintainence biologic     Spinal stenosis of lumbar region without neurogenic claudication     No Comments Provided     Uncomplicated alcohol abuse     No Comments Provided       Past Surgical History:   Procedure Laterality Date     ARTHROPLASTY KNEE Left     01/09     ARTHROPLASTY SHOULDER Left 2018     ARTHROSCOPY KNEE Left     12/05     COLONOSCOPY      2001, 2006, 2011,Adenomatous 2001, subsequent WNL     COLONOSCOPY  11/21/2014 11/21/2014,Adenoma     COLONOSCOPY  01/07/2020    tubular adenomas, follow up 5 years     HERNIORRHAPHY INGUINAL BILATERAL       PROSTATECTOMY SUPRAPUBIC  2007     UPPER GI ENDOSCOPY  2005       Allergies   Allergen Reactions     Paclitaxel Other (See Comments) and Cough      flushing     Ace Inhibitors Cough     Aspirin      Epistaxis requiring hospitalization from 81 mg daily     Hydrocodone-Acetaminophen      Other reaction(s): Insomnia     Sulfamethoxazole-Trimethoprim Itching and Rash     Sulfasalazine Rash       Current Outpatient Medications   Medication Sig Dispense Refill     amoxicillin (AMOXIL) 500 MG capsule Take 2,000 mg by mouth once as needed (1 HOUR PRIOR TO DENTAL APPOINTMENT)       atenolol (TENORMIN) 50 MG tablet Take 1 tablet (50 mg) by mouth daily 90 tablet 3     docusate sodium (COLACE) 100 MG tablet Take 100 mg by mouth as needed for constipation       durvalumab Twice monthly       Multiple Vitamin (MULTI-VITAMINS) TABS Take 1 tablet by mouth daily       predniSONE (DELTASONE) 5 MG tablet Take 0.5 tablets (2.5 mg) by mouth daily       prochlorperazine (COMPAZINE) 10 MG tablet Take 10 mg by mouth every 6 hours as needed for nausea        simvastatin (ZOCOR) 40 MG tablet Take 1 tablet (40 mg) by mouth At Bedtime 90 tablet 3       Review of Systems   Constitutional: Negative.    Eyes: Negative.    Endocrine: Negative.    Allergic/Immunologic: Negative.        Physical Exam  Vitals signs and nursing note reviewed.   Constitutional:       General: He is not in acute distress.     Appearance: Normal appearance. He is not ill-appearing, toxic-appearing or diaphoretic.   Neurological:      Mental Status: He is alert.         Assessment:        ICD-10-CM    1. PMR (polymyalgia rheumatica) (H)  M35.3        Plan: We will just have him stay at 2-1/2 mg daily indefinitely as he feels much better on that dose then with attempts to wean.  I did not feel that a sed rate would be helpful, we will only do that if he does not gain improvement going back to the 2-1/2 mg every day.

## 2021-05-11 ENCOUNTER — TELEPHONE (OUTPATIENT)
Dept: INTERNAL MEDICINE | Facility: OTHER | Age: 81
End: 2021-05-11

## 2021-05-11 NOTE — TELEPHONE ENCOUNTER
Called pt, Pt states he saw you last Monday, and talked about the prednisone he is taking. States he saw his oncologist yesterday and he has increased muscle pain, and the oncologist wants to increase his prednisone to 20 mg a day for a week to rule out whats causing his pain- he wants to make sure this is ok with you?    Kieran Ryan, LPN on 5/11/2021 at 8:53 AM

## 2021-06-09 ENCOUNTER — VIRTUAL VISIT (OUTPATIENT)
Dept: RADIATION ONCOLOGY | Facility: HOSPITAL | Age: 81
End: 2021-06-09
Attending: RADIOLOGY
Payer: MEDICARE

## 2021-06-09 DIAGNOSIS — C34.90 PRIMARY MUCINOUS ADENOCARCINOMA OF LUNG (H): Primary | ICD-10-CM

## 2021-06-09 PROCEDURE — 99441 PR PHYSICIAN TELEPHONE EVALUATION 5-10 MIN: CPT | Mod: 95 | Performed by: RADIOLOGY

## 2021-06-09 NOTE — PROGRESS NOTES
[facility]  DEPARTMENT OF RADIATION ONCOLOGY  FOLLOW-UP NOTE (Telephone Virtual Visit)    Name: Wilfrid Meneses MRN: [id number]   : 1940 (80 year old)  Date of Service: 2021 Referring: Dr. Ramos     Diagnosis and Cancer Staging  Primary mucinous adenocarcinoma of lung (H)  Staging form: Lung, AJCC 8th Edition  - Clinical stage from 2020: Stage IIIA (cT4, cN0, cM0) - Signed by Reji Zavaleta MD on 2020      Summary of Problems   (Please note that EMR interfaces between institutions can result in loss of the embedded images).     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 Pulmonary medicine referred the patient for consultation about the role of radiotherapy as potentially curative treatment for the asymptomatic disease (cT3/4N0M)). The disease was ultimately felt to not be amenable to SBRT and better treated with concurrent chemoradiation. 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   2020       Fractions Dose/Fract Total Dose Dose (cGy)    Received 33 200 6,600 2021-2021     Recent History   Interval since radiation therapy: 103 days.    As part of the his routine oncologic management, I contacted the patient for follow up after concurrent chemoradiation for locally advanced mucinous adenocarcinoma of the right lower lobe/perihilar region. While medical oncology delivered weekly carboplatin/Abraxane, we treated the patient in 33 fractions of 200 cGy each to a total dose of 6,600 cGy ending 2021. Since his last visit with me in 2021, the patient's principal complaint has been persistence of arthralgias. Bilateral wrist discomfort has been the  principal site of pain and historically has been attributed to primarily myalgia rheumatica (the site was the principal site of pain at his initial presentation with PMR). The patient remains on chronic low-dose prednisone. However, a potential competing etiology is his ongoing maintenance immunotherapy with durvalumab (the most recent infusion was 6/8/2021, C3D1). A test of higher dose prednisone (20 mg) did not improve the pain or assist in discriminating between PMR versus immunotherapy as the etiology. The patient therefore tapered back to his maintenance dose of 2.5-mg of prednisone. A surveillance CT is anticipated after 6 cycles of durvalumab.    The patient reports that the thick bothersome phlegm that developed after radiation therapy has improved significantly over the last 2 weeks. He also reports that esophageal pain triggered by cold foods has also resolved. He denies new dyspnea. He denies hemoptysis or hematemesis. He denies headache, nausea, vomiting, or new back pain. He denies unintentional weight loss.    Past Medical History:   Diagnosis Date     Basal cell carcinoma of back 12/01/2014     Coronary artery calcification     score 297 12/2010     Dysarthria      Enlarged prostate without lower urinary tract symptoms (luts)     Post prostatectomy     Essential (primary) hypertension     No Comments Provided     History of colonic polyps     No Comments Provided     Hyperglycemia     No Comments Provided     Hyperlipidemia     No Comments Provided     Malignant neoplasm of skin     basal cell ca. on back and right ear     PMR (polymyalgia rheumatica) (H)      Primary mucinous adenocarcinoma of lung (H)     S/P radiation and chemo, on maintainence biologic     Spinal stenosis of lumbar region without neurogenic claudication     No Comments Provided     Uncomplicated alcohol abuse     No Comments Provided     Past Surgical History:   Procedure Laterality Date     ARTHROPLASTY KNEE Left     01/09      ARTHROPLASTY SHOULDER Left 2018     ARTHROSCOPY KNEE Left     12/05     COLONOSCOPY      2001, 2006, 2011,Adenomatous 2001, subsequent WNL     COLONOSCOPY  11/21/2014 11/21/2014,Adenoma     COLONOSCOPY  01/07/2020    tubular adenomas, follow up 5 years     HERNIORRHAPHY INGUINAL BILATERAL       PROSTATECTOMY SUPRAPUBIC  2007     UPPER GI ENDOSCOPY  2005     Outpatient Encounter Medications as of 6/9/2021   Medication Sig Dispense Refill     amoxicillin (AMOXIL) 500 MG capsule Take 2,000 mg by mouth once as needed (1 HOUR PRIOR TO DENTAL APPOINTMENT)       atenolol (TENORMIN) 50 MG tablet Take 1 tablet (50 mg) by mouth daily 90 tablet 3     docusate sodium (COLACE) 100 MG tablet Take 100 mg by mouth as needed for constipation       durvalumab Twice monthly       Multiple Vitamin (MULTI-VITAMINS) TABS Take 1 tablet by mouth daily       predniSONE (DELTASONE) 5 MG tablet Take 0.5 tablets (2.5 mg) by mouth daily       prochlorperazine (COMPAZINE) 10 MG tablet Take 10 mg by mouth every 6 hours as needed for nausea        simvastatin (ZOCOR) 40 MG tablet Take 1 tablet (40 mg) by mouth At Bedtime 90 tablet 3     No facility-administered encounter medications on file as of 6/9/2021.     No orders of the defined types were placed in this encounter.    Allergies: Paclitaxel, Ace inhibitors, Aspirin, Hydrocodone-acetaminophen, Sulfamethoxazole-trimethoprim, and Sulfasalazine    Social History     Tobacco Use     Smoking status: Never Smoker     Smokeless tobacco: Never Used     Tobacco comment: smoked a pipe in college for 2-3 years (seldom)   Substance Use Topics     Alcohol use: Not Currently     Alcohol/week: 28.0 standard drinks     Frequency: Never     Comment: Last use was 7/4/20     Drug use: No     Family History   Problem Relation Age of Onset     Diabetes Mother      Chronic Obstructive Pulmonary Disease Mother      Myocardial Infarction Father      Other - See Comments Son         MI during GXT     Other - See  Comments Sister         PMR     Cancer No family hx of    No other cancers in first or second degree relatives.    Physical Exam   Sounds well. Good volume and clarity. Stable vocal tremors. Relaxed tone. Upbeat, relaxed, confident, engaging, and motivated. Pleasant, cooperative, insightful, and concrete. Rapid verbal responses. Complex speech patterns. Well spoken, good short and long-term memory, good context, good judgment, and good insights. Did not require repeating of questions. No aphasia.     Time on Day of Encounter, and MDM Data Reviewed and Analyzed on Day of Encounter   Time spent on patient activities is based on Chart review 14 minutes, Visit 6 minutes, and Documentation 25 minutes. Total time: 45 minutes.    MDM based on:       Modeate risk element:  o Locally advanced lung cancer, symptomatic polymyalgia rheumatica, potentially symptomatic immunotherapy, resolving esophageal toxicity from thoracic radiation therapy.       Tests, documents, or independent historian(s) analyses include but are not limited to:  o As above.       Independent Interpretations of tests include but are not limited to:  o Review of radiation therapy plan from 1/11/2021 confirms treatment of the right medial lung and hilar region.    Assessment/Plan   The patient continues to improve after chemoradiation for cancer of the right lung. Currently, the patient feels that he has no toxicity from radiation therapy. Thick esophageal secretions and esophageal cold intolerance have both resolved. Arthralgias seem to be due to polymyalgia rheumatica rather than durvalumab since their character is the same as when the patient originally presented with PMR. Low-dose prednisone appears to be helpful, and a test dose of higher dose prednisone did not provide better efficacy. Foot neuropathy remains easily tolerable and has not caused falls or alter the patient's activities. The patient has follow-up with medical oncology on 6/21/2021 for  continuation of maintenance nivolumab. A follow-up CT is anticipated after the cycle 6. The images are anticipated to show interval partial regression of the primary disease with significant surrounding treatment-related changes. Due to the frequent medical oncology visits, we will arrange follow-up with me in approximately 6 months. The patient remains in frequent contact with his primary care service. The patient wishes to proceed as noted above. I answered all questions to his satisfaction.      Reji Zavaleta M.D., Ph.D.  Department of Radiation Oncology  Tel: [phone]  Fax: [phone]    cc:  Itz Ramos M.D., Ph.D. (medical oncology)  Roman Littlejohn M.D. (pulmonary medicine)  Jimenez Colon M.D. (medicine)

## 2021-06-28 ENCOUNTER — OFFICE VISIT (OUTPATIENT)
Dept: INTERNAL MEDICINE | Facility: OTHER | Age: 81
End: 2021-06-28
Attending: INTERNAL MEDICINE
Payer: MEDICARE

## 2021-06-28 VITALS
RESPIRATION RATE: 16 BRPM | SYSTOLIC BLOOD PRESSURE: 122 MMHG | BODY MASS INDEX: 25.49 KG/M2 | TEMPERATURE: 97.7 F | WEIGHT: 150.8 LBS | HEART RATE: 66 BPM | OXYGEN SATURATION: 98 % | DIASTOLIC BLOOD PRESSURE: 76 MMHG

## 2021-06-28 DIAGNOSIS — M35.3 PMR (POLYMYALGIA RHEUMATICA) (H): ICD-10-CM

## 2021-06-28 DIAGNOSIS — C34.90 PRIMARY MUCINOUS ADENOCARCINOMA OF LUNG (H): Primary | ICD-10-CM

## 2021-06-28 PROCEDURE — 99213 OFFICE O/P EST LOW 20 MIN: CPT | Performed by: INTERNAL MEDICINE

## 2021-06-28 PROCEDURE — G0463 HOSPITAL OUTPT CLINIC VISIT: HCPCS

## 2021-06-28 ASSESSMENT — ENCOUNTER SYMPTOMS
CONSTITUTIONAL NEGATIVE: 1
ALLERGIC/IMMUNOLOGIC NEGATIVE: 1
EYES NEGATIVE: 1
ENDOCRINE NEGATIVE: 1

## 2021-06-28 ASSESSMENT — PAIN SCALES - GENERAL: PAINLEVEL: MILD PAIN (2)

## 2021-06-28 NOTE — NURSING NOTE
"Chief Complaint   Patient presents with     Follow Up       Initial /76   Pulse 66   Temp 97.7  F (36.5  C) (Tympanic)   Resp 16   Wt 68.4 kg (150 lb 12.8 oz)   SpO2 98%   BMI 25.49 kg/m   Estimated body mass index is 25.49 kg/m  as calculated from the following:    Height as of 2/3/21: 1.638 m (5' 4.5\").    Weight as of this encounter: 68.4 kg (150 lb 12.8 oz).  Medication Reconciliation: complete    Kieran Ryan LPN    "

## 2021-06-28 NOTE — PROGRESS NOTES
Chief Complaint:  F/U.    HPI: This patient comes in today for follow-up.  He is getting immunotherapy for his lung cancer.  He has had approximately 6 doses of the durvalumab.  Associated side effects with this include arthralgias and he is having arthralgias.  He is not having much for myalgias anymore.  He did try increasing the prednisone to 5 mg daily but that did not seem to make much of a difference.  His pain is mainly in his right wrist, ankles and his prosthetic joints.    Past Medical History:   Diagnosis Date     Basal cell carcinoma of back 12/01/2014     Coronary artery calcification     score 297 12/2010     Dysarthria      Enlarged prostate without lower urinary tract symptoms (luts)     Post prostatectomy     Essential (primary) hypertension     No Comments Provided     History of colonic polyps     No Comments Provided     Hyperglycemia     No Comments Provided     Hyperlipidemia     No Comments Provided     Malignant neoplasm of skin     basal cell ca. on back and right ear     PMR (polymyalgia rheumatica) (H)      Primary mucinous adenocarcinoma of lung (H)     S/P radiation and chemo, on maintainence biologic     Spinal stenosis of lumbar region without neurogenic claudication     No Comments Provided     Uncomplicated alcohol abuse     No Comments Provided       Past Surgical History:   Procedure Laterality Date     ARTHROPLASTY KNEE Left     01/09     ARTHROPLASTY SHOULDER Left 2018     ARTHROSCOPY KNEE Left     12/05     COLONOSCOPY      2001, 2006, 2011,Adenomatous 2001, subsequent WNL     COLONOSCOPY  11/21/2014 11/21/2014,Adenoma     COLONOSCOPY  01/07/2020    tubular adenomas, follow up 5 years     HERNIORRHAPHY INGUINAL BILATERAL       PROSTATECTOMY SUPRAPUBIC  2007     UPPER GI ENDOSCOPY  2005       Allergies   Allergen Reactions     Paclitaxel Other (See Comments) and Cough     flushing     Ace Inhibitors Cough     Aspirin      Epistaxis requiring hospitalization from 81 mg daily      Hydrocodone-Acetaminophen      Other reaction(s): Insomnia     Sulfamethoxazole-Trimethoprim Itching and Rash     Sulfasalazine Rash       Current Outpatient Medications   Medication Sig Dispense Refill     amoxicillin (AMOXIL) 500 MG capsule Take 2,000 mg by mouth once as needed (1 HOUR PRIOR TO DENTAL APPOINTMENT)       atenolol (TENORMIN) 50 MG tablet Take 1 tablet (50 mg) by mouth daily 90 tablet 3     docusate sodium (COLACE) 100 MG tablet Take 100 mg by mouth as needed for constipation       durvalumab Twice monthly       Multiple Vitamin (MULTI-VITAMINS) TABS Take 1 tablet by mouth daily       predniSONE (DELTASONE) 5 MG tablet Take 0.5 tablets (2.5 mg) by mouth daily       prochlorperazine (COMPAZINE) 10 MG tablet Take 10 mg by mouth every 6 hours as needed for nausea        simvastatin (ZOCOR) 40 MG tablet Take 1 tablet (40 mg) by mouth At Bedtime 90 tablet 3       Review of Systems   Constitutional: Negative.    Eyes: Negative.    Endocrine: Negative.    Allergic/Immunologic: Negative.        Physical Exam  Vitals signs and nursing note reviewed.   Constitutional:       General: He is not in acute distress.     Appearance: Normal appearance. He is not ill-appearing, toxic-appearing or diaphoretic.   Neurological:      Mental Status: He is alert.         Assessment:        ICD-10-CM    1. Primary mucinous adenocarcinoma of lung (H)  C34.90    2. PMR (polymyalgia rheumatica) (H)  M35.3        Plan: I think that his pain is probably more related to the immunotherapy than any PMR at this time.  Elected not to do a sedimentation rate, I think it would be meaningless.  I would treat him more based on his symptomatology.  He will try increasing the prednisone to 10 mg daily to see how he feels with that.  He will follow-up on that dose of prednisone with oncology next Wednesday when he has his next infusion.  Follow-up with me will be as needed.

## 2021-08-02 NOTE — PROGRESS NOTES
Outpatient Physical Therapy Progress Note     Patient: Wilfrid Meneses  : 1940    Beginning/End Dates of Reporting Period:  3/8/18 to 4/10/2018    Referring Provider: Evingson    Therapy Diagnosis: s/p shoulder surgery     Client Self Report: Some discomfort with exercises but overall he is doing well.     Objective Measurements:  Objective Measure: Subjective Pain - L shoulder  Details: 0-2/10  Objective Measure: 3/8 90 PROM.  3/12 95 PROM.   3/14 120 PROM.  3/19 125 PROM.  3/23 130 AAROM.  3/27 133 AAROM.  3/30 140 AAROM, 135 PROM.  4/3  145 AAROM, 140 PROM.   155 AAROM.  4/10: 160 AAROM.    Details: L shoulder flexion        Goals:  Goal Identifier Pain   Goal Description Pt report 0/10 pain for improved ADLs   Target Date 18   Date Met      Progress:     Goal Identifier ROM   Goal Description Pt to demo L shoulder ROM that is functional to complete ADLs without difficulty   Target Date 18   Date Met      Progress:     Goal Identifier Strength   Goal Description Pt to demo L shoulder strength that is functional to complete ADLs without difficulty   Target Date 18   Date Met      Progress:     Progress Toward Goals:   Progress this reporting period: ROM improving    Plan:  Continue therapy per current plan of care.    Discharge:  No   full weight-bearing

## 2022-01-06 ENCOUNTER — OFFICE VISIT (OUTPATIENT)
Dept: INTERNAL MEDICINE | Facility: OTHER | Age: 82
End: 2022-01-06
Attending: INTERNAL MEDICINE
Payer: MEDICARE

## 2022-01-06 VITALS
HEART RATE: 80 BPM | DIASTOLIC BLOOD PRESSURE: 84 MMHG | OXYGEN SATURATION: 96 % | HEIGHT: 65 IN | BODY MASS INDEX: 24.43 KG/M2 | SYSTOLIC BLOOD PRESSURE: 138 MMHG | WEIGHT: 146.6 LBS | RESPIRATION RATE: 16 BRPM | TEMPERATURE: 97.4 F

## 2022-01-06 DIAGNOSIS — C34.90 PRIMARY MUCINOUS ADENOCARCINOMA OF LUNG (H): ICD-10-CM

## 2022-01-06 DIAGNOSIS — L98.9 SKIN LESION: Primary | ICD-10-CM

## 2022-01-06 DIAGNOSIS — I10 HYPERTENSION, UNSPECIFIED TYPE: ICD-10-CM

## 2022-01-06 DIAGNOSIS — E78.5 HYPERLIPIDEMIA, UNSPECIFIED HYPERLIPIDEMIA TYPE: ICD-10-CM

## 2022-01-06 DIAGNOSIS — N40.1 BENIGN PROSTATIC HYPERPLASIA WITH URINARY OBSTRUCTION: ICD-10-CM

## 2022-01-06 DIAGNOSIS — R73.09 ABNORMAL GLUCOSE: ICD-10-CM

## 2022-01-06 DIAGNOSIS — N13.8 BENIGN PROSTATIC HYPERPLASIA WITH URINARY OBSTRUCTION: ICD-10-CM

## 2022-01-06 DIAGNOSIS — I25.10 CORONARY ARTERY CALCIFICATION: ICD-10-CM

## 2022-01-06 PROBLEM — L57.0 ACTINIC KERATOSIS: Status: RESOLVED | Noted: 2019-07-12 | Resolved: 2022-01-06

## 2022-01-06 PROBLEM — M35.3 PMR (POLYMYALGIA RHEUMATICA) (H): Status: RESOLVED | Noted: 2020-06-01 | Resolved: 2022-01-06

## 2022-01-06 PROBLEM — F10.20 ALCOHOL DEPENDENCE (H): Status: RESOLVED | Noted: 2022-01-06 | Resolved: 2022-01-06

## 2022-01-06 PROBLEM — F10.20 ALCOHOL DEPENDENCE (H): Status: RESOLVED | Noted: 2021-05-03 | Resolved: 2022-01-06

## 2022-01-06 PROBLEM — F10.20 ALCOHOL DEPENDENCE (H): Status: ACTIVE | Noted: 2022-01-06

## 2022-01-06 PROBLEM — T45.1X5A ADVERSE EFFECT OF CHEMOTHERAPY, INITIAL ENCOUNTER: Status: RESOLVED | Noted: 2021-02-03 | Resolved: 2022-01-06

## 2022-01-06 PROCEDURE — G0439 PPPS, SUBSEQ VISIT: HCPCS | Performed by: INTERNAL MEDICINE

## 2022-01-06 PROCEDURE — 17110 DESTRUCTION B9 LES UP TO 14: CPT | Performed by: INTERNAL MEDICINE

## 2022-01-06 PROCEDURE — 17000 DESTRUCT PREMALG LESION: CPT | Performed by: INTERNAL MEDICINE

## 2022-01-06 RX ORDER — ATENOLOL 50 MG/1
50 TABLET ORAL DAILY
Qty: 90 TABLET | Refills: 3 | Status: SHIPPED | OUTPATIENT
Start: 2022-01-06 | End: 2023-01-09

## 2022-01-06 RX ORDER — SIMVASTATIN 40 MG
40 TABLET ORAL AT BEDTIME
Qty: 90 TABLET | Refills: 3 | Status: SHIPPED | OUTPATIENT
Start: 2022-01-06 | End: 2023-01-09

## 2022-01-06 ASSESSMENT — MIFFLIN-ST. JEOR: SCORE: 1293.1

## 2022-01-06 ASSESSMENT — PAIN SCALES - GENERAL: PAINLEVEL: NO PAIN (0)

## 2022-01-06 ASSESSMENT — ACTIVITIES OF DAILY LIVING (ADL): CURRENT_FUNCTION: NO ASSISTANCE NEEDED

## 2022-01-06 NOTE — PATIENT INSTRUCTIONS
Continue all of your current medications.    Continue follow-ups with oncology as scheduled.    Consider getting the shingles vaccine.    Return here lesion treated again if needed.    Follow-up with me as needed or annually depending on your course.

## 2022-01-06 NOTE — PROGRESS NOTES
"SUBJECTIVE:   Wilfrid Meneses is a 81 year old male who presents for Preventive Visit.    He is here today for a Medicare wellness visit.  In most respects he is doing relatively well.  He did recently have a significant allergy to durvalumab which she was getting for immunotherapy for his lung cancer.  He was taken off of that and was treated with prednisone for an inflammatory pneumonitis and inflammatory reaction.  He is now off the prednisone and he feels great.  He will have restaging of his lung cancer in the upcoming months.    He has a remote history of PMR which is resolved.  He has a history of hyperlipidemia which is treated.  He has a history of hypertension which is treated.  He has no other major complaints or concerns other than a skin lesion behind his left ear.    Medications are reconciled.  Past medical history, past surgical history, family history and social histories are reviewed and updated.  Immunizations are up-to-date other than the Shingrix vaccine.      Patient has been advised of split billing requirements and indicates understanding: No   Are you in the first 12 months of your Medicare coverage?  No    Healthy Habits:     In general, how would you rate your overall health?  Good    Frequency of exercise:  2-3 days/week    Duration of exercise:  15-30 minutes    Do you usually eat at least 4 servings of fruit and vegetables a day, include whole grains    & fiber and avoid regularly eating high fat or \"junk\" foods?  No    Taking medications regularly:  Yes    Medication side effects:  None    Ability to successfully perform activities of daily living:  No assistance needed    Home Safety:  Lack of grab bars in the bathroom    Hearing Impairment:  No hearing concerns    In the past 6 months, have you been bothered by leaking of urine?  No    In general, how would you rate your overall mental or emotional health?  Excellent      PHQ-2 Total Score: 0    Additional concerns today:  No    Do " you feel safe in your environment? Yes    Have you ever done Advance Care Planning? (For example, a Health Directive, POLST, or a discussion with a medical provider or your loved ones about your wishes): Yes, advance care planning is on file.       Fall risk  Fallen 2 or more times in the past year?: No  Any fall with injury in the past year?: No    Cognitive Screening   1) Repeat 3 items (Leader, Season, Table)    2) Clock draw: NORMAL  3) 3 item recall: Recalls 2 objects   Results: NORMAL clock, 1-2 items recalled: COGNITIVE IMPAIRMENT LESS LIKELY    Mini-CogTM Copyright S Geovany. Licensed by the author for use in [facility]; reprinted with permission (monica@[email or web address]). All rights reserved.      Do you have sleep apnea, excessive snoring or daytime drowsiness?: no    Reviewed and updated as needed this visit by clinical staff  Tobacco  Allergies  Meds      Soc Hx       Reviewed and updated as needed this visit by Provider               Social History     Tobacco Use     Smoking status: Never Smoker     Smokeless tobacco: Never Used     Tobacco comment: smoked a pipe in college for 2-3 years (seldom)   Substance Use Topics     Alcohol use: Not Currently     Alcohol/week: 28.0 standard drinks     Comment: Last use was 7/4/20         Alcohol Use 1/6/2022   Prescreen: >3 drinks/day or >7 drinks/week? Not Applicable           Current Outpatient Medications   Medication     atenolol (TENORMIN) 50 MG tablet     Multiple Vitamin (MULTI-VITAMINS) TABS     simvastatin (ZOCOR) 40 MG tablet     No current facility-administered medications for this visit.         Current providers sharing in care for this patient include:   Patient Care Team:  Jimenez Colon MD as PCP - General (Internal Medicine)  Jimenez Colon MD as Assigned PCP  Roman Littlejohn MD as Itz Cancino MD as MD (Hematology & Oncology)  Reji Zavaleta MD as Assigned Cancer Care Provider  Pablo Hyman    The following health  "maintenance items are reviewed in Epic and correct as of today:  Health Maintenance Due   Topic Date Due     ZOSTER IMMUNIZATION (1 of 2) Never done     MEDICARE ANNUAL WELLNESS VISIT  12/23/2021     Lab work is in process          Review of Systems  Constitutional, HEENT, cardiovascular, pulmonary, GI, , musculoskeletal, neuro, skin, endocrine and psych systems are negative, except as otherwise noted.    OBJECTIVE:   /84 (BP Location: Right arm, Patient Position: Sitting, Cuff Size: Adult Regular)   Pulse 80   Temp 97.4  F (36.3  C) (Tympanic)   Resp 16   Ht 1.645 m (5' 4.76\")   Wt 66.5 kg (146 lb 9.6 oz)   SpO2 96%   BMI 24.57 kg/m   Estimated body mass index is 24.57 kg/m  as calculated from the following:    Height as of this encounter: 1.645 m (5' 4.76\").    Weight as of this encounter: 66.5 kg (146 lb 9.6 oz).  Physical Exam  GENERAL: healthy, alert and no distress  EYES: Eyes grossly normal to inspection, PERRL and conjunctivae and sclerae normal  HENT: ear canals and TM's normal, nose and mouth without ulcers or lesions  NECK: no adenopathy, no asymmetry, masses, or scars and thyroid normal to palpation  RESP: lungs clear to auscultation - no rales, rhonchi or wheezes  CV: regular rate and rhythm, normal S1 S2, no S3 or S4, no murmur, click or rub, no peripheral edema and peripheral pulses strong  ABDOMEN: soft, nontender, no hepatosplenomegaly, no masses and bowel sounds normal  MS: no gross musculoskeletal defects noted, no edema  SKIN: 1 cm x 5 mm lesion behind his left ear treated with liquid nitrogen  NEURO: Normal strength and tone, mentation intact and speech normal  PSYCH: mentation appears normal, affect normal/bright        ASSESSMENT / PLAN:   No diagnosis found.    Patient has been advised of split billing requirements and indicates understanding: Yes  COUNSELING:    He appears to be doing well at this time considering.  Medications will continue without change.  Lab work was " "really not indicated today so it was not done.  He will continue getting lab work through oncology and continue with oncology follow-ups as scheduled.  Follow-up on the skin lesion if it does not resolve.  If all goes well, he will follow-up with me annually.  Consider getting the Shingrix vaccine.  Reviewed preventive health counseling, as reflected in patient instructions       Regular exercise       Healthy diet/nutrition    Estimated body mass index is 24.57 kg/m  as calculated from the following:    Height as of this encounter: 1.645 m (5' 4.76\").    Weight as of this encounter: 66.5 kg (146 lb 9.6 oz).        He reports that he has never smoked. He has never used smokeless tobacco.      Appropriate preventive services were discussed with this patient, including applicable screening as appropriate for cardiovascular disease, diabetes, osteopenia/osteoporosis, and glaucoma.  As appropriate for age/gender, discussed screening for colorectal cancer, prostate cancer, breast cancer, and cervical cancer. Checklist reviewing preventive services available has been given to the patient.    Reviewed patients plan of care and provided an AVS. The Basic Care Plan (routine screening as documented in Health Maintenance) for Wilfrid meets the Care Plan requirement. This Care Plan has been established and reviewed with the Patient.    Counseling Resources:  ATP IV Guidelines  Pooled Cohorts Equation Calculator  Breast Cancer Risk Calculator  Breast Cancer: Medication to Reduce Risk  FRAX Risk Assessment  ICSI Preventive Guidelines  Dietary Guidelines for Americans, 2010  USDA's MyPlate  ASA Prophylaxis  Lung CA Screening    JERRY RILEY MD  [facility]    Identified Health Risks:  "

## 2022-01-06 NOTE — NURSING NOTE
"Chief Complaint   Patient presents with     Medicare Visit       FOOD SECURITY SCREENING QUESTIONS  Hunger Vital Signs:  Within the past 12 months we worried whether our food would run out before we got money to buy more. Never  Within the past 12 months the food we bought just didn't last and we didn't have money to get more. Never  Lily Finn LPN 1/6/2022 10:46 AM      Initial /84 (BP Location: Right arm, Patient Position: Sitting, Cuff Size: Adult Regular)   Pulse 80   Temp 97.4  F (36.3  C) (Tympanic)   Resp 16   Ht 1.645 m (5' 4.76\")   Wt 66.5 kg (146 lb 9.6 oz)   SpO2 96%   BMI 24.57 kg/m   Estimated body mass index is 24.57 kg/m  as calculated from the following:    Height as of this encounter: 1.645 m (5' 4.76\").    Weight as of this encounter: 66.5 kg (146 lb 9.6 oz).  Medication Reconciliation: complete    Lily Finn LPN  "

## 2022-02-14 ENCOUNTER — OFFICE VISIT (OUTPATIENT)
Dept: INTERNAL MEDICINE | Facility: OTHER | Age: 82
End: 2022-02-14
Attending: INTERNAL MEDICINE
Payer: MEDICARE

## 2022-02-14 VITALS
TEMPERATURE: 96.7 F | OXYGEN SATURATION: 93 % | HEART RATE: 86 BPM | RESPIRATION RATE: 18 BRPM | WEIGHT: 147.6 LBS | SYSTOLIC BLOOD PRESSURE: 124 MMHG | DIASTOLIC BLOOD PRESSURE: 80 MMHG | BODY MASS INDEX: 24.74 KG/M2

## 2022-02-14 DIAGNOSIS — N50.89 SCROTAL IRRITATION: Primary | ICD-10-CM

## 2022-02-14 PROCEDURE — G0463 HOSPITAL OUTPT CLINIC VISIT: HCPCS

## 2022-02-14 PROCEDURE — 99213 OFFICE O/P EST LOW 20 MIN: CPT | Performed by: INTERNAL MEDICINE

## 2022-02-14 ASSESSMENT — ENCOUNTER SYMPTOMS
ALLERGIC/IMMUNOLOGIC NEGATIVE: 1
ENDOCRINE NEGATIVE: 1
EYES NEGATIVE: 1
CONSTITUTIONAL NEGATIVE: 1

## 2022-02-14 ASSESSMENT — PAIN SCALES - GENERAL: PAINLEVEL: NO PAIN (0)

## 2022-02-14 NOTE — PROGRESS NOTES
Chief Complaint:  Scrotal pain.    HPI: He comes in today complaining of scrotal area irritation.  He is not sure what to make of this.  Its been for a couple of weeks.  He has tried some powders and some cortisone cream but they do not seem to help.  He actually is better lately.  He wonders if it is from the long underwear that he has been wearing over the winter since they do not provide much support.  He is here to to have things checked and evaluated.    Past Medical History:   Diagnosis Date     Basal cell carcinoma of back 12/01/2014     Coronary artery calcification     score 297 12/2010     Dysarthria      Enlarged prostate without lower urinary tract symptoms (luts)     Post prostatectomy     Essential (primary) hypertension     No Comments Provided     History of colonic polyps     No Comments Provided     Hyperglycemia     No Comments Provided     Hyperlipidemia     No Comments Provided     Malignant neoplasm of skin     basal cell ca. on back and right ear     PMR (polymyalgia rheumatica) (H)      Primary mucinous adenocarcinoma of lung (H)     S/P radiation and chemo, on maintainence biologic     Spinal stenosis of lumbar region without neurogenic claudication     No Comments Provided     Uncomplicated alcohol abuse     No Comments Provided       Past Surgical History:   Procedure Laterality Date     ARTHROPLASTY KNEE Left     01/09     ARTHROPLASTY SHOULDER Left 2018     ARTHROSCOPY KNEE Left     12/05     COLONOSCOPY      2001, 2006, 2011,Adenomatous 2001, subsequent WNL     COLONOSCOPY  11/21/2014 11/21/2014,Adenoma     COLONOSCOPY  01/07/2020    tubular adenomas, follow up 5 years     HERNIORRHAPHY INGUINAL BILATERAL       PROSTATECTOMY SUPRAPUBIC  2007     UPPER GI ENDOSCOPY  2005       Allergies   Allergen Reactions     Paclitaxel Other (See Comments) and Cough     flushing     Ace Inhibitors Cough     Aspirin Other (See Comments)     Epistaxis requiring hospitalization from 81 mg  daily  Epistaxis requiring hospitalization from 81 mg daily     Durvalumab      Hydrocodone-Acetaminophen Other (See Comments)     Other reaction(s): Insomnia  Other reaction(s): Insomnia     Sulfamethoxazole-Trimethoprim Itching and Rash     Sulfasalazine Rash       Current Outpatient Medications   Medication Sig Dispense Refill     atenolol (TENORMIN) 50 MG tablet Take 1 tablet (50 mg) by mouth daily 90 tablet 3     Multiple Vitamin (MULTI-VITAMINS) TABS Take 1 tablet by mouth daily       simvastatin (ZOCOR) 40 MG tablet Take 1 tablet (40 mg) by mouth At Bedtime 90 tablet 3       Review of Systems   Constitutional: Negative.    Eyes: Negative.    Endocrine: Negative.    Allergic/Immunologic: Negative.        Physical Exam  Vitals and nursing note reviewed.   Constitutional:       General: He is not in acute distress.     Appearance: Normal appearance. He is not ill-appearing, toxic-appearing or diaphoretic.   Abdominal:      Hernia: There is no hernia in the left inguinal area or right inguinal area.   Genitourinary:     Testes: Normal.         Right: Mass, tenderness or swelling not present.         Left: Mass, tenderness or swelling not present.      Epididymis:      Right: Normal.      Left: Normal.      Comments: No rash present  Neurological:      Mental Status: He is alert.         Assessment:        ICD-10-CM    1. Scrotal irritation  N50.89        Plan: He has a normal exam today.  He was reassured, I think he just had some localized irritation which is now resolving.  He will monitor the situation and let me know if he has further problems.      Answers for HPI/ROS submitted by the patient on 2/14/2022  How many servings of fruits and vegetables do you eat daily?: 2-3  On average, how many sweetened beverages do you drink each day (Examples: soda, juice, sweet tea, etc.  Do NOT count diet or artificially sweetened beverages)?: 0  How many minutes a day do you exercise enough to make your heart beat  faster?: 9 or less  How many days a week do you exercise enough to make your heart beat faster?: 3 or less  How many days per week do you miss taking your medication?: 0

## 2022-02-14 NOTE — NURSING NOTE
"Chief Complaint   Patient presents with     Penis/Scrotum Problem     Discomfort       Initial /80   Pulse 86   Temp (!) 96.7  F (35.9  C) (Tympanic)   Resp 18   Wt 67 kg (147 lb 9.6 oz)   SpO2 93%   BMI 24.74 kg/m   Estimated body mass index is 24.74 kg/m  as calculated from the following:    Height as of 1/6/22: 1.645 m (5' 4.76\").    Weight as of this encounter: 67 kg (147 lb 9.6 oz).  FOOD SECURITY SCREENING QUESTIONS  Hunger Vital Signs:  Within the past 12 months we worried whether our food would run out before we got money to buy more. Never  Within the past 12 months the food we bought just didn't last and we didn't have money to get more. Never      Kieran Ryan, ALIYA    "

## 2022-04-26 ENCOUNTER — OFFICE VISIT (OUTPATIENT)
Dept: FAMILY MEDICINE | Facility: OTHER | Age: 82
End: 2022-04-26
Attending: PHYSICIAN ASSISTANT
Payer: MEDICARE

## 2022-04-26 ENCOUNTER — HOSPITAL ENCOUNTER (OUTPATIENT)
Dept: GENERAL RADIOLOGY | Facility: OTHER | Age: 82
Discharge: HOME | End: 2022-04-26
Attending: NURSE PRACTITIONER
Payer: MEDICARE

## 2022-04-26 VITALS
TEMPERATURE: 98.1 F | DIASTOLIC BLOOD PRESSURE: 60 MMHG | WEIGHT: 146 LBS | RESPIRATION RATE: 18 BRPM | SYSTOLIC BLOOD PRESSURE: 108 MMHG | OXYGEN SATURATION: 98 % | HEIGHT: 64 IN | HEART RATE: 74 BPM | BODY MASS INDEX: 24.92 KG/M2

## 2022-04-26 DIAGNOSIS — R05.8 PRODUCTIVE COUGH: Primary | ICD-10-CM

## 2022-04-26 DIAGNOSIS — R06.2 WHEEZING: ICD-10-CM

## 2022-04-26 DIAGNOSIS — R05.8 PRODUCTIVE COUGH: ICD-10-CM

## 2022-04-26 PROBLEM — F10.20 ALCOHOL DEPENDENCE (H): Status: ACTIVE | Noted: 2022-04-26

## 2022-04-26 PROCEDURE — U0003 INFECTIOUS AGENT DETECTION BY NUCLEIC ACID (DNA OR RNA); SEVERE ACUTE RESPIRATORY SYNDROME CORONAVIRUS 2 (SARS-COV-2) (CORONAVIRUS DISEASE [COVID-19]), AMPLIFIED PROBE TECHNIQUE, MAKING USE OF HIGH THROUGHPUT TECHNOLOGIES AS DESCRIBED BY CMS-2020-01-R: HCPCS | Mod: ZL | Performed by: NURSE PRACTITIONER

## 2022-04-26 PROCEDURE — G0463 HOSPITAL OUTPT CLINIC VISIT: HCPCS

## 2022-04-26 PROCEDURE — 99213 OFFICE O/P EST LOW 20 MIN: CPT | Mod: CS | Performed by: NURSE PRACTITIONER

## 2022-04-26 PROCEDURE — G0463 HOSPITAL OUTPT CLINIC VISIT: HCPCS | Mod: 25

## 2022-04-26 PROCEDURE — C9803 HOPD COVID-19 SPEC COLLECT: HCPCS | Performed by: NURSE PRACTITIONER

## 2022-04-26 PROCEDURE — 71046 X-RAY EXAM CHEST 2 VIEWS: CPT

## 2022-04-26 ASSESSMENT — PAIN SCALES - GENERAL: PAINLEVEL: NO PAIN (0)

## 2022-04-26 NOTE — NURSING NOTE
"Chief Complaint   Patient presents with     Cough     Respiratory Problems     Patient presented to the clinic with a cough that has gotten worse over the last three days with wheezing. Patient reported that he does have lung cancer so he has a chronic cough.    Initial /60 (BP Location: Left arm, Patient Position: Sitting, Cuff Size: Adult Large)   Pulse 74   Temp 98.1  F (36.7  C) (Tympanic)   Resp 18   Ht 1.626 m (5' 4\")   Wt 66.2 kg (146 lb)   SpO2 98%   BMI 25.06 kg/m   Estimated body mass index is 25.06 kg/m  as calculated from the following:    Height as of this encounter: 1.626 m (5' 4\").    Weight as of this encounter: 66.2 kg (146 lb).       FOOD SECURITY SCREENING QUESTIONS:    The next two questions are to help us understand your food security.  If you are feeling you need any assistance in this area, we have resources available to support you today.    Hunger Vital Signs:  Within the past 12 months we worried whether our food would run out before we got money to buy more. Never  Within the past 12 months the food we bought just didn't last and we didn't have money to get more. Never      Medication Reconciliation: Complete      Sherine Servin LPN  "

## 2022-04-26 NOTE — PATIENT INSTRUCTIONS
Chest xray today is reassuring that pneumonia is not present.     It is reasssuring oxygen is 98% and no fevers.     Covid test is pending.     Recommend follow up with Dr. Colon or someone in family practice in 3 days especially if symptoms progress or worsen.

## 2022-04-26 NOTE — PROGRESS NOTES
ASSESSMENT/PLAN:    I have reviewed the nursing notes.  I have reviewed the findings, diagnosis, plan and need for follow up with the patient.    1. Productive cough  2. Wheezing  - XR Chest 2 Views; Future  - Symptomatic; Yes; 4/23/2022 COVID-19 Virus (Coronavirus) by PCR Nose  -Per x-ray read by radiology there is progressive right hilar fullness, new partial atelectasis of the right lung and left lung and pleural space remain clear.  I discussed these findings with Wilfrid in the office, no suspicion of pneumonia at this time.  COVID test is pending to rule out, may have come in contact with other viral upper respiratory infection causing his symptoms.  I gave him an incentive spirometer and instructed use with that today.  I also recommend follow-up with Dr. Colon an appointment was set up.  Do not feel antibiotics are warranted at this time but if symptoms progress or feels of fever, chills, other symptoms further work-up and possible antibiotics may be warranted.  -Given incentive spirometer - up to 1500 in office. Recommend use 10x per hour while awake. F/u with Dr. Colon (patient's PCP) set for Thursday in 2 days for re-evaluation and also patient would like to discuss weak legs and concerns of worsening PMR.     Follow up if symptoms persist or worsen or concerns    I explained my diagnostic considerations and recommendations to the patient, who voiced understanding and agreement with the treatment plan. All questions were answered. We discussed potential side effects of any prescribed or recommended therapies, as well as expectations for response to treatments.    Laureen Hayden NP  4/26/2022  10:36 AM    HPI:  Wilfrid Meneses is a 81 year old male who presents to [facility] today for concerns of cough, respiratory problems. He has lung cancer; has 3 days of worsening cough and wheezing as compared to his chronic cough that he deals with. Oxygen is adequate at 98%. Cough is worse at night; during the  day it is very productive of thick white phlegm which is also not particularly new to him either. Cancer is mostly on the right lung near the esophagus. He does feel more fatigued than usual especially as he has been up late coughing. No fevers/chills. Does not feel particularly ill overall. Feels unlikely for exposures to covid or influenza but ok with swab today to r/o covid. No known sick contacts currently. Denies shortness of breath any more than normal, except with coughing.       Not actively getting cancer treatment; had CT with contrast 1 month ago. Repeat CT in 6 months or so.     Hx of radiation and chemo and mononucleal antibodies.     Past Medical History:   Diagnosis Date     Basal cell carcinoma of back 12/01/2014     Coronary artery calcification     score 297 12/2010     Dysarthria      Enlarged prostate without lower urinary tract symptoms (luts)     Post prostatectomy     Essential (primary) hypertension     No Comments Provided     History of colonic polyps     No Comments Provided     Hyperglycemia     No Comments Provided     Hyperlipidemia     No Comments Provided     Malignant neoplasm of skin     basal cell ca. on back and right ear     PMR (polymyalgia rheumatica) (H)      Primary mucinous adenocarcinoma of lung (H)     S/P radiation and chemo, on maintainence biologic     Spinal stenosis of lumbar region without neurogenic claudication     No Comments Provided     Uncomplicated alcohol abuse     No Comments Provided     Past Surgical History:   Procedure Laterality Date     ARTHROPLASTY KNEE Left     01/09     ARTHROPLASTY SHOULDER Left 2018     ARTHROSCOPY KNEE Left     12/05     COLONOSCOPY      2001, 2006, 2011,Adenomatous 2001, subsequent WNL     COLONOSCOPY  11/21/2014 11/21/2014,Adenoma     COLONOSCOPY  01/07/2020    tubular adenomas, follow up 5 years     HERNIORRHAPHY INGUINAL BILATERAL       PROSTATECTOMY SUPRAPUBIC  2007     UPPER GI ENDOSCOPY  2005     Social History  "    Tobacco Use     Smoking status: Never Smoker     Smokeless tobacco: Never Used     Tobacco comment: smoked a pipe in college for 2-3 years (seldom)   Substance Use Topics     Alcohol use: Not Currently     Alcohol/week: 28.0 standard drinks     Comment: Last use was 7/4/20     Current Outpatient Medications   Medication Sig Dispense Refill     atenolol (TENORMIN) 50 MG tablet Take 1 tablet (50 mg) by mouth daily 90 tablet 3     Multiple Vitamin (MULTI-VITAMINS) TABS Take 1 tablet by mouth daily       simvastatin (ZOCOR) 40 MG tablet Take 1 tablet (40 mg) by mouth At Bedtime 90 tablet 3     Allergies   Allergen Reactions     Paclitaxel Other (See Comments) and Cough     flushing     Ace Inhibitors Cough     Aspirin Other (See Comments)     Epistaxis requiring hospitalization from 81 mg daily  Epistaxis requiring hospitalization from 81 mg daily     Durvalumab      Hydrocodone-Acetaminophen Other (See Comments)     Other reaction(s): Insomnia  Other reaction(s): Insomnia     Sulfamethoxazole-Trimethoprim Itching and Rash     Sulfasalazine Rash     Past medical history, past surgical history, current medications and allergies reviewed and accurate to the best of my knowledge.      ROS:  Refer to HPI    /60 (BP Location: Left arm, Patient Position: Sitting, Cuff Size: Adult Large)   Pulse 74   Temp 98.1  F (36.7  C) (Tympanic)   Resp 18   Ht 1.626 m (5' 4\")   Wt 66.2 kg (146 lb)   SpO2 98%   BMI 25.06 kg/m      EXAM:  General Appearance: Well appearing 81 year old male, appropriate appearance for age. No acute distress   Ears: Left TM intact, translucent with bony landmarks appreciated, no erythema, no effusion, no bulging, no purulence.  Right TM intact, translucent with bony landmarks appreciated, no erythema, no effusion, no bulging, no purulence.  Left auditory canal clear.  Right auditory canal clear.  Normal external ears, non tender.  Eyes: conjunctivae normal without erythema or irritation, " corneas clear, no drainage or crusting, no eyelid swelling, pupils equal   Oropharynx: moist mucous membranes, posterior pharynx without erythema, tonsils symmetric, no erythema, no exudates or petechiae, no post nasal drip seen, voice clear.    Sinuses:  No sinus tenderness upon palpation of the frontal or maxillary sinuses  Nose:  Bilateral nares: no erythema, no edema, no drainage or congestion   Neck: supple without adenopathy  Respiratory: normal chest wall and respirations.  Normal effort.  Speaking in full sentences. No labored breathing. + There are scattered wheezes and coarse sounds to posterior auscultation of right upper and middle lobe. left lung sounds clear. Clear to auscultation bilaterally, no wheezing, crackles or rhonchi.  No increased work of breathing.  + harsh cough appreciated occasionally.   Cardiac: RRR with no murmurs  Psychological: normal affect, alert, oriented, and pleasant.     Results for orders placed or performed during the hospital encounter of 04/26/22   XR Chest 2 Views     Status: None    Narrative    PROCEDURE:  XR CHEST 2 VW    HISTORY: hx of lung cancer; no active treatment. 3 days of worsening  productive cough and wheezing as compared to his chronic cough.  pneumonia?; Productive cough; Wheezing, .    COMPARISON:  2/3/2021    FINDINGS:  There is progressive right hilar fullness. There is new partial  atelectasis of the right lung. The left lung and pleural space remain  clear.      Impression    IMPRESSION:      Findings suspicious for progressive right hilar neoplasm with new  right lung partial atelectasis.      ANTHONY AMCKENZIE MD         SYSTEM ID:  [id number]

## 2022-04-27 LAB — SARS-COV-2 RNA RESP QL NAA+PROBE: NEGATIVE

## 2022-04-28 ENCOUNTER — OFFICE VISIT (OUTPATIENT)
Dept: INTERNAL MEDICINE | Facility: OTHER | Age: 82
End: 2022-04-28
Attending: INTERNAL MEDICINE
Payer: MEDICARE

## 2022-04-28 VITALS
RESPIRATION RATE: 18 BRPM | DIASTOLIC BLOOD PRESSURE: 72 MMHG | WEIGHT: 146.4 LBS | OXYGEN SATURATION: 96 % | BODY MASS INDEX: 25.13 KG/M2 | HEART RATE: 87 BPM | TEMPERATURE: 97.3 F | SYSTOLIC BLOOD PRESSURE: 124 MMHG

## 2022-04-28 DIAGNOSIS — J40 BRONCHITIS: Primary | ICD-10-CM

## 2022-04-28 PROCEDURE — 99213 OFFICE O/P EST LOW 20 MIN: CPT | Performed by: INTERNAL MEDICINE

## 2022-04-28 PROCEDURE — G0463 HOSPITAL OUTPT CLINIC VISIT: HCPCS

## 2022-04-28 RX ORDER — DOXYCYCLINE HYCLATE 100 MG
100 TABLET ORAL 2 TIMES DAILY
Qty: 14 TABLET | Refills: 0 | Status: SHIPPED | OUTPATIENT
Start: 2022-04-28 | End: 2022-05-05

## 2022-04-28 ASSESSMENT — ENCOUNTER SYMPTOMS
EYES NEGATIVE: 1
CONSTITUTIONAL NEGATIVE: 1
ALLERGIC/IMMUNOLOGIC NEGATIVE: 1
ENDOCRINE NEGATIVE: 1

## 2022-04-28 ASSESSMENT — PAIN SCALES - GENERAL: PAINLEVEL: NO PAIN (0)

## 2022-04-28 NOTE — PROGRESS NOTES
Chief Complaint:  F/U on lung symptoms.    HPI: He returns for follow-up.  See note from rapid clinic.  He has now developed a head cold.  His COVID testing came back negative.  His chest x-ray was read as suspicious for worsening disease but he had a CT scan a month ago which actually showed improvement per his oncologist.  He is coughing up some phlegm but has not had a fever.  He thinks it is just a cold.    Past Medical History:   Diagnosis Date     Basal cell carcinoma of back 12/01/2014     Coronary artery calcification     score 297 12/2010     Dysarthria      Enlarged prostate without lower urinary tract symptoms (luts)     Post prostatectomy     Essential (primary) hypertension     No Comments Provided     History of colonic polyps     No Comments Provided     Hyperglycemia     No Comments Provided     Hyperlipidemia     No Comments Provided     Malignant neoplasm of skin     basal cell ca. on back and right ear     PMR (polymyalgia rheumatica) (H)      Primary mucinous adenocarcinoma of lung (H)     S/P radiation and chemo, on maintainence biologic     Spinal stenosis of lumbar region without neurogenic claudication     No Comments Provided     Uncomplicated alcohol abuse     No Comments Provided       Past Surgical History:   Procedure Laterality Date     ARTHROPLASTY KNEE Left     01/09     ARTHROPLASTY SHOULDER Left 2018     ARTHROSCOPY KNEE Left     12/05     COLONOSCOPY      2001, 2006, 2011,Adenomatous 2001, subsequent WNL     COLONOSCOPY  11/21/2014 11/21/2014,Adenoma     COLONOSCOPY  01/07/2020    tubular adenomas, follow up 5 years     HERNIORRHAPHY INGUINAL BILATERAL       PROSTATECTOMY SUPRAPUBIC  2007     UPPER GI ENDOSCOPY  2005       Allergies   Allergen Reactions     Paclitaxel Other (See Comments) and Cough     flushing     Ace Inhibitors Cough     Aspirin Other (See Comments)     Epistaxis requiring hospitalization from 81 mg daily  Epistaxis requiring hospitalization from 81 mg daily      Durvalumab      Hydrocodone-Acetaminophen Other (See Comments)     Other reaction(s): Insomnia  Other reaction(s): Insomnia     Sulfamethoxazole-Trimethoprim Itching and Rash     Sulfasalazine Rash       Current Outpatient Medications   Medication Sig Dispense Refill     atenolol (TENORMIN) 50 MG tablet Take 1 tablet (50 mg) by mouth daily 90 tablet 3     doxycycline hyclate (VIBRA-TABS) 100 MG tablet Take 1 tablet (100 mg) by mouth 2 times daily for 7 days 14 tablet 0     Multiple Vitamin (MULTI-VITAMINS) TABS Take 1 tablet by mouth daily       simvastatin (ZOCOR) 40 MG tablet Take 1 tablet (40 mg) by mouth At Bedtime 90 tablet 3       Review of Systems   Constitutional: Negative.    Eyes: Negative.    Endocrine: Negative.    Allergic/Immunologic: Negative.        Physical Exam  Vitals and nursing note reviewed.   Constitutional:       General: He is not in acute distress.     Appearance: Normal appearance. He is not ill-appearing, toxic-appearing or diaphoretic.   Cardiovascular:      Rate and Rhythm: Normal rate and regular rhythm.   Pulmonary:      Effort: Pulmonary effort is normal.      Breath sounds: Normal breath sounds. No wheezing, rhonchi or rales.   Skin:     General: Skin is warm and dry.   Neurological:      Mental Status: He is alert.         Assessment:        ICD-10-CM    1. Bronchitis  J40 doxycycline hyclate (VIBRA-TABS) 100 MG tablet       Plan: This may just be a viral upper respiratory infection but I think that treating aggressively seems appropriate.  I put him on doxycycline for a week.  If he does not improve he will let me know.  We may have to consider doing a follow-up CT scan if he fails to have clinical improvement.

## 2022-04-28 NOTE — NURSING NOTE
"Chief Complaint   Patient presents with     Follow Up     [facility]       Initial /72   Pulse 87   Temp 97.3  F (36.3  C) (Tympanic)   Resp 18   Wt 66.4 kg (146 lb 6.4 oz)   SpO2 96%   BMI 25.13 kg/m   Estimated body mass index is 25.13 kg/m  as calculated from the following:    Height as of 4/26/22: 1.626 m (5' 4\").    Weight as of this encounter: 66.4 kg (146 lb 6.4 oz).  FOOD SECURITY SCREENING QUESTIONS  Hunger Vital Signs:  Within the past 12 months we worried whether our food would run out before we got money to buy more. Never  Within the past 12 months the food we bought just didn't last and we didn't have money to get more. Never        Kieran Ryan, ALIYA    "

## 2022-04-29 ENCOUNTER — TELEPHONE (OUTPATIENT)
Dept: INTERNAL MEDICINE | Facility: OTHER | Age: 82
End: 2022-04-29
Payer: MEDICARE

## 2022-04-29 DIAGNOSIS — J40 BRONCHITIS: Primary | ICD-10-CM

## 2022-04-29 RX ORDER — DEXTROMETHORPHAN HBR. AND GUAIFENESIN 10; 100 MG/5ML; MG/5ML
5 SOLUTION ORAL 4 TIMES DAILY PRN
Qty: 118 ML | Refills: 1 | Status: SHIPPED | OUTPATIENT
Start: 2022-04-29 | End: 2022-07-12

## 2022-04-29 NOTE — TELEPHONE ENCOUNTER
Please call the patient.  He would like the RX - for cough medicine  now.  He had an appointment yesterday and it was offered but he declined it.      Pharmacy is [facility] on 4/29/2022 at 9:00 AM

## 2022-05-03 ENCOUNTER — TELEPHONE (OUTPATIENT)
Dept: INTERNAL MEDICINE | Facility: OTHER | Age: 82
End: 2022-05-03
Payer: MEDICARE

## 2022-05-03 ENCOUNTER — HOSPITAL ENCOUNTER (EMERGENCY)
Facility: OTHER | Age: 82
Discharge: HOME | End: 2022-05-03
Attending: PHYSICIAN ASSISTANT | Admitting: PHYSICIAN ASSISTANT
Payer: MEDICARE

## 2022-05-03 VITALS
HEIGHT: 64 IN | BODY MASS INDEX: 24.99 KG/M2 | SYSTOLIC BLOOD PRESSURE: 141 MMHG | DIASTOLIC BLOOD PRESSURE: 92 MMHG | RESPIRATION RATE: 18 BRPM | OXYGEN SATURATION: 95 % | TEMPERATURE: 98 F | WEIGHT: 146.39 LBS | HEART RATE: 68 BPM

## 2022-05-03 DIAGNOSIS — R04.0 EPISTAXIS: ICD-10-CM

## 2022-05-03 LAB
HGB BLD-MCNC: 15.1 G/DL (ref 13.3–17.7)
HOLD SPECIMEN: NORMAL

## 2022-05-03 PROCEDURE — 85018 HEMOGLOBIN: CPT | Performed by: PHYSICIAN ASSISTANT

## 2022-05-03 PROCEDURE — 36415 COLL VENOUS BLD VENIPUNCTURE: CPT | Performed by: PHYSICIAN ASSISTANT

## 2022-05-03 PROCEDURE — 250N000009 HC RX 250: Performed by: PHYSICIAN ASSISTANT

## 2022-05-03 PROCEDURE — 99283 EMERGENCY DEPT VISIT LOW MDM: CPT | Performed by: PHYSICIAN ASSISTANT

## 2022-05-03 RX ORDER — OXYMETAZOLINE HYDROCHLORIDE 0.05 G/100ML
2 SPRAY NASAL 2 TIMES DAILY
Status: DISCONTINUED | OUTPATIENT
Start: 2022-05-03 | End: 2022-05-03 | Stop reason: HOSPADM

## 2022-05-03 RX ORDER — TRANEXAMIC ACID 100 MG/ML
INJECTION, SOLUTION INTRAVENOUS ONCE
Status: COMPLETED | OUTPATIENT
Start: 2022-05-03 | End: 2022-05-03

## 2022-05-03 RX ADMIN — OXYMETAZOLINE HYDROCHLORIDE 2 SPRAY: 0.05 SPRAY NASAL at 12:28

## 2022-05-03 RX ADMIN — TRANEXAMIC ACID 1000 MG: 1 INJECTION, SOLUTION INTRAVENOUS at 12:28

## 2022-05-03 NOTE — TELEPHONE ENCOUNTER
Has a light bloody nose currently, bleed three times last night, would like some advise as to what to do, please call, thank you!      Michelle Daly on 5/3/2022 at 10:18 AM

## 2022-05-03 NOTE — ED TRIAGE NOTES
"     ED Nursing Triage Note (General)   ________________________________    Wilfrid Meneses is a 81 year old Male that presents to triage private car  With history of  Nose bleeding heavily started at 1700 last night, stopped after \"some time\" pt then says he woke up at midnight and was bleeding again, in triage pt has bleeding from left nares, pt states many years ago he was sent to [place] and admitted to hospital for this bleeding and had cauterized,, denies blood thinners  reported by patient   Significant symptoms had onset 12 hour(s) ago.      "

## 2022-05-03 NOTE — TELEPHONE ENCOUNTER
Spoke with Wilfrid, he states around dinner time last night he was blowing his nose pretty hard and he got a severe nose bleed that he eventually stopped but it came back last night which he eventually got it to go away again. States now it has been lightly bleeding. He is wondering if there is something he can do to help it before going to the ER.    Kieran Palma, LPN on 5/3/2022 at 10:27 AM

## 2022-05-03 NOTE — DISCHARGE INSTRUCTIONS
Get plenty of fluids and rest.  For the next couple of days you can use the Afrin spray every 12 hours to hopefully help keep nosebleed from reoccurring.  If it does recur, use firm pressure for the nose clip that we provide you on the forward portion of your nose for at least 15 minutes of your bleeding continues or if you are having symptoms such as lightheadedness or shortness of breath please return for further evaluation.  Referral has been placed for you to follow-up with ENT for reassessment.

## 2022-05-03 NOTE — TELEPHONE ENCOUNTER
Other than pressure, there probably is not much else that can be done.  He will need to go to the emergency room if it continues.

## 2022-05-07 ASSESSMENT — ENCOUNTER SYMPTOMS
VOMITING: 0
CONFUSION: 0
ABDOMINAL PAIN: 0
SHORTNESS OF BREATH: 0
NAUSEA: 0
DYSURIA: 0
FEVER: 0

## 2022-05-07 NOTE — ED PROVIDER NOTES
"  History     Chief Complaint   Patient presents with     Epistaxis     HPI  Wilfrid Meneses is a 81 year old male who presents to the ED for evaluation of epistaxis. history of  Nose bleeding heavily started at 1700 last night, stopped after \"some time\" pt then says he woke up at midnight and was bleeding again, in triage pt has bleeding from left nares, pt states many years ago he was sent to [place] and admitted to hospital for this bleeding and had cauterized,, denies blood thinners  reported by patient   Significant symptoms had onset 12 hour(s) ago.    Allergies:  Allergies   Allergen Reactions     Paclitaxel Other (See Comments) and Cough     flushing     Ace Inhibitors Cough     Aspirin Other (See Comments)     Epistaxis requiring hospitalization from 81 mg daily  Epistaxis requiring hospitalization from 81 mg daily     Durvalumab      Hydrocodone-Acetaminophen Other (See Comments)     Other reaction(s): Insomnia  Other reaction(s): Insomnia     Sulfamethoxazole-Trimethoprim Itching and Rash     Sulfasalazine Rash       Problem List:    Patient Active Problem List    Diagnosis Date Noted     Alcohol dependence (H) 04/26/2022     Priority: Medium     Primary mucinous adenocarcinoma of lung (H)      Priority: Medium     Benign prostatic hyperplasia with urinary obstruction 01/16/2018     Priority: Medium     Lumbar spinal stenosis 01/16/2018     Priority: Medium     Hypertension 01/16/2018     Priority: Medium     Hyperlipidemia 01/16/2018     Priority: Medium     Benign neoplasm of colon 01/16/2018     Priority: Medium     Overview:   History of colonic polyps, colonoscopy 1/01, follow up done 2006, normal       Osteoarthritis of left glenohumeral joint 12/04/2017     Priority: Medium     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     Priority: Medium     Dysarthria 05/18/2016     Priority: Medium     Coronary artery calcification 12/01/2015     Priority: Medium     Abnormal glucose 12/14/2011     Priority: " Medium        Past Medical History:    Past Medical History:   Diagnosis Date     Basal cell carcinoma of back 12/01/2014     Coronary artery calcification      Dysarthria      Enlarged prostate without lower urinary tract symptoms (luts)      Essential (primary) hypertension      History of colonic polyps      Hyperglycemia      Hyperlipidemia      Malignant neoplasm of skin      PMR (polymyalgia rheumatica) (H)      Primary mucinous adenocarcinoma of lung (H)      Spinal stenosis of lumbar region without neurogenic claudication      Uncomplicated alcohol abuse        Past Surgical History:    Past Surgical History:   Procedure Laterality Date     ARTHROPLASTY KNEE Left     01/09     ARTHROPLASTY SHOULDER Left 2018     ARTHROSCOPY KNEE Left     12/05     COLONOSCOPY      2001, 2006, 2011,Adenomatous 2001, subsequent WNL     COLONOSCOPY  11/21/2014 11/21/2014,Adenoma     COLONOSCOPY  01/07/2020    tubular adenomas, follow up 5 years     HERNIORRHAPHY INGUINAL BILATERAL       PROSTATECTOMY SUPRAPUBIC  2007     UPPER GI ENDOSCOPY  2005       Family History:    Family History   Problem Relation Age of Onset     Diabetes Mother      Chronic Obstructive Pulmonary Disease Mother      Myocardial Infarction Father      Other - See Comments Son         MI during GXT     Other - See Comments Sister         PMR     Cancer No family hx of        Social History:  Marital Status:   [2]  Social History     Tobacco Use     Smoking status: Never Smoker     Smokeless tobacco: Never Used     Tobacco comment: smoked a pipe in college for 2-3 years (seldom)   Vaping Use     Vaping Use: Never used   Substance Use Topics     Alcohol use: Not Currently     Alcohol/week: 28.0 standard drinks     Comment: Last use was 7/4/20     Drug use: No        Medications:    atenolol (TENORMIN) 50 MG tablet  dextromethorphan-guaiFENesin (TUSSIN DM)  MG/5ML liquid  Multiple Vitamin (MULTI-VITAMINS) TABS  simvastatin (ZOCOR) 40 MG  "tablet          Review of Systems   Constitutional: Negative for fever.   HENT: Positive for nosebleeds. Negative for congestion.    Eyes: Negative for visual disturbance.   Respiratory: Negative for shortness of breath.    Cardiovascular: Negative for chest pain.   Gastrointestinal: Negative for abdominal pain, nausea and vomiting.   Genitourinary: Negative for dysuria.   Psychiatric/Behavioral: Negative for confusion.       Physical Exam   BP: (!) 141/92  Pulse: 68  Temp: 98  F (36.7  C)  Resp: 18  Height: 162.6 cm (5' 4\")  Weight: 66.4 kg (146 lb 6.2 oz)  SpO2: 95 %      Physical Exam  Constitutional:       General: He is not in acute distress.     Appearance: He is well-developed. He is not diaphoretic.   HENT:      Head: Normocephalic and atraumatic.   Eyes:      General: No scleral icterus.     Conjunctiva/sclera: Conjunctivae normal.   Cardiovascular:      Rate and Rhythm: Normal rate and regular rhythm.   Pulmonary:      Effort: Pulmonary effort is normal.      Breath sounds: Normal breath sounds.   Abdominal:      Palpations: Abdomen is soft.      Tenderness: There is no abdominal tenderness.   Musculoskeletal:         General: No deformity.      Cervical back: Neck supple.   Lymphadenopathy:      Cervical: No cervical adenopathy.   Skin:     General: Skin is warm and dry.      Coloration: Skin is not jaundiced.      Findings: No rash.   Neurological:      Mental Status: He is alert and oriented to person, place, and time. Mental status is at baseline.   Psychiatric:         Mood and Affect: Mood normal.         Behavior: Behavior normal.         ED Course                 Procedures              Critical Care time:  none               No results found for this or any previous visit (from the past 24 hour(s)).    Medications   tranexamic acid (CYKLOKAPRON) TOPICAL (injection used topically) (1,000 mg Topical Given 5/3/22 9008)       Assessments & Plan (with Medical Decision Making)   Pt nontoxic in NAD. " Heart, lung, bowel sounds normal, abd soft, nontender to palpation, nondistended. VSS, afebrile.     Dried blood in left nare. I did have him blow his nose vigorously. Some clots did come out of left nare. No active bleeding. I sprayed afrin into both nares. Examined nasal passages, no obvious tissue deformities. Hgb normal. We watched him for quite sometime and roadtested him. No new bleeding occurred. He feels comfortable going home at this time. Referral placed to f/u with ENT prn.     Strict return precautions are given to the pt, they will return if symptoms are worsening or concerning. The pt understands and agrees with the plan and they are discharged.     Matthew Vaughan PA-C    I have reviewed the nursing notes.    I have reviewed the findings, diagnosis, plan and need for follow up with the patient.       Discharge Medication List as of 5/3/2022  1:53 PM          Final diagnoses:   Epistaxis       5/3/2022   [facility]     Matthew Vaughan PA  05/07/22 1123

## 2022-05-16 ENCOUNTER — HOSPITAL ENCOUNTER (OUTPATIENT)
Dept: GENERAL RADIOLOGY | Facility: OTHER | Age: 82
Discharge: HOME | End: 2022-05-16
Attending: STUDENT IN AN ORGANIZED HEALTH CARE EDUCATION/TRAINING PROGRAM
Payer: MEDICARE

## 2022-05-16 ENCOUNTER — OFFICE VISIT (OUTPATIENT)
Dept: INTERNAL MEDICINE | Facility: OTHER | Age: 82
End: 2022-05-16
Attending: STUDENT IN AN ORGANIZED HEALTH CARE EDUCATION/TRAINING PROGRAM
Payer: MEDICARE

## 2022-05-16 VITALS
SYSTOLIC BLOOD PRESSURE: 128 MMHG | RESPIRATION RATE: 20 BRPM | OXYGEN SATURATION: 98 % | TEMPERATURE: 97.9 F | HEIGHT: 68 IN | BODY MASS INDEX: 21.46 KG/M2 | HEART RATE: 90 BPM | DIASTOLIC BLOOD PRESSURE: 74 MMHG | WEIGHT: 141.6 LBS

## 2022-05-16 DIAGNOSIS — C34.90 PRIMARY MUCINOUS ADENOCARCINOMA OF LUNG (H): ICD-10-CM

## 2022-05-16 DIAGNOSIS — R06.2 WHEEZING: ICD-10-CM

## 2022-05-16 DIAGNOSIS — R06.2 WHEEZING: Primary | ICD-10-CM

## 2022-05-16 DIAGNOSIS — R05.9 COUGH: ICD-10-CM

## 2022-05-16 PROCEDURE — 99214 OFFICE O/P EST MOD 30 MIN: CPT | Performed by: STUDENT IN AN ORGANIZED HEALTH CARE EDUCATION/TRAINING PROGRAM

## 2022-05-16 PROCEDURE — G0463 HOSPITAL OUTPT CLINIC VISIT: HCPCS

## 2022-05-16 PROCEDURE — 71046 X-RAY EXAM CHEST 2 VIEWS: CPT

## 2022-05-16 RX ORDER — ALBUTEROL SULFATE 90 UG/1
2 AEROSOL, METERED RESPIRATORY (INHALATION) EVERY 6 HOURS
Qty: 18 G | Refills: 3 | Status: SHIPPED | OUTPATIENT
Start: 2022-05-16 | End: 2022-07-12

## 2022-05-16 RX ORDER — AZITHROMYCIN 250 MG/1
TABLET, FILM COATED ORAL
Qty: 6 TABLET | Refills: 0 | Status: SHIPPED | OUTPATIENT
Start: 2022-05-16 | End: 2022-05-21

## 2022-05-16 ASSESSMENT — PAIN SCALES - GENERAL: PAINLEVEL: NO PAIN (0)

## 2022-05-16 ASSESSMENT — ENCOUNTER SYMPTOMS: COUGH: 1

## 2022-05-16 NOTE — PROGRESS NOTES
"    Violet Yuen is a 81 year old who presents for the following health issues     Cough    History of Present Illness       Diabetes:   He presents for follow up of diabetes.  He is not checking blood glucose. He has no concerns regarding his diabetes at this time.  He is having numbness in feet and weight loss.         Reason for visit:  Cough  Symptom onset:  3-4 weeks ago  Symptom intensity:  Moderate  Symptom progression:  Worsening  Had these symptoms before:  Yes  Has tried/received treatment for these symptoms:  Yes  Previous treatment was successful:  No  What makes it worse:  No  What makes it better:  Iglesia consumes 0 sweetened beverage(s) daily.He exercises with enough effort to increase his heart rate 20 to 29 minutes per day.    He is taking medications regularly.     3 weeks ago had URI      Has carcinoma of the right lung.   CXR     Saw Darlene on 4/28/22 and treated with doxy x 1 week.   Lots of blowing of nose.   Bloody nose and ended up in the ED.     Head cold is clearing in past 2-3 days.   No longer plugged ears.   Still hacking and wheezing a lot.   Cannot lay on right side or Else he wheezes.   Cough is occasionally productive. Mostly not.   No inahlers. No fever or chills.    Never smoker.       He is a retired pharmacist.  He has been treated with therapy for his lung cancer and developed a pneumonia for which she was treated with amoxicillin as well as azithromycin.  He recently had a CT scan at [place].  Read was reviewed and interpreted.          Review of Systems   Respiratory: Positive for cough.             Objective    /74 (BP Location: Right arm, Patient Position: Sitting, Cuff Size: Adult Regular)   Pulse 90   Temp 97.9  F (36.6  C) (Tympanic)   Resp 20   Ht 1.727 m (5' 8\")   Wt 64.2 kg (141 lb 9.6 oz)   SpO2 98%   BMI 21.53 kg/m    Body mass index is 21.53 kg/m .  Physical Exam   General: Pleasant 81-year-old man sitting clinic no acute distress  Pulmonary " diffuse end expiratory wheezes throughout.  CV: Regular rate and rhythm no peripheral edema appreciated    CXR - Reviewed and interpreted by me: Right lower lobe atelectasis versus infiltrate.  Resolving from prior CT scan            Assessment & Plan       ICD-10-CM    1. Wheezing  R06.2 XR Chest 2 Views     albuterol (PROAIR HFA/PROVENTIL HFA/VENTOLIN HFA) 108 (90 Base) MCG/ACT inhaler     azithromycin (ZITHROMAX) 250 MG tablet   2. Primary mucinous adenocarcinoma of lung (H)  C34.90    3. Cough  R05.9        Patient has a history of primary mucinous adenocarcinoma of the lung who has been having ongoing upper respiratory symptoms.  Had cold symptoms have improved now only having wheezing and persistent dry cough.  COVID testing was negative and has been having symptoms for approximately 1 month now.  Treated with a course of doxycycline by his PCP.  Diffuse end expiratory wheezing with possible obstructive phenomenon in the right lower lobe concerned about possible airway obstruction with his malignancy however it is improved from CT and prior chest x-ray obtained by his PCP.  Treat as though this is a COPD exacerbation to help improve his wheezing with a course of prednisone 40 mg x 5 days followed by 20 mg x 3 days.  He has enough prednisone already at home left over from prior treatment for this recommendation.  He was also given a Z-Ganesh and an albuterol inhaler.  He will contact his oncologist to see if his PET scan can be moved up to assess for further obstruction in his right lower lobe.  No evidence of pneumonia at this time.  Discussed indications to return to clinic    He was given a hand written recommendation for the above prescriptions.    No follow-ups on file.    Gilberto Carroll MD  [facility]

## 2022-05-16 NOTE — NURSING NOTE
"Chief Complaint   Patient presents with     Cough     Coughing and wheezing that will not go away       Medication reconciliation completed.    FOOD SECURITY SCREENING QUESTIONS:    The next two questions are to help us understand your food security.  If you are feeling you need any assistance in this area, we have resources available to support you today.    Hunger Vital Signs:  Within the past 12 months we worried whether our food would run out before we got money to buy more. Never  Within the past 12 months the food we bought just didn't last and we didn't have money to get more. Never    Initial /74 (BP Location: Right arm, Patient Position: Sitting, Cuff Size: Adult Regular)   Pulse 90   Resp 20   Ht 1.727 m (5' 8\")   Wt 64.2 kg (141 lb 9.6 oz)   SpO2 98%   BMI 21.53 kg/m   Estimated body mass index is 21.53 kg/m  as calculated from the following:    Height as of this encounter: 1.727 m (5' 8\").    Weight as of this encounter: 64.2 kg (141 lb 9.6 oz).       Maria Esther Chang LPN .......  5/16/2022  9:07 AM  "

## 2022-05-22 ENCOUNTER — DOCUMENTATION ONLY (OUTPATIENT)
Dept: RADIATION ONCOLOGY | Facility: HOSPITAL | Age: 82
End: 2022-05-22
Payer: MEDICARE

## 2022-05-22 NOTE — PROGRESS NOTES
Radiation Oncology - Clinic Note      A chest CT with contrast on 03/21/2022 demonstrated interval improvement of the right lung with better aeration, smaller consolidative process, and no new regional adenopathy or distant metastatic disease. The patient continues to regularly see medical oncology and have an ECOG performance status of 0. He continues to see his primary care physician for management of exacerbations of COPD. The patient has a relatively low probability of significant additional toxicity from radiation therapy (currently grade 0). The patient will continue to see me as needed since my services unlikely to meaningfully contribute to the patient's active oncologic care (the process will minimize unnecessary duplication of services and will optimize coordination of care).      Reji Zavaleta M.D., Ph.D.  Department of Radiation Oncology  Tel: [phone]  Fax: [phone]

## 2022-06-27 ENCOUNTER — TELEPHONE (OUTPATIENT)
Dept: INTERNAL MEDICINE | Facility: OTHER | Age: 82
End: 2022-06-27

## 2022-06-27 ENCOUNTER — NURSE TRIAGE (OUTPATIENT)
Dept: INTERNAL MEDICINE | Facility: OTHER | Age: 82
End: 2022-06-27

## 2022-06-27 NOTE — TELEPHONE ENCOUNTER
I agree that he should report to the emergency room if he has any further episodes.  He should also schedule an appointment with me to review.

## 2022-06-27 NOTE — TELEPHONE ENCOUNTER
S-(situation): patient calling and states the he had an episode of chest pain on Saturday    B-(background): patient has lung cancer with history of radiation . Patient states had appointment with Oncologist last Monday and is following up in 3 months with him    A-(assessment): patient states on Saturday he was sitting at rest and chest tightness came on. States its across whole chest and radiates up to right ear and jaw.  Rates about a 3/10 states he notices when it comes on and doesn't do anything until it clears last about 10-15 minutes.  Patient states has had a few episodes prior to Saturdays.  Patient is having wheezing but has had this .  Patient denies any other symptoms when it came on.  Denies any more episodes since Saturday.    R-(recommendations): informed patient will route message to Dr. Colon for review and consideration.  Patient advised to present to ED in the meantime if symptom returns.    Devi Delong RN on 6/27/2022 at 8:17 AM      Reason for Disposition    Chest pain lasting longer than 5 minutes and occurred in last 3 days (72 hours) (Exception: feels exactly the same as previously diagnosed heartburn and has accompanying sour taste in mouth)    Additional Information    Negative: Severe difficulty breathing (e.g., struggling for each breath, speaks in single words)    Negative: Passed out (i.e., fainted, collapsed and was not responding)    Negative: Difficult to awaken or acting confused (e.g., disoriented, slurred speech)    Negative: Shock suspected (e.g., cold/pale/clammy skin, too weak to stand, low BP, rapid pulse)    Negative: Chest pain lasting longer than 5 minutes and ANY of the following:* Over 45 years old* Over 30 years old and at least one cardiac risk factor (e.g., diabetes, high blood pressure, high cholesterol, smoker, or strong family history of heart disease)* History of heart disease (i.e., angina, heart attack, heart failure, bypass surgery, takes nitroglycerin)*  "Pain is crushing, pressure-like, or heavy    Negative: Heart beating < 50 beats per minute OR > 140 beats per minute    Negative: Visible sweat on face or sweat dripping down face    Negative: Sounds like a life-threatening emergency to the triager    Negative: Followed an injury to chest    Negative: SEVERE chest pain    Negative: Pain also in shoulder(s) or arm(s) or jaw    Negative: Difficulty breathing    Negative: Cocaine use within last 3 days    Negative: Major surgery in the past month    Negative: Hip or leg fracture (broken bone) in past month (or had cast on leg or ankle in past month)    Negative: Illness requiring prolonged bedrest in past month (e.g., immobilization, long hospital stay)    Negative: Long-distance travel in past month (e.g., car, bus, train, plane; with trip lasting 6 or more hours)    Negative: History of prior 'blood clot' in leg or lungs (i.e., deep vein thrombosis, pulmonary embolism)    Negative: History of inherited increased risk of blood clots (e.g., Factor 5 Leiden, Anti-thrombin 3, Protein C or Protein S deficiency, Prothrombin mutation)    Negative: Heart beating irregularly or very rapidly    Answer Assessment - Initial Assessment Questions  1. LOCATION: \"Where does it hurt?\"        Across the whole chest   2. RADIATION: \"Does the pain go anywhere else?\" (e.g., into neck, jaw, arms, back)      Up to jaw and ear on right side  3. ONSET: \"When did the chest pain begin?\" (Minutes, hours or days)       Saturday had a couple lighter episodes prior  4. PATTERN \"Does the pain come and go, or has it been constant since it started?\"  \"Does it get worse with exertion?\"       Comes and goes  5. DURATION: \"How long does it last\" (e.g., seconds, minutes, hours)      10-15 minutes  6. SEVERITY: \"How bad is the pain?\"  (e.g., Scale 1-10; mild, moderate, or severe)     - MILD (1-3): doesn't interfere with normal activities      - MODERATE (4-7): interferes with normal activities or awakens " "from sleep     - SEVERE (8-10): excruciating pain, unable to do any normal activities        2-3/10  7. CARDIAC RISK FACTORS: \"Do you have any history of heart problems or risk factors for heart disease?\" (e.g., angina, prior heart attack; diabetes, high blood pressure, high cholesterol, smoker, or strong family history of heart disease)      High cholesterol and high blood pressure,  8. PULMONARY RISK FACTORS: \"Do you have any history of lung disease?\"  (e.g., blood clots in lung, asthma, emphysema, birth control pills)      Lung cancer  9. CAUSE: \"What do you think is causing the chest pain?\"      unsure  10. OTHER SYMPTOMS: \"Do you have any other symptoms?\" (e.g., dizziness, nausea, vomiting, sweating, fever, difficulty breathing, cough)        denies  11. PREGNANCY: \"Is there any chance you are pregnant?\" \"When was your last menstrual period?\"        n/a    Protocols used: CHEST PAIN-A-[place]      "

## 2022-06-27 NOTE — TELEPHONE ENCOUNTER
Called and informed patient of Dr. Colon's response and patient verbalized understanding.  Scheduling states that first opening with Dr. Colon is July 12th.  Patient is scheduled for July 12 th will route as FYI to Dr. Colon.    Devi Delong RN on 6/27/2022 at 8:53 AM

## 2022-07-12 ENCOUNTER — OFFICE VISIT (OUTPATIENT)
Dept: INTERNAL MEDICINE | Facility: OTHER | Age: 82
End: 2022-07-12
Attending: INTERNAL MEDICINE
Payer: MEDICARE

## 2022-07-12 VITALS
BODY MASS INDEX: 24.92 KG/M2 | OXYGEN SATURATION: 96 % | SYSTOLIC BLOOD PRESSURE: 144 MMHG | RESPIRATION RATE: 16 BRPM | DIASTOLIC BLOOD PRESSURE: 82 MMHG | WEIGHT: 146 LBS | HEIGHT: 64 IN | HEART RATE: 64 BPM | TEMPERATURE: 97.6 F

## 2022-07-12 DIAGNOSIS — C34.90 PRIMARY MUCINOUS ADENOCARCINOMA OF LUNG (H): ICD-10-CM

## 2022-07-12 DIAGNOSIS — R07.89 OTHER CHEST PAIN: Primary | ICD-10-CM

## 2022-07-12 PROCEDURE — 99214 OFFICE O/P EST MOD 30 MIN: CPT | Performed by: INTERNAL MEDICINE

## 2022-07-12 PROCEDURE — G0463 HOSPITAL OUTPT CLINIC VISIT: HCPCS

## 2022-07-12 ASSESSMENT — PAIN SCALES - GENERAL: PAINLEVEL: NO PAIN (0)

## 2022-07-12 ASSESSMENT — ENCOUNTER SYMPTOMS
GASTROINTESTINAL NEGATIVE: 1
CONSTITUTIONAL NEGATIVE: 1

## 2022-07-12 NOTE — NURSING NOTE
Patient presents to clinic today for a few episodes of chest tightness over the past month.    Medication reconciliation completed.    ACP on file? yes    FOOD SECURITY SCREENING QUESTIONS    The next two questions are to help us understand your food security.  If you are feeling you need any assistance in this area, we have resources available to support you today.    Hunger Vital Signs:  Within the past 12 months we worried whether our food would run out before we got money to buy more. Never  Within the past 12 months the food we bought just didn't last and we didn't have money to get more. Never    Emelyn Suarez CMA(AAMA)..................7/12/2022   10:21 AM

## 2022-07-12 NOTE — PROGRESS NOTES
ICD-10-CM    1. Other chest pain  R07.89    2. 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  They are infrequent in nature.  They are atypical for angina and he never gets any chest pain with exercise.  I think that his chest pain is noncardiac and could likely be related to his history of lung cancer and treatment thereof.  I recommended watchful waiting.  If his chest pain escalates or if he starts getting any symptoms with exercise, he will call and we will get him scheduled for a stress test.  I would likely do a dobutamine stress echo.    Violet Yuen is a 82 year old, presenting for the following health issues:  Respiratory Problems      He is in today complaining of some chest pain.  He has had it on a couple of occasions.  It only happens at rest.  Its located on the right side of his chest with a little bit of radiation to the right side of his neck.  He does have a history of right-sided lung cancer.  He does not have any previous cardiac issues or interventions.  He is adamant that this never happens during exercise and he does remain active and goes to the [facility] and exercises on a regular basis.    History of Present Illness       Reason for visit:  Chest discomfort  Symptom onset:  More than a month  Symptom intensity:  Mild  Symptom progression:  Staying the same  Had these symptoms before:  No  What makes it worse:  No  What makes it better:  No    He eats 2-3 servings of fruits and vegetables daily.He consumes 0 sweetened beverage(s) daily.He exercises with enough effort to increase his heart rate 20 to 29 minutes per day.    He is taking medications regularly.         Current Outpatient Medications   Medication     atenolol (TENORMIN) 50 MG tablet     Multiple Vitamin (MULTI-VITAMINS) TABS     simvastatin (ZOCOR) 40 MG tablet     No current facility-administered medications for this visit.         Review of  "Systems   Constitutional: Negative.    HENT: Negative.    Gastrointestinal: Negative.             Objective    BP (!) 144/82 (BP Location: Right arm, Patient Position: Sitting, Cuff Size: Adult Regular)   Pulse 64   Temp 97.6  F (36.4  C) (Tympanic)   Resp 16   Ht 1.626 m (5' 4\")   Wt 66.2 kg (146 lb)   SpO2 96%   BMI 25.06 kg/m    Body mass index is 25.06 kg/m .  Physical Exam  Vitals and nursing note reviewed.   Constitutional:       General: He is not in acute distress.     Appearance: Normal appearance. He is not ill-appearing, toxic-appearing or diaphoretic.   Cardiovascular:      Rate and Rhythm: Normal rate and regular rhythm.      Heart sounds: Normal heart sounds.   Pulmonary:      Breath sounds: Decreased breath sounds and wheezing present. No rhonchi or rales.   Neurological:      Mental Status: He is alert.                            .  ..  "

## 2022-12-02 ENCOUNTER — ALLIED HEALTH/NURSE VISIT (OUTPATIENT)
Dept: FAMILY MEDICINE | Facility: OTHER | Age: 82
End: 2022-12-02
Attending: INTERNAL MEDICINE
Payer: MEDICARE

## 2022-12-02 DIAGNOSIS — Z23 NEED FOR PROPHYLACTIC VACCINATION AND INOCULATION AGAINST INFLUENZA: Primary | ICD-10-CM

## 2022-12-02 PROCEDURE — G0008 ADMIN INFLUENZA VIRUS VAC: HCPCS

## 2022-12-02 NOTE — PROGRESS NOTES
Immunization Documentation  Verified patient's first and last name, and . Stated reason for visit today is to receive FLU vaccine. Accompained to clinic visit with SELF. Denied any concerns with previous immunizations. Allergies reviewed. VIS handout(s) reviewed and given to take home. VACCINE prepared and administered per standing order. Administration documented in IMMUNIZATIONS (see flowsheet and order for further information).  Instructed to wait in lobby for 15 minutes post-injection and notify staff immediately of any reaction.     Haydee Simpson RN ....................  2022   4:06 PM

## 2023-01-01 ENCOUNTER — OFFICE VISIT (OUTPATIENT)
Dept: FAMILY MEDICINE | Facility: OTHER | Age: 83
End: 2023-01-01
Attending: PHYSICIAN ASSISTANT
Payer: MEDICARE

## 2023-01-01 ENCOUNTER — OFFICE VISIT (OUTPATIENT)
Dept: INTERNAL MEDICINE | Facility: OTHER | Age: 83
End: 2023-01-01
Attending: INTERNAL MEDICINE
Payer: MEDICARE

## 2023-01-01 ENCOUNTER — HOSPITAL ENCOUNTER (OUTPATIENT)
Dept: GENERAL RADIOLOGY | Facility: OTHER | Age: 83
Discharge: HOME | End: 2023-08-10
Attending: INTERNAL MEDICINE
Payer: MEDICARE

## 2023-01-01 ENCOUNTER — TELEPHONE (OUTPATIENT)
Dept: FAMILY MEDICINE | Facility: OTHER | Age: 83
End: 2023-01-01
Payer: MEDICARE

## 2023-01-01 ENCOUNTER — HOSPITAL ENCOUNTER (OUTPATIENT)
Dept: PET IMAGING | Facility: OTHER | Age: 83
Discharge: HOME | End: 2023-12-06
Attending: INTERNAL MEDICINE | Admitting: INTERNAL MEDICINE
Payer: MEDICARE

## 2023-01-01 VITALS
WEIGHT: 143 LBS | RESPIRATION RATE: 18 BRPM | HEIGHT: 64 IN | HEART RATE: 80 BPM | DIASTOLIC BLOOD PRESSURE: 74 MMHG | BODY MASS INDEX: 24.41 KG/M2 | OXYGEN SATURATION: 95 % | SYSTOLIC BLOOD PRESSURE: 122 MMHG | TEMPERATURE: 98.1 F

## 2023-01-01 VITALS
OXYGEN SATURATION: 94 % | RESPIRATION RATE: 16 BRPM | WEIGHT: 144 LBS | BODY MASS INDEX: 24.72 KG/M2 | SYSTOLIC BLOOD PRESSURE: 128 MMHG | TEMPERATURE: 99.9 F | HEART RATE: 72 BPM | DIASTOLIC BLOOD PRESSURE: 76 MMHG

## 2023-01-01 DIAGNOSIS — C34.90 PRIMARY MUCINOUS ADENOCARCINOMA OF LUNG (H): ICD-10-CM

## 2023-01-01 DIAGNOSIS — R05.1 ACUTE COUGH: Primary | ICD-10-CM

## 2023-01-01 DIAGNOSIS — L72.3 INFLAMED SEBACEOUS CYST: Primary | ICD-10-CM

## 2023-01-01 DIAGNOSIS — R05.1 ACUTE COUGH: ICD-10-CM

## 2023-01-01 DIAGNOSIS — Z85.118 HISTORY OF MALIGNANT NEOPLASM OF BRONCHUS AND LUNG: ICD-10-CM

## 2023-01-01 DIAGNOSIS — J20.9 ACUTE BRONCHITIS, UNSPECIFIED ORGANISM: Primary | ICD-10-CM

## 2023-01-01 DIAGNOSIS — C34.31 MALIGNANT NEOPLASM OF LOWER LOBE, RIGHT BRONCHUS OR LUNG (H): ICD-10-CM

## 2023-01-01 DIAGNOSIS — F19.982 DRUG-INDUCED INSOMNIA (H): ICD-10-CM

## 2023-01-01 LAB
ALBUMIN SERPL BCG-MCNC: 4.1 G/DL (ref 3.5–5.2)
ALP SERPL-CCNC: 104 U/L (ref 40–129)
ALT SERPL W P-5'-P-CCNC: 28 U/L (ref 0–70)
ANION GAP SERPL CALCULATED.3IONS-SCNC: 12 MMOL/L (ref 7–15)
AST SERPL W P-5'-P-CCNC: 35 U/L (ref 0–45)
BASOPHILS # BLD AUTO: 0 10E3/UL (ref 0–0.2)
BASOPHILS NFR BLD AUTO: 0 %
BILIRUB SERPL-MCNC: 1.2 MG/DL
BUN SERPL-MCNC: 21.4 MG/DL (ref 8–23)
CALCIUM SERPL-MCNC: 9.3 MG/DL (ref 8.8–10.2)
CHLORIDE SERPL-SCNC: 96 MMOL/L (ref 98–107)
CREAT SERPL-MCNC: 0.98 MG/DL (ref 0.67–1.17)
DEPRECATED HCO3 PLAS-SCNC: 23 MMOL/L (ref 22–29)
EOSINOPHIL # BLD AUTO: 0 10E3/UL (ref 0–0.7)
EOSINOPHIL NFR BLD AUTO: 0 %
ERYTHROCYTE [DISTWIDTH] IN BLOOD BY AUTOMATED COUNT: 12.7 % (ref 10–15)
GFR SERPL CREATININE-BSD FRML MDRD: 77 ML/MIN/1.73M2
GLUCOSE SERPL-MCNC: 110 MG/DL (ref 70–99)
HCT VFR BLD AUTO: 39.9 % (ref 40–53)
HGB BLD-MCNC: 14.1 G/DL (ref 13.3–17.7)
IMM GRANULOCYTES # BLD: 0 10E3/UL
IMM GRANULOCYTES NFR BLD: 0 %
LYMPHOCYTES # BLD AUTO: 0.8 10E3/UL (ref 0.8–5.3)
LYMPHOCYTES NFR BLD AUTO: 14 %
MCH RBC QN AUTO: 35.1 PG (ref 26.5–33)
MCHC RBC AUTO-ENTMCNC: 35.3 G/DL (ref 31.5–36.5)
MCV RBC AUTO: 99 FL (ref 78–100)
MONOCYTES # BLD AUTO: 0.6 10E3/UL (ref 0–1.3)
MONOCYTES NFR BLD AUTO: 10 %
NEUTROPHILS # BLD AUTO: 4.1 10E3/UL (ref 1.6–8.3)
NEUTROPHILS NFR BLD AUTO: 76 %
NRBC # BLD AUTO: 0 10E3/UL
NRBC BLD AUTO-RTO: 0 /100
PLATELET # BLD AUTO: 143 10E3/UL (ref 150–450)
POTASSIUM SERPL-SCNC: 4 MMOL/L (ref 3.4–5.3)
PROT SERPL-MCNC: 6.7 G/DL (ref 6.4–8.3)
RBC # BLD AUTO: 4.02 10E6/UL (ref 4.4–5.9)
SODIUM SERPL-SCNC: 131 MMOL/L (ref 136–145)
WBC # BLD AUTO: 5.5 10E3/UL (ref 4–11)

## 2023-01-01 PROCEDURE — 78815 PET IMAGE W/CT SKULL-THIGH: CPT | Mod: PS

## 2023-01-01 PROCEDURE — 71046 X-RAY EXAM CHEST 2 VIEWS: CPT

## 2023-01-01 PROCEDURE — 85025 COMPLETE CBC W/AUTO DIFF WBC: CPT | Mod: ZL | Performed by: INTERNAL MEDICINE

## 2023-01-01 PROCEDURE — G0463 HOSPITAL OUTPT CLINIC VISIT: HCPCS

## 2023-01-01 PROCEDURE — 36415 COLL VENOUS BLD VENIPUNCTURE: CPT | Mod: ZL | Performed by: INTERNAL MEDICINE

## 2023-01-01 PROCEDURE — 99213 OFFICE O/P EST LOW 20 MIN: CPT | Performed by: PHYSICIAN ASSISTANT

## 2023-01-01 PROCEDURE — G0463 HOSPITAL OUTPT CLINIC VISIT: HCPCS | Mod: 25 | Performed by: INTERNAL MEDICINE

## 2023-01-01 PROCEDURE — 80053 COMPREHEN METABOLIC PANEL: CPT | Mod: ZL | Performed by: INTERNAL MEDICINE

## 2023-01-01 PROCEDURE — 343N000001 HC RX 343: Performed by: INTERNAL MEDICINE

## 2023-01-01 PROCEDURE — 99213 OFFICE O/P EST LOW 20 MIN: CPT | Performed by: INTERNAL MEDICINE

## 2023-01-01 PROCEDURE — A9552 F18 FDG: HCPCS | Performed by: INTERNAL MEDICINE

## 2023-01-01 RX ORDER — CEPHALEXIN 500 MG/1
500 CAPSULE ORAL 2 TIMES DAILY
Qty: 14 CAPSULE | Refills: 0 | Status: SHIPPED | OUTPATIENT
Start: 2023-01-01 | End: 2023-01-01

## 2023-01-01 RX ORDER — AZITHROMYCIN 250 MG/1
TABLET, FILM COATED ORAL
Qty: 6 TABLET | Refills: 0 | Status: SHIPPED | OUTPATIENT
Start: 2023-01-01 | End: 2023-01-01

## 2023-01-01 RX ADMIN — FLUDEOXYGLUCOSE F-18 13.31 MILLICURIE: 500 INJECTION, SOLUTION INTRAVENOUS at 16:50

## 2023-01-01 ASSESSMENT — PAIN SCALES - GENERAL
PAINLEVEL: NO PAIN (0)
PAINLEVEL: NO PAIN (0)

## 2023-01-09 ENCOUNTER — OFFICE VISIT (OUTPATIENT)
Dept: INTERNAL MEDICINE | Facility: OTHER | Age: 83
End: 2023-01-09
Attending: INTERNAL MEDICINE
Payer: MEDICARE

## 2023-01-09 VITALS
SYSTOLIC BLOOD PRESSURE: 126 MMHG | TEMPERATURE: 97.1 F | RESPIRATION RATE: 16 BRPM | BODY MASS INDEX: 24.79 KG/M2 | DIASTOLIC BLOOD PRESSURE: 74 MMHG | HEART RATE: 70 BPM | WEIGHT: 145.2 LBS | HEIGHT: 64 IN | OXYGEN SATURATION: 99 %

## 2023-01-09 DIAGNOSIS — R73.09 ABNORMAL GLUCOSE: ICD-10-CM

## 2023-01-09 DIAGNOSIS — I25.10 CORONARY ARTERY CALCIFICATION: ICD-10-CM

## 2023-01-09 DIAGNOSIS — I10 HYPERTENSION, UNSPECIFIED TYPE: Primary | ICD-10-CM

## 2023-01-09 DIAGNOSIS — E78.5 HYPERLIPIDEMIA, UNSPECIFIED HYPERLIPIDEMIA TYPE: ICD-10-CM

## 2023-01-09 DIAGNOSIS — C34.90 PRIMARY MUCINOUS ADENOCARCINOMA OF LUNG (H): ICD-10-CM

## 2023-01-09 LAB
CHOLEST SERPL-MCNC: 162 MG/DL
HBA1C MFR BLD: 5.2 % (ref 4–6.2)
HDLC SERPL-MCNC: 54 MG/DL
LDLC SERPL CALC-MCNC: 88 MG/DL
NONHDLC SERPL-MCNC: 108 MG/DL
TRIGL SERPL-MCNC: 102 MG/DL

## 2023-01-09 PROCEDURE — 36415 COLL VENOUS BLD VENIPUNCTURE: CPT | Mod: ZL | Performed by: INTERNAL MEDICINE

## 2023-01-09 PROCEDURE — G0439 PPPS, SUBSEQ VISIT: HCPCS | Performed by: INTERNAL MEDICINE

## 2023-01-09 PROCEDURE — G0463 HOSPITAL OUTPT CLINIC VISIT: HCPCS

## 2023-01-09 PROCEDURE — 83036 HEMOGLOBIN GLYCOSYLATED A1C: CPT | Mod: ZL | Performed by: INTERNAL MEDICINE

## 2023-01-09 PROCEDURE — 80061 LIPID PANEL: CPT | Mod: ZL | Performed by: INTERNAL MEDICINE

## 2023-01-09 RX ORDER — ATENOLOL 50 MG/1
50 TABLET ORAL DAILY
Qty: 90 TABLET | Refills: 3 | Status: SHIPPED | OUTPATIENT
Start: 2023-01-09 | End: 2024-01-01

## 2023-01-09 RX ORDER — SIMVASTATIN 40 MG
40 TABLET ORAL AT BEDTIME
Qty: 90 TABLET | Refills: 3 | Status: SHIPPED | OUTPATIENT
Start: 2023-01-09 | End: 2024-01-01

## 2023-01-09 ASSESSMENT — ENCOUNTER SYMPTOMS
NAUSEA: 0
SORE THROAT: 0
HEARTBURN: 0
PARESTHESIAS: 0
MYALGIAS: 1
FEVER: 0
ABDOMINAL PAIN: 0
CHILLS: 0
HEMATOCHEZIA: 0
JOINT SWELLING: 0
PALPITATIONS: 0
HEMATURIA: 0
NERVOUS/ANXIOUS: 0
CONSTIPATION: 0
COUGH: 1
DIZZINESS: 0
DIARRHEA: 0
HEADACHES: 0
SHORTNESS OF BREATH: 1
FREQUENCY: 0
WEAKNESS: 0
ARTHRALGIAS: 1
DYSURIA: 0

## 2023-01-09 ASSESSMENT — ACTIVITIES OF DAILY LIVING (ADL): CURRENT_FUNCTION: NO ASSISTANCE NEEDED

## 2023-01-09 NOTE — LETTER
January 9, 2023      Wilfrid Meneses  [address]  [facility] [address]        Dear Art,    Below are the results of your recent labs:    Results for orders placed or performed in visit on 01/09/23   Hemoglobin A1c     Status: Normal   Result Value Ref Range    Hemoglobin A1C 5.2 4.0 - 6.2 %   Lipid Panel     Status: Normal   Result Value Ref Range    Cholesterol 162 <200 mg/dL    Triglycerides 102 <150 mg/dL    Direct Measure HDL 54 >=40 mg/dL    LDL Cholesterol Calculated 88 <=100 mg/dL    Non HDL Cholesterol 108 <130 mg/dL    Narrative    Cholesterol  Desirable:  <200 mg/dL    Triglycerides  Normal:  Less than 150 mg/dL  Borderline High:  150-199 mg/dL  High:  200-499 mg/dL  Very High:  Greater than or equal to 500 mg/dL    Direct Measure HDL  Female:  Greater than or equal to 50 mg/dL   Male:  Greater than or equal to 40 mg/dL    LDL Cholesterol  Desirable:  <100mg/dL  Above Desirable:  100-129 mg/dL   Borderline High:  130-159 mg/dL   High:  160-189 mg/dL   Very High:  >= 190 mg/dL    Non HDL Cholesterol  Desirable:  130 mg/dL  Above Desirable:  130-159 mg/dL  Borderline High:  160-189 mg/dL  High:  190-219 mg/dL  Very High:  Greater than or equal to 220 mg/dL        Your blood tests look great.  Congratulations on this report    Sincerely,        Jimenez Colon MD  Internal Medicine  [facility]

## 2023-01-09 NOTE — PATIENT INSTRUCTIONS
Continue your current medications without change.    Blood tests today, I will send you a letter with the results.    Consider getting the shingles vaccine.    Keep your follow-up appointments with oncology as scheduled.    If all goes well, follow-up annually.

## 2023-06-29 NOTE — NURSING NOTE
Pt presents to clinic today for a cyst on the back of the head. Noticed it in January and was soft and non bothersome then.  Within the last week it has become painful to the touch, red and hard.       FOOD SECURITY SCREENING QUESTIONS:    The next two questions are to help us understand your food security.  If you are feeling you need any assistance in this area, we have resources available to support you today.    Hunger Vital Signs:  Within the past 12 months we worried whether our food would run out before we got money to buy more. Never  Within the past 12 months the food we bought just didn't last and we didn't have money to get more. Never            Medication Reconciliation: complete  Farhana Robin, ALIYA,LPN on 6/29/2023 at 2:04 PM

## 2023-06-29 NOTE — PROGRESS NOTES
Assessment & Plan   Problem List Items Addressed This Visit    None  Visit Diagnoses     Inflamed sebaceous cyst    -  Primary    Relevant Medications    cephALEXin (KEFLEX) 500 MG capsule    Other Relevant Orders    Adult General Surg Referral         Inflamed sebaceous cyst: Patient was given Keflex 500 mg twice daily for 7 days.  Can use warm compresses as needed for symptomatic relief.  Tylenol or ibuprofen as needed for pain relief.  Refer to general surgery for consult to discuss sebaceous cyst removal.    Prescription drug management     See Patient Instructions    No follow-ups on file.    Amee Cunningham PA-C  [facility] AND \[facility] [place] [facility]\""    Violet Yuen is a 83 year old, presenting for the following health issues:  Cyst (On back of head)        6/29/2023     2:02 PM   Additional Questions   Roomed by destinee   Accompanied by self     History of Present Illness       Reason for visit:  Cyst  Symptom onset:  More than a month  Symptoms include:  Cyst on head  Symptom intensity:  Moderate  Symptom progression:  Worsening  Had these symptoms before:  Yes  Has tried/received treatment for these symptoms:  No  What makes it worse:  Na  What makes it better:  Na    He eats 2-3 servings of fruits and vegetables daily.He consumes 1 sweetened beverage(s) daily.He exercises with enough effort to increase his heart rate 10 to 19 minutes per day.  He exercises with enough effort to increase his heart rate 4 days per week.   He is taking medications regularly.       Pt presents to clinic today for a cyst on the back of the head. Noticed it in January and was soft and non bothersome then.  Within the last week it has become painful to the touch, red and hard.  Soreness has been present over the last week.  No trauma or injury.  No pus or drainage.  No fevers or chills.    Review of Systems   Constitutional, HEENT, cardiovascular, pulmonary, gi and gu systems are negative, except as otherwise noted.     "  Objective    /74 (BP Location: Right arm, Patient Position: Sitting, Cuff Size: Adult Large)   Pulse 80   Temp 98.1  F (36.7  C) (Tympanic)   Resp 18   Ht 1.626 m (5' 4\")   Wt 64.9 kg (143 lb)   SpO2 95%   BMI 24.55 kg/m    Body mass index is 24.55 kg/m .  Physical Exam  Vitals and nursing note reviewed.   Constitutional:       Appearance: Normal appearance.   HENT:      Head: Normocephalic and atraumatic.   Musculoskeletal:         General: No swelling or tenderness. Normal range of motion.      Cervical back: Normal range of motion.   Skin:     General: Skin is warm and dry.      Findings: No rash.      Comments: Right posterior scalp has an approximate 1 x 1 cm mildly erythematous raised cyst appreciated that is tender to palpation.  No surrounding erythema, open wound, pus, drainage.   Neurological:      General: No focal deficit present.      Mental Status: He is alert and oriented to person, place, and time.   Psychiatric:         Mood and Affect: Mood normal.         Behavior: Behavior normal.                  "

## 2023-08-10 NOTE — PROGRESS NOTES
Assessment & Plan     Acute cough  - Comprehensive Metabolic Panel  - CBC and Differential  - azithromycin (ZITHROMAX) 250 MG tablet; Take 2 tablets (500 mg) by mouth daily for 1 day, THEN 1 tablet (250 mg) daily for 4 days.    Primary mucinous adenocarcinoma of lung (H)    Drug-induced insomnia (H)    Acute bronchitis, unspecified organism  -At this time labs are obtained which are reassuring with no obvious sign of sepsis or renal disease.  Given his age and comorbidities however we will treat empirically with Z-Ganesh to prevent worsening.  If he has continued issues he is to contact the clinic.  - azithromycin (ZITHROMAX) 250 MG tablet; Take 2 tablets (500 mg) by mouth daily for 1 day, THEN 1 tablet (250 mg) daily for 4 days.      Return if symptoms worsen or fail to improve.    Rosy Kim, [facility]   Wilfrid is a 83 year old, presenting for the following health issues:  RECHECK (Cough fever )    Patient presents with an acute cough.  He has a history of adenocarcinoma of the lung.  He has been following with pulmonology.  He reports that over the last day he has had worsening cough.  He has had some fever and chills.  He did take a COVID test which was negative.    History of Present Illness       Reason for visit:  Fever  Symptom onset:  1-3 days ago    He eats 0-1 servings of fruits and vegetables daily.   He is taking medications regularly.       Review of Systems   No bowel or bladder issues      Objective    /76   Pulse 72   Temp 99.9  F (37.7  C) (Tympanic)   Resp 16   Wt 65.3 kg (144 lb)   SpO2 94%   BMI 24.72 kg/m    Body mass index is 24.72 kg/m .  Physical Exam   GEN: Vitals reviewed. Fatigued appearing. Patient is in no acute respiratory distress. Cooperative with exam.  HEENT: Normocephalic atraumatic.  Eyes grossly normal to inspection.  No discharge or erythema, or obvious scleral/conjunctival abnormalities.   CV: Heart regular in rate and  rhythm with no murmur.    LUNGS: No audible wheeze, cough, or visible cyanosis.  No visible retractions or increased work of breathing.  Lungs with wheeze to auscultation bilaterally.    ABD:  nondistended  SKIN: Warm and dry to touch.  Visible skin clear. No significant rash, abnormal pigmentation or lesions.

## 2023-08-10 NOTE — NURSING NOTE
Pt presents to clinic with concerns of possible pneumonia, cough and fever.  /76   Pulse 72   Temp 99.9  F (37.7  C) (Tympanic)   Resp 16   Wt 65.3 kg (144 lb)   SpO2 94%   BMI 24.72 kg/m    Becky Aviles LPN on 8/10/2023 at 2:19 PM

## 2023-08-10 NOTE — PATIENT INSTRUCTIONS
Your symptoms are most consistent with a bacterial infection.    Take your antibiotics as prescribed. Increase water intake.    Recommend eating yogurt/kefir or taking probiotics 1-2 times daily while on antibiotics     Call if symptoms do not improve in a couple days or if you develop any medication reactions.      For symptomatic relief you may try:    Cough  - Guaifenesin DM (generic Robitussin DM) as needed     General  - get extra rest  - drink plenty of fluid  - avoid tobacco products    You will need to be evaluated if you start to experience:   Fever higher than 102.5 F (39.2 C)   Worsening of current symptoms  Sudden and severe pain in the face and head or troubleseeing or thinking clearly   Swelling or redness around 1 or both eyes   Trouble breathing, chest pain or a stiff neck    * If you are a smoker, try to quit *

## 2023-08-13 NOTE — TELEPHONE ENCOUNTER
Patient called rapid clinic today regarding a positive home COVID test that he took that was  that did show positive.  He was seen in the office on 8/10/2023 by Rosy Banda, started on azithromycin.  He was concerned today that he should have further testing or evaluation.  Discussed that he is more than 5 days into symptoms, no antiviral therapy is indicated.  Discussed that if he has any concerns about his breathing, fluid intake, or how he is doing, he should come into the rapid clinic or ER to be evaluated.  He is agreeable with this plan.

## 2024-01-01 ENCOUNTER — HOSPITAL ENCOUNTER (EMERGENCY)
Facility: OTHER | Age: 84
Discharge: HOME | End: 2024-02-26
Attending: FAMILY MEDICINE | Admitting: FAMILY MEDICINE
Payer: MEDICARE

## 2024-01-01 ENCOUNTER — HOSPITAL ENCOUNTER (OUTPATIENT)
Dept: ULTRASOUND IMAGING | Facility: OTHER | Age: 84
Discharge: HOME | End: 2024-05-06
Payer: MEDICARE

## 2024-01-01 ENCOUNTER — OFFICE VISIT (OUTPATIENT)
Dept: SURGERY | Facility: OTHER | Age: 84
End: 2024-01-01
Attending: SURGERY
Payer: MEDICARE

## 2024-01-01 ENCOUNTER — TELEPHONE (OUTPATIENT)
Dept: SURGERY | Facility: OTHER | Age: 84
End: 2024-01-01
Payer: MEDICARE

## 2024-01-01 ENCOUNTER — APPOINTMENT (OUTPATIENT)
Dept: GENERAL RADIOLOGY | Facility: OTHER | Age: 84
DRG: 194 | End: 2024-01-01
Attending: FAMILY MEDICINE
Payer: MEDICARE

## 2024-01-01 ENCOUNTER — OFFICE VISIT (OUTPATIENT)
Dept: INTERNAL MEDICINE | Facility: OTHER | Age: 84
End: 2024-01-01
Attending: STUDENT IN AN ORGANIZED HEALTH CARE EDUCATION/TRAINING PROGRAM
Payer: MEDICARE

## 2024-01-01 ENCOUNTER — APPOINTMENT (OUTPATIENT)
Dept: GENERAL RADIOLOGY | Facility: OTHER | Age: 84
End: 2024-01-01
Attending: FAMILY MEDICINE
Payer: MEDICARE

## 2024-01-01 ENCOUNTER — HOSPITAL ENCOUNTER (EMERGENCY)
Facility: OTHER | Age: 84
Discharge: HOME | DRG: 194 | End: 2024-04-24
Attending: FAMILY MEDICINE | Admitting: FAMILY MEDICINE
Payer: MEDICARE

## 2024-01-01 ENCOUNTER — HOSPITAL ENCOUNTER (OUTPATIENT)
Dept: GENERAL RADIOLOGY | Facility: OTHER | Age: 84
Discharge: HOME | End: 2024-05-02
Attending: RADIOLOGY
Payer: MEDICARE

## 2024-01-01 ENCOUNTER — APPOINTMENT (OUTPATIENT)
Dept: GENERAL RADIOLOGY | Facility: OTHER | Age: 84
DRG: 194 | End: 2024-01-01
Payer: MEDICARE

## 2024-01-01 ENCOUNTER — OFFICE VISIT (OUTPATIENT)
Dept: INTERNAL MEDICINE | Facility: OTHER | Age: 84
End: 2024-01-01
Payer: MEDICARE

## 2024-01-01 ENCOUNTER — APPOINTMENT (OUTPATIENT)
Dept: ULTRASOUND IMAGING | Facility: OTHER | Age: 84
DRG: 194 | End: 2024-01-01
Attending: FAMILY MEDICINE
Payer: MEDICARE

## 2024-01-01 ENCOUNTER — ANESTHESIA EVENT (OUTPATIENT)
Dept: SURGERY | Facility: OTHER | Age: 84
End: 2024-01-01
Payer: MEDICARE

## 2024-01-01 ENCOUNTER — HOSPITAL ENCOUNTER (OUTPATIENT)
Dept: ULTRASOUND IMAGING | Facility: OTHER | Age: 84
Discharge: HOME | End: 2024-04-30
Attending: FAMILY MEDICINE
Payer: MEDICARE

## 2024-01-01 ENCOUNTER — MEDICAL CORRESPONDENCE (OUTPATIENT)
Dept: HEALTH INFORMATION MANAGEMENT | Facility: OTHER | Age: 84
End: 2024-01-01
Payer: MEDICARE

## 2024-01-01 ENCOUNTER — TELEPHONE (OUTPATIENT)
Dept: INTERNAL MEDICINE | Facility: OTHER | Age: 84
End: 2024-01-01
Payer: MEDICARE

## 2024-01-01 ENCOUNTER — PATIENT OUTREACH (OUTPATIENT)
Dept: INTERNAL MEDICINE | Facility: OTHER | Age: 84
End: 2024-01-01
Payer: MEDICARE

## 2024-01-01 ENCOUNTER — HOSPITAL ENCOUNTER (OUTPATIENT)
Facility: OTHER | Age: 84
Discharge: HOME | End: 2024-05-08
Attending: SURGERY | Admitting: SURGERY
Payer: MEDICARE

## 2024-01-01 ENCOUNTER — HOSPITAL ENCOUNTER (OUTPATIENT)
Dept: GENERAL RADIOLOGY | Facility: OTHER | Age: 84
Discharge: HOME | End: 2024-04-30
Attending: RADIOLOGY
Payer: MEDICARE

## 2024-01-01 ENCOUNTER — HOSPITAL ENCOUNTER (INPATIENT)
Facility: OTHER | Age: 84
LOS: 3 days | Discharge: HOME | DRG: 194 | End: 2024-04-28
Admitting: INTERNAL MEDICINE
Payer: MEDICARE

## 2024-01-01 ENCOUNTER — HOSPITAL ENCOUNTER (OUTPATIENT)
Dept: GENERAL RADIOLOGY | Facility: OTHER | Age: 84
Discharge: HOME | End: 2024-05-06
Attending: RADIOLOGY
Payer: MEDICARE

## 2024-01-01 ENCOUNTER — ANCILLARY PROCEDURE (OUTPATIENT)
Dept: RADIOLOGY | Facility: CLINIC | Age: 84
End: 2024-01-01
Payer: MEDICARE

## 2024-01-01 ENCOUNTER — HOSPITAL ENCOUNTER (OUTPATIENT)
Dept: ULTRASOUND IMAGING | Facility: OTHER | Age: 84
Discharge: HOME | End: 2024-05-02
Payer: MEDICARE

## 2024-01-01 ENCOUNTER — ANESTHESIA (OUTPATIENT)
Dept: SURGERY | Facility: OTHER | Age: 84
End: 2024-01-01
Payer: MEDICARE

## 2024-01-01 ENCOUNTER — APPOINTMENT (OUTPATIENT)
Dept: GENERAL RADIOLOGY | Facility: OTHER | Age: 84
End: 2024-01-01
Attending: SURGERY
Payer: MEDICARE

## 2024-01-01 VITALS
WEIGHT: 138 LBS | SYSTOLIC BLOOD PRESSURE: 109 MMHG | HEIGHT: 64 IN | DIASTOLIC BLOOD PRESSURE: 64 MMHG | TEMPERATURE: 97.3 F | BODY MASS INDEX: 23.56 KG/M2 | HEART RATE: 69 BPM | RESPIRATION RATE: 16 BRPM | OXYGEN SATURATION: 97 %

## 2024-01-01 VITALS
OXYGEN SATURATION: 95 % | WEIGHT: 138 LBS | HEIGHT: 64 IN | DIASTOLIC BLOOD PRESSURE: 61 MMHG | BODY MASS INDEX: 23.56 KG/M2 | RESPIRATION RATE: 17 BRPM | TEMPERATURE: 96.7 F | SYSTOLIC BLOOD PRESSURE: 110 MMHG | HEART RATE: 86 BPM

## 2024-01-01 VITALS
TEMPERATURE: 97.5 F | DIASTOLIC BLOOD PRESSURE: 78 MMHG | RESPIRATION RATE: 18 BRPM | SYSTOLIC BLOOD PRESSURE: 138 MMHG | HEART RATE: 65 BPM | WEIGHT: 139.8 LBS | HEIGHT: 65 IN | OXYGEN SATURATION: 100 % | BODY MASS INDEX: 23.29 KG/M2

## 2024-01-01 VITALS
WEIGHT: 136 LBS | TEMPERATURE: 96.3 F | DIASTOLIC BLOOD PRESSURE: 97 MMHG | BODY MASS INDEX: 23.22 KG/M2 | OXYGEN SATURATION: 99 % | HEART RATE: 64 BPM | HEIGHT: 64 IN | RESPIRATION RATE: 25 BRPM | SYSTOLIC BLOOD PRESSURE: 133 MMHG

## 2024-01-01 VITALS
SYSTOLIC BLOOD PRESSURE: 109 MMHG | OXYGEN SATURATION: 94 % | DIASTOLIC BLOOD PRESSURE: 62 MMHG | HEIGHT: 64 IN | BODY MASS INDEX: 23.56 KG/M2 | TEMPERATURE: 97 F | RESPIRATION RATE: 24 BRPM | WEIGHT: 138 LBS | HEART RATE: 82 BPM

## 2024-01-01 VITALS
SYSTOLIC BLOOD PRESSURE: 110 MMHG | BODY MASS INDEX: 23.56 KG/M2 | TEMPERATURE: 99 F | OXYGEN SATURATION: 99 % | RESPIRATION RATE: 16 BRPM | HEIGHT: 64 IN | DIASTOLIC BLOOD PRESSURE: 56 MMHG | WEIGHT: 138 LBS | HEART RATE: 94 BPM

## 2024-01-01 VITALS
TEMPERATURE: 98.2 F | OXYGEN SATURATION: 95 % | DIASTOLIC BLOOD PRESSURE: 69 MMHG | HEART RATE: 84 BPM | RESPIRATION RATE: 22 BRPM | SYSTOLIC BLOOD PRESSURE: 131 MMHG

## 2024-01-01 VITALS
HEART RATE: 94 BPM | TEMPERATURE: 98.8 F | SYSTOLIC BLOOD PRESSURE: 124 MMHG | OXYGEN SATURATION: 98 % | DIASTOLIC BLOOD PRESSURE: 65 MMHG | RESPIRATION RATE: 22 BRPM

## 2024-01-01 VITALS
DIASTOLIC BLOOD PRESSURE: 70 MMHG | SYSTOLIC BLOOD PRESSURE: 139 MMHG | HEIGHT: 64 IN | RESPIRATION RATE: 16 BRPM | HEART RATE: 80 BPM | BODY MASS INDEX: 23.56 KG/M2 | TEMPERATURE: 97.7 F | WEIGHT: 138 LBS | OXYGEN SATURATION: 97 %

## 2024-01-01 VITALS
TEMPERATURE: 99 F | RESPIRATION RATE: 16 BRPM | OXYGEN SATURATION: 94 % | SYSTOLIC BLOOD PRESSURE: 146 MMHG | HEART RATE: 93 BPM | DIASTOLIC BLOOD PRESSURE: 69 MMHG

## 2024-01-01 DIAGNOSIS — C34.90 PRIMARY MUCINOUS ADENOCARCINOMA OF LUNG (H): ICD-10-CM

## 2024-01-01 DIAGNOSIS — D61.818 PANCYTOPENIA (H): ICD-10-CM

## 2024-01-01 DIAGNOSIS — Z98.890 POSTOPERATIVE STATE: Primary | ICD-10-CM

## 2024-01-01 DIAGNOSIS — C80.1 MUCINOUS ADENOCARCINOMA (H): ICD-10-CM

## 2024-01-01 DIAGNOSIS — J90 PLEURAL EFFUSION ON RIGHT: Primary | ICD-10-CM

## 2024-01-01 DIAGNOSIS — I10 HYPERTENSION, UNSPECIFIED TYPE: ICD-10-CM

## 2024-01-01 DIAGNOSIS — J90 PLEURAL EFFUSION: ICD-10-CM

## 2024-01-01 DIAGNOSIS — J90 PLEURAL EFFUSION ON RIGHT: ICD-10-CM

## 2024-01-01 DIAGNOSIS — D64.9 ANEMIA: ICD-10-CM

## 2024-01-01 DIAGNOSIS — E87.1 HYPONATREMIA: ICD-10-CM

## 2024-01-01 DIAGNOSIS — E78.5 HYPERLIPIDEMIA, UNSPECIFIED HYPERLIPIDEMIA TYPE: ICD-10-CM

## 2024-01-01 DIAGNOSIS — C34.91 LUNG CANCER, PRIMARY, WITH METASTASIS FROM LUNG TO OTHER SITE, RIGHT (H): ICD-10-CM

## 2024-01-01 DIAGNOSIS — R50.9 FEVER, UNSPECIFIED: Primary | ICD-10-CM

## 2024-01-01 DIAGNOSIS — R50.9 FEVER, UNSPECIFIED: ICD-10-CM

## 2024-01-01 DIAGNOSIS — Z00.00 MEDICARE ANNUAL WELLNESS VISIT, SUBSEQUENT: Primary | ICD-10-CM

## 2024-01-01 DIAGNOSIS — R06.02 SHORTNESS OF BREATH: ICD-10-CM

## 2024-01-01 DIAGNOSIS — J90 RECURRENT RIGHT PLEURAL EFFUSION: ICD-10-CM

## 2024-01-01 DIAGNOSIS — Z20.822 LAB TEST NEGATIVE FOR COVID-19 VIRUS: ICD-10-CM

## 2024-01-01 DIAGNOSIS — J18.9 PNEUMONIA OF RIGHT LOWER LOBE DUE TO INFECTIOUS ORGANISM: ICD-10-CM

## 2024-01-01 DIAGNOSIS — F19.982 DRUG-INDUCED INSOMNIA (H): ICD-10-CM

## 2024-01-01 DIAGNOSIS — R73.09 ABNORMAL GLUCOSE: ICD-10-CM

## 2024-01-01 DIAGNOSIS — C34.90 PRIMARY MUCINOUS ADENOCARCINOMA OF LUNG (H): Primary | ICD-10-CM

## 2024-01-01 DIAGNOSIS — R07.89 LEFT-SIDED CHEST WALL PAIN: ICD-10-CM

## 2024-01-01 DIAGNOSIS — C34.91 LUNG CANCER, PRIMARY, WITH METASTASIS FROM LUNG TO OTHER SITE, RIGHT (H): Primary | ICD-10-CM

## 2024-01-01 DIAGNOSIS — D64.9 ANEMIA, UNSPECIFIED TYPE: ICD-10-CM

## 2024-01-01 DIAGNOSIS — J90 PLEURAL EFFUSION, RIGHT: ICD-10-CM

## 2024-01-01 LAB
ABO/RH(D): NORMAL
ALBUMIN SERPL BCG-MCNC: 2.7 G/DL (ref 3.5–5.2)
ALBUMIN SERPL BCG-MCNC: 3 G/DL (ref 3.5–5.2)
ALBUMIN SERPL BCG-MCNC: 3.5 G/DL (ref 3.5–5.2)
ALBUMIN SERPL BCG-MCNC: 3.6 G/DL (ref 3.5–5.2)
ALBUMIN SERPL BCG-MCNC: 3.7 G/DL (ref 3.5–5.2)
ALBUMIN UR-MCNC: NEGATIVE MG/DL
ALP SERPL-CCNC: 121 U/L (ref 40–150)
ALP SERPL-CCNC: 147 U/L (ref 40–150)
ALP SERPL-CCNC: 156 U/L (ref 40–150)
ALP SERPL-CCNC: 179 U/L (ref 40–150)
ALP SERPL-CCNC: 208 U/L (ref 40–150)
ALT SERPL W P-5'-P-CCNC: 14 U/L (ref 0–70)
ALT SERPL W P-5'-P-CCNC: 21 U/L (ref 0–70)
ALT SERPL W P-5'-P-CCNC: 24 U/L (ref 0–70)
ALT SERPL W P-5'-P-CCNC: 25 U/L (ref 0–70)
ALT SERPL W P-5'-P-CCNC: 30 U/L (ref 0–70)
ANION GAP SERPL CALCULATED.3IONS-SCNC: 11 MMOL/L (ref 7–15)
ANION GAP SERPL CALCULATED.3IONS-SCNC: 12 MMOL/L (ref 7–15)
ANION GAP SERPL CALCULATED.3IONS-SCNC: 12 MMOL/L (ref 7–15)
ANION GAP SERPL CALCULATED.3IONS-SCNC: 15 MMOL/L (ref 7–15)
ANTIBODY SCREEN: NEGATIVE
APPEARANCE UR: CLEAR
AST SERPL W P-5'-P-CCNC: 28 U/L (ref 0–45)
AST SERPL W P-5'-P-CCNC: 31 U/L (ref 0–45)
AST SERPL W P-5'-P-CCNC: 41 U/L (ref 0–45)
AST SERPL W P-5'-P-CCNC: 44 U/L (ref 0–45)
AST SERPL W P-5'-P-CCNC: 48 U/L (ref 0–45)
ATRIAL RATE - MUSE: 53 BPM
ATRIAL RATE - MUSE: 69 BPM
ATRIAL RATE - MUSE: 75 BPM
BACTERIA #/AREA URNS HPF: ABNORMAL /HPF
BACTERIA BLD CULT: NO GROWTH
BACTERIA BLD CULT: NO GROWTH
BACTERIA SPT CULT: ABNORMAL
BACTERIA UR CULT: NORMAL
BASE EXCESS BLDV CALC-SCNC: 2 MMOL/L (ref -3–3)
BASOPHILS # BLD AUTO: 0 10E3/UL (ref 0–0.2)
BASOPHILS NFR BLD AUTO: 0 %
BILIRUB DIRECT SERPL-MCNC: <0.2 MG/DL (ref 0–0.3)
BILIRUB SERPL-MCNC: 0.4 MG/DL
BILIRUB SERPL-MCNC: 0.5 MG/DL
BILIRUB SERPL-MCNC: 0.9 MG/DL
BILIRUB UR QL STRIP: NEGATIVE
BUN SERPL-MCNC: 12.3 MG/DL (ref 8–23)
BUN SERPL-MCNC: 14.4 MG/DL (ref 8–23)
BUN SERPL-MCNC: 17.6 MG/DL (ref 8–23)
BUN SERPL-MCNC: 18.1 MG/DL (ref 8–23)
BUN SERPL-MCNC: 18.4 MG/DL (ref 8–23)
BUN SERPL-MCNC: 8 MG/DL (ref 8–23)
CALCIUM SERPL-MCNC: 8.1 MG/DL (ref 8.8–10.2)
CALCIUM SERPL-MCNC: 8.2 MG/DL (ref 8.8–10.2)
CALCIUM SERPL-MCNC: 8.4 MG/DL (ref 8.8–10.2)
CALCIUM SERPL-MCNC: 8.7 MG/DL (ref 8.8–10.2)
CALCIUM SERPL-MCNC: 8.8 MG/DL (ref 8.8–10.2)
CALCIUM SERPL-MCNC: 9.1 MG/DL (ref 8.8–10.2)
CHLORIDE SERPL-SCNC: 100 MMOL/L (ref 98–107)
CHLORIDE SERPL-SCNC: 88 MMOL/L (ref 98–107)
CHLORIDE SERPL-SCNC: 91 MMOL/L (ref 98–107)
CHLORIDE SERPL-SCNC: 94 MMOL/L (ref 98–107)
CHLORIDE SERPL-SCNC: 96 MMOL/L (ref 98–107)
CHLORIDE SERPL-SCNC: 99 MMOL/L (ref 98–107)
COLOR UR AUTO: ABNORMAL
CREAT SERPL-MCNC: 0.7 MG/DL (ref 0.67–1.17)
CREAT SERPL-MCNC: 0.76 MG/DL (ref 0.67–1.17)
CREAT SERPL-MCNC: 0.77 MG/DL (ref 0.67–1.17)
CREAT SERPL-MCNC: 0.78 MG/DL (ref 0.67–1.17)
CREAT SERPL-MCNC: 0.78 MG/DL (ref 0.67–1.17)
CREAT SERPL-MCNC: 0.81 MG/DL (ref 0.67–1.17)
DEPRECATED HCO3 PLAS-SCNC: 22 MMOL/L (ref 22–29)
DEPRECATED HCO3 PLAS-SCNC: 23 MMOL/L (ref 22–29)
DEPRECATED HCO3 PLAS-SCNC: 25 MMOL/L (ref 22–29)
DIASTOLIC BLOOD PRESSURE - MUSE: NORMAL MMHG
EGFRCR SERPLBLD CKD-EPI 2021: 87 ML/MIN/1.73M2
EGFRCR SERPLBLD CKD-EPI 2021: 88 ML/MIN/1.73M2
EGFRCR SERPLBLD CKD-EPI 2021: 88 ML/MIN/1.73M2
EGFRCR SERPLBLD CKD-EPI 2021: 89 ML/MIN/1.73M2
EGFRCR SERPLBLD CKD-EPI 2021: 89 ML/MIN/1.73M2
EGFRCR SERPLBLD CKD-EPI 2021: >90 ML/MIN/1.73M2
EOSINOPHIL # BLD AUTO: 0 10E3/UL (ref 0–0.7)
EOSINOPHIL # BLD AUTO: 0 10E3/UL (ref 0–0.7)
EOSINOPHIL # BLD AUTO: 0.1 10E3/UL (ref 0–0.7)
EOSINOPHIL NFR BLD AUTO: 0 %
EOSINOPHIL NFR BLD AUTO: 1 %
EOSINOPHIL NFR BLD AUTO: 1 %
ERYTHROCYTE [DISTWIDTH] IN BLOOD BY AUTOMATED COUNT: 14.6 % (ref 10–15)
ERYTHROCYTE [DISTWIDTH] IN BLOOD BY AUTOMATED COUNT: 14.7 % (ref 10–15)
ERYTHROCYTE [DISTWIDTH] IN BLOOD BY AUTOMATED COUNT: 14.9 % (ref 10–15)
ERYTHROCYTE [DISTWIDTH] IN BLOOD BY AUTOMATED COUNT: 15 % (ref 10–15)
ERYTHROCYTE [DISTWIDTH] IN BLOOD BY AUTOMATED COUNT: 15 % (ref 10–15)
ERYTHROCYTE [DISTWIDTH] IN BLOOD BY AUTOMATED COUNT: 15.1 % (ref 10–15)
ETHANOL SERPL-MCNC: <0.01 G/DL
FERRITIN SERPL-MCNC: 1475 NG/ML (ref 31–409)
FLUAV RNA SPEC QL NAA+PROBE: NEGATIVE
FLUAV RNA SPEC QL NAA+PROBE: NEGATIVE
FLUBV RNA RESP QL NAA+PROBE: NEGATIVE
FLUBV RNA RESP QL NAA+PROBE: NEGATIVE
GLUCOSE SERPL-MCNC: 113 MG/DL (ref 70–99)
GLUCOSE SERPL-MCNC: 123 MG/DL (ref 70–99)
GLUCOSE SERPL-MCNC: 143 MG/DL (ref 70–99)
GLUCOSE SERPL-MCNC: 87 MG/DL (ref 70–99)
GLUCOSE SERPL-MCNC: 90 MG/DL (ref 70–99)
GLUCOSE SERPL-MCNC: 96 MG/DL (ref 70–99)
GLUCOSE UR STRIP-MCNC: NEGATIVE MG/DL
GRAM STAIN RESULT: ABNORMAL
HCO3 BLDV-SCNC: 25 MMOL/L (ref 21–28)
HCT VFR BLD AUTO: 20.2 % (ref 40–53)
HCT VFR BLD AUTO: 20.7 % (ref 40–53)
HCT VFR BLD AUTO: 23.3 % (ref 40–53)
HCT VFR BLD AUTO: 24.6 % (ref 40–53)
HCT VFR BLD AUTO: 25.1 % (ref 40–53)
HCT VFR BLD AUTO: 26 % (ref 40–53)
HEMOCCULT STL QL: NEGATIVE
HGB BLD-MCNC: 6.8 G/DL (ref 13.3–17.7)
HGB BLD-MCNC: 6.9 G/DL (ref 13.3–17.7)
HGB BLD-MCNC: 7.3 G/DL (ref 13.3–17.7)
HGB BLD-MCNC: 7.8 G/DL (ref 13.3–17.7)
HGB BLD-MCNC: 8 G/DL (ref 13.3–17.7)
HGB BLD-MCNC: 8.4 G/DL (ref 13.3–17.7)
HGB BLD-MCNC: 8.5 G/DL (ref 13.3–17.7)
HGB BLD-MCNC: 8.9 G/DL (ref 13.3–17.7)
HGB UR QL STRIP: NEGATIVE
HOLD SPECIMEN: NORMAL
IMM GRANULOCYTES # BLD: 0 10E3/UL
IMM GRANULOCYTES # BLD: 0 10E3/UL
IMM GRANULOCYTES # BLD: 0.1 10E3/UL
IMM GRANULOCYTES NFR BLD: 1 %
INTERPRETATION ECG - MUSE: NORMAL
IRON BINDING CAPACITY (ROCHE): 167 UG/DL (ref 240–430)
IRON SATN MFR SERPL: 11 % (ref 15–46)
IRON SERPL-MCNC: 19 UG/DL (ref 61–157)
KETONES UR STRIP-MCNC: ABNORMAL MG/DL
LACTATE SERPL-SCNC: 1.4 MMOL/L (ref 0.7–2)
LDH SERPL L TO P-CCNC: 227 U/L (ref 0–250)
LEUKOCYTE ESTERASE UR QL STRIP: NEGATIVE
LYMPHOCYTES # BLD AUTO: 0.7 10E3/UL (ref 0.8–5.3)
LYMPHOCYTES # BLD AUTO: 0.9 10E3/UL (ref 0.8–5.3)
LYMPHOCYTES # BLD AUTO: 1 10E3/UL (ref 0.8–5.3)
LYMPHOCYTES NFR BLD AUTO: 14 %
LYMPHOCYTES NFR BLD AUTO: 15 %
LYMPHOCYTES NFR BLD AUTO: 15 %
MAGNESIUM SERPL-MCNC: 1.9 MG/DL (ref 1.7–2.3)
MCH RBC QN AUTO: 33.8 PG (ref 26.5–33)
MCH RBC QN AUTO: 33.8 PG (ref 26.5–33)
MCH RBC QN AUTO: 34.4 PG (ref 26.5–33)
MCH RBC QN AUTO: 34.4 PG (ref 26.5–33)
MCH RBC QN AUTO: 34.8 PG (ref 26.5–33)
MCH RBC QN AUTO: 35 PG (ref 26.5–33)
MCHC RBC AUTO-ENTMCNC: 32.5 G/DL (ref 31.5–36.5)
MCHC RBC AUTO-ENTMCNC: 32.9 G/DL (ref 31.5–36.5)
MCHC RBC AUTO-ENTMCNC: 33.5 G/DL (ref 31.5–36.5)
MCHC RBC AUTO-ENTMCNC: 33.9 G/DL (ref 31.5–36.5)
MCHC RBC AUTO-ENTMCNC: 34.2 G/DL (ref 31.5–36.5)
MCHC RBC AUTO-ENTMCNC: 34.2 G/DL (ref 31.5–36.5)
MCV RBC AUTO: 100 FL (ref 78–100)
MCV RBC AUTO: 102 FL (ref 78–100)
MCV RBC AUTO: 103 FL (ref 78–100)
MCV RBC AUTO: 104 FL (ref 78–100)
MONOCYTES # BLD AUTO: 0.4 10E3/UL (ref 0–1.3)
MONOCYTES # BLD AUTO: 0.9 10E3/UL (ref 0–1.3)
MONOCYTES # BLD AUTO: 0.9 10E3/UL (ref 0–1.3)
MONOCYTES NFR BLD AUTO: 14 %
MONOCYTES NFR BLD AUTO: 14 %
MONOCYTES NFR BLD AUTO: 9 %
MUCOUS THREADS #/AREA URNS LPF: PRESENT /LPF
NEUTROPHILS # BLD AUTO: 3.3 10E3/UL (ref 1.6–8.3)
NEUTROPHILS # BLD AUTO: 4.3 10E3/UL (ref 1.6–8.3)
NEUTROPHILS # BLD AUTO: 4.5 10E3/UL (ref 1.6–8.3)
NEUTROPHILS NFR BLD AUTO: 70 %
NEUTROPHILS NFR BLD AUTO: 70 %
NEUTROPHILS NFR BLD AUTO: 75 %
NITRATE UR QL: NEGATIVE
NRBC # BLD AUTO: 0 10E3/UL
NRBC BLD AUTO-RTO: 0 /100
NT-PROBNP SERPL-MCNC: 456 PG/ML (ref 0–1800)
O2/TOTAL GAS SETTING VFR VENT: 21 %
OSMOLALITY SERPL: 269 MMOL/KG (ref 280–301)
OSMOLALITY UR: 484 MMOL/KG (ref 100–1200)
OXYHGB MFR BLDV: 93 % (ref 70–75)
P AXIS - MUSE: 37 DEGREES
P AXIS - MUSE: 9 DEGREES
P AXIS - MUSE: NORMAL DEGREES
PCO2 BLDV: 32 MM HG (ref 40–50)
PH BLDV: 7.5 [PH] (ref 7.32–7.43)
PH UR STRIP: 6.5 [PH] (ref 5–9)
PLATELET # BLD AUTO: 243 10E3/UL (ref 150–450)
PLATELET # BLD AUTO: 246 10E3/UL (ref 150–450)
PLATELET # BLD AUTO: 269 10E3/UL (ref 150–450)
PLATELET # BLD AUTO: 270 10E3/UL (ref 150–450)
PLATELET # BLD AUTO: 280 10E3/UL (ref 150–450)
PLATELET # BLD AUTO: 75 10E3/UL (ref 150–450)
PO2 BLDV: 60 MM HG (ref 25–47)
POTASSIUM SERPL-SCNC: 3.8 MMOL/L (ref 3.4–5.3)
POTASSIUM SERPL-SCNC: 3.9 MMOL/L (ref 3.4–5.3)
POTASSIUM SERPL-SCNC: 3.9 MMOL/L (ref 3.4–5.3)
POTASSIUM SERPL-SCNC: 4 MMOL/L (ref 3.4–5.3)
POTASSIUM SERPL-SCNC: 4.2 MMOL/L (ref 3.4–5.3)
POTASSIUM SERPL-SCNC: 4.4 MMOL/L (ref 3.4–5.3)
PR INTERVAL - MUSE: 146 MS
PR INTERVAL - MUSE: 156 MS
PR INTERVAL - MUSE: 176 MS
PROCALCITONIN SERPL IA-MCNC: 0.09 NG/ML
PROCALCITONIN SERPL IA-MCNC: 0.38 NG/ML
PROT SERPL-MCNC: 5.1 G/DL (ref 6.4–8.3)
PROT SERPL-MCNC: 5.6 G/DL (ref 6.4–8.3)
PROT SERPL-MCNC: 6 G/DL (ref 6.4–8.3)
PROT SERPL-MCNC: 6 G/DL (ref 6.4–8.3)
PROT SERPL-MCNC: 6.3 G/DL (ref 6.4–8.3)
QRS DURATION - MUSE: 92 MS
QRS DURATION - MUSE: 92 MS
QRS DURATION - MUSE: 96 MS
QT - MUSE: 388 MS
QT - MUSE: 410 MS
QT - MUSE: 430 MS
QTC - MUSE: 384 MS
QTC - MUSE: 433 MS
QTC - MUSE: 460 MS
R AXIS - MUSE: 12 DEGREES
R AXIS - MUSE: 22 DEGREES
R AXIS - MUSE: 36 DEGREES
RBC # BLD AUTO: 1.98 10E6/UL (ref 4.4–5.9)
RBC # BLD AUTO: 2.01 10E6/UL (ref 4.4–5.9)
RBC # BLD AUTO: 2.27 10E6/UL (ref 4.4–5.9)
RBC # BLD AUTO: 2.37 10E6/UL (ref 4.4–5.9)
RBC # BLD AUTO: 2.43 10E6/UL (ref 4.4–5.9)
RBC # BLD AUTO: 2.59 10E6/UL (ref 4.4–5.9)
RBC URINE: <1 /HPF
RETICS # AUTO: 0.06 10E6/UL (ref 0.03–0.1)
RETICS/RBC NFR AUTO: 3 % (ref 0.5–2)
RSV RNA SPEC NAA+PROBE: NEGATIVE
RSV RNA SPEC NAA+PROBE: NEGATIVE
SAO2 % BLDV: 95 % (ref 70–75)
SARS-COV-2 RNA RESP QL NAA+PROBE: NEGATIVE
SARS-COV-2 RNA RESP QL NAA+PROBE: NEGATIVE
SODIUM SERPL-SCNC: 123 MMOL/L (ref 135–145)
SODIUM SERPL-SCNC: 128 MMOL/L (ref 135–145)
SODIUM SERPL-SCNC: 129 MMOL/L (ref 135–145)
SODIUM SERPL-SCNC: 130 MMOL/L (ref 135–145)
SODIUM SERPL-SCNC: 133 MMOL/L (ref 135–145)
SODIUM SERPL-SCNC: 136 MMOL/L (ref 135–145)
SODIUM UR-SCNC: 113 MMOL/L
SP GR UR STRIP: 1.01 (ref 1–1.03)
SPECIMEN EXPIRATION DATE: NORMAL
SQUAMOUS EPITHELIAL: 1 /HPF
SYSTOLIC BLOOD PRESSURE - MUSE: NORMAL MMHG
T AXIS - MUSE: 14 DEGREES
T AXIS - MUSE: 21 DEGREES
T AXIS - MUSE: 72 DEGREES
TROPONIN T SERPL HS-MCNC: 19 NG/L
TROPONIN T SERPL HS-MCNC: 25 NG/L
TROPONIN T SERPL HS-MCNC: 27 NG/L
UROBILINOGEN UR STRIP-MCNC: NORMAL MG/DL
VANCOMYCIN SERPL-MCNC: 13.1 UG/ML
VENTRICULAR RATE- MUSE: 53 BPM
VENTRICULAR RATE- MUSE: 69 BPM
VENTRICULAR RATE- MUSE: 75 BPM
VIT B12 SERPL-MCNC: >2000 PG/ML (ref 232–1245)
WBC # BLD AUTO: 4.4 10E3/UL (ref 4–11)
WBC # BLD AUTO: 4.9 10E3/UL (ref 4–11)
WBC # BLD AUTO: 5.2 10E3/UL (ref 4–11)
WBC # BLD AUTO: 6.1 10E3/UL (ref 4–11)
WBC # BLD AUTO: 6.4 10E3/UL (ref 4–11)
WBC # BLD AUTO: 6.5 10E3/UL (ref 4–11)
WBC URINE: 2 /HPF

## 2024-01-01 PROCEDURE — G0378 HOSPITAL OBSERVATION PER HR: HCPCS

## 2024-01-01 PROCEDURE — 80048 BASIC METABOLIC PNL TOTAL CA: CPT | Performed by: INTERNAL MEDICINE

## 2024-01-01 PROCEDURE — 272N000042 US THORACENTESIS

## 2024-01-01 PROCEDURE — 90480 ADMN SARSCOV2 VAC 1/ONLY CMP: CPT

## 2024-01-01 PROCEDURE — 80202 ASSAY OF VANCOMYCIN: CPT | Performed by: INTERNAL MEDICINE

## 2024-01-01 PROCEDURE — 85027 COMPLETE CBC AUTOMATED: CPT | Performed by: FAMILY MEDICINE

## 2024-01-01 PROCEDURE — 83550 IRON BINDING TEST: CPT | Performed by: FAMILY MEDICINE

## 2024-01-01 PROCEDURE — 250N000011 HC RX IP 250 OP 636: Performed by: FAMILY MEDICINE

## 2024-01-01 PROCEDURE — 80053 COMPREHEN METABOLIC PANEL: CPT | Performed by: FAMILY MEDICINE

## 2024-01-01 PROCEDURE — 84145 PROCALCITONIN (PCT): CPT

## 2024-01-01 PROCEDURE — 80048 BASIC METABOLIC PNL TOTAL CA: CPT | Performed by: FAMILY MEDICINE

## 2024-01-01 PROCEDURE — 87086 URINE CULTURE/COLONY COUNT: CPT

## 2024-01-01 PROCEDURE — 85018 HEMOGLOBIN: CPT | Performed by: INTERNAL MEDICINE

## 2024-01-01 PROCEDURE — 120N000001 HC R&B MED SURG/OB

## 2024-01-01 PROCEDURE — 99100 ANES PT EXTEME AGE<1 YR&>70: CPT | Performed by: NURSE ANESTHETIST, CERTIFIED REGISTERED

## 2024-01-01 PROCEDURE — 94640 AIRWAY INHALATION TREATMENT: CPT

## 2024-01-01 PROCEDURE — 99207 PR NO CHARGE LOS: CPT | Performed by: FAMILY MEDICINE

## 2024-01-01 PROCEDURE — 84484 ASSAY OF TROPONIN QUANT: CPT

## 2024-01-01 PROCEDURE — 272N000001 HC OR GENERAL SUPPLY STERILE: Performed by: SURGERY

## 2024-01-01 PROCEDURE — 82247 BILIRUBIN TOTAL: CPT | Performed by: FAMILY MEDICINE

## 2024-01-01 PROCEDURE — 36415 COLL VENOUS BLD VENIPUNCTURE: CPT

## 2024-01-01 PROCEDURE — 85025 COMPLETE CBC W/AUTO DIFF WBC: CPT | Performed by: FAMILY MEDICINE

## 2024-01-01 PROCEDURE — 250N000013 HC RX MED GY IP 250 OP 250 PS 637: Performed by: INTERNAL MEDICINE

## 2024-01-01 PROCEDURE — 99285 EMERGENCY DEPT VISIT HI MDM: CPT

## 2024-01-01 PROCEDURE — 999N000157 HC STATISTIC RCP TIME EA 10 MIN

## 2024-01-01 PROCEDURE — 99222 1ST HOSP IP/OBS MODERATE 55: CPT | Performed by: INTERNAL MEDICINE

## 2024-01-01 PROCEDURE — 36415 COLL VENOUS BLD VENIPUNCTURE: CPT | Performed by: INTERNAL MEDICINE

## 2024-01-01 PROCEDURE — 83935 ASSAY OF URINE OSMOLALITY: CPT | Performed by: FAMILY MEDICINE

## 2024-01-01 PROCEDURE — 81001 URINALYSIS AUTO W/SCOPE: CPT

## 2024-01-01 PROCEDURE — 250N000013 HC RX MED GY IP 250 OP 250 PS 637: Performed by: FAMILY MEDICINE

## 2024-01-01 PROCEDURE — 71045 X-RAY EXAM CHEST 1 VIEW: CPT | Mod: XU

## 2024-01-01 PROCEDURE — G0463 HOSPITAL OUTPT CLINIC VISIT: HCPCS | Mod: 25

## 2024-01-01 PROCEDURE — 99495 TRANSJ CARE MGMT MOD F2F 14D: CPT

## 2024-01-01 PROCEDURE — 99291 CRITICAL CARE FIRST HOUR: CPT | Mod: 25 | Performed by: FAMILY MEDICINE

## 2024-01-01 PROCEDURE — 82272 OCCULT BLD FECES 1-3 TESTS: CPT

## 2024-01-01 PROCEDURE — 96375 TX/PRO/DX INJ NEW DRUG ADDON: CPT

## 2024-01-01 PROCEDURE — 82077 ASSAY SPEC XCP UR&BREATH IA: CPT

## 2024-01-01 PROCEDURE — 36415 COLL VENOUS BLD VENIPUNCTURE: CPT | Performed by: FAMILY MEDICINE

## 2024-01-01 PROCEDURE — 82805 BLOOD GASES W/O2 SATURATION: CPT

## 2024-01-01 PROCEDURE — 258N000003 HC RX IP 258 OP 636: Performed by: INTERNAL MEDICINE

## 2024-01-01 PROCEDURE — 84484 ASSAY OF TROPONIN QUANT: CPT | Performed by: FAMILY MEDICINE

## 2024-01-01 PROCEDURE — 93005 ELECTROCARDIOGRAM TRACING: CPT

## 2024-01-01 PROCEDURE — 84145 PROCALCITONIN (PCT): CPT | Performed by: FAMILY MEDICINE

## 2024-01-01 PROCEDURE — 83880 ASSAY OF NATRIURETIC PEPTIDE: CPT | Performed by: FAMILY MEDICINE

## 2024-01-01 PROCEDURE — 83930 ASSAY OF BLOOD OSMOLALITY: CPT | Performed by: FAMILY MEDICINE

## 2024-01-01 PROCEDURE — 360N000077 HC SURGERY LEVEL 4, PER MIN: Performed by: SURGERY

## 2024-01-01 PROCEDURE — 32550 INSERT PLEURAL CATH: CPT | Performed by: NURSE ANESTHETIST, CERTIFIED REGISTERED

## 2024-01-01 PROCEDURE — 82607 VITAMIN B-12: CPT | Performed by: FAMILY MEDICINE

## 2024-01-01 PROCEDURE — G0463 HOSPITAL OUTPT CLINIC VISIT: HCPCS

## 2024-01-01 PROCEDURE — 99284 EMERGENCY DEPT VISIT MOD MDM: CPT | Performed by: FAMILY MEDICINE

## 2024-01-01 PROCEDURE — 96374 THER/PROPH/DIAG INJ IV PUSH: CPT

## 2024-01-01 PROCEDURE — 999N000065 XR CHEST PORT 2 VIEWS

## 2024-01-01 PROCEDURE — 258N000003 HC RX IP 258 OP 636

## 2024-01-01 PROCEDURE — 83615 LACTATE (LD) (LDH) ENZYME: CPT | Performed by: FAMILY MEDICINE

## 2024-01-01 PROCEDURE — 250N000011 HC RX IP 250 OP 636: Performed by: NURSE ANESTHETIST, CERTIFIED REGISTERED

## 2024-01-01 PROCEDURE — 250N000011 HC RX IP 250 OP 636

## 2024-01-01 PROCEDURE — 999N000141 HC STATISTIC PRE-PROCEDURE NURSING ASSESSMENT: Performed by: SURGERY

## 2024-01-01 PROCEDURE — 93010 ELECTROCARDIOGRAM REPORT: CPT | Performed by: INTERNAL MEDICINE

## 2024-01-01 PROCEDURE — 82728 ASSAY OF FERRITIN: CPT | Performed by: FAMILY MEDICINE

## 2024-01-01 PROCEDURE — 85041 AUTOMATED RBC COUNT: CPT

## 2024-01-01 PROCEDURE — 71046 X-RAY EXAM CHEST 2 VIEWS: CPT | Mod: TC

## 2024-01-01 PROCEDURE — 250N000009 HC RX 250: Performed by: RADIOLOGY

## 2024-01-01 PROCEDURE — 80053 COMPREHEN METABOLIC PANEL: CPT

## 2024-01-01 PROCEDURE — 85027 COMPLETE CBC AUTOMATED: CPT | Performed by: INTERNAL MEDICINE

## 2024-01-01 PROCEDURE — 250N000009 HC RX 250

## 2024-01-01 PROCEDURE — 71046 X-RAY EXAM CHEST 2 VIEWS: CPT

## 2024-01-01 PROCEDURE — 250N000009 HC RX 250: Performed by: NURSE ANESTHETIST, CERTIFIED REGISTERED

## 2024-01-01 PROCEDURE — 84300 ASSAY OF URINE SODIUM: CPT | Performed by: FAMILY MEDICINE

## 2024-01-01 PROCEDURE — 999N000065 XR CHEST 2 VIEWS

## 2024-01-01 PROCEDURE — 83735 ASSAY OF MAGNESIUM: CPT | Performed by: FAMILY MEDICINE

## 2024-01-01 PROCEDURE — 83605 ASSAY OF LACTIC ACID: CPT

## 2024-01-01 PROCEDURE — 85018 HEMOGLOBIN: CPT | Performed by: FAMILY MEDICINE

## 2024-01-01 PROCEDURE — 87040 BLOOD CULTURE FOR BACTERIA: CPT

## 2024-01-01 PROCEDURE — 86900 BLOOD TYPING SEROLOGIC ABO: CPT

## 2024-01-01 PROCEDURE — 370N000017 HC ANESTHESIA TECHNICAL FEE, PER MIN: Performed by: SURGERY

## 2024-01-01 PROCEDURE — G0439 PPPS, SUBSEQ VISIT: HCPCS | Performed by: STUDENT IN AN ORGANIZED HEALTH CARE EDUCATION/TRAINING PROGRAM

## 2024-01-01 PROCEDURE — 87637 SARSCOV2&INF A&B&RSV AMP PRB: CPT | Performed by: FAMILY MEDICINE

## 2024-01-01 PROCEDURE — 32550 INSERT PLEURAL CATH: CPT | Performed by: SURGERY

## 2024-01-01 PROCEDURE — 82248 BILIRUBIN DIRECT: CPT | Performed by: FAMILY MEDICINE

## 2024-01-01 PROCEDURE — 99233 SBSQ HOSP IP/OBS HIGH 50: CPT | Performed by: FAMILY MEDICINE

## 2024-01-01 PROCEDURE — 250N000009 HC RX 250: Performed by: FAMILY MEDICINE

## 2024-01-01 PROCEDURE — 710N000012 HC RECOVERY PHASE 2, PER MINUTE: Performed by: SURGERY

## 2024-01-01 PROCEDURE — C1729 CATH, DRAINAGE: HCPCS | Performed by: SURGERY

## 2024-01-01 PROCEDURE — 93005 ELECTROCARDIOGRAM TRACING: CPT | Performed by: FAMILY MEDICINE

## 2024-01-01 PROCEDURE — 87637 SARSCOV2&INF A&B&RSV AMP PRB: CPT

## 2024-01-01 PROCEDURE — 250N000011 HC RX IP 250 OP 636: Performed by: INTERNAL MEDICINE

## 2024-01-01 PROCEDURE — 99291 CRITICAL CARE FIRST HOUR: CPT | Mod: 25

## 2024-01-01 PROCEDURE — 99239 HOSP IP/OBS DSCHRG MGMT >30: CPT | Performed by: FAMILY MEDICINE

## 2024-01-01 PROCEDURE — 99285 EMERGENCY DEPT VISIT HI MDM: CPT | Mod: 25 | Performed by: FAMILY MEDICINE

## 2024-01-01 PROCEDURE — 93005 ELECTROCARDIOGRAM TRACING: CPT | Mod: XU | Performed by: FAMILY MEDICINE

## 2024-01-01 PROCEDURE — 85045 AUTOMATED RETICULOCYTE COUNT: CPT | Performed by: FAMILY MEDICINE

## 2024-01-01 PROCEDURE — 99204 OFFICE O/P NEW MOD 45 MIN: CPT | Performed by: SURGERY

## 2024-01-01 PROCEDURE — 250N000011 HC RX IP 250 OP 636: Performed by: SURGERY

## 2024-01-01 PROCEDURE — 87205 SMEAR GRAM STAIN: CPT | Performed by: INTERNAL MEDICINE

## 2024-01-01 RX ORDER — LIDOCAINE 40 MG/G
CREAM TOPICAL
Status: DISCONTINUED | OUTPATIENT
Start: 2024-01-01 | End: 2024-01-01 | Stop reason: HOSPADM

## 2024-01-01 RX ORDER — ACETAMINOPHEN 500 MG
1000 TABLET ORAL EVERY 6 HOURS PRN
COMMUNITY

## 2024-01-01 RX ORDER — ONDANSETRON 4 MG/1
4 TABLET, ORALLY DISINTEGRATING ORAL EVERY 30 MIN PRN
Status: DISCONTINUED | OUTPATIENT
Start: 2024-01-01 | End: 2024-01-01 | Stop reason: HOSPADM

## 2024-01-01 RX ORDER — SODIUM CHLORIDE, SODIUM LACTATE, POTASSIUM CHLORIDE, CALCIUM CHLORIDE 600; 310; 30; 20 MG/100ML; MG/100ML; MG/100ML; MG/100ML
INJECTION, SOLUTION INTRAVENOUS CONTINUOUS
Status: DISCONTINUED | OUTPATIENT
Start: 2024-01-01 | End: 2024-01-01 | Stop reason: HOSPADM

## 2024-01-01 RX ORDER — DOXYCYCLINE 100 MG/1
100 CAPSULE ORAL EVERY 12 HOURS
Qty: 14 CAPSULE | Refills: 0 | Status: SHIPPED | OUTPATIENT
Start: 2024-01-01 | End: 2024-01-01

## 2024-01-01 RX ORDER — ATENOLOL 50 MG/1
50 TABLET ORAL DAILY
Qty: 90 TABLET | Refills: 4 | Status: SHIPPED | OUTPATIENT
Start: 2024-01-01

## 2024-01-01 RX ORDER — FERROUS SULFATE 220 (44)/5
220 ELIXIR ORAL DAILY
Qty: 473 ML | Refills: 0 | Status: SHIPPED | OUTPATIENT
Start: 2024-01-01

## 2024-01-01 RX ORDER — CEFUROXIME AXETIL 500 MG/1
500 TABLET ORAL EVERY 12 HOURS
Qty: 14 TABLET | Refills: 0 | Status: SHIPPED | OUTPATIENT
Start: 2024-01-01 | End: 2024-01-01

## 2024-01-01 RX ORDER — PROCHLORPERAZINE MALEATE 10 MG
10 TABLET ORAL EVERY 6 HOURS PRN
COMMUNITY
Start: 2023-01-01

## 2024-01-01 RX ORDER — NALOXONE HYDROCHLORIDE 0.4 MG/ML
0.2 INJECTION, SOLUTION INTRAMUSCULAR; INTRAVENOUS; SUBCUTANEOUS
Status: DISCONTINUED | OUTPATIENT
Start: 2024-01-01 | End: 2024-01-01 | Stop reason: HOSPADM

## 2024-01-01 RX ORDER — ONDANSETRON 2 MG/ML
4 INJECTION INTRAMUSCULAR; INTRAVENOUS EVERY 30 MIN PRN
Status: DISCONTINUED | OUTPATIENT
Start: 2024-01-01 | End: 2024-01-01 | Stop reason: HOSPADM

## 2024-01-01 RX ORDER — FERROUS SULFATE 220 (44)/5
220 ELIXIR ORAL DAILY
Qty: 473 ML | Refills: 0 | Status: SHIPPED | OUTPATIENT
Start: 2024-01-01 | End: 2024-01-01

## 2024-01-01 RX ORDER — PIPERACILLIN SODIUM, TAZOBACTAM SODIUM 4; .5 G/20ML; G/20ML
4.5 INJECTION, POWDER, LYOPHILIZED, FOR SOLUTION INTRAVENOUS EVERY 6 HOURS
Status: DISCONTINUED | OUTPATIENT
Start: 2024-01-01 | End: 2024-01-01

## 2024-01-01 RX ORDER — ACETAMINOPHEN 650 MG/1
650 SUPPOSITORY RECTAL EVERY 4 HOURS PRN
Status: DISCONTINUED | OUTPATIENT
Start: 2024-01-01 | End: 2024-01-01 | Stop reason: HOSPADM

## 2024-01-01 RX ORDER — IPRATROPIUM BROMIDE AND ALBUTEROL SULFATE 2.5; .5 MG/3ML; MG/3ML
3 SOLUTION RESPIRATORY (INHALATION) ONCE
Status: COMPLETED | OUTPATIENT
Start: 2024-01-01 | End: 2024-01-01

## 2024-01-01 RX ORDER — VANCOMYCIN/0.9 % SOD CHLORIDE 750MG/.15L
750 PLASTIC BAG, INJECTION (ML) INTRAVENOUS EVERY 12 HOURS
Status: DISCONTINUED | OUTPATIENT
Start: 2024-01-01 | End: 2024-01-01

## 2024-01-01 RX ORDER — DEXAMETHASONE SODIUM PHOSPHATE 10 MG/ML
4 INJECTION, SOLUTION INTRAMUSCULAR; INTRAVENOUS
Status: DISCONTINUED | OUTPATIENT
Start: 2024-01-01 | End: 2024-01-01 | Stop reason: HOSPADM

## 2024-01-01 RX ORDER — ATENOLOL 25 MG/1
25 TABLET ORAL DAILY
Status: DISCONTINUED | OUTPATIENT
Start: 2024-01-01 | End: 2024-01-01 | Stop reason: HOSPADM

## 2024-01-01 RX ORDER — CEFAZOLIN SODIUM/WATER 2 G/20 ML
2 SYRINGE (ML) INTRAVENOUS
Status: COMPLETED | OUTPATIENT
Start: 2024-01-01 | End: 2024-01-01

## 2024-01-01 RX ORDER — FENTANYL CITRATE 50 UG/ML
50 INJECTION, SOLUTION INTRAMUSCULAR; INTRAVENOUS EVERY 5 MIN PRN
Status: DISCONTINUED | OUTPATIENT
Start: 2024-01-01 | End: 2024-01-01 | Stop reason: HOSPADM

## 2024-01-01 RX ORDER — SIMVASTATIN 40 MG
40 TABLET ORAL AT BEDTIME
Status: DISCONTINUED | OUTPATIENT
Start: 2024-01-01 | End: 2024-01-01 | Stop reason: HOSPADM

## 2024-01-01 RX ORDER — NALOXONE HYDROCHLORIDE 0.4 MG/ML
0.4 INJECTION, SOLUTION INTRAMUSCULAR; INTRAVENOUS; SUBCUTANEOUS
Status: DISCONTINUED | OUTPATIENT
Start: 2024-01-01 | End: 2024-01-01 | Stop reason: HOSPADM

## 2024-01-01 RX ORDER — PROPOFOL 10 MG/ML
INJECTION, EMULSION INTRAVENOUS PRN
Status: DISCONTINUED | OUTPATIENT
Start: 2024-01-01 | End: 2024-01-01

## 2024-01-01 RX ORDER — ONDANSETRON 4 MG/1
4 TABLET, ORALLY DISINTEGRATING ORAL
Status: DISCONTINUED | OUTPATIENT
Start: 2024-01-01 | End: 2024-01-01 | Stop reason: HOSPADM

## 2024-01-01 RX ORDER — AMOXICILLIN 250 MG
1 CAPSULE ORAL 2 TIMES DAILY PRN
Status: DISCONTINUED | OUTPATIENT
Start: 2024-01-01 | End: 2024-01-01 | Stop reason: HOSPADM

## 2024-01-01 RX ORDER — OXYCODONE HYDROCHLORIDE 5 MG/1
2.5 TABLET ORAL EVERY 6 HOURS PRN
Qty: 10 TABLET | Refills: 0 | Status: SHIPPED | OUTPATIENT
Start: 2024-01-01 | End: 2024-01-01

## 2024-01-01 RX ORDER — ONDANSETRON 2 MG/ML
4 INJECTION INTRAMUSCULAR; INTRAVENOUS EVERY 6 HOURS PRN
Status: DISCONTINUED | OUTPATIENT
Start: 2024-01-01 | End: 2024-01-01 | Stop reason: HOSPADM

## 2024-01-01 RX ORDER — PANTOPRAZOLE SODIUM 40 MG/1
40 TABLET, DELAYED RELEASE ORAL
Status: DISCONTINUED | OUTPATIENT
Start: 2024-01-01 | End: 2024-01-01 | Stop reason: HOSPADM

## 2024-01-01 RX ORDER — HYDROMORPHONE HCL IN WATER/PF 6 MG/30 ML
0.2 PATIENT CONTROLLED ANALGESIA SYRINGE INTRAVENOUS EVERY 5 MIN PRN
Status: DISCONTINUED | OUTPATIENT
Start: 2024-01-01 | End: 2024-01-01 | Stop reason: HOSPADM

## 2024-01-01 RX ORDER — DEXAMETHASONE 4 MG/1
TABLET ORAL 3 TIMES DAILY
Status: ON HOLD | COMMUNITY
Start: 2023-01-01 | End: 2024-01-01

## 2024-01-01 RX ORDER — DOXYCYCLINE 100 MG/1
100 CAPSULE ORAL EVERY 12 HOURS SCHEDULED
Status: DISCONTINUED | OUTPATIENT
Start: 2024-01-01 | End: 2024-01-01 | Stop reason: HOSPADM

## 2024-01-01 RX ORDER — ACETAMINOPHEN 325 MG/1
650 TABLET ORAL
Status: DISCONTINUED | OUTPATIENT
Start: 2024-01-01 | End: 2024-01-01 | Stop reason: HOSPADM

## 2024-01-01 RX ORDER — CEFAZOLIN SODIUM/WATER 2 G/20 ML
2 SYRINGE (ML) INTRAVENOUS SEE ADMIN INSTRUCTIONS
Status: DISCONTINUED | OUTPATIENT
Start: 2024-01-01 | End: 2024-01-01 | Stop reason: HOSPADM

## 2024-01-01 RX ORDER — FOLIC ACID 0.8 MG
TABLET ORAL DAILY
Status: ON HOLD | COMMUNITY
Start: 2023-01-01 | End: 2024-01-01

## 2024-01-01 RX ORDER — SODIUM CHLORIDE, SODIUM LACTATE, POTASSIUM CHLORIDE, CALCIUM CHLORIDE 600; 310; 30; 20 MG/100ML; MG/100ML; MG/100ML; MG/100ML
INJECTION, SOLUTION INTRAVENOUS CONTINUOUS
Status: DISCONTINUED | OUTPATIENT
Start: 2024-01-01 | End: 2024-01-01

## 2024-01-01 RX ORDER — HEPARIN SODIUM (PORCINE) LOCK FLUSH IV SOLN 100 UNIT/ML 100 UNIT/ML
500 SOLUTION INTRAVENOUS EVERY 8 HOURS
Status: DISCONTINUED | OUTPATIENT
Start: 2024-01-01 | End: 2024-01-01 | Stop reason: HOSPADM

## 2024-01-01 RX ORDER — NALOXONE HYDROCHLORIDE 0.4 MG/ML
0.1 INJECTION, SOLUTION INTRAMUSCULAR; INTRAVENOUS; SUBCUTANEOUS
Status: DISCONTINUED | OUTPATIENT
Start: 2024-01-01 | End: 2024-01-01 | Stop reason: HOSPADM

## 2024-01-01 RX ORDER — IPRATROPIUM BROMIDE AND ALBUTEROL SULFATE 2.5; .5 MG/3ML; MG/3ML
3 SOLUTION RESPIRATORY (INHALATION)
Status: DISCONTINUED | OUTPATIENT
Start: 2024-01-01 | End: 2024-01-01 | Stop reason: HOSPADM

## 2024-01-01 RX ORDER — OXYCODONE HYDROCHLORIDE 5 MG/1
5 TABLET ORAL
Status: DISCONTINUED | OUTPATIENT
Start: 2024-01-01 | End: 2024-01-01 | Stop reason: HOSPADM

## 2024-01-01 RX ORDER — FENTANYL CITRATE 50 UG/ML
25 INJECTION, SOLUTION INTRAMUSCULAR; INTRAVENOUS EVERY 5 MIN PRN
Status: DISCONTINUED | OUTPATIENT
Start: 2024-01-01 | End: 2024-01-01 | Stop reason: HOSPADM

## 2024-01-01 RX ORDER — OXYCODONE HYDROCHLORIDE 5 MG/1
10 TABLET ORAL
Status: DISCONTINUED | OUTPATIENT
Start: 2024-01-01 | End: 2024-01-01 | Stop reason: HOSPADM

## 2024-01-01 RX ORDER — AMOXICILLIN 250 MG
2 CAPSULE ORAL 2 TIMES DAILY PRN
Status: DISCONTINUED | OUTPATIENT
Start: 2024-01-01 | End: 2024-01-01 | Stop reason: HOSPADM

## 2024-01-01 RX ORDER — FERROUS SULFATE 220 (44)/5
220 ELIXIR ORAL DAILY
Status: DISCONTINUED | OUTPATIENT
Start: 2024-01-01 | End: 2024-01-01 | Stop reason: HOSPADM

## 2024-01-01 RX ORDER — SENNA AND DOCUSATE SODIUM 50; 8.6 MG/1; MG/1
1 TABLET, FILM COATED ORAL PRN
Qty: 20 TABLET | Refills: 0 | Status: SHIPPED | OUTPATIENT
Start: 2024-01-01

## 2024-01-01 RX ORDER — LANOLIN ALCOHOL/MO/W.PET/CERES
3 CREAM (GRAM) TOPICAL
Status: DISCONTINUED | OUTPATIENT
Start: 2024-01-01 | End: 2024-01-01 | Stop reason: HOSPADM

## 2024-01-01 RX ORDER — CEFUROXIME AXETIL 250 MG/1
500 TABLET ORAL EVERY 12 HOURS SCHEDULED
Status: DISCONTINUED | OUTPATIENT
Start: 2024-01-01 | End: 2024-01-01 | Stop reason: HOSPADM

## 2024-01-01 RX ORDER — HYDROMORPHONE HCL IN WATER/PF 6 MG/30 ML
0.4 PATIENT CONTROLLED ANALGESIA SYRINGE INTRAVENOUS EVERY 5 MIN PRN
Status: DISCONTINUED | OUTPATIENT
Start: 2024-01-01 | End: 2024-01-01 | Stop reason: HOSPADM

## 2024-01-01 RX ORDER — HEPARIN SODIUM (PORCINE) LOCK FLUSH IV SOLN 100 UNIT/ML 100 UNIT/ML
500 SOLUTION INTRAVENOUS ONCE
Status: COMPLETED | OUTPATIENT
Start: 2024-01-01 | End: 2024-01-01

## 2024-01-01 RX ORDER — ACETAMINOPHEN 325 MG/1
650 TABLET ORAL EVERY 4 HOURS PRN
Status: DISCONTINUED | OUTPATIENT
Start: 2024-01-01 | End: 2024-01-01 | Stop reason: HOSPADM

## 2024-01-01 RX ORDER — SIMVASTATIN 40 MG
40 TABLET ORAL AT BEDTIME
Qty: 90 TABLET | Refills: 4 | Status: SHIPPED | OUTPATIENT
Start: 2024-01-01

## 2024-01-01 RX ORDER — LIDOCAINE HYDROCHLORIDE 20 MG/ML
INJECTION, SOLUTION INFILTRATION; PERINEURAL PRN
Status: DISCONTINUED | OUTPATIENT
Start: 2024-01-01 | End: 2024-01-01

## 2024-01-01 RX ORDER — CEFTRIAXONE 2 G/1
2 INJECTION, POWDER, FOR SOLUTION INTRAMUSCULAR; INTRAVENOUS EVERY 24 HOURS
Status: DISCONTINUED | OUTPATIENT
Start: 2024-01-01 | End: 2024-01-01

## 2024-01-01 RX ORDER — PROPOFOL 10 MG/ML
INJECTION, EMULSION INTRAVENOUS CONTINUOUS PRN
Status: DISCONTINUED | OUTPATIENT
Start: 2024-01-01 | End: 2024-01-01

## 2024-01-01 RX ORDER — FENTANYL CITRATE 50 UG/ML
25 INJECTION, SOLUTION INTRAMUSCULAR; INTRAVENOUS
Status: DISCONTINUED | OUTPATIENT
Start: 2024-01-01 | End: 2024-01-01 | Stop reason: HOSPADM

## 2024-01-01 RX ORDER — ONDANSETRON 4 MG/1
4 TABLET, ORALLY DISINTEGRATING ORAL EVERY 6 HOURS PRN
Status: DISCONTINUED | OUTPATIENT
Start: 2024-01-01 | End: 2024-01-01 | Stop reason: HOSPADM

## 2024-01-01 RX ORDER — PROCHLORPERAZINE MALEATE 5 MG
5 TABLET ORAL EVERY 6 HOURS PRN
Status: DISCONTINUED | OUTPATIENT
Start: 2024-01-01 | End: 2024-01-01 | Stop reason: HOSPADM

## 2024-01-01 RX ORDER — OXYCODONE AND ACETAMINOPHEN 5; 325 MG/1; MG/1
1 TABLET ORAL EVERY 6 HOURS PRN
Qty: 12 TABLET | Refills: 0 | Status: SHIPPED | OUTPATIENT
Start: 2024-01-01 | End: 2024-01-01

## 2024-01-01 RX ADMIN — PIPERACILLIN AND TAZOBACTAM 4.5 G: 4; .5 INJECTION, POWDER, LYOPHILIZED, FOR SOLUTION INTRAVENOUS at 06:47

## 2024-01-01 RX ADMIN — LIDOCAINE HYDROCHLORIDE 10 ML: 10 INJECTION, SOLUTION INFILTRATION; PERINEURAL at 12:42

## 2024-01-01 RX ADMIN — IPRATROPIUM BROMIDE AND ALBUTEROL SULFATE 3 ML: .5; 3 SOLUTION RESPIRATORY (INHALATION) at 12:15

## 2024-01-01 RX ADMIN — ACETAMINOPHEN 650 MG: 325 TABLET, FILM COATED ORAL at 16:11

## 2024-01-01 RX ADMIN — HEPARIN 500 UNITS: 100 SYRINGE at 09:36

## 2024-01-01 RX ADMIN — Medication 220 MG: at 11:18

## 2024-01-01 RX ADMIN — LIDOCAINE HYDROCHLORIDE 10 ML: 10 INJECTION, SOLUTION INFILTRATION; PERINEURAL at 13:05

## 2024-01-01 RX ADMIN — PANTOPRAZOLE SODIUM 40 MG: 40 TABLET, DELAYED RELEASE ORAL at 06:47

## 2024-01-01 RX ADMIN — ACETAMINOPHEN 650 MG: 325 TABLET, FILM COATED ORAL at 07:35

## 2024-01-01 RX ADMIN — SODIUM CHLORIDE, POTASSIUM CHLORIDE, SODIUM LACTATE AND CALCIUM CHLORIDE: 600; 310; 30; 20 INJECTION, SOLUTION INTRAVENOUS at 03:02

## 2024-01-01 RX ADMIN — DOXYCYCLINE HYCLATE 100 MG: 100 CAPSULE ORAL at 09:00

## 2024-01-01 RX ADMIN — Medication 1500 MG: at 09:22

## 2024-01-01 RX ADMIN — Medication 2 G: at 07:25

## 2024-01-01 RX ADMIN — LIDOCAINE HYDROCHLORIDE 40 MG: 20 INJECTION, SOLUTION INFILTRATION; PERINEURAL at 07:37

## 2024-01-01 RX ADMIN — HEPARIN 500 UNITS: 100 SYRINGE at 17:07

## 2024-01-01 RX ADMIN — ACETAMINOPHEN 650 MG: 325 TABLET, FILM COATED ORAL at 22:14

## 2024-01-01 RX ADMIN — ACETAMINOPHEN 650 MG: 325 TABLET, FILM COATED ORAL at 14:11

## 2024-01-01 RX ADMIN — CEFUROXIME AXETIL 500 MG: 250 TABLET, FILM COATED ORAL at 11:53

## 2024-01-01 RX ADMIN — Medication 1500 MG: at 23:31

## 2024-01-01 RX ADMIN — CEFTRIAXONE 2 G: 2 INJECTION, POWDER, FOR SOLUTION INTRAMUSCULAR; INTRAVENOUS at 11:18

## 2024-01-01 RX ADMIN — PROPOFOL 50 MG: 10 INJECTION, EMULSION INTRAVENOUS at 07:37

## 2024-01-01 RX ADMIN — SIMVASTATIN 40 MG: 40 TABLET, FILM COATED ORAL at 21:58

## 2024-01-01 RX ADMIN — VANCOMYCIN HYDROCHLORIDE 750 MG: 750 INJECTION, SOLUTION INTRAVENOUS at 21:58

## 2024-01-01 RX ADMIN — IPRATROPIUM BROMIDE AND ALBUTEROL SULFATE 3 ML: 2.5; .5 SOLUTION RESPIRATORY (INHALATION) at 12:13

## 2024-01-01 RX ADMIN — PANTOPRAZOLE SODIUM 40 MG: 40 TABLET, DELAYED RELEASE ORAL at 06:48

## 2024-01-01 RX ADMIN — PIPERACILLIN AND TAZOBACTAM 4.5 G: 4; .5 INJECTION, POWDER, LYOPHILIZED, FOR SOLUTION INTRAVENOUS at 00:27

## 2024-01-01 RX ADMIN — PIPERACILLIN AND TAZOBACTAM 4.5 G: 4; .5 INJECTION, POWDER, LYOPHILIZED, FOR SOLUTION INTRAVENOUS at 17:10

## 2024-01-01 RX ADMIN — PIPERACILLIN AND TAZOBACTAM 4.5 G: 4; .5 INJECTION, POWDER, LYOPHILIZED, FOR SOLUTION INTRAVENOUS at 12:23

## 2024-01-01 RX ADMIN — SIMVASTATIN 40 MG: 40 TABLET, FILM COATED ORAL at 21:52

## 2024-01-01 RX ADMIN — ACETAMINOPHEN 650 MG: 325 TABLET, FILM COATED ORAL at 04:15

## 2024-01-01 RX ADMIN — LIDOCAINE HYDROCHLORIDE 10 ML: 10 INJECTION, SOLUTION INFILTRATION; PERINEURAL at 14:05

## 2024-01-01 RX ADMIN — ACETAMINOPHEN 650 MG: 325 TABLET, FILM COATED ORAL at 16:08

## 2024-01-01 RX ADMIN — SODIUM CHLORIDE, POTASSIUM CHLORIDE, SODIUM LACTATE AND CALCIUM CHLORIDE: 600; 310; 30; 20 INJECTION, SOLUTION INTRAVENOUS at 13:22

## 2024-01-01 RX ADMIN — VANCOMYCIN HYDROCHLORIDE 750 MG: 750 INJECTION, SOLUTION INTRAVENOUS at 10:32

## 2024-01-01 RX ADMIN — PIPERACILLIN AND TAZOBACTAM 4.5 G: 4; .5 INJECTION, POWDER, LYOPHILIZED, FOR SOLUTION INTRAVENOUS at 22:39

## 2024-01-01 RX ADMIN — ACETAMINOPHEN 650 MG: 325 TABLET, FILM COATED ORAL at 03:30

## 2024-01-01 RX ADMIN — SODIUM CHLORIDE, POTASSIUM CHLORIDE, SODIUM LACTATE AND CALCIUM CHLORIDE 100 ML/HR: 600; 310; 30; 20 INJECTION, SOLUTION INTRAVENOUS at 07:25

## 2024-01-01 RX ADMIN — Medication 220 MG: at 11:53

## 2024-01-01 RX ADMIN — SODIUM CHLORIDE, POTASSIUM CHLORIDE, SODIUM LACTATE AND CALCIUM CHLORIDE: 600; 310; 30; 20 INJECTION, SOLUTION INTRAVENOUS at 01:05

## 2024-01-01 RX ADMIN — PROPOFOL 75 MCG/KG/MIN: 10 INJECTION, EMULSION INTRAVENOUS at 07:37

## 2024-01-01 RX ADMIN — LIDOCAINE HYDROCHLORIDE 6 ML: 10 INJECTION, SOLUTION EPIDURAL; INFILTRATION; INTRACAUDAL; PERINEURAL at 15:02

## 2024-01-01 RX ADMIN — PIPERACILLIN AND TAZOBACTAM 4.5 G: 4; .5 INJECTION, POWDER, LYOPHILIZED, FOR SOLUTION INTRAVENOUS at 03:37

## 2024-01-01 RX ADMIN — ATENOLOL 25 MG: 25 TABLET ORAL at 09:59

## 2024-01-01 RX ADMIN — SODIUM CHLORIDE 500 ML: 9 INJECTION, SOLUTION INTRAVENOUS at 22:37

## 2024-01-01 RX ADMIN — ATENOLOL 25 MG: 25 TABLET ORAL at 11:18

## 2024-01-01 ASSESSMENT — ENCOUNTER SYMPTOMS
WEAKNESS: 1
WHEEZING: 1
NEUROLOGICAL NEGATIVE: 1
ABDOMINAL PAIN: 0
FATIGUE: 1
HEMATOCHEZIA: 0
FEVER: 0
ACTIVITY CHANGE: 1
APPETITE CHANGE: 0
PSYCHIATRIC NEGATIVE: 1
FEVER: 1
FEVER: 0
HEMATURIA: 0
ARTHRALGIAS: 1
CHILLS: 0
COUGH: 0
CHEST TIGHTNESS: 1
SHORTNESS OF BREATH: 1
CHILLS: 0
CHEST TIGHTNESS: 0
CONSTIPATION: 0
GASTROINTESTINAL NEGATIVE: 1
PALPITATIONS: 0
COUGH: 1
SHORTNESS OF BREATH: 1

## 2024-01-01 ASSESSMENT — ACTIVITIES OF DAILY LIVING (ADL)
TOILETING_ISSUES: YES
ADLS_ACUITY_SCORE: 38
ADLS_ACUITY_SCORE: 37
ADLS_ACUITY_SCORE: 39
ADLS_ACUITY_SCORE: 39
ADLS_ACUITY_SCORE: 38
CHANGE_IN_FUNCTIONAL_STATUS_SINCE_ONSET_OF_CURRENT_ILLNESS/INJURY: NO
ADLS_ACUITY_SCORE: 36
ADLS_ACUITY_SCORE: 38
ADLS_ACUITY_SCORE: 38
ADLS_ACUITY_SCORE: 41
DIFFICULTY_EATING/SWALLOWING: NO
NUMBER_OF_TIMES_PATIENT_HAS_FALLEN_WITHIN_LAST_SIX_MONTHS: 1
ADLS_ACUITY_SCORE: 38
ADLS_ACUITY_SCORE: 38
TOILETING: 0-->INDEPENDENT
WALKING_OR_CLIMBING_STAIRS_DIFFICULTY: YES
ADLS_ACUITY_SCORE: 36
ADLS_ACUITY_SCORE: 39
ADLS_ACUITY_SCORE: 38
ADLS_ACUITY_SCORE: 38
TOILETING: 1-->ASSISTANCE (EQUIPMENT/PERSON) NEEDED
ADLS_ACUITY_SCORE: 38
TOILETING_MANAGEMENT: HESITANCY
ADLS_ACUITY_SCORE: 39
ADLS_ACUITY_SCORE: 38
ADLS_ACUITY_SCORE: 39
DRESSING/BATHING: BATHING DIFFICULTY, ASSISTANCE 1 PERSON
CONCENTRATING,_REMEMBERING_OR_MAKING_DECISIONS_DIFFICULTY: NO
ADLS_ACUITY_SCORE: 38
ADLS_ACUITY_SCORE: 38
CURRENT_FUNCTION: NO ASSISTANCE NEEDED
ADLS_ACUITY_SCORE: 36
ADLS_ACUITY_SCORE: 36
ADLS_ACUITY_SCORE: 39
ADLS_ACUITY_SCORE: 38
ADLS_ACUITY_SCORE: 39
ADLS_ACUITY_SCORE: 38
ADLS_ACUITY_SCORE: 37
ADLS_ACUITY_SCORE: 38
ADLS_ACUITY_SCORE: 39
ADLS_ACUITY_SCORE: 39
ADLS_ACUITY_SCORE: 31
DRESSING/BATHING_DIFFICULTY: YES
ADLS_ACUITY_SCORE: 39
EQUIPMENT_CURRENTLY_USED_AT_HOME: CANE, STRAIGHT;WALKER, STANDARD
DOING_ERRANDS_INDEPENDENTLY_DIFFICULTY: YES
ADLS_ACUITY_SCORE: 39
ADLS_ACUITY_SCORE: 41
FALL_HISTORY_WITHIN_LAST_SIX_MONTHS: YES
ADLS_ACUITY_SCORE: 41
ADLS_ACUITY_SCORE: 31
ADLS_ACUITY_SCORE: 37
ADLS_ACUITY_SCORE: 36
ADLS_ACUITY_SCORE: 39
ADLS_ACUITY_SCORE: 36
ADLS_ACUITY_SCORE: 39
WEAR_GLASSES_OR_BLIND: NO
ADLS_ACUITY_SCORE: 38
ADLS_ACUITY_SCORE: 39
ADLS_ACUITY_SCORE: 31
ADLS_ACUITY_SCORE: 41
ADLS_ACUITY_SCORE: 39
ADLS_ACUITY_SCORE: 41
TOILETING_ASSISTANCE: TOILETING DIFFICULTY, REQUIRES EQUIPMENT
ADLS_ACUITY_SCORE: 39
ADLS_ACUITY_SCORE: 41
ADLS_ACUITY_SCORE: 36
ADLS_ACUITY_SCORE: 38
ADLS_ACUITY_SCORE: 31
ADLS_ACUITY_SCORE: 38
WALKING_OR_CLIMBING_STAIRS: AMBULATION DIFFICULTY, REQUIRES EQUIPMENT
ADLS_ACUITY_SCORE: 39
ADLS_ACUITY_SCORE: 37
ADLS_ACUITY_SCORE: 39
DIFFICULTY_COMMUNICATING: NO
ADLS_ACUITY_SCORE: 36
DRESSING/BATHING_MANAGEMENT: DEPENDENT
ADLS_ACUITY_SCORE: 38
ADLS_ACUITY_SCORE: 37
ADLS_ACUITY_SCORE: 39
ADLS_ACUITY_SCORE: 41
ADLS_ACUITY_SCORE: 41
ADLS_ACUITY_SCORE: 37
ADLS_ACUITY_SCORE: 38
ADLS_ACUITY_SCORE: 38
ADLS_ACUITY_SCORE: 41
ADLS_ACUITY_SCORE: 39
ADLS_ACUITY_SCORE: 39
ADLS_ACUITY_SCORE: 38
ADLS_ACUITY_SCORE: 41
ADLS_ACUITY_SCORE: 38
ADLS_ACUITY_SCORE: 41

## 2024-01-01 ASSESSMENT — COLUMBIA-SUICIDE SEVERITY RATING SCALE - C-SSRS
1. IN THE PAST MONTH, HAVE YOU WISHED YOU WERE DEAD OR WISHED YOU COULD GO TO SLEEP AND NOT WAKE UP?: NO
6. HAVE YOU EVER DONE ANYTHING, STARTED TO DO ANYTHING, OR PREPARED TO DO ANYTHING TO END YOUR LIFE?: NO
2. HAVE YOU ACTUALLY HAD ANY THOUGHTS OF KILLING YOURSELF IN THE PAST MONTH?: NO
1. IN THE PAST MONTH, HAVE YOU WISHED YOU WERE DEAD OR WISHED YOU COULD GO TO SLEEP AND NOT WAKE UP?: NO
2. HAVE YOU ACTUALLY HAD ANY THOUGHTS OF KILLING YOURSELF IN THE PAST MONTH?: NO
2. HAVE YOU ACTUALLY HAD ANY THOUGHTS OF KILLING YOURSELF IN THE PAST MONTH?: NO
6. HAVE YOU EVER DONE ANYTHING, STARTED TO DO ANYTHING, OR PREPARED TO DO ANYTHING TO END YOUR LIFE?: NO
1. IN THE PAST MONTH, HAVE YOU WISHED YOU WERE DEAD OR WISHED YOU COULD GO TO SLEEP AND NOT WAKE UP?: NO
6. HAVE YOU EVER DONE ANYTHING, STARTED TO DO ANYTHING, OR PREPARED TO DO ANYTHING TO END YOUR LIFE?: NO

## 2024-01-01 ASSESSMENT — PAIN SCALES - GENERAL
PAINLEVEL: NO PAIN (0)

## 2024-01-11 PROBLEM — F19.982 DRUG-INDUCED INSOMNIA (H): Status: ACTIVE | Noted: 2024-01-01

## 2024-01-11 PROBLEM — D61.818 PANCYTOPENIA (H): Status: ACTIVE | Noted: 2024-01-01

## 2024-01-11 NOTE — PROGRESS NOTES
"SUBJECTIVE:   Wilfrid is a 83 year old, presenting for the following:  Medicare Visit (Annual & Est. Care)        1/11/2024     7:57 AM   Additional Questions   Roomed by Pippa Valencia LPN       Are you in the first 12 months of your Medicare coverage?  No    Healthy Habits:     In general, how would you rate your overall health?  Good    Frequency of exercise:  2-3 days/week    Duration of exercise:  15-30 minutes    Do you usually eat at least 4 servings of fruit and vegetables a day, include whole grains    & fiber and avoid regularly eating high fat or \"junk\" foods?  Yes    Taking medications regularly:  Yes    Medication side effects:  Not applicable    Ability to successfully perform activities of daily living:  No assistance needed    Home Safety:  No safety concerns identified    Hearing Impairment:  Difficulty understanding soft or whispered speech    In the past 6 months, have you been bothered by leaking of urine?  No    In general, how would you rate your overall mental or emotional health?  Excellent    Additional concerns today:  No      Today's PHQ-2 Score:       1/11/2024     8:01 AM   PHQ-2 ( 1999 Pf[employer])   Q1: Little interest or pleasure in doing things 1   Q2: Feeling down, depressed or hopeless 0   PHQ-2 Score 1   Q1: Little interest or pleasure in doing things Several days   Q2: Feeling down, depressed or hopeless Not at all   PHQ-2 Score 1           Have you ever done Advance Care Planning? (For example, a Health Directive, POLST, or a discussion with a medical provider or your loved ones about your wishes): Yes, advance care planning is on file.        Fall risk  Fallen 2 or more times in the past year?: No  Any fall with injury in the past year?: No    Cognitive Screening   1) Repeat 3 items (Leader, Season, Table)    2) Clock draw: ABNORMAL 11:05  3) 3 item recall: Recalls 3 objects  Results: 3 items recalled: COGNITIVE IMPAIRMENT LESS LIKELY    Mini-CogTM Copyright S Geovany. Licensed by the " author for use in [facility]; reprinted with permission ([email or web address]@[email or web address]). All rights reserved.      Do you have sleep apnea, excessive snoring or daytime drowsiness? : no    Reviewed and updated as needed this visit by clinical staff   Tobacco  Allergies  Meds              Reviewed and updated as needed this visit by Provider                 Social History     Tobacco Use    Smoking status: Never    Smokeless tobacco: Never    Tobacco comments:     smoked a pipe in college for 2-3 years (seldom)   Substance Use Topics    Alcohol use: Not Currently     Comment: Last use was 7/4/20 1/11/2024     8:00 AM   Alcohol Use   Prescreen: >3 drinks/day or >7 drinks/week? Not Applicable     Do you have a current opioid prescription? No  Do you use any other controlled substances or medications that are not prescribed by a provider? None      Had his first infusion last Wednesday for his lung cancer.   Side effects of dexamethasone.   Every 21 days and then repeat PET scan.   Following with Dr. Griggs          Current providers sharing in care for this patient include:   Patient Care Team:  No Ref-Primary, Physician as PCP - General  Roman Littlejohn MD as Itz Cancino MD as MD (Hematology & Oncology)  Pablo Hyman Stacy K, DO as Assigned PCP    The following health maintenance items are reviewed in Epic and correct as of today:  Health Maintenance   Topic Date Due    ZOSTER IMMUNIZATION (1 of 2) Never done    RSV VACCINE (Pregnancy & 60+) (1 - 1-dose 60+ series) Never done    INFLUENZA VACCINE (1) 09/01/2023    MEDICARE ANNUAL WELLNESS VISIT  01/09/2024    COLORECTAL CANCER SCREENING  01/07/2025 (Originally 1940)    FALL RISK ASSESSMENT  01/11/2025    ADVANCE CARE PLANNING  01/11/2029    DTAP/TDAP/TD IMMUNIZATION (2 - Td or Tdap) 12/03/2029    PHQ-2 (once per calendar year)  Completed    Pneumococcal Vaccine: 65+ Years  Completed    IPV IMMUNIZATION  Aged Out    HPV  "IMMUNIZATION  Aged Out    MENINGITIS IMMUNIZATION  Aged Out    RSV MONOCLONAL ANTIBODY  Aged Out    COVID-19 Vaccine  Discontinued               Review of Systems   Constitutional:  Negative for chills.   HENT:  Negative for congestion.    Respiratory:  Negative for cough.    Cardiovascular:  Negative for chest pain.   Gastrointestinal:  Negative for abdominal pain, constipation and hematochezia.   Genitourinary:  Negative for hematuria.         OBJECTIVE:   /78   Pulse 65   Temp 97.5  F (36.4  C) (Temporal)   Resp 18   Ht 1.651 m (5' 5\")   Wt 63.4 kg (139 lb 12.8 oz)   SpO2 100%   BMI 23.26 kg/m   Estimated body mass index is 23.26 kg/m  as calculated from the following:    Height as of this encounter: 1.651 m (5' 5\").    Weight as of this encounter: 63.4 kg (139 lb 12.8 oz).  Physical Exam  Vitals and nursing note reviewed.   Constitutional:       General: He is not in acute distress.     Appearance: He is well-developed. He is not diaphoretic.   HENT:      Head: Normocephalic and atraumatic.      Right Ear: Tympanic membrane, ear canal and external ear normal.      Left Ear: Tympanic membrane, ear canal and external ear normal.      Mouth/Throat:      Mouth: Mucous membranes are moist.      Pharynx: Oropharynx is clear.   Eyes:      General: No scleral icterus.     Conjunctiva/sclera: Conjunctivae normal.   Cardiovascular:      Rate and Rhythm: Normal rate and regular rhythm.      Heart sounds: No murmur heard.  Pulmonary:      Effort: Pulmonary effort is normal.      Breath sounds: Normal breath sounds. No wheezing.   Abdominal:      Palpations: Abdomen is soft. There is no mass.      Tenderness: There is no abdominal tenderness.   Musculoskeletal:         General: No deformity.      Cervical back: Neck supple.   Lymphadenopathy:      Cervical: No cervical adenopathy.   Skin:     General: Skin is warm and dry.      Coloration: Skin is not jaundiced.      Findings: No rash.   Neurological:      " Mental Status: He is alert and oriented to person, place, and time. Mental status is at baseline.   Psychiatric:         Mood and Affect: Mood normal.         Behavior: Behavior normal.         Thought Content: Thought content normal.           Diagnostic Test Results:  Labs reviewed in Epic    ASSESSMENT / PLAN:       ICD-10-CM    1. Medicare annual wellness visit, subsequent  Z00.00       2. Hypertension, unspecified type  I10 atenolol (TENORMIN) 50 MG tablet      3. Hyperlipidemia, unspecified hyperlipidemia type  E78.5 Lipid Panel     simvastatin (ZOCOR) 40 MG tablet      4. Abnormal glucose  R73.09 Hemoglobin A1c      5. Pancytopenia (H)  D61.818       6. 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  He was given his COVID booster today.  He will obtain influenza and RSV vaccines at the pharmacy.  He has graduated from colonoscopies and no longer is performing PSAs.  He has had extensive laboratory workup performed at his oncology appointment we will obtain lipids and A1c with his next lab draw here.  He will monitor for hypoglycemia with steroids with his lab draws.    Hyperlipidemia: Refill his simvastatin.    Hypertension: Blood pressure under good control today refill atenolol.    Drug-induced insomnia: Related to his prednisone previously with his polymyalgia rheumatica, now likely related to his dexamethasone use associated with his chemo.  Encouraged good sleep hygiene habits and was discussed today as well as trying some over-the-counter melatonin 1 mg nightly.  Declines a prescription for trazodone and will contact me if it becomes more of an issue.    Pancytopenia: Resolved on most recent labs from [facility].    Primary mucinous adenocarcinoma of the lung: Undergoing treatment at [facility].  Recurrent cancer with nodules in both  lungs as well as his pelvis.      Patient has been advised of split billing requirements and indicates understanding: Yes      COUNSELING:  Reviewed preventive health counseling, as reflected in patient instructions        He reports that he has never smoked. He has never used smokeless tobacco.      Appropriate preventive services were discussed with this patient, including applicable screening as appropriate for fall prevention, nutrition, physical activity, Tobacco-use cessation, weight loss and cognition.  Checklist reviewing preventive services available has been given to the patient.          Gilberto Carroll MD  [facility]    Identified Health Risks:  I have reviewed Opioid Use Disorder and Substance Use Disorder risk factors and made any needed referrals.    Special Stains Stage 3 - Results: Base On Clearance Noted Above

## 2024-01-11 NOTE — NURSING NOTE
"Chief Complaint   Patient presents with    Medicare Visit     Annual & Est. Care       Initial /78   Pulse 65   Temp 97.5  F (36.4  C) (Temporal)   Resp 18   Ht 1.651 m (5' 5\")   Wt 63.4 kg (139 lb 12.8 oz)   SpO2 100%   BMI 23.26 kg/m   Estimated body mass index is 23.26 kg/m  as calculated from the following:    Height as of this encounter: 1.651 m (5' 5\").    Weight as of this encounter: 63.4 kg (139 lb 12.8 oz).  Medication Review: complete    The next two questions are to help us understand your food security.  If you are feeling you need any assistance in this area, we have resources available to support you today.          1/11/2024   SDOH- Food Insecurity   Within the past 12 months, did you worry that your food would run out before you got money to buy more? N   Within the past 12 months, did the food you bought just not last and you didn t have money to get more? N         Health Care Directive:  Patient has a Health Care Directive on file      Verenice Valencia LPN      "

## 2024-02-26 NOTE — ED PROVIDER NOTES
11:10 AM report from Dr. Burgos at change of shift to resume care.     Wilfrid Meneses is an 83 year old male patient that presented to the ER today with chest tightness, shortness of breath. He is currently receiving chemotherapy treatment for lung cancer at [facility]. Symptoms since his last chemo treatment 2/12/24  Chest xray shows a pleural effusion on the right side that appears stable from previous 12/18/23. EKG stable. Negative covid/flu/rsv. Dr. Burgos suggested trying solumedol, neb for shortness of breath; steroid inhaler discharge RX.  Awaiting lab results: troponin, CBC, BMP, CMP, Mag.   HX metastatic adenocarcinoma of the lung tx with carboplatin, pemetrexed, and pembrolizumab     11:30: AM CBC: WBC: 4.4, RBC 2.59, Hgb: 8.9, hematocrit 26.0, MCH 34.4 Platelets 75;   Compared to recent labs  2/12/24: Hbg 10.7 , platelets 234  CMP: stable, Magnesium: normal. Troponin: 19  BNP: 456, stable  Patient is alert and orientated. He is not ill appearing. Denies current chest pain. Lung sounds diminished right side, insp wheeze. Clear left sided lung sounds. Speaking in short sentences, he is not distressed. He reports that he is feeling ok right now.   2/12/24:  discussed that with hgb changes patient will need to be scoped     12:08 PM Patient denies dark, bloody or tarry stools. Vitals: Sats % on room air, 132/65, HR 61, RR 20. Contacted patient's oncologist at [facility], [phone]; awaiting call back    Meds: Duoneb: reports little relief, however, patient reports he was feeling better prior to neb treatment    1:12 PM Spoke again with Dr. Ramos's nurse [facility] oncology, [place]. She contacted Dr. Ramos and provided him the lab work, symptom information I discussed with her earlier. Relayed to me that if patient is feeling ok he can discharge home, follow up with clinic appointment with oncology on March 4th in [place] as planned. I think this is a reasonable plan today.    Patient feels  comfortable discharging home. He is feeling better. Denies chest pain. Vitals are stable, no hypoxia on room air. Shortness of breath may be related to anemia, likely side effect of chemotherapy treatment. Troponin, BNP, EKG unremarkable for cardiac cause today. Chest xray stable compared to previous.  Return to ER if symptoms do not improve or worsen. Patient gave verbal understanding of information and is discharging home in stable condition.     Diagnosis this visit:    Shortness of breath      Anemia       Yvonne Tse, RAFAEL CNP  02/26/24 [id number]

## 2024-02-26 NOTE — DISCHARGE INSTRUCTIONS
You have symptoms of a heart attack. These may include:  Chest pain or pressure, or a strange feeling in the chest.  Sweating.  Shortness of breath.  Nausea or vomiting.  Pain, pressure, or a strange feeling in the back, neck, jaw, or upper belly or in one or both shoulders or arms.  Lightheadedness or sudden weakness.  A fast or irregular heartbeat.  After you call 911, the  may tell you to chew 1 adult-strength or 2 to 4 low-dose aspirin. Wait for an ambulance. Do not try to drive yourself.     You passed out (lost consciousness).     You have severe shortness of breath.   Call your doctor now or seek immediate medical care if:    You have new or increased shortness of breath.     You are dizzy or lightheaded, or you feel like you may faint.     You have new or worse nausea and vomiting.     Your fatigue and weakness continue or get worse.     You have any abnormal bleeding, such as:  Nosebleeds.  Vaginal bleeding that is different (heavier, more frequent, at a different time of the month) than what you are used to.  Bloody or black stools, or rectal bleeding.  Bloody or pink urine.     Keep appointment for oncology on Monday, March 4th. Return if symptoms worsen

## 2024-02-26 NOTE — ED PROVIDER NOTES
History     Chief Complaint   Patient presents with    Shortness of Breath     HPI  Wilfrid Meneses is a 83 year old male who is currently undergoing treatment for lung cancer with carboplatin, pemetrexed, and pembrolizumab through [facility]. He reports some chest tightness that has been present ever since starting chemotherapy.  He does not have the tightness currently, but is much more short of breath in the past day.  No history of asthma.  No fever.  No cough.  No ill contacts.  He did receive prednisone for rash a few weeks ago and that did seem to help his breathing.    Allergies:  Allergies   Allergen Reactions    Paclitaxel Other (See Comments) and Cough     flushing    Ace Inhibitors Cough    Aspirin Other (See Comments)     Epistaxis requiring hospitalization from 81 mg daily      Durvalumab     Hydrocodone-Acetaminophen Other (See Comments)     Other reaction(s): Insomnia      Sulfamethoxazole-Trimethoprim Itching and Rash    Sulfasalazine Rash       Problem List:    Patient Active Problem List    Diagnosis Date Noted    Pancytopenia (H) 01/11/2024     Priority: Medium    Drug-induced insomnia (H) 01/11/2024     Priority: Medium    Alcohol dependence (H) 04/26/2022     Priority: Medium    Primary mucinous adenocarcinoma of lung (H)      Priority: Medium    Benign prostatic hyperplasia with urinary obstruction 01/16/2018     Priority: Medium    Lumbar spinal stenosis 01/16/2018     Priority: Medium    Hypertension 01/16/2018     Priority: Medium    Hyperlipidemia 01/16/2018     Priority: Medium    Benign neoplasm of colon 01/16/2018     Priority: Medium     Overview:   History of colonic polyps, colonoscopy 1/01, follow up done 2006, normal      Osteoarthritis of left glenohumeral joint 12/04/2017     Priority: Medium    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     Priority: Medium    Dysarthria 05/18/2016     Priority: Medium    Coronary artery calcification 12/01/2015     Priority: Medium    Abnormal  glucose 12/14/2011     Priority: Medium        Past Medical History:    Past Medical History:   Diagnosis Date    Basal cell carcinoma of back 12/01/2014    Coronary artery calcification     Dysarthria     Enlarged prostate without lower urinary tract symptoms (luts)     Essential (primary) hypertension     History of colonic polyps     Hyperglycemia     Hyperlipidemia     Malignant neoplasm of skin     PMR (polymyalgia rheumatica) (H24)     Primary mucinous adenocarcinoma of lung (H)     Spinal stenosis of lumbar region without neurogenic claudication     Uncomplicated alcohol abuse        Past Surgical History:    Past Surgical History:   Procedure Laterality Date    ARTHROPLASTY KNEE Left     01/09    ARTHROPLASTY SHOULDER Left 2018    ARTHROSCOPY KNEE Left     12/05    COLONOSCOPY      2001, 2006, 2011,Adenomatous 2001, subsequent WNL    COLONOSCOPY  11/21/2014 11/21/2014,Adenoma    COLONOSCOPY  01/07/2020    tubular adenomas, follow up 5 years    HERNIORRHAPHY INGUINAL BILATERAL      PORTACATH PLACEMENT      PROSTATECTOMY SUPRAPUBIC  2007    UPPER GI ENDOSCOPY  2005       Family History:    Family History   Problem Relation Age of Onset    Diabetes Mother     Chronic Obstructive Pulmonary Disease Mother     Myocardial Infarction Father     Other - See Comments Son         MI during GXT    Other - See Comments Sister         PMR    Cancer No family hx of        Social History:  Marital Status:   [2]  Social History     Tobacco Use    Smoking status: Never    Smokeless tobacco: Never    Tobacco comments:     smoked a pipe in college for 2-3 years (seldom)   Vaping Use    Vaping Use: Never used   Substance Use Topics    Alcohol use: Not Currently     Comment: Last use was 7/4/20    Drug use: No        Medications:    atenolol (TENORMIN) 50 MG tablet  dexAMETHasone (DECADRON) 4 MG tablet  folic acid 800 MCG tablet  Multiple Vitamin (MULTI-VITAMINS) TABS  prochlorperazine (COMPAZINE) 10 MG  "tablet  simvastatin (ZOCOR) 40 MG tablet          Review of Systems   Constitutional:  Negative for appetite change and fever.   HENT: Negative.     Respiratory:  Positive for chest tightness, shortness of breath and wheezing.    Cardiovascular:  Negative for palpitations and leg swelling.   Gastrointestinal: Negative.    Genitourinary: Negative.    Skin: Negative.    Neurological: Negative.    Psychiatric/Behavioral: Negative.         Physical Exam   BP: (!) 133/97  Pulse: 83  Temp: (!) 96.3  F (35.7  C)  Resp: 22  Height: 162.6 cm (5' 4\")  Weight: 61.7 kg (136 lb)  SpO2: 97 %      Physical Exam  Vitals and nursing note reviewed.   Constitutional:       Appearance: He is not ill-appearing.   HENT:      Head: Normocephalic.      Mouth/Throat:      Mouth: Mucous membranes are moist.      Pharynx: Oropharynx is clear.   Cardiovascular:      Rate and Rhythm: Normal rate and regular rhythm.   Pulmonary:      Effort: Tachypnea present.      Breath sounds: Wheezing present. No rales.   Musculoskeletal:         General: Normal range of motion.      Cervical back: Normal range of motion.      Right lower leg: No edema.      Left lower leg: No edema.   Skin:     General: Skin is warm and dry.   Neurological:      General: No focal deficit present.      Mental Status: He is alert.   Psychiatric:         Mood and Affect: Mood normal.         Behavior: Behavior normal.         ED Course              ED Course as of 02/26/24 1059   Mon Feb 26, 2024   1017 Chest tightness since starting chemotherapy. Now more SOB. Has not used an inhaler. Due to broad differential, ordered labs and CXR before providing treatment. Not hypoxic.   1053 CXR with right pleural effusion. That appears stable compared to CXR from [facility] 12/18/23.   Bedside ultrasound shows right pleural effusion. No b-lines on left side. Cardiac function appears normal.     Procedures              EKG Interpretation:      Interpreted by Rishi Burgos MD  Time " reviewed: 1057  Symptoms at time of EKG: shortness of breath   Rhythm: sinus bradycardia  Rate: normal  Axis: normal  Ectopy: none  Conduction: normal  ST Segments/ T Waves: No ST-T wave changes  Q Waves: none  Comparison to prior: appears stable, was sinus tachycardia in 2021    Clinical Impression: normal EKG         Results for orders placed or performed during the hospital encounter of 02/26/24 (from the past 24 hour(s))   Symptomatic Influenza A/B, RSV, & SARS-CoV2 PCR (COVID-19) Nose    Specimen: Nose; Swab   Result Value Ref Range    Influenza A PCR Negative Negative    Influenza B PCR Negative Negative    RSV PCR Negative Negative    SARS CoV2 PCR Negative Negative    Narrative    Testing was performed using the Xpert Xpress CoV2/Flu/RSV Assay on the Cepheid GeneXpert Instrument. This test should be ordered for the detection of SARS-CoV-2, influenza, and RSV viruses in individuals who meet clinical and/or epidemiological criteria. Test performance is unknown in asymptomatic patients. This test is for in vitro diagnostic use under the FDA EUA for laboratories certified under CLIA to perform high or moderate complexity testing. This test has not been FDA cleared or approved. A negative result does not rule out the presence of PCR inhibitors in the specimen or target RNA in concentration below the limit of detection for the assay. If only one viral target is positive but coinfection with multiple targets is suspected, the sample should be re-tested with another FDA cleared, approved, or authorized test, if coinfection would change clinical management. This test was validated by the [employer]. These laboratories are certified under the Clinical Laboratory Improvement Amendments of 1988 (CLIA-88) as qualified to perform high complexity laboratory testing.   XR Chest 2 Views    Narrative    PROCEDURE:  XR CHEST 2 VIEWS    HISTORY: chest discomfort, SOB    COMPARISON:  PET/CT 12/6/2023; chest  radiograph 8/10/2023    FINDINGS: PA and lateral chest radiographs  Left chest Port-A-Cath. Stable right lung surgical changes with right  chest volume loss. Stable prominent right hilum. Small right pleural  effusion.  Cardiomediastinal silhouette is within normal limits. There is  calcific aortic atherosclerosis.  Right perihilar opacities. No pneumothorax.    No suspicious osseous lesion or subdiaphragmatic free air.  Left shoulder arthroplasty.      Impression    IMPRESSION:    Right pleural effusion and right perihilar opacities, favored to  represent pulmonary vascular congestion. Infection not excluded.    JULIETA VERONICA MD         SYSTEM ID:  [id number]   CBC with platelets differential    Narrative    The following orders were created for panel order CBC with platelets differential.  Procedure                               Abnormality         Status                     ---------                               -----------         ------                     CBC with platelets and d...[[id number]]                                                   Please view results for these tests on the individual orders.   Extra Tube    Narrative    The following orders were created for panel order Extra Tube.  Procedure                               Abnormality         Status                     ---------                               -----------         ------                     Extra Blue Top Tube[[id number]]                              In process                   Please view results for these tests on the individual orders.       Medications - No data to display    Assessments & Plan (with Medical Decision Making)     I have reviewed the nursing notes.    Labs needed to be redrawn.  Chest x-ray shows appearance of stable right pleural effusion.  EKG without concerning findings.  Workup is incomplete.  Patient signed over to Yvonne Tse NP for further ED evaluation and treatment.    Rishi Burgos MD   2/26/2024   [facility]       Rishi Burgos MD  02/26/24 [id number]

## 2024-02-26 NOTE — ED TRIAGE NOTES
"Patient here with increasing SOB.  Patient is currently getting treatment for lung cancer and is getting chemo. Patient also has intermittent chest pain but denies chest pain at this time.  Patient does not have home oxygen.    BP (!) 133/97   Pulse 83   Temp (!) 96.3  F (35.7  C) (Temporal)   Resp 22   Ht 1.626 m (5' 4\")   Wt 61.7 kg (136 lb)   SpO2 97%   BMI 23.34 kg/m         Triage Assessment (Adult)       Row Name 02/26/24 0944          Triage Assessment    Airway WDL WDL        Respiratory WDL    Respiratory WDL X        Skin Circulation/Temperature WDL    Skin Circulation/Temperature WDL WDL        Cardiac WDL    Cardiac WDL X;chest pain        Peripheral/Neurovascular WDL    Peripheral Neurovascular WDL WDL        Cognitive/Neuro/Behavioral WDL    Cognitive/Neuro/Behavioral WDL WDL                     "

## 2024-04-24 NOTE — DISCHARGE INSTRUCTIONS
ULTRASOUND GUIDED THORACENTESIS    Ultrasound guided thoracentesis is a procedure which involves the insertion of a needle into your chest to remove fluid from the space between the lungs and wall of your chest.  This is done to find out the cause of the extra fluid and/or to relieve the symptoms caused by it.  Because this requires the insertion of a needle into the chest, there is a small risk of bleeding, infection, and/or collapse of the lung, which could result in some shortness of breath.          ACTIVITY:  Rest the remainder of the day.  You may resume normal activity the next day.  Avoid any vigorous physical activity for 24 hours    COMFORT:  If you have discomfort or tenderness at the site you may take your usual or recommended pain medication.  Do not take aspirin the day of the procedure.    DIET:   You may resume your usual diet    CARE OF SITE:  Keep the bandage on for 24 hours.  Then you may remove the bandage and shower.  You may put on a clean Band-Aid or leave it open to air.    RETURN TO AN EMERGENCY ROOM FOR:  Shortness of breath  Rapid heart rate  Pain becomes worse    CALL YOUR DOCTOR FOR:  A fever over 101 degrees  Increased redness, increased swelling, and/or persistent drainage/discomfort around the site    For questions, problems or concerns, contact the Radiology Department at [phone]

## 2024-04-24 NOTE — ED TRIAGE NOTES
Pt arrives via private vehicle with c/o needing a thoracentesis. Pt states he saw his cancer doctor and they told him he needed an emergent thoracentesis on Monday. Pt has been getting more SOB and unable to get through a sentence without taking a deep breath. Pt has stage four non small cell lung cancer.      Triage Assessment (Adult)       Row Name 04/24/24 3607          Triage Assessment    Airway WDL WDL        Respiratory WDL    Respiratory WDL X;rhythm/pattern     Rhythm/Pattern, Respiratory shortness of breath        Skin Circulation/Temperature WDL    Skin Circulation/Temperature WDL WDL        Cardiac WDL    Cardiac WDL WDL        Peripheral/Neurovascular WDL    Peripheral Neurovascular WDL WDL        Cognitive/Neuro/Behavioral WDL    Cognitive/Neuro/Behavioral WDL WDL

## 2024-04-24 NOTE — ED NOTES
Uneventful thoracentesis.  Patient tolerated procedure well.  900 mLs of clear and yellow colored fluid from right pleural cavity.      Fluid was not sent for lab.      xray ordered to verify no pneumothorax.      Report to ER nurse.

## 2024-04-24 NOTE — ED PROVIDER NOTES
History     Chief Complaint   Patient presents with    Shortness of Breath     HPI  Wilfrid Meneses is a 83 year old male who presents to the ED with increasing dyspnea and needing a thoracentesis.  No fevers or chills, no chest pain.  History of lung cancer.    Reviewed nurses notes below, similar history as related to me.  Pt arrives via private vehicle with c/o needing a thoracentesis. Pt states he saw his cancer doctor and they told him he needed an emergent thoracentesis on Monday. Pt has been getting more SOB and unable to get through a sentence without taking a deep breath. Pt has stage four non small cell lung cancer.      Allergies:  Allergies   Allergen Reactions    Paclitaxel Other (See Comments) and Cough     flushing    Ace Inhibitors Cough    Aspirin Other (See Comments)     Epistaxis requiring hospitalization from 81 mg daily      Durvalumab     Hydrocodone-Acetaminophen Other (See Comments)     Other reaction(s): Insomnia      Sulfamethoxazole-Trimethoprim Itching and Rash    Sulfasalazine Rash       Problem List:    Patient Active Problem List    Diagnosis Date Noted    Pancytopenia (H) 01/11/2024     Priority: Medium    Drug-induced insomnia (H) 01/11/2024     Priority: Medium    Alcohol dependence (H) 04/26/2022     Priority: Medium    Primary mucinous adenocarcinoma of lung (H)      Priority: Medium    Benign prostatic hyperplasia with urinary obstruction 01/16/2018     Priority: Medium    Lumbar spinal stenosis 01/16/2018     Priority: Medium    Hypertension 01/16/2018     Priority: Medium    Hyperlipidemia 01/16/2018     Priority: Medium    Benign neoplasm of colon 01/16/2018     Priority: Medium     Overview:   History of colonic polyps, colonoscopy 1/01, follow up done 2006, normal      Osteoarthritis of left glenohumeral joint 12/04/2017     Priority: Medium    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     Priority: Medium    Dysarthria 05/18/2016     Priority: Medium    Coronary artery  calcification 12/01/2015     Priority: Medium    Abnormal glucose 12/14/2011     Priority: Medium        Past Medical History:    Past Medical History:   Diagnosis Date    Basal cell carcinoma of back 12/01/2014    Coronary artery calcification     Dysarthria     Enlarged prostate without lower urinary tract symptoms (luts)     Essential (primary) hypertension     History of colonic polyps     Hyperglycemia     Hyperlipidemia     Malignant neoplasm of skin     PMR (polymyalgia rheumatica) (H24)     Primary mucinous adenocarcinoma of lung (H)     Spinal stenosis of lumbar region without neurogenic claudication     Uncomplicated alcohol abuse        Past Surgical History:    Past Surgical History:   Procedure Laterality Date    ARTHROPLASTY KNEE Left     01/09    ARTHROPLASTY SHOULDER Left 2018    ARTHROSCOPY KNEE Left     12/05    COLONOSCOPY      2001, 2006, 2011,Adenomatous 2001, subsequent WNL    COLONOSCOPY  11/21/2014 11/21/2014,Adenoma    COLONOSCOPY  01/07/2020    tubular adenomas, follow up 5 years    HERNIORRHAPHY INGUINAL BILATERAL      PORTACATH PLACEMENT      PROSTATECTOMY SUPRAPUBIC  2007    UPPER GI ENDOSCOPY  2005       Family History:    Family History   Problem Relation Age of Onset    Diabetes Mother     Chronic Obstructive Pulmonary Disease Mother     Myocardial Infarction Father     Other - See Comments Son         MI during GXT    Other - See Comments Sister         PMR    Cancer No family hx of        Social History:  Marital Status:   [2]  Social History     Tobacco Use    Smoking status: Never    Smokeless tobacco: Never    Tobacco comments:     smoked a pipe in college for 2-3 years (seldom)   Vaping Use    Vaping status: Never Used   Substance Use Topics    Alcohol use: Not Currently     Comment: Last use was 7/4/20    Drug use: No        Medications:    oxyCODONE (ROXICODONE) 5 MG tablet  atenolol (TENORMIN) 50 MG tablet  dexAMETHasone (DECADRON) 4 MG tablet  folic acid 800 MCG  "tablet  Multiple Vitamin (MULTI-VITAMINS) TABS  omeprazole (PRILOSEC) 20 MG DR capsule  prochlorperazine (COMPAZINE) 10 MG tablet  simvastatin (ZOCOR) 40 MG tablet          Review of Systems   Constitutional:  Negative for chills and fever.   Respiratory:  Negative for chest tightness.        Physical Exam   BP: 124/73  Pulse: 85  Temp: 97  F (36.1  C)  Resp: 26  Height: 162.6 cm (5' 4\")  Weight: 62.6 kg (138 lb)  SpO2: 92 %      Physical Exam  Vitals and nursing note reviewed.   Constitutional:       Appearance: He is well-developed.   HENT:      Mouth/Throat:      Pharynx: Oropharynx is clear.   Pulmonary:      Breath sounds: Decreased breath sounds present.   Neurological:      Mental Status: He is alert.         Procedures    EKG: Sinus rhythm, occasional PVC no significant clinical change    Results for orders placed or performed during the hospital encounter of 04/24/24 (from the past 24 hour(s))   CBC with platelets differential    Narrative    The following orders were created for panel order CBC with platelets differential.  Procedure                               Abnormality         Status                     ---------                               -----------         ------                     CBC with platelets and d...[[id number]]  Abnormal            Final result                 Please view results for these tests on the individual orders.   Comprehensive metabolic panel   Result Value Ref Range    Sodium 128 (L) 135 - 145 mmol/L    Potassium 4.2 3.4 - 5.3 mmol/L    Carbon Dioxide (CO2) 22 22 - 29 mmol/L    Anion Gap 15 7 - 15 mmol/L    Urea Nitrogen 17.6 8.0 - 23.0 mg/dL    Creatinine 0.78 0.67 - 1.17 mg/dL    GFR Estimate 88 >60 mL/min/1.73m2    Calcium 8.8 8.8 - 10.2 mg/dL    Chloride 91 (L) 98 - 107 mmol/L    Glucose 113 (H) 70 - 99 mg/dL    Alkaline Phosphatase 147 40 - 150 U/L    AST 28 0 - 45 U/L    ALT 14 0 - 70 U/L    Protein Total 6.3 (L) 6.4 - 8.3 g/dL    Albumin 3.6 3.5 - 5.2 g/dL    " Bilirubin Total 0.9 <=1.2 mg/dL   CBC with platelets and differential   Result Value Ref Range    WBC Count 6.4 4.0 - 11.0 10e3/uL    RBC Count 2.43 (L) 4.40 - 5.90 10e6/uL    Hemoglobin 8.5 (L) 13.3 - 17.7 g/dL    Hematocrit 25.1 (L) 40.0 - 53.0 %     (H) 78 - 100 fL    MCH 35.0 (H) 26.5 - 33.0 pg    MCHC 33.9 31.5 - 36.5 g/dL    RDW 14.7 10.0 - 15.0 %    Platelet Count 269 150 - 450 10e3/uL    % Neutrophils 70 %    % Lymphocytes 15 %    % Monocytes 14 %    % Eosinophils 1 %    % Basophils 0 %    % Immature Granulocytes 1 %    NRBCs per 100 WBC 0 <1 /100    Absolute Neutrophils 4.5 1.6 - 8.3 10e3/uL    Absolute Lymphocytes 1.0 0.8 - 5.3 10e3/uL    Absolute Monocytes 0.9 0.0 - 1.3 10e3/uL    Absolute Eosinophils 0.0 0.0 - 0.7 10e3/uL    Absolute Basophils 0.0 0.0 - 0.2 10e3/uL    Absolute Immature Granulocytes 0.0 <=0.4 10e3/uL    Absolute NRBCs 0.0 10e3/uL   Extra Tube    Narrative    The following orders were created for panel order Extra Tube.  Procedure                               Abnormality         Status                     ---------                               -----------         ------                     Extra Blue Top Tube[[id number]]                              Final result               Extra Red Top Tube[[id number]]                               Final result               Extra Green Top (Lithium...[[id number]]                      Final result                 Please view results for these tests on the individual orders.   Extra Blue Top Tube   Result Value Ref Range    Hold Specimen x    Extra Red Top Tube   Result Value Ref Range    Hold Specimen x    Extra Green Top (Lithium Heparin) ON ICE   Result Value Ref Range    Hold Specimen x    XR Chest Port 1 View    Narrative    PROCEDURE:  XR CHEST PORT 1 VIEW    HISTORY: SOB, CP. .    COMPARISON:  4/22/2024    FINDINGS:  A left chest port is in place.  The cardiomediastinal contours are partially obscured.  There is a large right pleural  effusion, similar to 4/22/2024, worse  when compared to 2/26/2024. The left lung and pleural space are clear.      Impression    IMPRESSION: Enlarging right pleural effusion.    ANTHONY MACKENZIE MD         SYSTEM ID:  [id number]   XR Chest Port 2 Views    Narrative    PROCEDURE:  XR CHEST PORT 2 VIEWS    HISTORY:  post thora.     COMPARISON:  None.    FINDINGS:   The cardiac silhouette is normal in size. The pulmonary vasculature is  normal.  There is no pneumothorax following thoracentesis. The  right-sided pleural effusion has improved following the thoracentesis.  There is compressive atelectasis in the right lung.      Impression    IMPRESSION:  No pneumothorax following thoracentesis      SHIRAZ SIU MD         SYSTEM ID:  [id number]       Medications   lidocaine 1 % 10 mL (6 mLs Infiltration $Given 4/24/24 1502)       Assessments & Plan (with Medical Decision Making)     I have reviewed the nursing notes.    I have reviewed the findings, diagnosis, plan and need for follow up with the patient.          Medical Decision Making  The patient's presentation was of moderate complexity (a chronic illness mild to moderate exacerbation, progression, or side effect of treatment).    The patient's evaluation involved:  review of external note(s) from 3+ sources (see separate area of note for details)    The patient's management necessitated moderate risk (prescription drug management including medications given in the ED).    Sent patient over to interventional radiology for ultrasound-guided thoracentesis.  That went smoothly, patient had no immediate complications.  No fluid was sent as this was only therapeutic.  Discussed with patient that he may need to be set up for these periodically.  He will discuss with primary.    Return with worsening symptoms.  Patient has been taking very little for pain and it is my impression that he would do better with a little more pain control particularly postprocedure.  Will  send him home with oxycodone as below.  Return to ED with fevers chills, shortness of breath or worsening symptoms.  Patient verbalized understanding of plan is agreement left ED in improving condition.      Discharge Medication List as of 4/24/2024  5:02 PM        START taking these medications    Details   oxyCODONE (ROXICODONE) 5 MG tablet Take 0.5 tablets (2.5 mg) by mouth every 6 hours as needed for pain, breakthrough pain, moderate pain or moderate to severe pain, Disp-10 tablet, R-0, E-Prescribe             Final diagnoses:   Pleural effusion on right   Primary mucinous adenocarcinoma of lung (H)   Hyponatremia       4/24/2024   [facility]       Ralph Fay MD  05/01/24 4870

## 2024-04-25 PROBLEM — J90 PLEURAL EFFUSION ON RIGHT: Status: ACTIVE | Noted: 2024-01-01

## 2024-04-25 PROBLEM — J18.9 RIGHT LOWER LOBE PNEUMONIA: Status: ACTIVE | Noted: 2024-01-01

## 2024-04-25 PROBLEM — R50.9 FEVER: Status: ACTIVE | Noted: 2024-01-01

## 2024-04-25 PROBLEM — J90 PLEURAL EFFUSION: Status: ACTIVE | Noted: 2024-01-01

## 2024-04-25 PROBLEM — E87.1 HYPONATREMIA: Status: ACTIVE | Noted: 2024-01-01

## 2024-04-25 PROBLEM — R07.89 LEFT-SIDED CHEST WALL PAIN: Status: ACTIVE | Noted: 2024-01-01

## 2024-04-25 PROBLEM — R50.9 FEVER, UNSPECIFIED: Status: ACTIVE | Noted: 2024-01-01

## 2024-04-26 PROBLEM — Z20.822 LAB TEST NEGATIVE FOR COVID-19 VIRUS: Status: ACTIVE | Noted: 2024-01-01

## 2024-04-26 PROBLEM — C34.91: Status: ACTIVE | Noted: 2024-01-01

## 2024-04-26 PROBLEM — D64.9 ANEMIA, UNSPECIFIED TYPE: Status: ACTIVE | Noted: 2024-01-01

## 2024-04-26 NOTE — PHARMACY-VANCOMYCIN DOSING SERVICE
"Pharmacy Vancomycin Initial Note  Date of Service 2024  Patient's  1940  83 year old, male    Indication: Sepsis    Current estimated CrCl = Estimated Creatinine Clearance: 63.5 mL/min (based on SCr of 0.78 mg/dL).    Creatinine for last 3 days  2024:  1:47 PM Creatinine 0.78 mg/dL    Recent Vancomycin Level(s) for last 3 days  No results found for requested labs within last 3 days.      Vancomycin IV Administrations (past 72 hours)        No vancomycin orders with administrations in past 72 hours.                    Nephrotoxins and other renal medications (From now, onward)      Start     Dose/Rate Route Frequency Ordered Stop    24 220  piperacillin-tazobactam (ZOSYN) 4.5 g vial to attach to  mL bag        Note to Pharmacy: For [facility], [facility] and [facility]: For Zosyn-naive patients, use the \"Zosyn initial dose + extended infusion\" order panel.    4.5 g  over 30 Minutes Intravenous EVERY 6 HOURS 24  vancomycin (VANCOCIN) 1,500 mg in 0.9% NaCl 250 mL intermittent infusion         1,500 mg  over 90 Minutes Intravenous EVERY 24 HOURS 24 215              Contrast Orders - past 72 hours (72h ago, onward)      None            [employer] Prediction of Planned Initial Vancomycin Regimen  Loading dose: N/A  Regimen: 1500 mg IV every 24 hours.  Start time: 21:51 on 2024  Exposure target: AUC24 (range)400-600 mg/L.hr   AUC24,ss: 523 mg/L.hr  Probability of AUC24 > 400: 80 %  Ctrough,ss: 14.4 mg/L  Probability of Ctrough,ss > 20: 21 %  Probability of nephrotoxicity (Lodise TERRELL ): 10 %          Plan:  Start vancomycin  1500 mg IV q24h.   Vancomycin monitoring method: AUC  Vancomycin therapeutic monitoring goal: 400-600 mg*h/L  Pharmacy will check vancomycin levels as appropriate in 1-3 Days.    Serum creatinine levels will be ordered daily for the first week of therapy and at least twice weekly for subsequent weeks.      Margarita Haro, [facility]    "

## 2024-04-26 NOTE — ED TRIAGE NOTES
"ED Nursing Triage Note (General)   ________________________________    Wilfrid Meneses is a 83 year old Male that presents to triage via private vehicle with complaints of fever and port pain.  Patient states pain has progressively gotten worse throughout the day.  Port was placed 4 months ago, however, patient states he had a thoracentesis yesterday at which time his port was accessed. Patient states temp prior to arrival was 100.3, on arrival temp is 99.3 after patient took 1000mg of tylenol.   Significant symptoms had onset 12 hour(s) ago.  BP 97/57   Pulse 108   Temp 99.3  F (37.4  C) (Tympanic)   Resp 20   Ht 1.626 m (5' 4\")   Wt 62.6 kg (138 lb)   SpO2 96%   BMI 23.69 kg/m     PRE HOSPITAL PRIOR LIVING SITUATION Spouse      Triage Assessment (Adult)       Row Name 04/25/24 2057          Triage Assessment    Airway WDL WDL        Respiratory WDL    Respiratory WDL WDL        Skin Circulation/Temperature WDL    Skin Circulation/Temperature WDL WDL        Cardiac WDL    Cardiac WDL WDL     Cardiac Rhythm NSR        Peripheral/Neurovascular WDL    Peripheral Neurovascular WDL WDL        Cognitive/Neuro/Behavioral WDL    Cognitive/Neuro/Behavioral WDL WDL                     "

## 2024-04-26 NOTE — PHARMACY-CONSULT NOTE
Pharmacy- Automatic Medication Dose Adjustment     Patient Active Problem List   Diagnosis    Cerebral microvascular disease    Benign prostatic hyperplasia with urinary obstruction    Lumbar spinal stenosis    Hypertension    Hyperlipidemia    Abnormal glucose    Dysarthria    Coronary artery calcification    Benign neoplasm of colon    Osteoarthritis of left glenohumeral joint    Primary mucinous adenocarcinoma of lung (H)    Alcohol dependence (H)    Pancytopenia (H)    Drug-induced insomnia (H)    Right lower lobe pneumonia    Hyponatremia    Left-sided chest wall pain    Fever, unspecified    Pleural effusion on right    Lung cancer, primary, with metastasis from lung to other site, right (H)    Anemia, unspecified type    Lab test negative for COVID-19 virus        Relevant Labs:  Recent Labs   Lab Test 04/26/24  0634 04/25/24  2159   WBC 4.9 6.1   HGB 6.9* 7.8*    280        CrCl: 63.2 mL/min    Weight: 60.7 kg      Intake/Output Summary (Last 24 hours) at 4/26/2024 1611  Last data filed at 4/26/2024 1322  Gross per 24 hour   Intake 480 ml   Output 950 ml   Net -470 ml          Per Automatic Medication Dose Adjustment Policy, will adjust:  Prochlorperazine 5 mg by mouth Q6H PRN per patient age > 65 years old    Will continue to follow and make adjustments accordingly. Thank You.    Bonnie Ochoa, [facility] ....................  4/26/2024   4:11 PM

## 2024-04-26 NOTE — ED PROVIDER NOTES
History     Chief Complaint   Patient presents with    Fever    Vascular Access Problem     The history is provided by the patient, medical records and the spouse.     Wilfrid Meneses is a 83 year old male with a PMH hypertension, hyperlipidemia, primary mucinous adenocarcinoma of lung, right pleural effusion, alcohol dependence, drug induced insomnia who presents today with complaints of a fever and left port a cath pain. He reports today when he would cough he would have sharp pains near his port a cath. At times the pains were intense. This evening he developed a temp of 100.4 at home. He is concerned that his port may be infected. He last took tylenol at 1800 today. He had a thoracentesis yesterday in the ED and port was accessed at that time. Hx lung cancer. Last chemo treatment was 3-4 weeks ago. He reports they stopped his chemo infusion because he lost vision in his left eye.     4/24/24- copied ED note from chart- pleural effusion on the right. US guided thoracentesis right side with 900cc fluid from right pleural cavity.     Recent CT chest (4/22/24)- 1. Increasing size of the right hilar mass with occlusion of the right middle and lower lobe and complete atelectasis of those lobes. Near complete occlusion of right upper lobe.   2. Moderate to large right-sided effusion increased in size.   3. Increase in the left adrenal gland thickening concerning for metastatic disease.   4. Nonspecific ground-glass nodularity in the lungs slightly increased on the left.     Copied note from chart from [facility] provider regarding CT results on 4/23/24- There is increase in right hilar mass with occlusion of most of right middle and lower lobe. He also has a moderate to large pleural effusion. (Dr Ramos reviewed CT and advised thoracentesis) I placed an order for UC guided thoracentesis at [facility]. The order was faxed to the facility. Art will be contacted by [facility] to schedule that appointment. Due to  the progression noted on recent scan Dr. Ramos would like to see Art sooner than the previously scheduled May 7th appointment. The  was able to get Art scheduled April 29th with Dr. Ramos.       Allergies:  Allergies   Allergen Reactions    Paclitaxel Other (See Comments) and Cough     flushing    Ace Inhibitors Cough    Aspirin Other (See Comments)     Epistaxis requiring hospitalization from 81 mg daily      Durvalumab     Hydrocodone-Acetaminophen Other (See Comments)     Other reaction(s): Insomnia      Sulfamethoxazole-Trimethoprim Itching and Rash    Sulfasalazine Rash       Problem List:    Patient Active Problem List    Diagnosis Date Noted    Fever 04/25/2024     Priority: Medium    Pleural effusion 04/25/2024     Priority: Medium    Right lower lobe pneumonia 04/25/2024     Priority: Medium    Hyponatremia 04/25/2024     Priority: Medium    Left-sided chest wall pain 04/25/2024     Priority: Medium    Fever, unspecified 04/25/2024     Priority: Medium    Pleural effusion on right 04/25/2024     Priority: Medium    Pancytopenia (H) 01/11/2024     Priority: Medium    Drug-induced insomnia (H) 01/11/2024     Priority: Medium    Alcohol dependence (H) 04/26/2022     Priority: Medium    Primary mucinous adenocarcinoma of lung (H)      Priority: Medium    Benign prostatic hyperplasia with urinary obstruction 01/16/2018     Priority: Medium    Lumbar spinal stenosis 01/16/2018     Priority: Medium    Hypertension 01/16/2018     Priority: Medium    Hyperlipidemia 01/16/2018     Priority: Medium    Benign neoplasm of colon 01/16/2018     Priority: Medium     Overview:   History of colonic polyps, colonoscopy 1/01, follow up done 2006, normal      Osteoarthritis of left glenohumeral joint 12/04/2017     Priority: Medium    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     Priority: Medium    Dysarthria 05/18/2016     Priority: Medium    Coronary artery calcification 12/01/2015     Priority: Medium     Abnormal glucose 12/14/2011     Priority: Medium        Past Medical History:    Past Medical History:   Diagnosis Date    Basal cell carcinoma of back 12/01/2014    Coronary artery calcification     Dysarthria     Enlarged prostate without lower urinary tract symptoms (luts)     Essential (primary) hypertension     History of colonic polyps     Hyperglycemia     Hyperlipidemia     Malignant neoplasm of skin     PMR (polymyalgia rheumatica) (H24)     Primary mucinous adenocarcinoma of lung (H)     Spinal stenosis of lumbar region without neurogenic claudication     Uncomplicated alcohol abuse        Past Surgical History:    Past Surgical History:   Procedure Laterality Date    ARTHROPLASTY KNEE Left     01/09    ARTHROPLASTY SHOULDER Left 2018    ARTHROSCOPY KNEE Left     12/05    COLONOSCOPY      2001, 2006, 2011,Adenomatous 2001, subsequent WNL    COLONOSCOPY  11/21/2014 11/21/2014,Adenoma    COLONOSCOPY  01/07/2020    tubular adenomas, follow up 5 years    HERNIORRHAPHY INGUINAL BILATERAL      PORTACATH PLACEMENT      PROSTATECTOMY SUPRAPUBIC  2007    UPPER GI ENDOSCOPY  2005       Family History:    Family History   Problem Relation Age of Onset    Diabetes Mother     Chronic Obstructive Pulmonary Disease Mother     Myocardial Infarction Father     Other - See Comments Son         MI during GXT    Other - See Comments Sister         PMR    Cancer No family hx of        Social History:  Marital Status:   [2]  Social History     Tobacco Use    Smoking status: Never    Smokeless tobacco: Never    Tobacco comments:     smoked a pipe in college for 2-3 years (seldom)   Vaping Use    Vaping status: Never Used   Substance Use Topics    Alcohol use: Not Currently     Comment: Last use was 7/4/20    Drug use: No        Medications:    No current outpatient medications on file.        Review of Systems   Constitutional:  Positive for activity change, fatigue and fever.   Respiratory:  Positive for cough and  "shortness of breath.    Cardiovascular:  Positive for chest pain.   Musculoskeletal:  Positive for arthralgias.   Neurological:  Positive for weakness.   All other systems reviewed and are negative.      Physical Exam   BP: 97/57  Pulse: 108  Temp: 99.3  F (37.4  C)  Resp: 20  Height: 162.6 cm (5' 4\")  Weight: 62.6 kg (138 lb)  SpO2: 96 %      Physical Exam  Vitals and nursing note reviewed.   Constitutional:       General: He is not in acute distress.     Appearance: He is normal weight. He is ill-appearing.   HENT:      Head: Normocephalic.      Right Ear: Tympanic membrane normal.      Left Ear: Tympanic membrane normal.      Nose: Nose normal.      Mouth/Throat:      Mouth: Mucous membranes are moist.      Pharynx: No posterior oropharyngeal erythema.   Eyes:      Conjunctiva/sclera:      Right eye: Right conjunctiva is injected.      Left eye: Left conjunctiva is injected.   Cardiovascular:      Rate and Rhythm: Regular rhythm. Tachycardia present.      Pulses: Normal pulses.      Heart sounds: Normal heart sounds.   Pulmonary:      Breath sounds: Examination of the right-upper field reveals decreased breath sounds. Examination of the right-lower field reveals decreased breath sounds. Decreased breath sounds present.   Abdominal:      General: Bowel sounds are normal.      Palpations: Abdomen is soft.      Tenderness: There is no abdominal tenderness.   Musculoskeletal:         General: Normal range of motion.      Cervical back: Normal range of motion and neck supple.   Skin:     General: Skin is warm and dry.      Capillary Refill: Capillary refill takes less than 2 seconds.      Findings: No rash.   Neurological:      General: No focal deficit present.      Mental Status: He is alert and oriented to person, place, and time. Mental status is at baseline.   Psychiatric:         Mood and Affect: Mood normal.            Results for orders placed or performed during the hospital encounter of 04/25/24 (from the " past 24 hour(s))   CBC with platelets differential    Narrative    The following orders were created for panel order CBC with platelets differential.  Procedure                               Abnormality         Status                     ---------                               -----------         ------                     CBC with platelets and d...[[id number]]  Abnormal            Final result                 Please view results for these tests on the individual orders.   Comprehensive metabolic panel   Result Value Ref Range    Sodium 123 (L) 135 - 145 mmol/L    Potassium 4.0 3.4 - 5.3 mmol/L    Carbon Dioxide (CO2) 23 22 - 29 mmol/L    Anion Gap 12 7 - 15 mmol/L    Urea Nitrogen 18.1 8.0 - 23.0 mg/dL    Creatinine 0.77 0.67 - 1.17 mg/dL    GFR Estimate 89 >60 mL/min/1.73m2    Calcium 8.4 (L) 8.8 - 10.2 mg/dL    Chloride 88 (L) 98 - 107 mmol/L    Glucose 143 (H) 70 - 99 mg/dL    Alkaline Phosphatase 179 (H) 40 - 150 U/L    AST 44 0 - 45 U/L    ALT 21 0 - 70 U/L    Protein Total 6.0 (L) 6.4 - 8.3 g/dL    Albumin 3.5 3.5 - 5.2 g/dL    Bilirubin Total 0.5 <=1.2 mg/dL   Lactic acid whole blood   Result Value Ref Range    Lactic Acid 1.4 0.7 - 2.0 mmol/L   Procalcitonin   Result Value Ref Range    Procalcitonin 0.38 <0.50 ng/mL   CBC with platelets and differential   Result Value Ref Range    WBC Count 6.1 4.0 - 11.0 10e3/uL    RBC Count 2.27 (L) 4.40 - 5.90 10e6/uL    Hemoglobin 7.8 (L) 13.3 - 17.7 g/dL    Hematocrit 23.3 (L) 40.0 - 53.0 %     (H) 78 - 100 fL    MCH 34.4 (H) 26.5 - 33.0 pg    MCHC 33.5 31.5 - 36.5 g/dL    RDW 15.0 10.0 - 15.0 %    Platelet Count 280 150 - 450 10e3/uL    % Neutrophils 70 %    % Lymphocytes 14 %    % Monocytes 14 %    % Eosinophils 1 %    % Basophils 0 %    % Immature Granulocytes 1 %    NRBCs per 100 WBC 0 <1 /100    Absolute Neutrophils 4.3 1.6 - 8.3 10e3/uL    Absolute Lymphocytes 0.9 0.8 - 5.3 10e3/uL    Absolute Monocytes 0.9 0.0 - 1.3 10e3/uL    Absolute Eosinophils 0.1 0.0  - 0.7 10e3/uL    Absolute Basophils 0.0 0.0 - 0.2 10e3/uL    Absolute Immature Granulocytes 0.1 <=0.4 10e3/uL    Absolute NRBCs 0.0 10e3/uL   Extra Tube    Narrative    The following orders were created for panel order Extra Tube.  Procedure                               Abnormality         Status                     ---------                               -----------         ------                     Extra Blue Top Tube[[id number]]                              Final result               Extra Red Top Tube[[id number]]                               Final result                 Please view results for these tests on the individual orders.   Extra Blue Top Tube   Result Value Ref Range    Hold Specimen x    Extra Red Top Tube   Result Value Ref Range    Hold Specimen x    Occult blood stool   Result Value Ref Range    Occult Blood Negative Negative   Ethanol GH   Result Value Ref Range    Alcohol ethyl <0.01 <=0.01 g/dL   Troponin T, High Sensitivity   Result Value Ref Range    Troponin T, High Sensitivity 27 (H) <=22 ng/L   ABO/Rh type and screen    Narrative    The following orders were created for panel order ABO/Rh type and screen.  Procedure                               Abnormality         Status                     ---------                               -----------         ------                     Adult Type and Screen[[id number]]                            Preliminary result           Please view results for these tests on the individual orders.   Adult Type and Screen   Result Value Ref Range    ABO/RH(D) O POS     SPECIMEN EXPIRATION DATE [id number]    Blood gas venous   Result Value Ref Range    pH Venous 7.50 (H) 7.32 - 7.43    pCO2 Venous 32 (L) 40 - 50 mm Hg    pO2 Venous 60 (H) 25 - 47 mm Hg    Bicarbonate Venous 25 21 - 28 mmol/L    Base Excess/Deficit Venous 2.0 -3.0 - 3.0 mmol/L    FIO2 21     Oxyhemoglobin Venous 93 (H) 70 - 75 %    O2 Sat, Venous 95.0 (H) 70.0 - 75.0 %    Narrative    In  healthy individuals, oxyhemoglobin (O2Hb) and oxygen saturation (SO2) are approximately equal. In the presence of dyshemoglobins, oxyhemoglobin can be considerably lower than oxygen saturation.   XR Chest 2 Views    Narrative    PROCEDURE INFORMATION:   Exam: XR Chest   Exam date and time: 4/25/2024 10:22 PM   Age: 83 years old   Clinical indication: Fever; Prior surgery; Surgery date: 1-6 months; Surgery   type: Port placement; Patient HX: Fluid removed, thoracentesis     TECHNIQUE:   Imaging protocol: Radiologic exam of the chest.   Views: 2 views.     COMPARISON:   CR XR CHEST PORT 2 VIEWS 4/24/2024 3:12 PM     FINDINGS:   Tubes, catheters and devices: Left-sided Port-A-Cath with tip at the level of   the mid superior vena cava. EKG leads overlying the chest.     Lungs: Haziness in the right lung base.   Pleural spaces: Moderate to large right pleural effusion. No pneumothorax.   Heart/Mediastinum: Unremarkable. No cardiomegaly.   Bones/joints: Left shoulder arthroplasty.       Impression    IMPRESSION:   1.   Moderate to large right pleural effusion.   2.   Right basilar infiltrate and/or atelectasis.   3.   Left-sided Port-A-Cath.     THIS DOCUMENT HAS BEEN ELECTRONICALLY SIGNED BY GILDA ADAMS DO   Symptomatic Influenza A/B, RSV, & SARS-CoV2 PCR (COVID-19) Nose    Specimen: Nose; Swab   Result Value Ref Range    Influenza A PCR Negative Negative    Influenza B PCR Negative Negative    RSV PCR Negative Negative    SARS CoV2 PCR Negative Negative    Narrative    Testing was performed using the Xpert Xpress CoV2/Flu/RSV Assay on the [employer] GeneXpert Instrument. This test should be ordered for the detection of SARS-CoV-2, influenza, and RSV viruses in individuals who meet clinical and/or epidemiological criteria. Test performance is unknown in asymptomatic patients. This test is for in vitro diagnostic use under the FDA EUA for laboratories certified under CLIA to perform high or moderate complexity testing.  This test has not been FDA cleared or approved. A negative result does not rule out the presence of PCR inhibitors in the specimen or target RNA in concentration below the limit of detection for the assay. If only one viral target is positive but coinfection with multiple targets is suspected, the sample should be re-tested with another FDA cleared, approved, or authorized test, if coinfection would change clinical management. This test was validated by the [facility]. These laboratories are certified under the Clinical Laboratory Improvement Amendments of 1988 (CLIA-88) as qualified to perform high complexity laboratory testing.   UA with Microscopic reflex to Culture    Specimen: Urine, Clean Catch   Result Value Ref Range    Color Urine Light Yellow Colorless, Straw, Light Yellow, Yellow    Appearance Urine Clear Clear    Glucose Urine Negative Negative mg/dL    Bilirubin Urine Negative Negative    Ketones Urine Trace (A) Negative mg/dL    Specific Gravity Urine 1.013 1.000 - 1.030    Blood Urine Negative Negative    pH Urine 6.5 5.0 - 9.0    Protein Albumin Urine Negative Negative mg/dL    Urobilinogen Urine Normal Normal, 2.0 mg/dL    Nitrite Urine Negative Negative    Leukocyte Esterase Urine Negative Negative    Bacteria Urine Few (A) None Seen /HPF    Mucus Urine Present (A) None Seen /LPF    RBC Urine <1 <=2 /HPF    WBC Urine 2 <=5 /HPF    Squamous Epithelials Urine 1 <=1 /HPF    Narrative    Urine Culture not indicated       Medications   sodium chloride (PF) 0.9% PF flush 3 mL (has no administration in time range)   sodium chloride (PF) 0.9% PF flush 3 mL (has no administration in time range)   piperacillin-tazobactam (ZOSYN) 4.5 g vial to attach to  mL bag (4.5 g Intravenous $New Bag 4/25/24 5722)   vancomycin (VANCOCIN) 1,500 mg in 0.9% NaCl 250 mL intermittent infusion (1,500 mg Intravenous $New Bag 4/25/24 2331)   sodium chloride 0.9% BOLUS 500 mL (500 mLs Intravenous $New  Bag 4/25/24 2237)       Assessments & Plan (with Medical Decision Making)  Wilfrid Meneses is a 83 year old male with a PMH hypertension, hyperlipidemia, primary mucinous adenocarcinoma of lung, right pleural effusion, alcohol dependence, drug induced insomnia who presents today with complaints of a fever and left port a cath pain. He reports today when he would cough he would have sharp pains near his port a cath. At times the pains were intense. This evening he developed a temp of 100.4 at home. He is concerned that his port may be infected. He last took tylenol at 1800 today. He had a thoracentesis yesterday in the ED and port was accessed at that time. Hx lung cancer. Last chemo treatment was 3-4 weeks ago. He reports they stopped his chemo infusion because he lost vision in his left eye.   4/24/24- copied ED note from chart- pleural effusion on the right. US guided thoracentesis right side with 900cc fluid from right pleural cavity.   Recent CT chest (4/22/24)- 1. Increasing size of the right hilar mass with occlusion of the right middle and lower lobe and complete atelectasis of those lobes. Near complete occlusion of right upper lobe.   2. Moderate to large right-sided effusion increased in size.   3. Increase in the left adrenal gland thickening concerning for metastatic disease.   4. Nonspecific ground-glass nodularity in the lungs slightly increased on the left.   Copied note from chart from [facility] provider regarding CT results on 4/23/24- There is increase in right hilar mass with occlusion of most of right middle and lower lobe. He also has a moderate to large pleural effusion. (Dr Ramos reviewed CT and advised thoracentesis) I placed an order for UC guided thoracentesis at [facility]. The order was faxed to the facility. Art will be contacted by [facility] to schedule that appointment. Due to the progression noted on recent scan Dr. Ramos would like to see Art sooner than the previously  "scheduled May 7th appointment. The  was able to get Art scheduled April 29th with Dr. Ramos.   VS in the ED. BP 97/61   Pulse 66   Temp 99.3  F (37.4  C) (Tympanic)   Resp 20   Ht 1.626 m (5' 4\")   Wt 62.6 kg (138 lb)   SpO2 99%   BMI 23.69 kg/m  Awake and alert. Ill appearing. Cheeks flushed. Skin warm to the touch. No erythema noted surrounding port in left chest. Pain is not reproducible. HRR 70's. LS right lung diminished. Congested cough. No s/s of increased work of breathing at rest. Gave IVF bolus.   DDx: Differentials considered but not limited to ACS, pneumonia, infectious process, sepsis, pulmonary embolism, pneumothorax (labs, x-ray pending). Consider CT chest. Recent CT chest 4/22/24.   Diagnostics:    Lab: Covid/Flu/RSV- negative. CBC- hgb 7.8 down from 8.5 yesterday. Occult stool- neg. CMP- Na 123, down from 128 yesterday. Alk phos- elevated 179. Alcohol- neg. Procal- 0.38. blood gas venous- okay. Troponin- 27. Repeat troponin- in process at time of admission. Dr. Francisco aware. Lactic acid- normal. Peripheral blood cultures- in process. Not able to collect blood culture from venous line. UA- does not imply UTI. UC in process.     X-ray: chest x-ray two view- mod to large right pleural effusion. Right basilar infiltrate and/or atelectasis. Left sided port a cath.     EKG  Rhythm-normal sinus rhythm   Rate- 69  Axis- normal   Any abnormal   I have independently reviewed and interpreted today's EKG, pending cardiologist over read.    ED Course as of 04/26/24 0057   Thu Apr 25, 2024   2303 Sodium(!): 123   2304 Alkaline Phosphatase(!): 179   2304 Hemoglobin(!): 7.8   2304 Hemoglobin 7.8 today, yesterday was 8.5. He denies any black or bloody stools. Denies any bloody sputum. Denies abdominal pain. Stool occult collected at this time.    2305 BP 90's/50's. 500cc IVF bolus infusing.    2346 Spoke with Dr. Francisco who kindly accepts the pt for admission for right basilar infiltrate, " "hyponatremia, and anemia. He recommends type and screen and complete blood consent. Pt and pt's spouse updated and agreeable with the plan.    Fri Apr 26, 2024   0033 Blood consent completed.      Patient Vitals for the past 24 hrs:   BP Temp Temp src Pulse Resp SpO2 Height Weight   04/26/24 0000 97/61 -- -- 66 20 99 % -- --   04/25/24 2300 103/61 -- -- 80 19 97 % -- --   04/25/24 2215 110/70 -- -- 68 27 98 % -- --   04/25/24 2200 99/51 -- -- 68 29 97 % -- --   04/25/24 2145 92/59 -- -- 70 27 -- -- --   04/25/24 2130 109/61 -- -- 80 -- 93 % -- --   04/25/24 2059 97/57 99.3  F (37.4  C) Tympanic 108 20 96 % 1.626 m (5' 4\") 62.6 kg (138 lb)     Wilfrid is a 84 y/o male who presents today with complaints of a fever of 100.4 at home and left port-a-cath pain.  Pain only occurs in that area when he coughs and depends on his current position. No erythema or swelling surrounding his port-a-cath. He took tylenol prior to coming to the ED that helped decrease the pain. Differential diagnoses are broad including ACS, pneumonia, sepsis, pulmonary embolism, or musculoskeletal strain. Initiated sepsis work up with tachycardia and initial SBP's 90's. Zosyn and Vancomycin ordered. No leukocytosis. Lactic acid and procalcitonin negative. UA- does not imply UTI. UC in process. Peripheral blood cultures in process. The nurses were not able to draw blood back when they accessed his port to collect venous culture. Chest x-ray reveals right basilar infiltrate and moderate to large right pleural effusion. Hx primary mucinous adenocarinoma of right lung. Recent CT revealed increase right hilar mass. He reports last chemo treatment was 3-4 weeks ago. He follows with [facility] oncology and has a follow up with Dr. Ramos on Monday 4/29/24. Pt's labs worsening in comparison to yesterday. Hgb 7.8 down from 8.5 yesterday. Type and screen ordered. Blood consent completed. Today Na 123, down from 128 yesterday. I suspect labs are worsening " related to mass increasing vs infectious process or both. BP responded to IV fluid bolus. His initial troponin 27. EKG normal sinus rhythm with no concerning findings. Possibly elevated related to anemia vs dehydration. Will repeat tropin after IVF bolus. Spoke with Dr. Francisco who kindly accepts the pt for observation for right basilar infiltrate, hyponatremia, and anemia. Pt's and pt's spouse updated and agreeable with the plan for observation.   Repeat troponin is pending at that time of admission.      I have reviewed the nursing notes.    I have reviewed the findings, diagnosis, plan and need for follow up with the patient.    Medical Decision Making  The patient's presentation was of high complexity (a chronic illness severe exacerbation, progression, or side effect of treatment).    The patient's evaluation involved:  an assessment requiring an independent historian (see separate area of note for details)  review of external note(s) from 3+ sources (see separate area of note for details)  review of 3+ test result(s) ordered prior to this encounter (see separate area of note for details)  ordering and/or review of 3+ test(s) in this encounter (see separate area of note for details)    The patient's management necessitated high risk (a decision regarding hospitalization).    Final diagnoses:   Fever, unspecified   Hyponatremia   Left-sided chest wall pain   Pleural effusion on right   Primary mucinous adenocarcinoma of lung (H)   Anemia, unspecified type     4/25/2024   [facility]       Alexus Mccarty APRN CNP  04/26/24 0059       Alexus Mccarty APRN CNP  04/26/24 0630

## 2024-04-26 NOTE — PROVIDER NOTIFICATION
04/26/24 0139   Valuables   Patient Belongings remains with patient   Patient Belongings Remaining with Patient clothing   Did you bring any home meds/supplements to the hospital?  No     A               Admission:  I am responsible for any personal items that are not sent to the safe or pharmacy.  [facility] is not responsible for loss, theft or damage of any property in my possession.    Signature:  _________________________________ Date: _______  Time: _____                                              Staff Signature:  ____________________________ Date: ________  Time: _____      2nd Staff person, if patient is unable/unwilling to sign:    Signature: ________________________________ Date: ________  Time: _____     Discharge:  [place] has returned all of my personal belongings:    Signature: _________________________________ Date: ________  Time: _____                                          Staff Signature:  ____________________________ Date: ________  Time: _____

## 2024-04-26 NOTE — PROGRESS NOTES
" NS ADMISSION NOTE    Patient admitted to room 336 at approximately 0100 via wheel chair from emergency room. Patient was accompanied by transport tech.     Verbal SBAR report received from ROMEL Bui  prior to patient arrival.  Has hx of Lung cancer.  Hx of thoracentesis yesterday in the ED.  Chemo in the last 3-4 weeks.  Sepsis work up in the ER.  UA sent for culture in ER.  Has had some trouble with urinary retention at times.  Pt has a Left Port that ER attempted to access tonight but couldn't get any blood return on it, it was then de-accessed and he has a Peripheral IV in Left arm.  That was saline locked on arrival to med/surg.  Pt voided large void before coming to med/surg per pt report.     Patient ambulated to bed with stand-by assist. Patient alert and oriented X 3. Pain is not well controlled.  Medication(s) being used: acetaminophen.  . Admission vital signs: Blood pressure 125/56, pulse 88, temperature 98.1  F (36.7  C), temperature source Tympanic, resp. rate 16, height 1.626 m (5' 4\"), weight 60.7 kg (133 lb 14.4 oz), SpO2 98%. Patient was oriented to plan of care, call light, bed controls, tv, telephone, bathroom, and visiting hours.       Risk Assessment    The following safety risks were identified during admission: fall. Yellow risk band applied: YES.     Skin Initial Assessment    This writer admitted this patient and completed a full skin assessment and Reyes score in the Adult PCS flowsheet. Appropriate interventions initiated as needed.       Reyes Risk Assessment  Sensory Perception: 3-->slightly limited  Moisture: 4-->rarely moist  Activity: 3-->walks occasionally  Mobility: 4-->no limitation  Nutrition: 3-->adequate  Friction and Shear: 3-->no apparent problem  Reyes Score: 20  Mattress: Standard gel/foam mattress (IsoFlex, Atmos Air, etc.)  Bed Frame: Standard width and length    Education    Patient has a [place] to Observation order: Yes  Observation education completed and " documented: Yes      Sada Gaytan RN

## 2024-04-26 NOTE — PLAN OF CARE
Goal Outcome Evaluation:    Denies port pain when coughing.  Wife thinks he strained a rib muscle yesterday when having his intense coughing spells.    SBA with walker to BR.

## 2024-04-26 NOTE — H&P
[facility]    History and Physical - Hospitalist Service       Date of Admission:  4/25/2024    Assessment & Plan      Wilfrid Meneses is a 83 year old male admitted on 4/25/2024. He presented with a fever at home.     Fever  Possible RLL pneumonia  He noted a temperature of 102F at home along with some discomfort at his chemoport site. Here, he has not had a fever and he has no leukocytosis. He has had some tachypnea and pleural effusion and on imaging he has a possible RLL infiltrate.   - admit to hospitalist service  - c/w piperacillin and vancomycin  - f/up blood cultures and sputum culture  - c/w supplemental oxygen as needed    Hyponatremia  His sodium is lower than it was a few days ago, at 124. He is asymptomatic.   - check urine sodium and osmolality  - trend sodium  - c/w IVF    Chronic anemia  His baseline Hgb is around 8.5-9, but today it is 7.8. No acute bleeding noted.   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   - monitor for now and may require another thoracentesis.     Adenocarcinoma of the lung  He has a known mass in the right hilar region.     Hypertension  - c/w atenolol    Hyperlipidemia  - c/w simvastatin       Diet: Combination Diet Low Saturated Fat Na <2400mg Diet, No Caffeine Diet    DVT Prophylaxis: Enoxaparin (Lovenox) SQ  Woodward Catheter: Not present  Lines: None     Code Status: Full Code      Clinically Significant Risk Factors Present on Admission         # Hyponatremia: Lowest Na = 123 mmol/L in last 2 days, will monitor as appropriate  # Hypocalcemia: Lowest Ca = 8.4 mg/dL in last 2 days, will monitor and replace as appropriate         # Hypertension: Noted on problem list                 Disposition Plan      Expected Discharge Date: 04/27/2024                The patient's care was discussed with the Bedside Nurse and Patient.        Janusz Francisco  "MD  [facility]  Securely message with the Vocera Web Console (learn more here)  Text page via [facility] Paging/Directory      Visit/Communication Style   Virtual (Video) communication was used to evaluate Wilfrid.  Wilfrid consented to the use of video communication: yes  Video START time: 0345, 4/26/2024  Video STOP time: 0400, 4/26/2024   Patient's location: [facility]   Provider's location during the visit: [facility] Tele-medicine site        ______________________________________________________________________    Chief Complaint   Fever    History is obtained from the patient    History of Present Illness   Wilfrid Meneses is a 83 year old male who presented to the ED with a fever of 100.4F. He has a malignant pleural effusion and had to come to our ED for a thoracentesis, which removed 900ml. He felt only a 'little bit better\" after the thoracentesis. He states his chemoport was accessed two days ago in the ED. He then started to have pain around the chemoport yesterday afternoon. He is not sure if it is the port itself or maybe the muscles around it that hurt. He denies any swelling or drainage from the port. He also has a cough, which has been going on for the last 4 months. He decided to check his temperature and found that it was elevated att 100.4F and came to the ED to be evaluated.     Review of Systems    General: positive for fever. Negative for chills, sweats, weakness  Eyes: negative for blurred vision, loss of vision  Ear Nose and Throat: negative for pharyngitis, speech or swallowing difficulties  Respiratory:  negative for sputum production, wheezing, DRAPER, pleuritic pain, sob or cough  Cardiology:  negative for chest pain, palpitations, orthopnea, PND, edema, syncope   Gastrointestinal: negative for abdominal pain, nausea, vomiting, diarrhea, constipation, hematemesis, melena or hematochezia  Genitourinary: negative for frequency, urgency, dysuria, " hematuria   Neurological: negative for focal weakness, paresthesia    Past Medical History    I have reviewed this patient's medical history and updated it with pertinent information if needed.   Past Medical History:   Diagnosis Date    Basal cell carcinoma of back 12/01/2014    Coronary artery calcification     score 297 12/2010    Dysarthria     Enlarged prostate without lower urinary tract symptoms (luts)     Post prostatectomy    Essential (primary) hypertension     No Comments Provided    History of colonic polyps     No Comments Provided    Hyperglycemia     No Comments Provided    Hyperlipidemia     No Comments Provided    Malignant neoplasm of skin     basal cell ca. on back and right ear    PMR (polymyalgia rheumatica) (H24)     Primary mucinous adenocarcinoma of lung (H)     S/P radiation and chemo, on maintainence biologic    Spinal stenosis of lumbar region without neurogenic claudication     No Comments Provided    Uncomplicated alcohol abuse     No Comments Provided       Past Surgical History   I have reviewed this patient's surgical history and updated it with pertinent information if needed.  Past Surgical History:   Procedure Laterality Date    ARTHROPLASTY KNEE Left     01/09    ARTHROPLASTY SHOULDER Left 2018    ARTHROSCOPY KNEE Left     12/05    COLONOSCOPY      2001, 2006, 2011,Adenomatous 2001, subsequent WNL    COLONOSCOPY  11/21/2014 11/21/2014,Adenoma    COLONOSCOPY  01/07/2020    tubular adenomas, follow up 5 years    HERNIORRHAPHY INGUINAL BILATERAL      PORTACATH PLACEMENT      PROSTATECTOMY SUPRAPUBIC  2007    UPPER GI ENDOSCOPY  2005       Social History   I have reviewed this patient's social history and updated it with pertinent information if needed.  Social History     Tobacco Use    Smoking status: Never    Smokeless tobacco: Never    Tobacco comments:     smoked a pipe in college for 2-3 years (seldom)   Vaping Use    Vaping status: Never Used   Substance Use Topics     Alcohol use: Not Currently     Comment: Last use was 7/4/20    Drug use: No       Family History   I have reviewed this patient's family history and updated it with pertinent information if needed.  Family History   Problem Relation Age of Onset    Diabetes Mother     Chronic Obstructive Pulmonary Disease Mother     Myocardial Infarction Father     Other - See Comments Son         MI during GXT    Other - See Comments Sister         PMR    Cancer No family hx of        Prior to Admission Medications   Prior to Admission Medications   Prescriptions Last Dose Informant Patient Reported? Taking?   Multiple Vitamin (MULTI-VITAMINS) TABS  Self Yes No   Sig: Take 1 tablet by mouth daily   atenolol (TENORMIN) 50 MG tablet   No No   Sig: Take 1 tablet (50 mg) by mouth daily   dexAMETHasone (DECADRON) 4 MG tablet   Yes No   Sig: 3 times daily   folic acid 800 MCG tablet   Yes No   Sig: daily   omeprazole (PRILOSEC) 20 MG DR capsule   Yes No   Sig: Take 20 mg by mouth   oxyCODONE (ROXICODONE) 5 MG tablet   No No   Sig: Take 0.5 tablets (2.5 mg) by mouth every 6 hours as needed for pain, breakthrough pain, moderate pain or moderate to severe pain   prochlorperazine (COMPAZINE) 10 MG tablet   Yes No   Sig: Take 10 mg by mouth   simvastatin (ZOCOR) 40 MG tablet   No No   Sig: Take 1 tablet (40 mg) by mouth at bedtime      Facility-Administered Medications: None     Allergies   Allergies   Allergen Reactions    Paclitaxel Other (See Comments) and Cough     flushing    Ace Inhibitors Cough    Aspirin Other (See Comments)     Epistaxis requiring hospitalization from 81 mg daily      Durvalumab     Hydrocodone-Acetaminophen Other (See Comments)     Other reaction(s): Insomnia      Sulfamethoxazole-Trimethoprim Itching and Rash    Sulfasalazine Rash       Physical Exam   Vital Signs: Temp: 98.1  F (36.7  C) Temp src: Tympanic BP: 125/56 Pulse: 88   Resp: 16 SpO2: 98 % O2 Device: None (Room air)    Weight: 133 lbs 14.4 oz    Gen:   Well-developed, well-nourished, in no acute distress, lying semi-supine in hospital stretcher  HEENT:  Anicteric sclera, PER, hearing intact to voice  Resp:  No accessory muscle use, decreased breath sounds on the right side with some rhonchi. No wheezes no rales   Card:  No murmur, normal S1, S2   Chest: Chemoport site looks clean without any erythema or swelling.   Abd:  Soft per RN exam, no TTP, non-distended, normoactive bowel sounds are present  Musc:  Normal strength and movement of the major muscle groups without obvious deformity  Psych:  Good insight, oriented to person, place and time, not anxious, not agitated    Data     Recent Labs   Lab 04/25/24  2159 04/24/24  1347   WBC 6.1 6.4   HGB 7.8* 8.5*   * 103*    269   * 128*   POTASSIUM 4.0 4.2   CHLORIDE 88* 91*   CO2 23 22   BUN 18.1 17.6   CR 0.77 0.78   ANIONGAP 12 15   ESTIVEN 8.4* 8.8   * 113*   ALBUMIN 3.5 3.6   PROTTOTAL 6.0* 6.3*   BILITOTAL 0.5 0.9   ALKPHOS 179* 147   ALT 21 14   AST 44 28         Recent Results (from the past 24 hour(s))   XR Chest 2 Views    Narrative    PROCEDURE INFORMATION:   Exam: XR Chest   Exam date and time: 4/25/2024 10:22 PM   Age: 83 years old   Clinical indication: Fever; Prior surgery; Surgery date: 1-6 months; Surgery   type: Port placement; Patient HX: Fluid removed, thoracentesis     TECHNIQUE:   Imaging protocol: Radiologic exam of the chest.   Views: 2 views.     COMPARISON:   CR XR CHEST PORT 2 VIEWS 4/24/2024 3:12 PM     FINDINGS:   Tubes, catheters and devices: Left-sided Port-A-Cath with tip at the level of   the mid superior vena cava. EKG leads overlying the chest.     Lungs: Haziness in the right lung base.   Pleural spaces: Moderate to large right pleural effusion. No pneumothorax.   Heart/Mediastinum: Unremarkable. No cardiomegaly.   Bones/joints: Left shoulder arthroplasty.       Impression    IMPRESSION:   1.   Moderate to large right pleural effusion.   2.   Right basilar  infiltrate and/or atelectasis.   3.   Left-sided Port-A-Cath.     THIS DOCUMENT HAS BEEN ELECTRONICALLY SIGNED BY GILDA ADAMS DO

## 2024-04-26 NOTE — PROGRESS NOTES
Interdisciplinary Discharge Planning Note    Anticipated Discharge Date: 4/28    Anticipated Discharge Location: Home    Clinical Needs Before Discharge:  fever subsiding and ABX and stable hemoglobin     Treatment Needs After Discharge:  None identified    Potential Barriers to Discharge: None identified      BRYANT Griffin  4/26/2024,  12:32 PM

## 2024-04-26 NOTE — PROGRESS NOTES
[facility]    Medicine Progress Note - Hospitalist Service    Date of Admission:  4/25/2024    Assessment & Plan   Principal Problem:    Fever, unspecified    Right lower lobe pneumonia  Temp of 102 at home.  Cough, minimally productive.  Chest x-ray showing right basilar infiltrate versus atelectasis.  Pain at left chest port site, concern for potential port infection.  Unable to draw blood from the port.  Started on broad-spectrum antibiotics with Zosyn and vancomycin.  Waiting on blood cultures and sputum culture to guide further treatment.    Active Problems:    Primary mucinous adenocarcinoma of lung (H)    Lung cancer, primary, with metastasis from lung to other site, right (H)  Recurrent and now metastatic lung cancer.  Increasing size of right hilar mass.  CT scan shows occlusion of right middle and lower lobe with complete atelectasis of those lobes and near complete occlusion of the right upper lobe.  Discussed case with [facility] pulmonology on 4/26 and postobstructive pneumonia requiring bronchoscopy was not felt to be present.   Updated primary oncologist, Dr. Ramos, with patient's status.  We will treat potential pneumonia and have patient follow-up next week to discuss options.  Hospice is a consideration.      Hyponatremia  Acute on chronic hyponatremia.  Baseline sodium around 130, 123 on admission.  Improved to 130 with IVF.  Monitor      Left-sided chest wall pain  Left Chemo-Port.  Consider potential for infected port and potential removal.  Await blood cultures      Pleural effusion on right  Recent thoracentesis on 4/24.  Likely malignant, however cytology not obtained.      Anemia, unspecified type  Recent hemoglobin around 8.5 to 9.0.  Hemoglobin has declined to 6.9.  No signs of active blood loss.  Stool occult blood negative.  Likely a production issue.  Patient would prefer to avoid blood transfusion if possible.  Plan recheck hemoglobin today, if further decline  in hemoglobin, then transfuse.      Hypertension   Holding home atenolol until blood pressure improves    Spent over 75 minutes on care of patient          Diet: Combination Diet Low Saturated Fat Na <2400mg Diet, No Caffeine Diet    DVT Prophylaxis: Pneumatic Compression Devices  Woodward Catheter: Not present  Lines: None     Cardiac Monitoring: None  Code Status: No CPR- Do NOT Intubate      Clinically Significant Risk Factors Present on Admission         # Hyponatremia: Lowest Na = 123 mmol/L in last 2 days, will monitor as appropriate  # Hypocalcemia: Lowest Ca = 8.1 mg/dL in last 2 days, will monitor and replace as appropriate         # Hypertension: Noted on problem list                 Disposition Plan     Medically Ready for Discharge: Anticipated in 2-4 Days             Rishi Burgos MD  Hospitalist Service  [facility]  Securely message with [employer] (more info)  Text page via [employer] Paging/Directory   ______________________________________________________________________    Interval History   Stable overnight. Coughing, yellow to clear phlegm. No further fever since antibiotics in ED.     Discussed previous imaging findings with [facility] pulmonology.  CT scan on 4/23/2024 shows complete occlusion of the middle and lower lobe airways.  Severe narrowing of right upper lobe airways is present.  Had concerns whether he potentially has an obstructive pneumonia and may benefit from bronchoscopy.  Currently he does not have concerning features such as persistent fever, high white count, or increasing O2 needs.  It was felt that he would not benefit from bronchoscopy.  Pulmonology recommended discussion with oncology.  I contacted Dr. Ramos, patient's primary oncologist.  Patient has reoccurrence of cancer despite previous treatment.  He does not have any further treatment options.  Hospice could be considered.  For now, patient would like to focus on treating current issues and then can  follow-up with oncology for further conversation on options including hospice.    Physical Exam   Vital Signs: Temp: 98.1  F (36.7  C) Temp src: Tympanic BP: 119/41 Pulse: 80   Resp: 20 SpO2: 94 % O2 Device: None (Room air)    Weight: 133 lbs 14.4 oz    General Appearance: Alert. No acute distress  Chest/Respiratory Exam: Clear to auscultation on left, diminished on right  Cardiovascular Exam: Regular rate and rhythm. S1, S2, no murmur, gallop, or rubs.  Extremities: No lower extremity edema.  Psychiatric: Normal affect and mentation      Medical Decision Making             Data     I have personally reviewed the following data over the past 24 hrs:    4.9  \   6.9 (LL)   / 246     130 (L) 96 (L) 14.4 /  123 (H)   3.8 23 0.76 \     ALT: 21 AST: 44 AP: 179 (H) TBILI: 0.5   ALB: 3.5 TOT PROTEIN: 6.0 (L) LIPASE: N/A     Trop: 25 (H) BNP: N/A     Procal: 0.38 CRP: N/A Lactic Acid: 1.4         Imaging results reviewed over the past 24 hrs:   Recent Results (from the past 24 hour(s))   XR Chest 2 Views    Narrative    PROCEDURE INFORMATION:   Exam: XR Chest   Exam date and time: 4/25/2024 10:22 PM   Age: 83 years old   Clinical indication: Fever; Prior surgery; Surgery date: 1-6 months; Surgery   type: Port placement; Patient HX: Fluid removed, thoracentesis     TECHNIQUE:   Imaging protocol: Radiologic exam of the chest.   Views: 2 views.     COMPARISON:   CR XR CHEST PORT 2 VIEWS 4/24/2024 3:12 PM     FINDINGS:   Tubes, catheters and devices: Left-sided Port-A-Cath with tip at the level of   the mid superior vena cava. EKG leads overlying the chest.     Lungs: Haziness in the right lung base.   Pleural spaces: Moderate to large right pleural effusion. No pneumothorax.   Heart/Mediastinum: Unremarkable. No cardiomegaly.   Bones/joints: Left shoulder arthroplasty.       Impression    IMPRESSION:   1.   Moderate to large right pleural effusion.   2.   Right basilar infiltrate and/or atelectasis.   3.   Left-sided  Port-A-Cath.     THIS DOCUMENT HAS BEEN ELECTRONICALLY SIGNED BY GILDA ADAMS DO

## 2024-04-26 NOTE — PHARMACY-ADMISSION MEDICATION HISTORY
Pharmacist Admission Medication History    Admission medication history is complete. The information provided in this note is only as accurate as the sources available at the time of the update.    Information Source(s): Patient via in-person interview  -[facility]  -[facility] Everywhere chart notes    Pertinent Information: pt reports he has Oxycodone at home from recent ED visit, also has Prochlorperazine, but has not had to use either medication. 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 Recently switched to Gemcitabine 1,000 mg/m2 day 1 and day 8 of a 21-day cycle for lung cancer- has only received 2 doses (3/25 and 4/2)- has subsequently developed eye problems and current therapy is on hold until after patient sees eye specialist next week.    -Prefers [facility] pharmacy for any new medications, ok with using [facility] if discharges on the weekend.    Changes made to PTA medication list:  Added:   Acetaminophen  Deleted:   Dexamethasone (previously prescribed w/Carbo/Pemetrexed/Keytruda)  Folic acid (previously prescribed w/Carbo/Pemetrexed/Keytruda)  Changed:   Prochlorperazine: completed directions    Allergies reviewed with patient and updates made in EHR: yes  -updated Durvalumab allergy to MEDIUM severity (from unspecified)- possible inflammatory pneumonitis per 12/20/21 [facility] On[facility] note.      Medication History Completed By: Isidro Taylor RPH 4/26/2024 10:06 AM

## 2024-04-26 NOTE — PHARMACY-VANCOMYCIN DOSING SERVICE
"Pharmacy Vancomycin Initial Note  Date of Service 2024  Patient's  1940  83 year old, male    Indication: [facility]-Associated Pneumonia and Sepsis    Current estimated CrCl = Estimated Creatinine Clearance: 62.4 mL/min (based on SCr of 0.77 mg/dL).    Creatinine for last 3 days  2024:  1:47 PM Creatinine 0.78 mg/dL  2024:  9:59 PM Creatinine 0.77 mg/dL    Recent Vancomycin Level(s) for last 3 days  No results found for requested labs within last 3 days.      Vancomycin IV Administrations (past 72 hours)                     vancomycin (VANCOCIN) 1,500 mg in 0.9% NaCl 250 mL intermittent infusion (mg) 1,500 mg New Bag 24 2331                    Nephrotoxins and other renal medications (From now, onward)      Start     Dose/Rate Route Frequency Ordered Stop    24 220  piperacillin-tazobactam (ZOSYN) 4.5 g vial to attach to  mL bag        Note to Pharmacy: For [facility], [facility] and [facility]: For Zosyn-naive patients, use the \"Zosyn initial dose + extended infusion\" order panel.    4.5 g  over 30 Minutes Intravenous EVERY 6 HOURS 24 21424 215  vancomycin (VANCOCIN) 1,500 mg in 0.9% NaCl 250 mL intermittent infusion         1,500 mg  over 90 Minutes Intravenous EVERY 24 HOURS 24 215              Contrast Orders - past 72 hours (72h ago, onward)      None            [employer] Prediction of Planned Initial Vancomycin Regimen  Loading dose: N/A  Regimen: 750 mg IV every 12 hours.  Start time: 11:31 on 2024  Exposure target: AUC24 (range)400-600 mg/L.hr   AUC24,ss: 550 mg/L.hr  Probability of AUC24 > 400: 82 %  Ctrough,ss: 18.2 mg/L  Probability of Ctrough,ss > 20: 41 %  Probability of nephrotoxicity (Lodise TERRELL ): 14 %        Plan:  Start vancomycin  750 mg IV q12h after 1500 mg loading dose.   Vancomycin monitoring method: AUC  Vancomycin therapeutic monitoring goal: 400-600 mg*h/L  Pharmacy will check vancomycin levels as appropriate in  in AM .  "   Serum creatinine levels will be ordered  in AM .      Tameka Kolb RPH

## 2024-04-27 NOTE — PLAN OF CARE
Patient rested comfortably most of day. PRN Tylenol given x2 for back discomfort. Low-grade temp, resolved with medication. Ambulating SBA/A1. Voiding without difficulty. LS diminished. Patient denies shortness of breath; dyspnea on exertion noted. Frequent cough. Family at bedside.

## 2024-04-27 NOTE — PROGRESS NOTES
[facility]    Medicine Progress Note - Hospitalist Service    Date of Admission:  4/25/2024    Assessment & Plan   Principal Problem:    Fever, unspecified    Right lower lobe pneumonia  Temp of 102 at home.  Cough, minimally productive.  Chest x-ray showing right basilar infiltrate versus atelectasis.   Pain at left chest port site, concern for potential port infection.  Unable to draw blood from the port.  Favor pneumonia over other etiologies for fever.  Started on broad-spectrum antibiotics with Zosyn and vancomycin.  Improving, no history of Pseudomonas.  Change from Zosyn to ceftriaxone.  Received vancomycin today, will receive a total of 2 days, then transition to doxycycline tomorrow.  Waiting on blood cultures and sputum culture to guide further treatment.    Active Problems:    Primary mucinous adenocarcinoma of lung (H)    Lung cancer, primary, with metastasis from lung to other site, right (H)  Recurrent and now metastatic lung cancer.  Increasing size of right hilar mass.  CT scan shows occlusion of right middle and lower lobe with complete atelectasis of those lobes and near complete occlusion of the right upper lobe.  Discussed case with [facility] pulmonology on 4/26 and postobstructive pneumonia requiring bronchoscopy was not felt to be present.   Updated primary oncologist, Dr. Ramos, with patient's status.  We will treat potential pneumonia and have patient follow-up next week to discuss options.  Hospice is a consideration.      Hyponatremia  Acute on chronic hyponatremia.  Baseline sodium around 130, 123 on admission.  Improved to 133 with IVF.  Stop IVF.      Left-sided chest wall pain  Left Chemo-Port.  Consider potential for infected port and potential removal.  Pain resolved since admission. Await blood cultures      Pleural effusion on right  Recent thoracentesis on 4/24.  Likely malignant, however cytology not obtained.      Anemia, unspecified type  Recent  hemoglobin around 8.5 to 9.0.  Hemoglobin declined to 6.8, has been 6.8 to 8.4 in the past day.  No signs of active blood loss.  Stool occult blood negative.    Elevated reticulocyte count. Iron low. Ferritin elevated, likely acute phase reactant.  B12 normal.  Start ferrous sulfate  Patient would prefer to avoid blood transfusion if possible.  Continue monitoring.      Hypertension   Held home atenolol until blood pressure improved. Restart at 25 mg daily.    Spent over 65 minutes on care of patient          Diet: Combination Diet Low Saturated Fat Na <2400mg Diet, No Caffeine Diet    DVT Prophylaxis: Pneumatic Compression Devices  Woodward Catheter: Not present  Lines: None     Cardiac Monitoring: None  Code Status: No CPR- Do NOT Intubate      Clinically Significant Risk Factors         # Hyponatremia: Lowest Na = 123 mmol/L in last 2 days, will monitor as appropriate  # Hypocalcemia: Lowest Ca = 8.1 mg/dL in last 2 days, will monitor and replace as appropriate         # Hypertension: Noted on problem list                   Disposition Plan     Medically Ready for Discharge: Anticipated Tomorrow           Rishi Burgos MD  Hospitalist Service  [facility]  Securely message with [employer] (more info)  Text page via [facility] Paging/Directory   ______________________________________________________________________    Interval History   Feeling a little bit better.  Minimal cough.  Not short of breath.  Appetite is fine.  We spent further time discussing potential outcomes.  Unclear etiology, favor pneumonia over port infection.  He is aware that if there is an obstructive pneumonia that he may continue to have fevers in the future.  Likewise, if there are is a port infection, despite antibiotics he could continue to have fevers.  For now blood cultures are negative.  He would like to try and make the appointment with Dr. Ramos on Monday, so we are guiding treatment in order to facilitate  discharge tomorrow.    Physical Exam   Vital Signs: Temp: 97.3  F (36.3  C) Temp src: Tympanic BP: 116/59 Pulse: 85   Resp: 18 SpO2: 98 % O2 Device: None (Room air)    Weight: 137 lbs 6.4 oz    General Appearance: Alert. No acute distress  Chest/Respiratory Exam: Clear to auscultation on left, diminished on right  Cardiovascular Exam: Regular rate and rhythm. S1, S2, no murmur, gallop, or rubs.  Extremities: No lower extremity edema.  Psychiatric: Normal affect and mentation      Medical Decision Making             Data     I have personally reviewed the following data over the past 24 hrs:    5.2  \   7.3 (L)   / 243     133 (L) 99 12.3 /  96   3.9 23 0.78 \     Ferritin:  1,475 (H) % Retic:  3.0 (H) LDH:  N/A       Imaging results reviewed over the past 24 hrs:   No results found for this or any previous visit (from the past 24 hour(s)).

## 2024-04-27 NOTE — PHARMACY-VANCOMYCIN DOSING SERVICE
"Pharmacy Vancomycin Note  Date of Service 2024  Patient's  1940   83 year old, male    Indication: Healthcare-Associated Pneumonia and Sepsis  Day of Therapy: 3  Current vancomycin regimen:  750 mg IV q12h  Current vancomycin monitoring method: AUC  Current vancomycin therapeutic monitoring goal: 400-600 mg*h/L    InsightRX Prediction of Current Vancomycin Regimen  Loading dose: N/A  Regimen: 750 mg IV every 12 hours.  Start time: 09:58 on 2024  Exposure target: AUC24 (range)400-600 mg/L.hr   AUC24,ss: 456 mg/L.hr  Probability of AUC24 > 400: 74 %  Ctrough,ss: 14.7 mg/L  Probability of Ctrough,ss > 20: 17 %  Probability of nephrotoxicity (Lodise TERRELL ): 10 %      Current estimated CrCl = Estimated Creatinine Clearance: 63.2 mL/min (based on SCr of 0.78 mg/dL).    Creatinine for last 3 days  2024:  1:47 PM Creatinine 0.78 mg/dL  2024:  9:59 PM Creatinine 0.77 mg/dL  2024:  6:34 AM Creatinine 0.76 mg/dL  2024:  6:14 AM Creatinine 0.78 mg/dL    Recent Vancomycin Levels (past 3 days)  2024:  6:14 AM Vancomycin 13.1 ug/mL    Vancomycin IV Administrations (past 72 hours)                     vancomycin (VANCOCIN) 750 mg in NaCl 0.9% 150 mL infusion (mg) 750 mg Given 24 2158     750 mg Given  1032    vancomycin (VANCOCIN) 1,500 mg in 0.9% NaCl 250 mL intermittent infusion (mg) 1,500 mg New Bag 24 2331                    Nephrotoxins and other renal medications (From now, onward)      Start     Dose/Rate Route Frequency Ordered Stop    24 1000  vancomycin (VANCOCIN) 1,500 mg in 0.9% NaCl 250 mL intermittent infusion         1,500 mg  over 90 Minutes Intravenous EVERY 24 HOURS 24 0731      24 2200  piperacillin-tazobactam (ZOSYN) 4.5 g vial to attach to  mL bag        Note to Pharmacy: For [facility], [facility] and [facility]: For Zosyn-naive patients, use the \"Zosyn initial dose + extended infusion\" order panel.    4.5 g  over 30 Minutes Intravenous EVERY 6 " HOURS 04/25/24 2147                 Contrast Orders - past 72 hours (72h ago, onward)      None            Interpretation of levels and current regimen:  Vancomycin level is reflective of -600    Has serum creatinine changed greater than 50% in last 72 hours: No    Urine output:  good urine output    Renal Function: Stable    InsightRX Prediction of Planned New Vancomycin Regimen  Loading dose: N/A  Regimen: 1500 mg IV every 24 hours.  Start time: 21:58 on 04/27/2024  Exposure target: AUC24 (range)400-600 mg/L.hr   AUC24,ss: 460 mg/L.hr  Probability of AUC24 > 400: 76 %  Ctrough,ss: 12.2 mg/L  Probability of Ctrough,ss > 20: 11 %  Probability of nephrotoxicity (Lodise TERRELL 2009): 7 %      Plan:  Change dose to 1500 mg IV vancomycin Q24H due to lower chance of nephrotoxicity.   Vancomycin monitoring method: AUC  Vancomycin therapeutic monitoring goal: 400-600 mg*h/L  Pharmacy will check vancomycin levels as appropriate in 1-3 Days.  Serum creatinine levels will be ordered daily for the first week of therapy and at least twice weekly for subsequent weeks.    Bonnie Ochoa [facility]

## 2024-04-27 NOTE — PLAN OF CARE
SAFETY CHECKLIST  ID Bands and Risk clasps correct and in place (DNR, Fall risk, Allergy, Latex, Limb):  Yes  All Lines Reconciled and labeled correctly: Yes  Whiteboard updated:Yes  Environmental interventions: Yes  Verify Tele #: N/A    Candy Silverio RN .......  4/26/2024  7:35 PM

## 2024-04-27 NOTE — PLAN OF CARE
Pt admitted on 4/25/26 for right lower lobe pneumonia, left sided chest wall pain, fever, PE, lung cancer, and anemia. VSS, afebrile. Denies pain, was having lower back pain earlier on shift resolved with PRN Tylenol. Up with 1 person assist with walker and gait belt. LS diminished throughout. Productive, loose cough. Hemoglobin came back this AM at 6.8. Provider notified. Order to recheck hemoglobin stat. Recheck hemoglobin back at 7.3.    Candy Silverio RN .......  4/27/2024  6:43 AM       Goal Outcome Evaluation:      Plan of Care Reviewed With: patient    Overall Patient Progress: no change    Outcome Evaluation: LS diminished, lower back pain improved with Tylenol, VSS, receiving IV antibiotics

## 2024-04-28 NOTE — DISCHARGE SUMMARY
"[facility]  Hospitalist Discharge Summary      Date of Admission:  4/25/2024  Date of Discharge:  4/28/2024  Discharging Provider: Rishi Burgos MD  Discharge Service: Hospitalist Service    Discharge Diagnoses   Principal Problem:    Right lower lobe pneumonia  Active Problems:    Primary mucinous adenocarcinoma of lung (H)    Hyponatremia    Left-sided chest wall pain    Fever, unspecified    Pleural effusion on right    Lung cancer, primary, with metastasis from lung to other site, right (H)    Anemia, unspecified type    Lab test negative for COVID-19 virus      Clinically Significant Risk Factors          Follow-ups Needed After Discharge   Follow-up Appointments     Follow-up and recommended labs and tests       Follow up with Dr. Ramos on Monday, April 29   Ordered another thoracentesis with radiology to remove fluid from your   right lung            Unresulted Labs Ordered in the Past 30 Days of this Admission       Date and Time Order Name Status Description    4/28/2024  9:33 AM Osmolality urine In process     4/26/2024  1:06 AM Respiratory Aerobic Bacterial Culture In process     4/25/2024  9:47 PM Blood Culture Peripheral Blood Preliminary     4/25/2024  9:47 PM Blood Culture Peripheral Blood Preliminary         These results will be followed up by hospitalist pool    Discharge Disposition   Discharged to home  Condition at discharge: Good    Hospital Course   As per HPI from Dr. Francisco: \"Wilfrid Meneses is a 83 year old male who presented to the ED with a fever of 100.4F. He has a malignant pleural effusion and had to come to our ED for a thoracentesis, which removed 900ml. He felt only a 'little bit better\" after the thoracentesis. He states his chemoport was accessed two days ago in the ED. He then started to have pain around the chemoport yesterday afternoon. He is not sure if it is the port itself or maybe the muscles around it that hurt. He denies any swelling or " "drainage from the port. He also has a cough, which has been going on for the last 4 months. He decided to check his temperature and found that it was elevated att 100.4F and came to the ED to be evaluated.\"    Principal Problem:    Fever, unspecified    Right lower lobe pneumonia  Temp of 102 at home.  Cough, minimally productive.  Chest x-ray showing right basilar infiltrate versus atelectasis.   Pain at left chest port site, concern for potential port infection.  Unable to draw blood from the port.  Favor pneumonia over other etiologies for fever.  Negative blood cultures at discharge  Started on broad-spectrum antibiotics with Zosyn and vancomycin.  Improved, no history of Pseudomonas.  Changed from Zosyn after 1 day to ceftriaxone for 1 day  Received vancomycin for 2 days, then transitioned to doxycycline.  Temp max 99.9 in the past day.  He will take cefuroxime 500 mg twice daily and doxycycline 100 mg twice daily for 7 days.  Sputum culture still pending  We discussed the possibility of a postobstructive pneumonia and that if he has ongoing fever spikes or worsens, may need to see pulmonology.  For now, he would like to keep his appointment with Dr. Ramos tomorrow and was discharged home today in order to facilitate getting to that appointment.    Active Problems:    Primary mucinous adenocarcinoma of lung (H)    Lung cancer, primary, with metastasis from lung to other site, right (H)  Recurrent and now metastatic lung cancer.  Increasing size of right hilar mass.  CT scan shows occlusion of right middle and lower lobe with complete atelectasis of those lobes and near complete occlusion of the right upper lobe.  Discussed case with [facility] pulmonology on 4/26 and postobstructive pneumonia requiring bronchoscopy was not felt to be present.   Updated primary oncologist, Dr. Ramos, with patient's status on 4/26.  We will treat potential pneumonia and have patient follow-up as scheduled 4/29.  Hospice is " a consideration, this was reviewed with patient so he considers goals of care before the appointment.      Hyponatremia  Acute on chronic hyponatremia.  Baseline sodium around 130, 123 on admission.  Improved to 133 with IVF.  Stopped IVF and sodium declined to 129.  Urine sodium elevated at 113, suspicious for SIADH.  Serum and urine osmolality pending.      Left-sided chest wall pain  Left Chemo-Port.  Consider potential for infected port and potential removal.  Pain resolved since admission.  Blood cultures negative at discharge.  We discussed that if he had an ineffective port, it may warrant removal or replacement.      Pleural effusion on right  Recent thoracentesis on 4/24.  Likely malignant, however cytology not obtained.  Repeat x-ray on 4/28 shows increased effusion present.  He is not hypoxic or having significant respiratory distress.  Ordered repeat thoracentesis as an outpatient through radiology including diagnostic labs of cytology, Gram stain and culture, protein, cholesterol, LDH, glucose.      Anemia, unspecified type  Recent hemoglobin around 8.5 to 9.0.  Hemoglobin declined to 6.8, but above 7 on recheck.  He was not transfused and preferred to avoid transfusion if possible. No signs of active blood loss.  Stool occult blood negative.    Elevated reticulocyte count. Iron low. Ferritin elevated, likely acute phase reactant.  B12 normal.  Started ferrous sulfate daily  Hemoglobin improved to 8.0 at time of discharge      Hypertension   Held home atenolol until blood pressure improved. Resume at discharge      Consultations This Hospital Stay   PHARMACY TO DOSE VANCO  PHARMACY TO DOSE VANCO    Code Status   No CPR- Do NOT Intubate    Time Spent on this Encounter   I, Rishi Burgos MD, personally saw the patient today and spent greater than 30 minutes discharging this patient.       Rishi Burgos MD  [facility]  [address]  [place] [address]  Phone:  [phone]  Fax: [phone]  ______________________________________________________________________    Physical Exam   Vital Signs: Temp: 97.9  F (36.6  C) Temp src: Tympanic BP: 110/56 Pulse: 94   Resp: 16 SpO2: 99 % O2 Device: None (Room air)    Weight: 138 lbs 0 oz  General Appearance: Alert. No acute distress  Chest/Respiratory Exam: Clear to auscultation on left. Mild crackles and very light wheezing on right mid and upper lung  Cardiovascular Exam: Regular rate and rhythm. S1, S2, no murmur, gallop, or rubs.  Extremities: No lower extremity edema.  Psychiatric: Normal affect and mentation       Primary Care Physician   Gilberto Carroll    Discharge Orders      US Thoracentesis     Reason for your hospital stay    Fever, likely due to pneumonia in right lung     Follow-up and recommended labs and tests     Follow up with Dr. Ramos on Monday, April 29   Ordered another thoracentesis with radiology to remove fluid from your right lung     Activity    Your activity upon discharge: activity as tolerated     Diet    Follow this diet upon discharge: regular diet       Significant Results and Procedures   Most Recent 3 CBC's:  Recent Labs   Lab Test 04/28/24  0710 04/27/24  0644 04/27/24  0614 04/26/24  1655 04/26/24  0634   WBC 6.5  --  5.2  --  4.9   HGB 8.0* 7.3* 6.8*   < > 6.9*   *  --  103*  --  102*     --  243  --  246    < > = values in this interval not displayed.     Most Recent 3 BMP's:  Recent Labs   Lab Test 04/28/24  0710 04/27/24  0614 04/26/24  0634   * 133* 130*   POTASSIUM 3.9 3.9 3.8   CHLORIDE 94* 99 96*   CO2 23 23 23   BUN 8.0 12.3 14.4   CR 0.70 0.78 0.76   ANIONGAP 12 11 11   ESTIVEN 8.7* 8.2* 8.1*   GLC 87 96 123*     Most Recent 2 LFT's:  Recent Labs   Lab Test 04/28/24  0710 04/27/24  0614   AST 48* 41   ALT 30 25   ALKPHOS 208* 156*   BILITOTAL 0.4 0.4   ,   Results for orders placed or performed during the hospital encounter of 04/25/24   XR Chest 2 Views    Narrative     PROCEDURE INFORMATION:   Exam: XR Chest   Exam date and time: 4/25/2024 10:22 PM   Age: 83 years old   Clinical indication: Fever; Prior surgery; Surgery date: 1-6 months; Surgery   type: Port placement; Patient HX: Fluid removed, thoracentesis     TECHNIQUE:   Imaging protocol: Radiologic exam of the chest.   Views: 2 views.     COMPARISON:   CR XR CHEST PORT 2 VIEWS 4/24/2024 3:12 PM     FINDINGS:   Tubes, catheters and devices: Left-sided Port-A-Cath with tip at the level of   the mid superior vena cava. EKG leads overlying the chest.     Lungs: Haziness in the right lung base.   Pleural spaces: Moderate to large right pleural effusion. No pneumothorax.   Heart/Mediastinum: Unremarkable. No cardiomegaly.   Bones/joints: Left shoulder arthroplasty.       Impression    IMPRESSION:   1.   Moderate to large right pleural effusion.   2.   Right basilar infiltrate and/or atelectasis.   3.   Left-sided Port-A-Cath.     THIS DOCUMENT HAS BEEN ELECTRONICALLY SIGNED BY GILDA ADAMS DO   XR Chest 2 Views    Narrative    PROCEDURE INFORMATION:   Exam: XR Chest   Exam date and time: 4/28/2024 11:01 AM   Age: 83 years old   Clinical indication: Other: Right pleural effusion, likely pneumonia also on   right     TECHNIQUE:   Imaging protocol: Radiologic exam of the chest.   Views: 2 views.     COMPARISON:   CR XR CHEST 2 VIEWS 4/25/2024 10:22 PM     FINDINGS:   Tubes, catheters and devices: A left internal jugular chest port is present.   The catheter tip overlies the superior vena cava. Left humeral head prosthesis.     Lungs: Clear left lung. Right hilar/perihilar mass suggested, unchanged.   Pleural spaces: Large right pleural effusion, increased in volume compared to   the previous study of 04/25/2024.   Heart/Mediastinum: Unremarkable. No cardiomegaly.   Bones/joints: Unremarkable.       Impression    IMPRESSION:   Increased right pleural effusion.     THIS DOCUMENT HAS BEEN ELECTRONICALLY SIGNED BY JOHNNIE BURGOS  MD       Discharge Medications   Current Discharge Medication List        START taking these medications    Details   cefuroxime (CEFTIN) 500 MG tablet Take 1 tablet (500 mg) by mouth every 12 hours  Qty: 14 tablet, Refills: 0    Associated Diagnoses: Pneumonia of right lower lobe due to infectious organism      doxycycline hyclate (VIBRAMYCIN) 100 MG capsule Take 1 capsule (100 mg) by mouth every 12 hours  Qty: 14 capsule, Refills: 0    Associated Diagnoses: Pneumonia of right lower lobe due to infectious organism      ferrous sulfate 220 (44 Fe) MG/5ML SOLN Take 5 mLs (220 mg) by mouth daily  Qty: 473 mL, Refills: 0    Comments: Order instructions to provider for this medication are to order as mg of Ferrous Sulfate. Each 5 mL contains 220 mg Ferrous Sulfate = 44 mg elemental Iron.  Associated Diagnoses: Anemia, unspecified type           CONTINUE these medications which have NOT CHANGED    Details   acetaminophen (TYLENOL) 500 MG tablet Take 1,000 mg by mouth every 6 hours as needed for mild pain      atenolol (TENORMIN) 50 MG tablet Take 1 tablet (50 mg) by mouth daily  Qty: 90 tablet, Refills: 4    Associated Diagnoses: Hypertension, unspecified type      Multiple Vitamin (MULTI-VITAMINS) TABS Take 1 tablet by mouth daily      omeprazole (PRILOSEC) 20 MG DR capsule Take 20 mg by mouth      simvastatin (ZOCOR) 40 MG tablet Take 1 tablet (40 mg) by mouth at bedtime  Qty: 90 tablet, Refills: 4    Associated Diagnoses: Hyperlipidemia, unspecified hyperlipidemia type      oxyCODONE (ROXICODONE) 5 MG tablet Take 0.5 tablets (2.5 mg) by mouth every 6 hours as needed for pain, breakthrough pain, moderate pain or moderate to severe pain  Qty: 10 tablet, Refills: 0      prochlorperazine (COMPAZINE) 10 MG tablet Take 10 mg by mouth every 6 hours as needed for nausea           Allergies   Allergies   Allergen Reactions    Paclitaxel Other (See Comments) and Cough     flushing    Ace Inhibitors Cough    Aspirin Other (See  Comments)     Epistaxis requiring hospitalization from 81 mg daily      Durvalumab      Possible Inflammatory pneumonitis per 12/20/21 [facility] Oncology note    Hydrocodone-Acetaminophen Other (See Comments)     Other reaction(s): Insomnia      Sulfamethoxazole-Trimethoprim Itching and Rash    Sulfasalazine Rash

## 2024-04-28 NOTE — PLAN OF CARE
"Pt admitted on 4/25/26 for RLL pneumonia, LS chest wall pain,fever, PE, and anemia. VSS, afebrile. Pt states \"99.2 is low grade temp for him.\" Requested Tylenol on shift for this. Denies pain. Stand by/1 person assist with gait belt and walker. LS diminished. Frequent, productive cough. Voiding adequately.     Temp: 99.2  F (37.3  C) Temp src: Tympanic BP: 136/71 Pulse: 67   Resp: 16 SpO2: 98 % O2 Device: None (Room air)        Candy Silverio RN .......  4/28/2024  5:05 AM      Goal Outcome Evaluation:      Plan of Care Reviewed With: patient    Overall Patient Progress: no change    Outcome Evaluation: LS dim, denies pain, requested Tylenol for fever, highest temp 99.2, VSS      "

## 2024-04-28 NOTE — PROGRESS NOTES
[facility] DISCHARGE NOTE    Patient discharged to home at 2:38 PM via wheel chair. Accompanied by spouse and staff. Discharge instructions reviewed with patient, opportunity offered to ask questions. Prescriptions sent to patients preferred pharmacy. All belongings sent with patient.    Yesika Nielsen RN

## 2024-04-28 NOTE — PLAN OF CARE
SAFETY CHECKLIST  ID Bands and Risk clasps correct and in place (DNR, Fall risk, Allergy, Latex, Limb):  Yes  All Lines Reconciled and labeled correctly: Yes  Whiteboard updated:Yes  Environmental interventions: Yes  Verify Tele #: N/A    Candy Silverio RN .......  4/27/2024  7:32 PM

## 2024-04-28 NOTE — PHARMACY - DISCHARGE MEDICATION RECONCILIATION AND EDUCATION
Pharmacy: Discharge Counseling and Medication Reconciliation    Wilfrid Meneses  [address]  [place] [address] [phone] (home)   83 year old male  PCP:Gilberto Carroll    Allergies   Allergen Reactions    Paclitaxel Other (See Comments) and Cough     flushing    Ace Inhibitors Cough    Aspirin Other (See Comments)     Epistaxis requiring hospitalization from 81 mg daily      Durvalumab      Possible Inflammatory pneumonitis per 12/20/21 [facility] note    Hydrocodone-Acetaminophen Other (See Comments)     Other reaction(s): Insomnia      Sulfamethoxazole-Trimethoprim Itching and Rash    Sulfasalazine Rash       Discharge Counseling:    Pharmacist met with patient (and/or family) today to review the medication portion of the After Visit Summary (with an emphasis on NEW medications) and to address patient's questions/concerns.     Summary of Education:   Met with patient at time of discharge to review all changes in medications including new cefuroxime and doxycycline, as well as ferrous sulfate. Discussed indications, directions for use, and possible side effects. Patient asked a few questions and all concerns were addressed.     Materials Provided:   MedCounselor sheets printed from Clinical Pharmacology on: cefuroxime and doxycycline, as well as ferrous sulfate    Discharge Medication Reconciliation:    Bonnie Ochoa RPH has reviewed the patient's discharge medication orders and has compared them to the inpatient medication administration record and to what the patient was taking prior to admission- any discrepancies have been resolved.     It has been determined that the patient has an adequate supply of medications available or which can be obtained from the patient's preferred pharmacy, which has been confirmed as: [facility] [place].     Thank you for the consult.     Bonnie Ochoa RPH ....................  4/28/2024   2:01 PM

## 2024-04-28 NOTE — PLAN OF CARE
Patient denies pain. Tylenol given for temp of 99. Voiding without difficulty. Patient reports diarrhea; no interventions required at this time. Dyspnea on exertion; denies shortness of breath while at rest. LS diminished throughout. O2 saturation stable on room air. Ambulating SBA with walker.

## 2024-04-29 NOTE — TELEPHONE ENCOUNTER
Received referral to have a Pleurx Catheter placed.     Patient is going on Hospice care.    Patient scheduled to be seen in Clinic by Dr. Davenport on Thursday May 2nd at 9 am to discuss placement and schedule surgery.     Patient advised to keep radiology appointments until Pleurx catheter is placed. Noticed radiology appointment had been cancelled for tomorrow and patient had talked about having an appointment tomorrow. Reached out to Radiology to follow up with the patient on appointments.    Surgery scheduled. Orders faxed. Sonya Finley RN  ....................  5/2/2024   12:04 PM

## 2024-04-29 NOTE — TELEPHONE ENCOUNTER
Transitional Care Management Phone Call      Summary of hospitalization:  [facility] discharge summary reviewed    DISCHARGE DIAGNOSIS: Right lower lobe pneumonia     DATE OF DISCHARGE: 4/28/2024    Diagnostic Tests/Treatments reviewed.  Follow up needed: Patient sees oncologist    Post Discharge Medication Reconciliation: discharge medications reconciled, continue medications without change      Medications reviewed by: by myself    Problems taking medications regularly:  None    Problems adhering to non-medication therapy:  None    Other Healthcare Providers Involved in Patient's Care:         Specialist appointment - oncology and Imaging     Update since discharge: stable.     Plan of care communicated with: patient    Just a friendly reminder that you appointment is:  Next 5 appointments (look out 90 days)      May 07, 2024  9:20 AM  (Arrive by 9:05 AM)  Office Visit with [facility] [address]  [place] [id number] [phone]              We encourage you to keep this appointment.    Please remember to bring all of your pills in their bottles (including any vitamins or over the counter pills) with you to your appointment.     The patient indicates understanding of these issues and agrees with the plan of care.   Yes    Was the patient contacted within the 2 business days or other approved timeframe?  Yes    Was the Medication reconciliation and management done since the patient was discharged? Yes  Guerline Daniels RN  4/29/2024 11:44 AM

## 2024-04-29 NOTE — PROGRESS NOTES
I spoke with patient's oncologist, Dr. Ramos.  They discussed options and patient has elected for hospice.  He has persistent right pleural effusion.  Placement of a Pleurx catheter recommended.  Patient would prefer to have it [place].  I agreed to help facilitate arrangements for the Pleurx catheter.  Referral to gen surgery to discuss process and arrange placement.  Rishi Burgos MD on 4/29/2024 at 12:39 PM

## 2024-04-30 NOTE — PROGRESS NOTES
Uneventful thoracentesis.  Patient tolerated procedure well.  800 mLs of sharon colored fluid from right pleural cavity.      Fluid was not sent for lab.      Patient to xray to verify no pneumothorax.      Patient was cleared by radiologist to discharge.      Discharge instructions were reviewed with patient.  All questions answered.

## 2024-04-30 NOTE — DISCHARGE INSTRUCTIONS
"  ULTRASOUND GUIDED THORACENTESIS  Thoracentesis (say \"qend-lg-kfo-DIONNA-sis\") is a procedure to remove fluid from the space between the lungs and the chest wall. This is called the pleural space. The procedure may also be called a \"chest tap.\"  It is normal to have a small amount of fluid in the pleural space. But too much fluid can build up because of problems such as infection, heart failure, and lung cancer. The procedure may be done to help with shortness of breath and pain caused by the fluid buildup. Or you may have it done so the doctor can test the fluid to find the cause of the buildup.  MEDICATIONS  Take your medications as directed by your doctor. If you have questions about your medications, please contact your doctor's office directly. If you are on a blood thinner, please follow these directions after your procedure:  ___________________________________________  DIET  You may resume your usual diet following this procedure.  ACTIVITY  Take it easy today. Avoid vigorous physical activity for 24 hours.  COMFORT  If you have any discomfort of tenderness at the procedure site, you may take your usual or recommended pain medication. Avoid products containing aspirin the day of the procedure, unless approved by your doctor. In some circumstances, you may use ice on the procedure site. Your nurse will discuss this with you.   CARE OF SITE  Keep the bandage on for 24 hours. Then you may remove the bandage and shower. Place a new bandage and keep in place until the site is healed. Do not submerge in a tub, pool, hot tub, or lake for 1 week following your procedure. You may experience a small amount of bleeding on the bandage. If you continue to have bleeding, apply gentle direct pressure to the biopsy site until the bleeding is stopped. Then, apply a new bandage. Bruising is not uncommon.   WHAT TO WATCH FOR  Anytime a procedure is done, there is a risk of infection. Call your doctor if you experience fever over " 100.5 degrees, increased redness, swelling, persistent bleeding or drainage, foul smelling drainage, or increased pain at the procedure site. A cough is not uncommon following this procedure. Your sputum may be blood tinged.  WHEN TO RETURN  Return to an emergency room if you experience any of the following:  Worsening chest pain or pain taking a deep breath  Coughing up more than about 2 tablespoons of blood  Sudden trouble breathing or worsening shortness of breath  LAB  If the fluid was sent for testing, keep in mind that results can take between 3-7 business days. Some tests take longer to result than others. Keep in mind that you may see your results before your doctor has viewed the results. Your doctor will contact you with results and to discuss next steps. If you don't hear from your doctor within 48 hours of results, you may call your doctor.   QUESTIONS, PROBLEMS, OR CONCERNS  Please contact your doctor directly or leave a message for a doctor at [facility] by calling [phone].  [facility] Imaging Nurse [phone]  Monday - Friday 8:00 am - 4:30 pm

## 2024-04-30 NOTE — TELEPHONE ENCOUNTER
Call placed to patient to let him know we received orders for future US thoracentesis. Patient states he has a fever and of note, he is on antibiotics. Call transferred to triage RN. Also suggested that if he isn't getting better, to have a low threshold for returning to ER. Patient verbalized understanding.

## 2024-04-30 NOTE — TELEPHONE ENCOUNTER
Call received from Dr. Ramos's office at [facility]. No need to send fluid from today's thora for any testing. They are ok with patient having another therapeutic thora prior to his pleurx catheter placement and will fax orders momentarily.

## 2024-04-30 NOTE — TELEPHONE ENCOUNTER
"Patient states that he was in hospital for pneumonia on 4/25/24 and was discharged on Ceftin and Doxycyline.    Patient states that he had thoracentesis today.  States that he has been having a low grade fever in the afternoons since Saturday. But takes Tylenol and it seems to get better.    Patient states today he his Tylenol 750 mg at 1230 and has just rechecked temp and 99.4 oral patient states his normal in 97.    Patient states he feels hot and \"punky\".    Patient states that radiology nurse called him and said there was bacteria in his sputum.  Patient wondering if he should be concerned with above.    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    Devi Delong RN on 4/30/2024 at 4:31 PM      "

## 2024-04-30 NOTE — TELEPHONE ENCOUNTER
Call placed to [facility] with question about whether the pleural fluid from today should be sent to lab for testing. Also inquired whether patient can have orders for another thoracentesis sometime the week of May 6 - if he fills up before the pleurx catheter placement. Awaiting return phone call.

## 2024-04-30 NOTE — TELEPHONE ENCOUNTER
Called and informed patient of Lianne Yen response and patient verbalized understanding.  Patient states he is going on hospice so will not be follow in up with oncology anymore.  States he is done with chemo.  Did inform patient to reach out to hospice also.  Patient dose have a consult on 5/2/24 with surgeon in regards to placement on pleurx catheter.  Patient informed of hospital follow up on 5/7/24 with Zoey Varma.  Patient will call back with any questions.  Devi Delong RN on 4/30/2024 at 4:52 PM

## 2024-04-30 NOTE — TELEPHONE ENCOUNTER
Please have patient present to ER if he develops difficulty breathing, worsening pain, difficulty eating and drinking and have him call us if he has any new questions. Continue with Tylenol PRN for fevers, follow up with oncology has planned.

## 2024-05-02 NOTE — NURSING NOTE
"Chief Complaint   Patient presents with    Consult       Initial /70 (BP Location: Right arm, Patient Position: Sitting, Cuff Size: Adult Large)   Pulse 80   Temp 97.7  F (36.5  C) (Tympanic)   Resp 16   Ht 1.626 m (5' 4\")   Wt 62.6 kg (138 lb)   SpO2 97%   BMI 23.69 kg/m   Estimated body mass index is 23.69 kg/m  as calculated from the following:    Height as of this encounter: 1.626 m (5' 4\").    Weight as of this encounter: 62.6 kg (138 lb).  Meds Reconciled: diogenes Finley, RN     "

## 2024-05-02 NOTE — PROGRESS NOTES
Uneventful thoracentesis.  Patient tolerated procedure fairly well.  550 mLs of straw/sharon colored fluid from right pleural cavity.      Fluid was not sent for lab.      Patient to xray to verify no pneumothorax.      Patient was cleared by radiologist to discharge.      Discharge instructions were reviewed with patient.  All questions answered.

## 2024-05-02 NOTE — DISCHARGE INSTRUCTIONS
"  ULTRASOUND GUIDED THORACENTESIS  Thoracentesis (say \"vkmz-nw-wyt-DIONNA-sis\") is a procedure to remove fluid from the space between the lungs and the chest wall. This is called the pleural space. The procedure may also be called a \"chest tap.\"  It is normal to have a small amount of fluid in the pleural space. But too much fluid can build up because of problems such as infection, heart failure, and lung cancer. The procedure may be done to help with shortness of breath and pain caused by the fluid buildup. Or you may have it done so the doctor can test the fluid to find the cause of the buildup.  MEDICATIONS  Take your medications as directed by your doctor. If you have questions about your medications, please contact your doctor's office directly. If you are on a blood thinner, please follow these directions after your procedure:  ___________________________________________  DIET  You may resume your usual diet following this procedure.  ACTIVITY  Take it easy today. Avoid vigorous physical activity for 24 hours.  COMFORT  If you have any discomfort of tenderness at the procedure site, you may take your usual or recommended pain medication. Avoid products containing aspirin the day of the procedure, unless approved by your doctor. In some circumstances, you may use ice on the procedure site. Your nurse will discuss this with you.   CARE OF SITE  Keep the bandage on for 24 hours. Then you may remove the bandage and shower. Place a new bandage and keep in place until the site is healed. Do not submerge in a tub, pool, hot tub, or lake for 1 week following your procedure. You may experience a small amount of bleeding on the bandage. If you continue to have bleeding, apply gentle direct pressure to the biopsy site until the bleeding is stopped. Then, apply a new bandage. Bruising is not uncommon.   WHAT TO WATCH FOR  Anytime a procedure is done, there is a risk of infection. Call your doctor if you experience fever over " 100.5 degrees, increased redness, swelling, persistent bleeding or drainage, foul smelling drainage, or increased pain at the procedure site. A cough is not uncommon following this procedure. Your sputum may be blood tinged.  WHEN TO RETURN  Return to an emergency room if you experience any of the following:  Worsening chest pain or pain taking a deep breath  Coughing up more than about 2 tablespoons of blood  Sudden trouble breathing or worsening shortness of breath  LAB  If the fluid was sent for testing, keep in mind that results can take between 3-7 business days. Some tests take longer to result than others. Keep in mind that you may see your results before your doctor has viewed the results. Your doctor will contact you with results and to discuss next steps. If you don't hear from your doctor within 48 hours of results, you may call your doctor.   QUESTIONS, PROBLEMS, OR CONCERNS  Please contact your doctor directly or leave a message for a doctor at [facility] by calling [phone].  [facility] Imaging Nurse [phone]  Monday - Friday 8:00 am - 4:30 pm

## 2024-05-03 NOTE — H&P (VIEW-ONLY)
GENERAL SURGERY CONSULTATION NOTE    Wilfrid Meneses   [address]  [facility] [address]  83 year old  male    Primary Care Provider:  Gilberto Carroll      HPI: Wilfrid Meneses presents to clinic with chronic right-sided pleural effusion due to lung cancer.  Patient has had several thoracenteses recently and is currently on hospice.  Patient is interested in a Pleurx catheter for home drainage as we does know to come in so often and have this procedure done.  No history of surgery to the right chest.   Being treated for presumed pneumonia, unexplained fevers.  Treatment will be done early next week, before 5/8.    REVIEW OF SYSTEMS:    GENERAL: No fevers or chills. Denies fatigue, recent weight loss.  HEENT: No sinus drainage. No changes with vision or hearing. No difficulty swallowing.   LYMPHATICS:  Noswollen nodes in axilla, neck or groin.  CARDIOVASCULAR: Denies chest pain, palpitations and dyspnea on exertion.  PULMONARY: No shortness of breath or cough. No increase in sputum production.  GI: Denies melena,bright red blood in stools. No hematemesis. No constipation or diarrhea.  : No dysuria or hematuria.  SKIN: No recent rashes or ulcers.   HEMATOLOGY:  No history of easy bruising or bleeding.  ENDOCRINE:  No history of diabetes or thyroid problems.  NEUROLOGY:  No history of seizures or headaches. No motor or sensory changes.        Patient Active Problem List   Diagnosis    Cerebral microvascular disease    Benign prostatic hyperplasia with urinary obstruction    Lumbar spinal stenosis    Hypertension    Hyperlipidemia    Abnormal glucose    Dysarthria    Coronary artery calcification    Benign neoplasm of colon    Osteoarthritis of left glenohumeral joint    Primary mucinous adenocarcinoma of lung (H)    Alcohol dependence (H)    Pancytopenia (H)    Drug-induced insomnia (H)    Right lower lobe pneumonia    Hyponatremia    Left-sided chest wall pain    Fever, unspecified    Pleural effusion on  right    Lung cancer, primary, with metastasis from lung to other site, right (H)    Anemia, unspecified type    Lab test negative for COVID-19 virus       Past Medical History:   Diagnosis Date    Basal cell carcinoma of back 12/01/2014    Coronary artery calcification     score 297 12/2010    Dysarthria     Enlarged prostate without lower urinary tract symptoms (luts)     Post prostatectomy    Essential (primary) hypertension     No Comments Provided    History of colonic polyps     No Comments Provided    Hyperglycemia     No Comments Provided    Hyperlipidemia     No Comments Provided    Malignant neoplasm of skin     basal cell ca. on back and right ear    PMR (polymyalgia rheumatica) (H24)     Primary mucinous adenocarcinoma of lung (H)     S/P radiation and chemo, on maintainence biologic    Spinal stenosis of lumbar region without neurogenic claudication     No Comments Provided    Uncomplicated alcohol abuse     No Comments Provided       Past Surgical History:   Procedure Laterality Date    ARTHROPLASTY KNEE Left     01/09    ARTHROPLASTY SHOULDER Left 2018    ARTHROSCOPY KNEE Left     12/05    COLONOSCOPY      2001, 2006, 2011,Adenomatous 2001, subsequent WNL    COLONOSCOPY  11/21/2014 11/21/2014,Adenoma    COLONOSCOPY  01/07/2020    tubular adenomas, follow up 5 years    HERNIORRHAPHY INGUINAL BILATERAL      PORTACATH PLACEMENT      PROSTATECTOMY SUPRAPUBIC  2007    UPPER GI ENDOSCOPY  2005       Family History   Problem Relation Age of Onset    Diabetes Mother     Chronic Obstructive Pulmonary Disease Mother     Myocardial Infarction Father     Other - See Comments Son         MI during GXT    Other - See Comments Sister         PMR    Cancer No family hx of        Social History     Social History Narrative    Pt is  Wife Yoko; lives in [place]; retired as a pharmacist at the hospital.    Likes fishing, used to hunt, now is too unsteady.     Likes woodworking.        Social History      Socioeconomic History    Marital status:      Spouse name: Yoko    Number of children: 2    Years of education: Not on file    Highest education level: Not on file   Occupational History    Occupation: Pharmacist     Comment: retired   Tobacco Use    Smoking status: Never    Smokeless tobacco: Never    Tobacco comments:     smoked a pipe in college for 2-3 years (seldom)   Vaping Use    Vaping status: Never Used   Substance and Sexual Activity    Alcohol use: Not Currently     Comment: Last use was 7/4/20    Drug use: No    Sexual activity: Not Currently   Other Topics Concern    Parent/sibling w/ CABG, MI or angioplasty before 65F 55M? Not Asked   Social History Narrative    Pt is  Wife Yoko; lives in [place]; retired as a pharmacist at the hospital.    Likes fishing, used to hunt, now is too unsteady.     Likes woodworking.      Social Determinants of Health     Financial Resource Strain: Low Risk  (1/11/2024)    Financial Resource Strain     Within the past 12 months, have you or your family members you live with been unable to get utilities (heat, electricity) when it was really needed?: No   Food Insecurity: Low Risk  (1/11/2024)    Food Insecurity     Within the past 12 months, did you worry that your food would run out before you got money to buy more?: No     Within the past 12 months, did the food you bought just not last and you didn t have money to get more?: No   Transportation Needs: Low Risk  (1/11/2024)    Transportation Needs     Within the past 12 months, has lack of transportation kept you from medical appointments, getting your medicines, non-medical meetings or appointments, work, or from getting things that you need?: No   Physical Activity: Not on file   Stress: Not on file   Social Connections: Not on file   Interpersonal Safety: Low Risk  (1/11/2024)    Interpersonal Safety     Do you feel physically and emotionally safe where you currently live?: Yes     Within  the past 12 months, have you been hit, slapped, kicked or otherwise physically hurt by someone?: No     Within the past 12 months, have you been humiliated or emotionally abused in other ways by your partner or ex-partner?: No   Housing Stability: Low Risk  (1/11/2024)    Housing Stability     Do you have housing? : Yes     Are you worried about losing your housing?: No       Current Outpatient Medications   Medication Sig Dispense Refill    acetaminophen (TYLENOL) 500 MG tablet Take 1,000 mg by mouth every 6 hours as needed for mild pain      atenolol (TENORMIN) 50 MG tablet Take 1 tablet (50 mg) by mouth daily 90 tablet 4    cefuroxime (CEFTIN) 500 MG tablet Take 1 tablet (500 mg) by mouth every 12 hours 14 tablet 0    doxycycline hyclate (VIBRAMYCIN) 100 MG capsule Take 1 capsule (100 mg) by mouth every 12 hours 14 capsule 0    ferrous sulfate 220 (44 Fe) MG/5ML SOLN Take 5 mLs (220 mg) by mouth daily 473 mL 0    Multiple Vitamin (MULTI-VITAMINS) TABS Take 1 tablet by mouth daily      omeprazole (PRILOSEC) 20 MG DR capsule Take 20 mg by mouth      prochlorperazine (COMPAZINE) 10 MG tablet Take 10 mg by mouth every 6 hours as needed for nausea      simvastatin (ZOCOR) 40 MG tablet Take 1 tablet (40 mg) by mouth at bedtime 90 tablet 4     No current facility-administered medications for this visit.         ALLERGIES/SENSITIVITIES:   Allergies   Allergen Reactions    Paclitaxel Other (See Comments) and Cough     flushing    Ace Inhibitors Cough    Aspirin Other (See Comments)     Epistaxis requiring hospitalization from 81 mg daily      Durvalumab      Possible Inflammatory pneumonitis per 12/20/21 [facility] Oncology note    Hydrocodone-Acetaminophen Other (See Comments)     Other reaction(s): Insomnia      Sulfamethoxazole-Trimethoprim Itching and Rash    Sulfasalazine Rash       PHYSICAL EXAM:     /70 (BP Location: Right arm, Patient Position: Sitting, Cuff Size: Adult Large)   Pulse 80   Temp 97.7  F (36.5  " C) (Tympanic)   Resp 16   Ht 1.626 m (5' 4\")   Wt 62.6 kg (138 lb)   SpO2 97%   BMI 23.69 kg/m      General Appearance:   Sitting up in the chair, no apparent distress  HEENT: Pupils are equal and reactive, no scleral icterus,   Heart & CV:  RRR no murmur.  Intact distal pulses, good cap refill.  LUNGS: No increased work of breathing. diminished lung sounds on the right, normal breath sounds on the left  Abd: Soft, nontender, nondistended  Ext: Normal bulk and tone, lower extremity edema  Neuro: Alert and oriented, normal speech and mentation        CONSULTATION ASSESSMENT AND PLAN:    83 year old male with metastatic lung cancer on the right with chronic, recurrent malignant pleural effusion.  I believe the patient is reasonable candidate for Pleurx drain.  Patient has had repeated thoracenteses, numerous over the past few weeks the drain 100s of milliliters of fluid at a time.  Patient reports some improvement in shortness of breath and coughing when the fluid is removed.  The technical details of Pleurx catheter placement in the right chest were discussed with the patient along with risk and benefits include bleeding, infection, malfunction of the line and injury to intrathoracic structures including lung and mediastinal structures.  Patient demonstrates understanding and is willing to proceed.    Schedule for right chest Pleurx catheter placement on 5/8/2024      Christian Davenport MD on 5/3/2024 at 8:42 AM      "

## 2024-05-03 NOTE — PROGRESS NOTES
GENERAL SURGERY CONSULTATION NOTE    Wilfrid Meneses   [address]  [facility] [address]  83 year old  male    Primary Care Provider:  Gilberto Carroll      HPI: Wilfrid Meneses presents to clinic with chronic right-sided pleural effusion due to lung cancer.  Patient has had several thoracenteses recently and is currently on hospice.  Patient is interested in a Pleurx catheter for home drainage as we does know to come in so often and have this procedure done.  No history of surgery to the right chest.   Being treated for presumed pneumonia, unexplained fevers.  Treatment will be done early next week, before 5/8.    REVIEW OF SYSTEMS:    GENERAL: No fevers or chills. Denies fatigue, recent weight loss.  HEENT: No sinus drainage. No changes with vision or hearing. No difficulty swallowing.   LYMPHATICS:  Noswollen nodes in axilla, neck or groin.  CARDIOVASCULAR: Denies chest pain, palpitations and dyspnea on exertion.  PULMONARY: No shortness of breath or cough. No increase in sputum production.  GI: Denies melena,bright red blood in stools. No hematemesis. No constipation or diarrhea.  : No dysuria or hematuria.  SKIN: No recent rashes or ulcers.   HEMATOLOGY:  No history of easy bruising or bleeding.  ENDOCRINE:  No history of diabetes or thyroid problems.  NEUROLOGY:  No history of seizures or headaches. No motor or sensory changes.        Patient Active Problem List   Diagnosis    Cerebral microvascular disease    Benign prostatic hyperplasia with urinary obstruction    Lumbar spinal stenosis    Hypertension    Hyperlipidemia    Abnormal glucose    Dysarthria    Coronary artery calcification    Benign neoplasm of colon    Osteoarthritis of left glenohumeral joint    Primary mucinous adenocarcinoma of lung (H)    Alcohol dependence (H)    Pancytopenia (H)    Drug-induced insomnia (H)    Right lower lobe pneumonia    Hyponatremia    Left-sided chest wall pain    Fever, unspecified    Pleural effusion on  right    Lung cancer, primary, with metastasis from lung to other site, right (H)    Anemia, unspecified type    Lab test negative for COVID-19 virus       Past Medical History:   Diagnosis Date    Basal cell carcinoma of back 12/01/2014    Coronary artery calcification     score 297 12/2010    Dysarthria     Enlarged prostate without lower urinary tract symptoms (luts)     Post prostatectomy    Essential (primary) hypertension     No Comments Provided    History of colonic polyps     No Comments Provided    Hyperglycemia     No Comments Provided    Hyperlipidemia     No Comments Provided    Malignant neoplasm of skin     basal cell ca. on back and right ear    PMR (polymyalgia rheumatica) (H24)     Primary mucinous adenocarcinoma of lung (H)     S/P radiation and chemo, on maintainence biologic    Spinal stenosis of lumbar region without neurogenic claudication     No Comments Provided    Uncomplicated alcohol abuse     No Comments Provided       Past Surgical History:   Procedure Laterality Date    ARTHROPLASTY KNEE Left     01/09    ARTHROPLASTY SHOULDER Left 2018    ARTHROSCOPY KNEE Left     12/05    COLONOSCOPY      2001, 2006, 2011,Adenomatous 2001, subsequent WNL    COLONOSCOPY  11/21/2014 11/21/2014,Adenoma    COLONOSCOPY  01/07/2020    tubular adenomas, follow up 5 years    HERNIORRHAPHY INGUINAL BILATERAL      PORTACATH PLACEMENT      PROSTATECTOMY SUPRAPUBIC  2007    UPPER GI ENDOSCOPY  2005       Family History   Problem Relation Age of Onset    Diabetes Mother     Chronic Obstructive Pulmonary Disease Mother     Myocardial Infarction Father     Other - See Comments Son         MI during GXT    Other - See Comments Sister         PMR    Cancer No family hx of        Social History     Social History Narrative    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    Likes fishing, used to hunt, now is too unsteady.     Likes woodworking.        Social History      Socioeconomic History    Marital status:      Spouse name: Yoko    Number of children: 2    Years of education: Not on file    Highest education level: Not on file   Occupational History    Occupation: Pharmacist     Comment: retired   Tobacco Use    Smoking status: Never    Smokeless tobacco: Never    Tobacco comments:     smoked a pipe in college for 2-3 years (seldom)   Vaping Use    Vaping status: Never Used   Substance and Sexual Activity    Alcohol use: Not Currently     Comment: Last use was 7/4/20    Drug use: No    Sexual activity: Not Currently   Other Topics Concern    Parent/sibling w/ CABG, MI or angioplasty before 65F 55M? Not Asked   Social History Narrative    Pt is  Wife Yoko; lives in [place]; retired as a pharmacist at the hospital.    Likes fishing, used to hunt, now is too unsteady.     Likes woodworking.      Social Determinants of Health     Financial Resource Strain: Low Risk  (1/11/2024)    Financial Resource Strain     Within the past 12 months, have you or your family members you live with been unable to get utilities (heat, electricity) when it was really needed?: No   Food Insecurity: Low Risk  (1/11/2024)    Food Insecurity     Within the past 12 months, did you worry that your food would run out before you got money to buy more?: No     Within the past 12 months, did the food you bought just not last and you didn t have money to get more?: No   Transportation Needs: Low Risk  (1/11/2024)    Transportation Needs     Within the past 12 months, has lack of transportation kept you from medical appointments, getting your medicines, non-medical meetings or appointments, work, or from getting things that you need?: No   Physical Activity: Not on file   Stress: Not on file   Social Connections: Not on file   Interpersonal Safety: Low Risk  (1/11/2024)    Interpersonal Safety     Do you feel physically and emotionally safe where you currently live?: Yes     Within  the past 12 months, have you been hit, slapped, kicked or otherwise physically hurt by someone?: No     Within the past 12 months, have you been humiliated or emotionally abused in other ways by your partner or ex-partner?: No   Housing Stability: Low Risk  (1/11/2024)    Housing Stability     Do you have housing? : Yes     Are you worried about losing your housing?: No       Current Outpatient Medications   Medication Sig Dispense Refill    acetaminophen (TYLENOL) 500 MG tablet Take 1,000 mg by mouth every 6 hours as needed for mild pain      atenolol (TENORMIN) 50 MG tablet Take 1 tablet (50 mg) by mouth daily 90 tablet 4    cefuroxime (CEFTIN) 500 MG tablet Take 1 tablet (500 mg) by mouth every 12 hours 14 tablet 0    doxycycline hyclate (VIBRAMYCIN) 100 MG capsule Take 1 capsule (100 mg) by mouth every 12 hours 14 capsule 0    ferrous sulfate 220 (44 Fe) MG/5ML SOLN Take 5 mLs (220 mg) by mouth daily 473 mL 0    Multiple Vitamin (MULTI-VITAMINS) TABS Take 1 tablet by mouth daily      omeprazole (PRILOSEC) 20 MG DR capsule Take 20 mg by mouth      prochlorperazine (COMPAZINE) 10 MG tablet Take 10 mg by mouth every 6 hours as needed for nausea      simvastatin (ZOCOR) 40 MG tablet Take 1 tablet (40 mg) by mouth at bedtime 90 tablet 4     No current facility-administered medications for this visit.         ALLERGIES/SENSITIVITIES:   Allergies   Allergen Reactions    Paclitaxel Other (See Comments) and Cough     flushing    Ace Inhibitors Cough    Aspirin Other (See Comments)     Epistaxis requiring hospitalization from 81 mg daily      Durvalumab      Possible Inflammatory pneumonitis per 12/20/21 [facility] Oncology note    Hydrocodone-Acetaminophen Other (See Comments)     Other reaction(s): Insomnia      Sulfamethoxazole-Trimethoprim Itching and Rash    Sulfasalazine Rash       PHYSICAL EXAM:     /70 (BP Location: Right arm, Patient Position: Sitting, Cuff Size: Adult Large)   Pulse 80   Temp 97.7  F (36.5  " C) (Tympanic)   Resp 16   Ht 1.626 m (5' 4\")   Wt 62.6 kg (138 lb)   SpO2 97%   BMI 23.69 kg/m      General Appearance:   Sitting up in the chair, no apparent distress  HEENT: Pupils are equal and reactive, no scleral icterus,   Heart & CV:  RRR no murmur.  Intact distal pulses, good cap refill.  LUNGS: No increased work of breathing. diminished lung sounds on the right, normal breath sounds on the left  Abd: Soft, nontender, nondistended  Ext: Normal bulk and tone, lower extremity edema  Neuro: Alert and oriented, normal speech and mentation        CONSULTATION ASSESSMENT AND PLAN:    83 year old male with metastatic lung cancer on the right with chronic, recurrent malignant pleural effusion.  I believe the patient is reasonable candidate for Pleurx drain.  Patient has had repeated thoracenteses, numerous over the past few weeks the drain 100s of milliliters of fluid at a time.  Patient reports some improvement in shortness of breath and coughing when the fluid is removed.  The technical details of Pleurx catheter placement in the right chest were discussed with the patient along with risk and benefits include bleeding, infection, malfunction of the line and injury to intrathoracic structures including lung and mediastinal structures.  Patient demonstrates understanding and is willing to proceed.    Schedule for right chest Pleurx catheter placement on 5/8/2024      Christian Davenport MD on 5/3/2024 at 8:42 AM      "

## 2024-05-06 NOTE — DISCHARGE INSTRUCTIONS
"  ULTRASOUND GUIDED THORACENTESIS  Thoracentesis (say \"qhuw-ib-jyu-DIONNA-sis\") is a procedure to remove fluid from the space between the lungs and the chest wall. This is called the pleural space. The procedure may also be called a \"chest tap.\"  It is normal to have a small amount of fluid in the pleural space. But too much fluid can build up because of problems such as infection, heart failure, and lung cancer. The procedure may be done to help with shortness of breath and pain caused by the fluid buildup. Or you may have it done so the doctor can test the fluid to find the cause of the buildup.  MEDICATIONS  Take your medications as directed by your doctor. If you have questions about your medications, please contact your doctor's office directly. If you are on a blood thinner, please follow these directions after your procedure:  ___________________________________________  DIET  You may resume your usual diet following this procedure.  ACTIVITY  Take it easy today. Avoid vigorous physical activity for 24 hours.  COMFORT  If you have any discomfort of tenderness at the procedure site, you may take your usual or recommended pain medication. Avoid products containing aspirin the day of the procedure, unless approved by your doctor. In some circumstances, you may use ice on the procedure site. Your nurse will discuss this with you.   CARE OF SITE  Keep the bandage on for 24 hours. Then you may remove the bandage and shower. Place a new bandage and keep in place until the site is healed. Do not submerge in a tub, pool, hot tub, or lake for 1 week following your procedure. You may experience a small amount of bleeding on the bandage. If you continue to have bleeding, apply gentle direct pressure to the biopsy site until the bleeding is stopped. Then, apply a new bandage. Bruising is not uncommon.   WHAT TO WATCH FOR  Anytime a procedure is done, there is a risk of infection. Call your doctor if you experience fever over " 100.5 degrees, increased redness, swelling, persistent bleeding or drainage, foul smelling drainage, or increased pain at the procedure site. A cough is not uncommon following this procedure. Your sputum may be blood tinged.  WHEN TO RETURN  Return to an emergency room if you experience any of the following:  Worsening chest pain or pain taking a deep breath  Coughing up more than about 2 tablespoons of blood  Sudden trouble breathing or worsening shortness of breath  LAB  If the fluid was sent for testing, keep in mind that results can take between 3-7 business days. Some tests take longer to result than others. Keep in mind that you may see your results before your doctor has viewed the results. Your doctor will contact you with results and to discuss next steps. If you don't hear from your doctor within 48 hours of results, you may call your doctor.   QUESTIONS, PROBLEMS, OR CONCERNS  Please contact your doctor directly or leave a message for a doctor at [facility] by calling [phone].  [facility] Imaging Nurse [phone]  Monday - Friday 8:00 am - 4:30 pm

## 2024-05-06 NOTE — TELEPHONE ENCOUNTER
Called [employer]. They have everything they needed. They reported patient will call after it is placed for first order. Sonya Finley RN  ....................  5/6/2024   11:45 AM

## 2024-05-06 NOTE — PROGRESS NOTES
Uneventful thoracentesis.  Patient tolerated procedure well.  500 mLs of straw colored fluid from right pleural cavity.      Fluid was not sent for lab.      Patient to xray to verify no pneumothorax.      Patient was cleared by radiologist to discharge.      Discharge instructions were reviewed with patient.  All questions answered.

## 2024-05-07 NOTE — PATIENT INSTRUCTIONS
It was wonderful meeting with you today.    Please let me know if you need anything for pain control moving forward. You can call and leave a message with the nurses. No need for an appointment for this unless you wish.

## 2024-05-07 NOTE — PROGRESS NOTES
Violet Yuen is a 83 year old, presenting for the following health issues:  Hospital F/U (Right lower lobe pneumonia )        5/7/2024     9:02 AM   Additional Questions   Roomed by ALIYA Kimble   Accompanied by Wife       ICD-10-CM    1. Primary mucinous adenocarcinoma of lung (H)  C34.90       2. Pneumonia of right lower lobe due to infectious organism  J18.9       3. Pleural effusion on right  J90       4. Lung cancer, primary, with metastasis from lung to other site, right (H)  C34.91       5. Fever, unspecified  R50.9       Very pleasant 83-year-old male presents to the clinic today for follow-up hospital visit from 4/25 to 4/28 with fevers and right side pneumonia.  He has completed postdischarge antibiotic course and has met with oncology since discharge.  Patient has a history of metastatic non-small lung cancer with recent treatment of chemotherapy with continued progression of the disease.  He has developed a pleural effusion which has been drained several times with some relief.  Plan is for him to get a Pleurx catheter tomorrow and then meet with hospice care tomorrow afternoon.  Overall today he is feeling well.  He did have some right leg pain last night and is using Tylenol for pain.  He does have a prescription for oxycodone as needed but has not needed this as of yet. I would plan on hospice overseeing for comfort measures.  No labs needed today.  Patient vital signs are stable on room air.     Hospital Follow-up Visit:    Patient states he had a bad night of sleep due to pain in his right leg. Does have chronic back pain. Has had pain in this leg before.  Typically pain is relieved by Tylenol however patient feels he got behind on his pain.  Has oxycodone as needed. Has not used.    Low grade fevers in the afternoon. Surgery aware. Unable to find source. Recent antibiotics.   Has met with oncology since discharge.     Breathing better. Can feel when he's due for thoracentesis. Having  thoracentesis approximately 3-4 times a week. Getting large volumes.  Plan pleurex cath tomorrow.     Has hospice meeting tomorrow afternoon.     Hospital/Nursing Home/IP Rehab Facility: [facility]  Date of Admission: 4-  Date of Discharge: 4-  Reason(s) for Admission: Right lower lobe pneumonia   Was the patient in the ICU or did the patient experience delirium during hospitalization?  No  Do you have any other stressors you would like to discuss with your provider? No    Problems taking medications regularly:  None  Medication changes since discharge: Antibiotics (doxycycline)   Problems adhering to non-medication therapy:  None    Summary of hospitalization:  [facility] discharge summary reviewed  Diagnostic Tests/Treatments reviewed.  Follow up needed: Patient to continue to follow with general surgery for Pleurx catheter as well as oncology as needed.  He is scheduled to meet with hospice tomorrow afternoon.  Other Healthcare Providers Involved in Patient s Care:         Hospice  Update since discharge: improved.           Objective    There were no vitals taken for this visit.  There is no height or weight on file to calculate BMI.  Physical Exam  Constitutional:       General: He is not in acute distress.     Appearance: Normal appearance. He is not ill-appearing.      Comments: Frail   HENT:      Head: Normocephalic.   Cardiovascular:      Rate and Rhythm: Normal rate and regular rhythm.      Pulses: Normal pulses.      Heart sounds: No murmur heard.  Pulmonary:      Effort: Pulmonary effort is normal. No respiratory distress.      Comments: Decreased lung sounds on right  Abdominal:      General: Bowel sounds are normal.      Palpations: Abdomen is soft.   Musculoskeletal:      Right lower leg: No edema.      Left lower leg: No edema.      Comments: Patient is in a wheelchair at visit today   Skin:     General: Skin is warm and dry.   Neurological:       Mental Status: He is alert and oriented to person, place, and time.      Comments: Slight tremor   Psychiatric:         Behavior: Behavior normal.        Signed Electronically by: RAFAEL Bean CNP

## 2024-05-07 NOTE — NURSING NOTE
"Chief Complaint   Patient presents with    Hospital F/U     Right lower lobe pneumonia        Initial /61   Pulse 86   Temp (!) 96.7  F (35.9  C) (Temporal)   Resp 17   Ht 1.626 m (5' 4\")   Wt 62.6 kg (138 lb)   SpO2 95%   BMI 23.69 kg/m   Estimated body mass index is 23.69 kg/m  as calculated from the following:    Height as of this encounter: 1.626 m (5' 4\").    Weight as of this encounter: 62.6 kg (138 lb).  Medication Reconciliation: complete    Shyanne Majano LPN on 5/7/2024 at 9:07 AM     "

## 2024-05-08 NOTE — ANESTHESIA PREPROCEDURE EVALUATION
Anesthesia Pre-Procedure Evaluation    Patient: Wilfrid Meneses   MRN: [id number] : 1940        Procedure : Procedure(s):  Pleurx Catheter Placement - Right Side          Past Medical History:   Diagnosis Date    Basal cell carcinoma of back 2014    Coronary artery calcification     score 297 2010    Dysarthria     Enlarged prostate without lower urinary tract symptoms (luts)     Post prostatectomy    Essential (primary) hypertension     No Comments Provided    History of colonic polyps     No Comments Provided    Hyperglycemia     No Comments Provided    Hyperlipidemia     No Comments Provided    Malignant neoplasm of skin     basal cell ca. on back and right ear    PMR (polymyalgia rheumatica) (H24)     Primary mucinous adenocarcinoma of lung (H)     S/P radiation and chemo, on maintainence biologic    Spinal stenosis of lumbar region without neurogenic claudication     No Comments Provided    Uncomplicated alcohol abuse     No Comments Provided      Past Surgical History:   Procedure Laterality Date    ARTHROPLASTY KNEE Left         ARTHROPLASTY SHOULDER Left 2018    ARTHROSCOPY KNEE Left         COLONOSCOPY      , , ,Adenomatous , subsequent WNL    COLONOSCOPY  2014,Adenoma    COLONOSCOPY  2020    tubular adenomas, follow up 5 years    HERNIORRHAPHY INGUINAL BILATERAL      PORTACATH PLACEMENT      PROSTATECTOMY SUPRAPUBIC  2007    UPPER GI ENDOSCOPY        Allergies   Allergen Reactions    Paclitaxel Other (See Comments) and Cough     flushing    Ace Inhibitors Cough    Aspirin Other (See Comments)     Epistaxis requiring hospitalization from 81 mg daily      Durvalumab      Possible Inflammatory pneumonitis per [facility] note    Hydrocodone-Acetaminophen Other (See Comments)     Other reaction(s): Insomnia      Sulfamethoxazole-Trimethoprim Itching and Rash    Sulfasalazine Rash      Social History     Tobacco Use     Smoking status: Never    Smokeless tobacco: Never    Tobacco comments:     smoked a pipe in college for 2-3 years (seldom)   Substance Use Topics    Alcohol use: Not Currently     Comment: Last use was 7/4/20      Wt Readings from Last 1 Encounters:   05/08/24 62.6 kg (138 lb)        Anesthesia Evaluation   Pt has had prior anesthetic. Type: MAC and General.    No history of anesthetic complications       ROS/MED HX  ENT/Pulmonary: Comment: Hx. Recurrent right thoracentesis (last 5/6/24), chronic SOB    (+)                              recent URI,          Neurologic: Comment: Tremor       Cardiovascular:     (+)  hypertension- -  CAD -  - -           DRAPER.                     pulmonary hypertension, Previous cardiac testing   Echo: Date: Results:    Stress Test:  Date: Results:    ECG Reviewed:  Date: 4/26/24 Results:  Sinus rhythm   Normal ECG   When compared with ECG of 24-APR-2024 14:11,   Premature ventricular complexes are no longer Present   Confirmed by DO ELIAS STACY ([id number]) on 4/28/2024 6:20:37 AM     Cath:  Date: Results:      METS/Exercise Tolerance: 1 - Eating, dressing    Hematologic: Comments: Pancytopenia       Musculoskeletal:       GI/Hepatic:       Renal/Genitourinary:       Endo:       Psychiatric/Substance Use:     (+) psychiatric history anxiety       Infectious Disease:       Malignancy:   (+) Malignancy, History of Lung.    Other:      (+)  , H/O Chronic Pain,         Physical Exam    Airway        Mallampati: II   TM distance: > 3 FB   Neck ROM: full   Mouth opening: > 3 cm    Respiratory Devices and Support         Dental       (+) Modest Abnormalities - crowns, retainers, 1 or 2 missing teeth      Cardiovascular          Rhythm and rate: regular and normal     Pulmonary           breath sounds clear to auscultation           OUTSIDE LABS:  CBC:   Lab Results   Component Value Date    WBC 6.5 04/28/2024    WBC 5.2 04/27/2024    HGB 8.0 (L) 04/28/2024    HGB 7.3 (L) 04/27/2024    HCT 24.6  "(L) 04/28/2024    HCT 20.7 (L) 04/27/2024     04/28/2024     04/27/2024     BMP:   Lab Results   Component Value Date     (L) 04/28/2024     (L) 04/27/2024    POTASSIUM 3.9 04/28/2024    POTASSIUM 3.9 04/27/2024    CHLORIDE 94 (L) 04/28/2024    CHLORIDE 99 04/27/2024    CO2 23 04/28/2024    CO2 23 04/27/2024    BUN 8.0 04/28/2024    BUN 12.3 04/27/2024    CR 0.70 04/28/2024    CR 0.78 04/27/2024    GLC 87 04/28/2024    GLC 96 04/27/2024     COAGS:   Lab Results   Component Value Date    PTT 31 01/24/2018    INR 0.9 01/24/2018     POC: No results found for: \"BGM\", \"HCG\", \"HCGS\"  HEPATIC:   Lab Results   Component Value Date    ALBUMIN 3.0 (L) 04/28/2024    PROTTOTAL 5.6 (L) 04/28/2024    ALT 30 04/28/2024    AST 48 (H) 04/28/2024    ALKPHOS 208 (H) 04/28/2024    BILITOTAL 0.4 04/28/2024     OTHER:   Lab Results   Component Value Date    LACT 1.4 04/25/2024    A1C 5.2 01/09/2023    ESTIVEN 8.7 (L) 04/28/2024    MAG 1.9 02/26/2024    CRP 2.0 (H) 05/18/2020    SED 11 (H) 01/25/2021       Anesthesia Plan    ASA Status:  3    NPO Status:  NPO Appropriate    Anesthesia Type: MAC.     - Reason for MAC: chronic cardiopulmonary disease   Induction: Intravenous.   Maintenance: Balanced.        Consents    Anesthesia Plan(s) and associated risks, benefits, and realistic alternatives discussed. Questions answered and patient/representative(s) expressed understanding.     - Discussed: Risks, Benefits and Alternatives for BOTH SEDATION and the PROCEDURE were discussed     - Discussed with:  Patient      - Extended Intubation/Ventilatory Support Discussed: Yes.      - Patient is DNR/DNI Status: Yes             Suspend during perioperative period? No (Patient wishes to continue DNR/DNI during surgery).    Use of blood products discussed: Yes.     - Discussed with: Patient.     - Consented: consented to blood products     Postoperative Care    Pain management: Multi-modal analgesia.        Comments:            "    Anabella Mcgee    I have reviewed the pertinent notes and labs in the chart from the past 30 days and (re)examined the patient.  Any updates or changes from those notes are reflected in this note.     # Hyponatremia: Lowest Na = 123 mmol/L in last 30 days, will monitor as appropriate      # Hypoalbuminemia: Lowest albumin = 2.7 g/dL in the past 30 days , will monitor as appropriate

## 2024-05-08 NOTE — OR NURSING
Pt has been discharged to home at 1010 via wheelchair accompanied by wife.    Written discharge instructions were provided to pt and significant other.  Prescriptions were escribed to [facility].  Patient states their pain is only present with coughing, and denies any nausea or dizziness upon discharge.    Patient verbalize understanding of discharge instructions including no driving until tomorrow and no longer taking narcotic pain medications, no operating mechanical equipment, and no making any important decisions.They understand reason for discharge, and necessary follow-up appointments.  Hospice scheduled to come later this afternoon to instruct on the Pleurx.   Lyn Caldwell RN on 5/8/2024 at 10:21 AM

## 2024-05-08 NOTE — ANESTHESIA CARE TRANSFER NOTE
Patient: Wilfrid Meneses    Procedure: Procedure(s):  Pleurx Catheter Placement - Right Side       Diagnosis: Lung cancer, primary, with metastasis from lung to other site, right (H) [C34.91]  Diagnosis Additional Information: No value filed.    Anesthesia Type:   MAC     Note:    Oropharynx: oropharynx clear of all foreign objects and spontaneously breathing  Level of Consciousness: awake  Oxygen Supplementation: room air    Independent Airway: airway patency satisfactory and stable  Dentition: dentition unchanged  Vital Signs Stable: post-procedure vital signs reviewed and stable  Report to RN Given: handoff report given  Patient transferred to: Phase II    Handoff Report: Identifed the Patient, Identified the Reponsible Provider, Reviewed the pertinent medical history, Discussed the surgical course, Reviewed Intra-OP anesthesia mangement and issues during anesthesia, Set expectations for post-procedure period and Allowed opportunity for questions and acknowledgement of understanding      Vitals:  Vitals Value Taken Time   BP     Temp     Pulse     Resp     SpO2 97 % 05/08/24 0814   Vitals shown include unfiled device data.    Electronically Signed By: Anabella Mcgee  May 8, 2024  8:15 AM

## 2024-05-08 NOTE — INTERVAL H&P NOTE
I saw and examined Wilfrid Meneses.  I have reviewed the history and physical and find no changes to the patient's medical status or condition with the exceptions noted below.   Wilfrid Meneses having shortness of breath and coughing. Finished with antibiotic course, still no source on work up. Continues to have fevers in the afternoon.       Christian Davenport MD   7:19 AM 5/8/2024

## 2024-05-08 NOTE — OR NURSING
Per patient and wife Hospice will be coming this afternoon to go over the education and home instructions on the Pleurx Catheter.  This was confirmed with documentation found in the notes. Lyn Caldwell RN on 5/8/2024 at 9:14 AM

## 2024-05-08 NOTE — ANESTHESIA POSTPROCEDURE EVALUATION
Patient: Wilfrid Meneses    Procedure: Procedure(s):  Pleurx Catheter Placement - Right Side       Anesthesia Type:  MAC    Note:  Disposition: Outpatient   Postop Pain Control: Uneventful            Sign Out: Well controlled pain   PONV: No   Neuro/Psych: Uneventful            Sign Out: Acceptable/Baseline neuro status   Airway/Respiratory: Uneventful            Sign Out: Acceptable/Baseline resp. status   CV/Hemodynamics: Uneventful            Sign Out: Acceptable CV status; No obvious hypovolemia; No obvious fluid overload   Other NRE: NONE   DID A NON-ROUTINE EVENT OCCUR? No       Last vitals:  Vitals Value Taken Time   /64 05/08/24 0915   Temp 97.3  F (36.3  C) 05/08/24 0815   Pulse 69 05/08/24 0915   Resp     SpO2 95 % 05/08/24 0921   Vitals shown include unfiled device data.    Electronically Signed By: RAFAEL Ring CRNA  May 8, 2024  2:12 PM

## 2024-05-08 NOTE — OP NOTE
PREOPERATIVE  DIAGNOSIS: Chronic, recurrent malignant pleural effusion right side     POSTOPERATIVE DIAGNOSIS: Same    PROCEDURE PERFORMED: Right chest Pleurx catheter drain placement with ultrasound guidance    ESTIMATED BLOOD LOSS:   10 mL    SURGEON:  Christian Davenport MD     ASSISTANT: Circulator: Catherine Hernandez RN  Scrub Person: Maral Cleaning  First Assistant: Ingrid Salas RN; Mayra Aviles RN    ANESTHESIA: ChoiceStudent Nurse Anesthetist: Anabella Mcgee CRNA Independent: Jane Granger APRN CRNA    FAMILYPHYSICIAN: Gilberto Carroll      INDICATION FOR THE PROCEDURE:  The patient is a 83 year old y.o. male with a metastatic lung cancer complicated by chronic, recurrent malignant pleural effusion.        PROCEDURE:  Wilfrid Meneses was brought to the operating room placed in supine position.    MAC anesthetic was applied until the patient was sufficiently sedated.  Patient was prepped and draped in usual sterile fashion.  Timeout was performed and everyone was in agreement to proceed.  Ultrasound was used to locate fluid within the chest cavity around the level of T5-6 at the RIGHT midaxillary line. Local anesthetic was infiltrated at the location where I will enter the chest. A 1 cm incision was made in this area. Introducer needle was placed in to the incision and slowly advanced in to the chest while aspirating until clear fluid filled the syringe. A thin wire was advanced in to the chest with ease. The wire was secured in place. The presumed tract for the catheter was infiltrated with local anesthetic. A 5mm incision was made in the abdomen and the pleurx drain, attached to the tunneling device, was placed in the abdominal incision and tunneled to the midaxillary incision.  An additional skin incision was made midway between the first 2 incisions as the tunneling device would not reach all the way to the chest incision. Dilator was placed over the wire and in to the chest. The  rigid trocar and wire were removed and clear fluid poured out the tear-away sheath. The pleurX catheter was quickly fed in to the chest and the sheath was removed. The catheter did not feel twisted or kinked. The catheter was hooked up to suction and 450 mL of clear fluid was removed.  The fluid became bloody towards the end.  The midaxillary incision was closed with inverted interrupted 4-0 Monocryl suture. The catheter was secured to the abdomen with 0-silk suture. The patient was cleansed and dried. The incisions and catheter were covered with clean dressings.     At the end of the case all sponge and needle counts were correct.  The patient tolerated procedure well. They were awoken in the operating room and was taken to the recovery room in good condition.     Post op CXR pending         SARAH Davenport MD

## 2024-05-08 NOTE — DISCHARGE INSTRUCTIONS
[facility]  Adult Discharge Orders & Instructions      For 24 hours after surgery:  Get plenty of rest.  A responsible adult must stay with you for at least 24 hours after you leave the hospital.   You may feel lightheaded.  IF so, sit for a few minutes before standing.  Have someone help you get up.   You may have a slight fever. Call the doctor if your fever is over 101 F (38.3 C) (taken under the tongue) or lasts longer than 24 hours.  You may have a dry mouth, a sore throat, muscle aches or trouble sleeping.  These should go away after 24 hours.  Do not make important or legal decisions.  6.   Do not drive or use heavy equipment.  If you have weakness or tingling, don't drive or use heavy equipment until this feeling goes away.                                                                                                                                                                           To contact a doctor, call    [phone]

## (undated) DEVICE — ESU GROUND PAD ADULT W/CORD E7507

## (undated) DEVICE — SOL WATER 1500ML

## (undated) DEVICE — GLOVE PROTEXIS POWDER FREE SMT 7.5  2D72PT75X

## (undated) DEVICE — ENDO SNARE POLYPECTOMY OVAL 10MM LOOP SD-240U-10

## (undated) DEVICE — GLOVE BIOGEL PI INDICATOR 8.0 LF 41680

## (undated) DEVICE — TUBING SUCTION 10'X3/16" N510

## (undated) DEVICE — PACK MAJOR LAPAROTOMY LF SBA15MLFCA

## (undated) DEVICE — ENDO KIT COMPLIANCE DYKENDOCMPLY

## (undated) DEVICE — DRAIN PLEURAL CATH KIT PLEURX 15.5FR 50-7000B

## (undated) DEVICE — DRAIN PLEURAL CATH KIT PLEURX RESERVOIR 50-7500B

## (undated) DEVICE — SUCTION MANIFOLD NEPTUNE 2 SYS 4 PORT 0702-020-000

## (undated) DEVICE — ENDO BRUSH CHANNEL MASTER CLEANING 2-4.2MM BW-412T

## (undated) DEVICE — Device

## (undated) DEVICE — SU MONOCRYL 4-0 PS-2 27" UND Y426H

## (undated) DEVICE — ENDO TRAP POLYP E-TRAP 00711099

## (undated) RX ORDER — SODIUM CHLORIDE 9 MG/ML
INJECTION, SOLUTION INTRAVENOUS
Status: DISPENSED
Start: 2021-02-03

## (undated) RX ORDER — BUPIVACAINE HYDROCHLORIDE AND EPINEPHRINE 5; 5 MG/ML; UG/ML
INJECTION, SOLUTION EPIDURAL; INTRACAUDAL; PERINEURAL
Status: DISPENSED
Start: 2024-01-01

## (undated) RX ORDER — HEPARIN SODIUM (PORCINE) LOCK FLUSH IV SOLN 100 UNIT/ML 100 UNIT/ML
SOLUTION INTRAVENOUS
Status: DISPENSED
Start: 2024-01-01

## (undated) RX ORDER — PIPERACILLIN SODIUM, TAZOBACTAM SODIUM 4; .5 G/20ML; G/20ML
INJECTION, POWDER, LYOPHILIZED, FOR SOLUTION INTRAVENOUS
Status: DISPENSED
Start: 2024-01-01

## (undated) RX ORDER — LIDOCAINE HYDROCHLORIDE 10 MG/ML
INJECTION, SOLUTION INFILTRATION; PERINEURAL
Status: DISPENSED
Start: 2024-01-01

## (undated) RX ORDER — ACETAMINOPHEN 500 MG
TABLET ORAL
Status: DISPENSED
Start: 2020-07-27

## (undated) RX ORDER — VANCOMYCIN HYDROCHLORIDE 1 G/20ML
INJECTION, POWDER, LYOPHILIZED, FOR SOLUTION INTRAVENOUS
Status: DISPENSED
Start: 2024-01-01

## (undated) RX ORDER — LIDOCAINE HYDROCHLORIDE 10 MG/ML
INJECTION, SOLUTION EPIDURAL; INFILTRATION; INTRACAUDAL; PERINEURAL
Status: DISPENSED
Start: 2024-01-01

## (undated) RX ORDER — OXYMETAZOLINE HYDROCHLORIDE 0.05 G/100ML
SPRAY NASAL
Status: DISPENSED
Start: 2022-05-03

## (undated) RX ORDER — PROPOFOL 10 MG/ML
INJECTION, EMULSION INTRAVENOUS
Status: DISPENSED
Start: 2024-01-01

## (undated) RX ORDER — OXYCODONE HYDROCHLORIDE 5 MG/1
TABLET ORAL
Status: DISPENSED
Start: 2020-07-27

## (undated) RX ORDER — LIDOCAINE HYDROCHLORIDE 20 MG/ML
INJECTION, SOLUTION EPIDURAL; INFILTRATION; INTRACAUDAL; PERINEURAL
Status: DISPENSED
Start: 2020-01-07

## (undated) RX ORDER — ACETAMINOPHEN 325 MG/1
TABLET ORAL
Status: DISPENSED
Start: 2021-02-03

## (undated) RX ORDER — IPRATROPIUM BROMIDE AND ALBUTEROL SULFATE 2.5; .5 MG/3ML; MG/3ML
SOLUTION RESPIRATORY (INHALATION)
Status: DISPENSED
Start: 2024-01-01

## (undated) RX ORDER — CEFAZOLIN SODIUM/WATER 2 G/20 ML
SYRINGE (ML) INTRAVENOUS
Status: DISPENSED
Start: 2024-01-01

## (undated) RX ORDER — PROPOFOL 10 MG/ML
INJECTION, EMULSION INTRAVENOUS
Status: DISPENSED
Start: 2020-01-07

## (undated) RX ORDER — GLYCOPYRROLATE 0.2 MG/ML
INJECTION, SOLUTION INTRAMUSCULAR; INTRAVENOUS
Status: DISPENSED
Start: 2020-01-07

## (undated) RX ORDER — CEFTRIAXONE SODIUM 1 G/50ML
INJECTION, SOLUTION INTRAVENOUS
Status: DISPENSED
Start: 2021-02-03